# Patient Record
Sex: FEMALE | Race: WHITE | Employment: OTHER | URBAN - METROPOLITAN AREA
[De-identification: names, ages, dates, MRNs, and addresses within clinical notes are randomized per-mention and may not be internally consistent; named-entity substitution may affect disease eponyms.]

---

## 2021-10-12 ENCOUNTER — APPOINTMENT (OUTPATIENT)
Dept: GENERAL RADIOLOGY | Facility: CLINIC | Age: 76
DRG: 291 | End: 2021-10-12
Payer: MEDICARE

## 2021-10-12 ENCOUNTER — HOSPITAL ENCOUNTER (INPATIENT)
Age: 76
LOS: 23 days | Discharge: NURSING, REHAB OR LONG-TERM CARE | DRG: 291 | End: 2021-11-04
Attending: EMERGENCY MEDICINE | Admitting: INTERNAL MEDICINE
Payer: MEDICARE

## 2021-10-12 DIAGNOSIS — R09.02 HYPOXIA: Primary | ICD-10-CM

## 2021-10-12 DIAGNOSIS — D64.9 ANEMIA, UNSPECIFIED TYPE: ICD-10-CM

## 2021-10-12 DIAGNOSIS — R60.9 PERIPHERAL EDEMA: ICD-10-CM

## 2021-10-12 DIAGNOSIS — J90 PLEURAL EFFUSION ON RIGHT: ICD-10-CM

## 2021-10-12 DIAGNOSIS — N17.9 ACUTE KIDNEY INJURY (HCC): ICD-10-CM

## 2021-10-12 PROBLEM — N18.4 ACUTE RENAL FAILURE SUPERIMPOSED ON STAGE 4 CHRONIC KIDNEY DISEASE (HCC): Status: ACTIVE | Noted: 2021-10-12

## 2021-10-12 PROBLEM — D50.9 IRON DEFICIENCY ANEMIA: Status: ACTIVE | Noted: 2021-10-12

## 2021-10-12 PROBLEM — R53.1 GENERAL WEAKNESS: Status: ACTIVE | Noted: 2021-10-12

## 2021-10-12 PROBLEM — I10 ESSENTIAL HYPERTENSION: Status: ACTIVE | Noted: 2021-10-12

## 2021-10-12 LAB
ABSOLUTE EOS #: 0.27 K/UL (ref 0–0.4)
ABSOLUTE IMMATURE GRANULOCYTE: ABNORMAL K/UL (ref 0–0.3)
ABSOLUTE LYMPH #: 0.53 K/UL (ref 1–4.8)
ABSOLUTE MONO #: 0.8 K/UL (ref 0.1–0.8)
ALBUMIN SERPL-MCNC: 3.3 G/DL (ref 3.5–5.2)
ALBUMIN/GLOBULIN RATIO: 0.9 (ref 1–2.5)
ALP BLD-CCNC: 149 U/L (ref 35–104)
ALT SERPL-CCNC: 6 U/L (ref 5–33)
ANION GAP SERPL CALCULATED.3IONS-SCNC: 14 MMOL/L (ref 9–17)
AST SERPL-CCNC: 10 U/L
BASOPHILS # BLD: 0 % (ref 0–2)
BASOPHILS ABSOLUTE: 0 K/UL (ref 0–0.2)
BILIRUB SERPL-MCNC: 0.2 MG/DL (ref 0.3–1.2)
BILIRUBIN DIRECT: 0.1 MG/DL
BILIRUBIN URINE: NEGATIVE
BILIRUBIN, INDIRECT: 0.1 MG/DL (ref 0–1)
BNP INTERPRETATION: ABNORMAL
BUN BLDV-MCNC: 54 MG/DL (ref 8–23)
BUN/CREAT BLD: ABNORMAL (ref 9–20)
CALCIUM SERPL-MCNC: 8.3 MG/DL (ref 8.6–10.4)
CHLORIDE BLD-SCNC: 102 MMOL/L (ref 98–107)
CO2: 20 MMOL/L (ref 20–31)
COLOR: YELLOW
COMMENT UA: NORMAL
CREAT SERPL-MCNC: 3.1 MG/DL (ref 0.5–0.9)
D-DIMER QUANTITATIVE: 2.38 MG/L FEU
DIFFERENTIAL TYPE: ABNORMAL
EOSINOPHILS RELATIVE PERCENT: 3 % (ref 1–4)
GFR AFRICAN AMERICAN: 18 ML/MIN
GFR NON-AFRICAN AMERICAN: 15 ML/MIN
GFR SERPL CREATININE-BSD FRML MDRD: ABNORMAL ML/MIN/{1.73_M2}
GFR SERPL CREATININE-BSD FRML MDRD: ABNORMAL ML/MIN/{1.73_M2}
GLOBULIN: ABNORMAL G/DL (ref 1.5–3.8)
GLUCOSE BLD-MCNC: 94 MG/DL (ref 70–99)
GLUCOSE URINE: NEGATIVE
HCT VFR BLD CALC: 29.7 % (ref 36–46)
HEMOGLOBIN: 8.8 G/DL (ref 12–16)
IMMATURE GRANULOCYTES: ABNORMAL %
INR BLD: 1
KETONES, URINE: NEGATIVE
LEUKOCYTE ESTERASE, URINE: NEGATIVE
LYMPHOCYTES # BLD: 6 % (ref 24–44)
MCH RBC QN AUTO: 24.8 PG (ref 26–34)
MCHC RBC AUTO-ENTMCNC: 29.7 G/DL (ref 31–37)
MCV RBC AUTO: 83.3 FL (ref 80–100)
MONOCYTES # BLD: 9 % (ref 1–7)
MORPHOLOGY: ABNORMAL
MORPHOLOGY: ABNORMAL
NITRITE, URINE: NEGATIVE
NRBC AUTOMATED: ABNORMAL PER 100 WBC
OSMOLALITY URINE: 347 MOSM/KG (ref 300–1170)
PDW BLD-RTO: 20.7 % (ref 12.5–15.4)
PH UA: 5.5 (ref 5–8)
PLATELET # BLD: 426 K/UL (ref 140–450)
PLATELET ESTIMATE: ABNORMAL
PMV BLD AUTO: 7.8 FL (ref 6–12)
POTASSIUM SERPL-SCNC: 5.1 MMOL/L (ref 3.7–5.3)
PRO-BNP: 5650 PG/ML
PROTEIN UA: NEGATIVE
PROTHROMBIN TIME: 13.4 SEC (ref 11.5–14.2)
RBC # BLD: 3.57 M/UL (ref 4–5.2)
RBC # BLD: ABNORMAL 10*6/UL
SARS-COV-2, RAPID: NOT DETECTED
SEG NEUTROPHILS: 82 % (ref 36–66)
SEGMENTED NEUTROPHILS ABSOLUTE COUNT: 7.3 K/UL (ref 1.8–7.7)
SODIUM BLD-SCNC: 136 MMOL/L (ref 135–144)
SPECIFIC GRAVITY UA: 1.02 (ref 1–1.03)
SPECIMEN DESCRIPTION: NORMAL
TOTAL PROTEIN: 6.8 G/DL (ref 6.4–8.3)
TROPONIN INTERP: ABNORMAL
TROPONIN T: ABNORMAL NG/ML
TROPONIN, HIGH SENSITIVITY: 30 NG/L (ref 0–14)
TURBIDITY: CLEAR
URINE HGB: NEGATIVE
UROBILINOGEN, URINE: NORMAL
WBC # BLD: 8.9 K/UL (ref 3.5–11)
WBC # BLD: ABNORMAL 10*3/UL

## 2021-10-12 PROCEDURE — 84300 ASSAY OF URINE SODIUM: CPT

## 2021-10-12 PROCEDURE — 80048 BASIC METABOLIC PNL TOTAL CA: CPT

## 2021-10-12 PROCEDURE — 81003 URINALYSIS AUTO W/O SCOPE: CPT

## 2021-10-12 PROCEDURE — 99285 EMERGENCY DEPT VISIT HI MDM: CPT

## 2021-10-12 PROCEDURE — 99222 1ST HOSP IP/OBS MODERATE 55: CPT | Performed by: NURSE PRACTITIONER

## 2021-10-12 PROCEDURE — APPNB30 APP NON BILLABLE TIME 0-30 MINS: Performed by: NURSE PRACTITIONER

## 2021-10-12 PROCEDURE — 83880 ASSAY OF NATRIURETIC PEPTIDE: CPT

## 2021-10-12 PROCEDURE — 87040 BLOOD CULTURE FOR BACTERIA: CPT

## 2021-10-12 PROCEDURE — 85025 COMPLETE CBC W/AUTO DIFF WBC: CPT

## 2021-10-12 PROCEDURE — 84156 ASSAY OF PROTEIN URINE: CPT

## 2021-10-12 PROCEDURE — 36415 COLL VENOUS BLD VENIPUNCTURE: CPT

## 2021-10-12 PROCEDURE — 2060000000 HC ICU INTERMEDIATE R&B

## 2021-10-12 PROCEDURE — 51702 INSERT TEMP BLADDER CATH: CPT

## 2021-10-12 PROCEDURE — 93005 ELECTROCARDIOGRAM TRACING: CPT | Performed by: EMERGENCY MEDICINE

## 2021-10-12 PROCEDURE — 80076 HEPATIC FUNCTION PANEL: CPT

## 2021-10-12 PROCEDURE — 2580000003 HC RX 258: Performed by: NURSE PRACTITIONER

## 2021-10-12 PROCEDURE — 83935 ASSAY OF URINE OSMOLALITY: CPT

## 2021-10-12 PROCEDURE — 87635 SARS-COV-2 COVID-19 AMP PRB: CPT

## 2021-10-12 PROCEDURE — 71045 X-RAY EXAM CHEST 1 VIEW: CPT

## 2021-10-12 PROCEDURE — 84484 ASSAY OF TROPONIN QUANT: CPT

## 2021-10-12 PROCEDURE — 85610 PROTHROMBIN TIME: CPT

## 2021-10-12 PROCEDURE — 85379 FIBRIN DEGRADATION QUANT: CPT

## 2021-10-12 RX ORDER — SODIUM CHLORIDE 0.9 % (FLUSH) 0.9 %
5-40 SYRINGE (ML) INJECTION EVERY 12 HOURS SCHEDULED
Status: DISCONTINUED | OUTPATIENT
Start: 2021-10-12 | End: 2021-10-13 | Stop reason: SDUPTHER

## 2021-10-12 RX ORDER — ONDANSETRON 2 MG/ML
4 INJECTION INTRAMUSCULAR; INTRAVENOUS EVERY 6 HOURS PRN
Status: DISCONTINUED | OUTPATIENT
Start: 2021-10-12 | End: 2021-11-04 | Stop reason: HOSPADM

## 2021-10-12 RX ORDER — ONDANSETRON 4 MG/1
4 TABLET, ORALLY DISINTEGRATING ORAL EVERY 8 HOURS PRN
Status: DISCONTINUED | OUTPATIENT
Start: 2021-10-12 | End: 2021-11-04 | Stop reason: HOSPADM

## 2021-10-12 RX ORDER — ACETAMINOPHEN 650 MG/1
650 SUPPOSITORY RECTAL EVERY 6 HOURS PRN
Status: DISCONTINUED | OUTPATIENT
Start: 2021-10-12 | End: 2021-11-04 | Stop reason: HOSPADM

## 2021-10-12 RX ORDER — ACETAMINOPHEN 325 MG/1
650 TABLET ORAL EVERY 6 HOURS PRN
Status: DISCONTINUED | OUTPATIENT
Start: 2021-10-12 | End: 2021-11-04 | Stop reason: HOSPADM

## 2021-10-12 RX ORDER — HEPARIN SODIUM 5000 [USP'U]/ML
5000 INJECTION, SOLUTION INTRAVENOUS; SUBCUTANEOUS EVERY 8 HOURS SCHEDULED
Status: DISCONTINUED | OUTPATIENT
Start: 2021-10-13 | End: 2021-10-21

## 2021-10-12 RX ORDER — SODIUM POLYSTYRENE SULFONATE 15 G/60ML
15 SUSPENSION ORAL; RECTAL
Status: ACTIVE | OUTPATIENT
Start: 2021-10-12 | End: 2021-10-12

## 2021-10-12 RX ORDER — SODIUM CHLORIDE 0.9 % (FLUSH) 0.9 %
5-40 SYRINGE (ML) INJECTION PRN
Status: DISCONTINUED | OUTPATIENT
Start: 2021-10-12 | End: 2021-10-13 | Stop reason: SDUPTHER

## 2021-10-12 RX ORDER — SODIUM CHLORIDE 9 MG/ML
25 INJECTION, SOLUTION INTRAVENOUS PRN
Status: DISCONTINUED | OUTPATIENT
Start: 2021-10-12 | End: 2021-10-13 | Stop reason: SDUPTHER

## 2021-10-12 RX ADMIN — SODIUM CHLORIDE, PRESERVATIVE FREE 10 ML: 5 INJECTION INTRAVENOUS at 22:42

## 2021-10-12 ASSESSMENT — ENCOUNTER SYMPTOMS
COUGH: 0
ABDOMINAL PAIN: 0
COLOR CHANGE: 0
VOMITING: 0
NAUSEA: 0
SORE THROAT: 0
SINUS PRESSURE: 0
DIARRHEA: 1
SHORTNESS OF BREATH: 0

## 2021-10-12 ASSESSMENT — PAIN SCALES - GENERAL: PAINLEVEL_OUTOF10: 0

## 2021-10-12 NOTE — ED NOTES
Pt and family updated on room assignment and transport ETA. Pt given a couple of ice chips to wet mouth. Pt boosted in bed and provided with pillow.       Faustina Horton RN  10/12/21 6719

## 2021-10-12 NOTE — ED NOTES
RN called [facility] to state to check their equipment d/t their report of saturation 97% when pt saturation was 45%. Spoke with Yloy Sandoval, she states she will pass the information along to who it may concern.         Norman Mclaughlin RN  10/12/21 9162

## 2021-10-12 NOTE — ED PROVIDER NOTES
[address]      Pt Name: Stephen Mcleod  MRN: [id number]  [place] 1945  Date of evaluation: 10/12/2021      CHIEF COMPLAINT       Chief Complaint   Patient presents with    Respiratory Distress     EMS reported 97% on room air; pt arrives with saturation 45% on room air    Failure To Thrive         HISTORY OF PRESENT ILLNESS      The patient presents to the emergency department via ambulance for reported failure to thrive. The patient lives at home. Her son is with her and says that she has had decreased energy and ability to get around. She is not opposed to going to a nursing home at this point. The patient does report immunization against Covid. She has had diarrhea for about 3 days. She was brought in, in a hypoxic state. The patient denies chest pain. She denies headache. She does feel generally weak. She has peripheral edema but did not really know what that was an ongoing issue for her. REVIEW OF SYSTEMS       All systems reviewed and negative unless noted in HPI. The patient denies fever or constitutional symptoms. Denies vision change. Denies any sore throat or rhinorrhea. Denies any neck pain or stiffness. Denies chest pain. Hypoxia today. Recent diarrhea. Denies abdominal pain. Denies any dysuria. Denies urinary frequency or hematuria. Denies musculoskeletal injury or pain. Denies any weakness, numbness or focal neurologic deficit. Generalized weakness and failure to thrive. Ongoing edema without diuretics. No recent psychiatric issues. No easy bruising or bleeding. Denies any polyuria, polydypsia or history of immunocompromise. PAST MEDICAL HISTORY    has no past medical history on file. SURGICAL HISTORY      has no past surgical history on file. CURRENT MEDICATIONS       Previous Medications    No medications on file       ALLERGIES     is allergic to penicillins.     FAMILY HISTORY Impression:    Significant right pleural effusion and associated airspace disease.  Mild   left basilar airspace disease.  Pneumonia cannot be excluded. Narrative:    EXAMINATION:   ONE XRAY VIEW OF THE CHEST     10/12/2021 3:55 pm     COMPARISON:   None. HISTORY:   ORDERING SYSTEM PROVIDED HISTORY: dyspnea   TECHNOLOGIST PROVIDED HISTORY:   dyspnea   Reason for Exam: respiratory distress   Acuity: Acute   Type of Exam: Initial     FINDINGS:   Right cardiomediastinal border is obscured.  No definite cardiomegaly. Calcifications noted of the aortic arch.  Moderate to large right pleural   effusion and associated airspace disease.  Mild left basilar airspace   disease.  Trace left pleural effusion cannot be excluded.  No pneumothorax or   subdiaphragmatic free air.  No acute osseous abnormality identified. LABS:  Results for orders placed or performed during the hospital encounter of 10/12/21   COVID-19, Rapid    Specimen: Nasopharyngeal Swab   Result Value Ref Range    Specimen Description . NASOPHARYNGEAL SWAB     SARS-CoV-2, Rapid Not Detected Not Detected   CBC Auto Differential   Result Value Ref Range    WBC 8.9 3.5 - 11.0 k/uL    RBC 3.57 (L) 4.0 - 5.2 m/uL    Hemoglobin 8.8 (L) 12.0 - 16.0 g/dL    Hematocrit 29.7 (L) 36 - 46 %    MCV 83.3 80 - 100 fL    MCH 24.8 (L) 26 - 34 pg    MCHC 29.7 (L) 31 - 37 g/dL    RDW 20.7 (H) 12.5 - 15.4 %    Platelets 077 200 - 881 k/uL    MPV 7.8 6.0 - 12.0 fL    NRBC Automated NOT REPORTED per 100 WBC    Differential Type NOT REPORTED     Immature Granulocytes NOT REPORTED 0 %    Absolute Immature Granulocyte NOT REPORTED 0.00 - 0.30 k/uL    WBC Morphology NOT REPORTED     RBC Morphology NOT REPORTED     Platelet Estimate NOT REPORTED     Seg Neutrophils 82 (H) 36 - 66 %    Lymphocytes 6 (L) 24 - 44 %    Monocytes 9 (H) 1 - 7 %    Eosinophils % 3 1 - 4 %    Basophils 0 0 - 2 %    Segs Absolute 7.30 1.8 - 7.7 k/uL    Absolute Lymph # 0.53 (L) 1.0 - 4.8 k/uL    Absolute Mono # 0.80 0.1 - 0.8 k/uL    Absolute Eos # 0.27 0.0 - 0.4 k/uL    Basophils Absolute 0.00 0.0 - 0.2 k/uL    Morphology ANISOCYTOSIS PRESENT     Morphology HYPOCHROMIA PRESENT    Basic Metabolic Panel   Result Value Ref Range    Glucose 94 70 - 99 mg/dL    BUN 54 (H) 8 - 23 mg/dL    CREATININE 3.10 (H) 0.50 - 0.90 mg/dL    Bun/Cre Ratio NOT REPORTED 9 - 20    Calcium 8.3 (L) 8.6 - 10.4 mg/dL    Sodium 136 135 - 144 mmol/L    Potassium 5.1 3.7 - 5.3 mmol/L    Chloride 102 98 - 107 mmol/L    CO2 20 20 - 31 mmol/L    Anion Gap 14 9 - 17 mmol/L    GFR Non-African American 15 (L) >60 mL/min    GFR  18 (L) >60 mL/min    GFR Comment          GFR Staging NOT REPORTED    Hepatic Function Panel   Result Value Ref Range    Albumin 3.3 (L) 3.5 - 5.2 g/dL    Alkaline Phosphatase 149 (H) 35 - 104 U/L    ALT 6 5 - 33 U/L    AST 10 <32 U/L    Total Bilirubin 0.20 (L) 0.3 - 1.2 mg/dL    Bilirubin, Direct 0.10 <0.31 mg/dL    Bilirubin, Indirect 0.10 0.00 - 1.00 mg/dL    Total Protein 6.8 6.4 - 8.3 g/dL    Globulin NOT REPORTED 1.5 - 3.8 g/dL    Albumin/Globulin Ratio 0.9 (L) 1.0 - 2.5   Troponin   Result Value Ref Range    Troponin, High Sensitivity 30 (H) 0 - 14 ng/L    Troponin T NOT REPORTED <0.03 ng/mL    Troponin Interp NOT REPORTED    D-Dimer, Quantitative   Result Value Ref Range    D-Dimer, Quant 2.38 mg/L FEU   Brain Natriuretic Peptide   Result Value Ref Range    Pro-BNP 5,650 (H) <300 pg/mL    BNP Interpretation Pro-BNP Reference Range:          EMERGENCY DEPARTMENT COURSE:   Vitals:    Vitals:    10/12/21 1506 10/12/21 1527 10/12/21 1604 10/12/21 1805   BP: (!) 119/57  (!) 129/56 (!) 120/58   Pulse: 83 80 81 80   Resp: 21 18 17 16   Temp: 98 °F (36.7 °C)      TempSrc: Oral      SpO2: 96% 95% 95% 98%   Weight: 63 kg (139 lb)        -------------------------  BP: (!) 120/58, Temp: 98 °F (36.7 °C), Pulse: 80, Resp: 16      Re-evaluation Notes    The patient responded well to supplemental oxygen. She has a right pleural effusion and pedal edema with acute kidney injury, which makes diuresis more risky. I have arranged for her to go to [facility] for further evaluation. She will receive a consultation from the nephrologist.  The patient is admitted to the hospitalist service. She is transferred via ground ambulance in stable condition. CRITICAL CARE:     Critical Care: The high probability of sudden, clinically significant deterioration in the patient's condition required the highest level of my preparedness to intervene urgently. ? The services I provided to this patient were to treat and/or prevent clinically significant deterioration. Services included the following: chart data review, reviewing nursing notes and/or old charts, documentation time, consultant collaboration regarding findings and treatment options, medication orders and management, direct patient care, vital sign assessments and ordering, interpreting and reviewing diagnostic studies/lab tests. ?  Aggregate critical care time includes only time during which I was engaged in work directly related to the patient's care, as described above, whether at the bedside or elsewhere in the Emergency Department. It did not include time spent performing other reported procedures or the services of residents, students, nurses, nurse practitioners or physician assistants. ?  Critical Care: 30 minutes. Management of hypoxia, edema, and acute kidney injury. CONSULTS:    [address] contacted for transfer to [address] [place], [place] [address]ist requested. [phone]  Discussed with Lori from [facility]. FINAL IMPRESSION      1. Hypoxia    2. Pleural effusion on right    3. Peripheral edema    4. Anemia, unspecified type    5.  Acute kidney injury ([facility] [address]          DISPOSITION/PLAN   DISPOSITION        Condition on Disposition    stable    PATIENT REFERRED TO:  No follow-up provider specified.     DISCHARGE MEDICATIONS:  New Prescriptions    No medications on file       (Please note that portions of this note were completed with a voice recognition program.  Efforts were made to edit the dictations but occasionally words are mis-transcribed.)    Ama Fatima MD,, MD   Attending Emergency Physician       Brandin Frye MD  10/12/21 5396

## 2021-10-13 ENCOUNTER — APPOINTMENT (OUTPATIENT)
Dept: GENERAL RADIOLOGY | Age: 76
DRG: 291 | End: 2021-10-13
Payer: MEDICARE

## 2021-10-13 ENCOUNTER — APPOINTMENT (OUTPATIENT)
Dept: NUCLEAR MEDICINE | Age: 76
DRG: 291 | End: 2021-10-13
Payer: MEDICARE

## 2021-10-13 ENCOUNTER — APPOINTMENT (OUTPATIENT)
Dept: ULTRASOUND IMAGING | Age: 76
DRG: 291 | End: 2021-10-13
Payer: MEDICARE

## 2021-10-13 ENCOUNTER — APPOINTMENT (OUTPATIENT)
Dept: INTERVENTIONAL RADIOLOGY/VASCULAR | Age: 76
DRG: 291 | End: 2021-10-13
Payer: MEDICARE

## 2021-10-13 PROBLEM — I50.31 ACUTE DIASTOLIC HEART FAILURE (HCC): Status: ACTIVE | Noted: 2021-10-13

## 2021-10-13 PROBLEM — E43 SEVERE MALNUTRITION (HCC): Status: ACTIVE | Noted: 2021-10-13

## 2021-10-13 PROBLEM — E44.0 MODERATE PROTEIN-CALORIE MALNUTRITION (HCC): Status: ACTIVE | Noted: 2021-10-13

## 2021-10-13 PROBLEM — E55.9 VITAMIN D DEFICIENCY: Status: ACTIVE | Noted: 2021-10-13

## 2021-10-13 LAB
-: ABNORMAL
ALBUMIN SERPL-MCNC: 3.2 G/DL (ref 3.5–5.2)
ALBUMIN/GLOBULIN RATIO: ABNORMAL (ref 1–2.5)
ALP BLD-CCNC: 146 U/L (ref 35–104)
ALT SERPL-CCNC: 9 U/L (ref 5–33)
AMORPHOUS: ABNORMAL
ANION GAP SERPL CALCULATED.3IONS-SCNC: 12 MMOL/L (ref 9–17)
ANION GAP SERPL CALCULATED.3IONS-SCNC: 12 MMOL/L (ref 9–17)
AST SERPL-CCNC: 10 U/L
BACTERIA: ABNORMAL
BILIRUB SERPL-MCNC: 0.2 MG/DL (ref 0.3–1.2)
BILIRUBIN URINE: NEGATIVE
BUN BLDV-MCNC: 54 MG/DL (ref 8–23)
BUN BLDV-MCNC: 54 MG/DL (ref 8–23)
BUN/CREAT BLD: 18 (ref 9–20)
BUN/CREAT BLD: 19 (ref 9–20)
CALCIUM SERPL-MCNC: 7.8 MG/DL (ref 8.6–10.4)
CALCIUM SERPL-MCNC: 8 MG/DL (ref 8.6–10.4)
CASE NUMBER:: NORMAL
CASTS UA: ABNORMAL /LPF
CHLORIDE BLD-SCNC: 106 MMOL/L (ref 98–107)
CHLORIDE BLD-SCNC: 107 MMOL/L (ref 98–107)
CHLORIDE, UR: 60 MMOL/L
CO2: 19 MMOL/L (ref 20–31)
CO2: 21 MMOL/L (ref 20–31)
COLOR: YELLOW
COMMENT UA: ABNORMAL
CORTISOL COLLECTION INFO: ABNORMAL
CORTISOL: 21.8 UG/DL (ref 2.7–18.4)
CREAT SERPL-MCNC: 2.86 MG/DL (ref 0.5–0.9)
CREAT SERPL-MCNC: 3.01 MG/DL (ref 0.5–0.9)
CREATININE URINE: 76 MG/DL (ref 28–217)
CRYSTALS, UA: ABNORMAL /HPF
EKG ATRIAL RATE: 79 BPM
EKG P AXIS: 27 DEGREES
EKG P-R INTERVAL: 98 MS
EKG Q-T INTERVAL: 386 MS
EKG QRS DURATION: 74 MS
EKG QTC CALCULATION (BAZETT): 442 MS
EKG R AXIS: 28 DEGREES
EKG T AXIS: 146 DEGREES
EKG VENTRICULAR RATE: 79 BPM
EPITHELIAL CELLS UA: ABNORMAL /HPF (ref 0–5)
FERRITIN: 20 UG/L (ref 13–150)
FOLATE: 12.3 NG/ML
GFR AFRICAN AMERICAN: 18 ML/MIN
GFR AFRICAN AMERICAN: 19 ML/MIN
GFR NON-AFRICAN AMERICAN: 15 ML/MIN
GFR NON-AFRICAN AMERICAN: 16 ML/MIN
GFR SERPL CREATININE-BSD FRML MDRD: ABNORMAL ML/MIN/{1.73_M2}
GLUCOSE BLD-MCNC: 56 MG/DL (ref 70–99)
GLUCOSE BLD-MCNC: 60 MG/DL (ref 65–105)
GLUCOSE BLD-MCNC: 77 MG/DL (ref 65–105)
GLUCOSE BLD-MCNC: 78 MG/DL (ref 65–105)
GLUCOSE BLD-MCNC: 85 MG/DL (ref 70–99)
GLUCOSE BLD-MCNC: 88 MG/DL (ref 65–105)
GLUCOSE URINE: NEGATIVE
HCT VFR BLD CALC: 30.5 % (ref 36.3–47.1)
HEMOGLOBIN: 8.5 G/DL (ref 11.9–15.1)
IRON SATURATION: 9 % (ref 20–55)
IRON: 22 UG/DL (ref 37–145)
KETONES, URINE: NEGATIVE
LACTATE DEHYDROGENASE: 197 U/L (ref 135–214)
LEUKOCYTE ESTERASE, URINE: ABNORMAL
LV EF: 65 %
LVEF MODALITY: NORMAL
MAGNESIUM: 3.1 MG/DL (ref 1.6–2.6)
MCH RBC QN AUTO: 24.1 PG (ref 25.2–33.5)
MCHC RBC AUTO-ENTMCNC: 27.9 G/DL (ref 28.4–34.8)
MCV RBC AUTO: 86.6 FL (ref 82.6–102.9)
MUCUS: ABNORMAL
NITRITE, URINE: NEGATIVE
NRBC AUTOMATED: 0 PER 100 WBC
OTHER OBSERVATIONS UA: ABNORMAL
PDW BLD-RTO: 19.3 % (ref 11.8–14.4)
PH FLUID: 7.5
PH UA: 5.5 (ref 5–8)
PLATELET # BLD: 376 K/UL (ref 138–453)
PMV BLD AUTO: 10.2 FL (ref 8.1–13.5)
POTASSIUM SERPL-SCNC: 5 MMOL/L (ref 3.7–5.3)
POTASSIUM SERPL-SCNC: 5.7 MMOL/L (ref 3.7–5.3)
PROTEIN UA: NEGATIVE
RBC # BLD: 3.52 M/UL (ref 3.95–5.11)
RBC UA: ABNORMAL /HPF (ref 0–2)
RENAL EPITHELIAL, UA: ABNORMAL /HPF
SODIUM BLD-SCNC: 138 MMOL/L (ref 135–144)
SODIUM BLD-SCNC: 139 MMOL/L (ref 135–144)
SODIUM,UR: 40 MMOL/L
SODIUM,UR: <20 MMOL/L
SPECIFIC GRAVITY UA: 1.02 (ref 1–1.03)
SPECIMEN DESCRIPTION: NORMAL
SPECIMEN TYPE: NORMAL
SPECIMEN TYPE: NORMAL
TOTAL IRON BINDING CAPACITY: 237 UG/DL (ref 250–450)
TOTAL PROTEIN, BODY FLUID: 2.7 G/DL
TOTAL PROTEIN, URINE: 11 MG/DL
TOTAL PROTEIN, URINE: 19 MG/DL
TOTAL PROTEIN: 6 G/DL (ref 6.4–8.3)
TOTAL PROTEIN: 6.3 G/DL (ref 6.4–8.3)
TRICHOMONAS: ABNORMAL
TURBIDITY: ABNORMAL
UNSATURATED IRON BINDING CAPACITY: 215 UG/DL (ref 112–347)
URINE HGB: ABNORMAL
URINE TOTAL PROTEIN CREATININE RATIO: 0.25 (ref 0–0.2)
UROBILINOGEN, URINE: NORMAL
VITAMIN B-12: 1647 PG/ML (ref 232–1245)
VITAMIN D 25-HYDROXY: <5 NG/ML (ref 30–100)
WBC # BLD: 10 K/UL (ref 3.5–11.3)
WBC UA: ABNORMAL /HPF (ref 0–5)
YEAST: ABNORMAL

## 2021-10-13 PROCEDURE — 3430000000 HC RX DIAGNOSTIC RADIOPHARMACEUTICAL: Performed by: NURSE PRACTITIONER

## 2021-10-13 PROCEDURE — 83615 LACTATE (LD) (LDH) ENZYME: CPT

## 2021-10-13 PROCEDURE — 6360000002 HC RX W HCPCS: Performed by: INTERNAL MEDICINE

## 2021-10-13 PROCEDURE — 81001 URINALYSIS AUTO W/SCOPE: CPT

## 2021-10-13 PROCEDURE — 93306 TTE W/DOPPLER COMPLETE: CPT

## 2021-10-13 PROCEDURE — 84155 ASSAY OF PROTEIN SERUM: CPT

## 2021-10-13 PROCEDURE — 80053 COMPREHEN METABOLIC PANEL: CPT

## 2021-10-13 PROCEDURE — 78580 LUNG PERFUSION IMAGING: CPT

## 2021-10-13 PROCEDURE — 88305 TISSUE EXAM BY PATHOLOGIST: CPT

## 2021-10-13 PROCEDURE — 80048 BASIC METABOLIC PNL TOTAL CA: CPT

## 2021-10-13 PROCEDURE — 84300 ASSAY OF URINE SODIUM: CPT

## 2021-10-13 PROCEDURE — 83735 ASSAY OF MAGNESIUM: CPT

## 2021-10-13 PROCEDURE — 87206 SMEAR FLUORESCENT/ACID STAI: CPT

## 2021-10-13 PROCEDURE — 82306 VITAMIN D 25 HYDROXY: CPT

## 2021-10-13 PROCEDURE — 82570 ASSAY OF URINE CREATININE: CPT

## 2021-10-13 PROCEDURE — 82607 VITAMIN B-12: CPT

## 2021-10-13 PROCEDURE — 82436 ASSAY OF URINE CHLORIDE: CPT

## 2021-10-13 PROCEDURE — 71045 X-RAY EXAM CHEST 1 VIEW: CPT

## 2021-10-13 PROCEDURE — 83550 IRON BINDING TEST: CPT

## 2021-10-13 PROCEDURE — 82947 ASSAY GLUCOSE BLOOD QUANT: CPT

## 2021-10-13 PROCEDURE — 88112 CYTOPATH CELL ENHANCE TECH: CPT

## 2021-10-13 PROCEDURE — 6370000000 HC RX 637 (ALT 250 FOR IP): Performed by: NURSE PRACTITIONER

## 2021-10-13 PROCEDURE — 82533 TOTAL CORTISOL: CPT

## 2021-10-13 PROCEDURE — 2500000003 HC RX 250 WO HCPCS: Performed by: STUDENT IN AN ORGANIZED HEALTH CARE EDUCATION/TRAINING PROGRAM

## 2021-10-13 PROCEDURE — 87102 FUNGUS ISOLATION CULTURE: CPT

## 2021-10-13 PROCEDURE — 94761 N-INVAS EAR/PLS OXIMETRY MLT: CPT

## 2021-10-13 PROCEDURE — 32555 ASPIRATE PLEURA W/ IMAGING: CPT | Performed by: RADIOLOGY

## 2021-10-13 PROCEDURE — 87205 SMEAR GRAM STAIN: CPT

## 2021-10-13 PROCEDURE — 89051 BODY FLUID CELL COUNT: CPT

## 2021-10-13 PROCEDURE — 6360000002 HC RX W HCPCS: Performed by: STUDENT IN AN ORGANIZED HEALTH CARE EDUCATION/TRAINING PROGRAM

## 2021-10-13 PROCEDURE — 6360000002 HC RX W HCPCS: Performed by: NURSE PRACTITIONER

## 2021-10-13 PROCEDURE — 2060000000 HC ICU INTERMEDIATE R&B

## 2021-10-13 PROCEDURE — 2580000003 HC RX 258: Performed by: STUDENT IN AN ORGANIZED HEALTH CARE EDUCATION/TRAINING PROGRAM

## 2021-10-13 PROCEDURE — 87075 CULTR BACTERIA EXCEPT BLOOD: CPT

## 2021-10-13 PROCEDURE — 82746 ASSAY OF FOLIC ACID SERUM: CPT

## 2021-10-13 PROCEDURE — 84156 ASSAY OF PROTEIN URINE: CPT

## 2021-10-13 PROCEDURE — A9540 TC99M MAA: HCPCS | Performed by: NURSE PRACTITIONER

## 2021-10-13 PROCEDURE — 2709999900 IR GUIDED THORACENTESIS PLEURAL

## 2021-10-13 PROCEDURE — 76770 US EXAM ABDO BACK WALL COMP: CPT

## 2021-10-13 PROCEDURE — 99233 SBSQ HOSP IP/OBS HIGH 50: CPT | Performed by: STUDENT IN AN ORGANIZED HEALTH CARE EDUCATION/TRAINING PROGRAM

## 2021-10-13 PROCEDURE — 2700000000 HC OXYGEN THERAPY PER DAY

## 2021-10-13 PROCEDURE — 87015 SPECIMEN INFECT AGNT CONCNTJ: CPT

## 2021-10-13 PROCEDURE — 83986 ASSAY PH BODY FLUID NOS: CPT

## 2021-10-13 PROCEDURE — 84157 ASSAY OF PROTEIN OTHER: CPT

## 2021-10-13 PROCEDURE — 6370000000 HC RX 637 (ALT 250 FOR IP): Performed by: STUDENT IN AN ORGANIZED HEALTH CARE EDUCATION/TRAINING PROGRAM

## 2021-10-13 PROCEDURE — 82945 GLUCOSE OTHER FLUID: CPT

## 2021-10-13 PROCEDURE — 82728 ASSAY OF FERRITIN: CPT

## 2021-10-13 PROCEDURE — 87070 CULTURE OTHR SPECIMN AEROBIC: CPT

## 2021-10-13 PROCEDURE — 85027 COMPLETE CBC AUTOMATED: CPT

## 2021-10-13 PROCEDURE — 87116 MYCOBACTERIA CULTURE: CPT

## 2021-10-13 PROCEDURE — APPSS60 APP SPLIT SHARED TIME 46-60 MINUTES: Performed by: NURSE PRACTITIONER

## 2021-10-13 PROCEDURE — 0W993ZZ DRAINAGE OF RIGHT PLEURAL CAVITY, PERCUTANEOUS APPROACH: ICD-10-PCS | Performed by: RADIOLOGY

## 2021-10-13 PROCEDURE — 36415 COLL VENOUS BLD VENIPUNCTURE: CPT

## 2021-10-13 PROCEDURE — 83540 ASSAY OF IRON: CPT

## 2021-10-13 RX ORDER — SODIUM CHLORIDE 0.9 % (FLUSH) 0.9 %
5-40 SYRINGE (ML) INJECTION PRN
Status: DISCONTINUED | OUTPATIENT
Start: 2021-10-13 | End: 2021-11-04 | Stop reason: HOSPADM

## 2021-10-13 RX ORDER — FUROSEMIDE 10 MG/ML
40 INJECTION INTRAMUSCULAR; INTRAVENOUS ONCE
Status: COMPLETED | OUTPATIENT
Start: 2021-10-13 | End: 2021-10-13

## 2021-10-13 RX ORDER — ERGOCALCIFEROL 1.25 MG/1
50000 CAPSULE ORAL WEEKLY
Status: DISCONTINUED | OUTPATIENT
Start: 2021-10-13 | End: 2021-11-04 | Stop reason: HOSPADM

## 2021-10-13 RX ORDER — DEXTROSE MONOHYDRATE 25 G/50ML
12.5 INJECTION, SOLUTION INTRAVENOUS PRN
Status: DISCONTINUED | OUTPATIENT
Start: 2021-10-13 | End: 2021-11-04 | Stop reason: HOSPADM

## 2021-10-13 RX ORDER — DEXTROSE MONOHYDRATE 50 MG/ML
100 INJECTION, SOLUTION INTRAVENOUS PRN
Status: DISCONTINUED | OUTPATIENT
Start: 2021-10-13 | End: 2021-11-04 | Stop reason: HOSPADM

## 2021-10-13 RX ORDER — SODIUM CHLORIDE 9 MG/ML
25 INJECTION, SOLUTION INTRAVENOUS PRN
Status: DISCONTINUED | OUTPATIENT
Start: 2021-10-13 | End: 2021-11-04 | Stop reason: HOSPADM

## 2021-10-13 RX ORDER — FOLIC ACID 1 MG/1
1 TABLET ORAL DAILY
Status: DISCONTINUED | OUTPATIENT
Start: 2021-10-13 | End: 2021-10-13

## 2021-10-13 RX ORDER — LEVOFLOXACIN 5 MG/ML
750 INJECTION, SOLUTION INTRAVENOUS ONCE
Status: COMPLETED | OUTPATIENT
Start: 2021-10-13 | End: 2021-10-13

## 2021-10-13 RX ORDER — FUROSEMIDE 10 MG/ML
20 INJECTION INTRAMUSCULAR; INTRAVENOUS ONCE
Status: COMPLETED | OUTPATIENT
Start: 2021-10-13 | End: 2021-10-13

## 2021-10-13 RX ORDER — NICOTINE POLACRILEX 4 MG
15 LOZENGE BUCCAL PRN
Status: DISCONTINUED | OUTPATIENT
Start: 2021-10-13 | End: 2021-11-04 | Stop reason: HOSPADM

## 2021-10-13 RX ORDER — METOPROLOL TARTRATE 100 MG/1
100 TABLET ORAL 2 TIMES DAILY
Status: ON HOLD | COMMUNITY
End: 2021-11-03 | Stop reason: HOSPADM

## 2021-10-13 RX ORDER — MIDODRINE HYDROCHLORIDE 5 MG/1
5 TABLET ORAL ONCE
Status: COMPLETED | OUTPATIENT
Start: 2021-10-13 | End: 2021-10-13

## 2021-10-13 RX ORDER — UREA 10 %
800 LOTION (ML) TOPICAL DAILY
COMMUNITY

## 2021-10-13 RX ORDER — AMLODIPINE BESYLATE 5 MG/1
5 TABLET ORAL DAILY
Status: ON HOLD | COMMUNITY
End: 2021-11-03 | Stop reason: HOSPADM

## 2021-10-13 RX ORDER — SODIUM CHLORIDE 0.9 % (FLUSH) 0.9 %
5-40 SYRINGE (ML) INJECTION EVERY 12 HOURS SCHEDULED
Status: DISCONTINUED | OUTPATIENT
Start: 2021-10-13 | End: 2021-11-04 | Stop reason: HOSPADM

## 2021-10-13 RX ORDER — MAGNESIUM OXIDE 400 MG/1
400 TABLET ORAL DAILY
COMMUNITY

## 2021-10-13 RX ORDER — LANOLIN ALCOHOL/MO/W.PET/CERES
325 CREAM (GRAM) TOPICAL
Status: DISCONTINUED | OUTPATIENT
Start: 2021-10-14 | End: 2021-10-15

## 2021-10-13 RX ORDER — LEVOFLOXACIN 5 MG/ML
500 INJECTION, SOLUTION INTRAVENOUS
Status: DISCONTINUED | OUTPATIENT
Start: 2021-10-15 | End: 2021-10-27

## 2021-10-13 RX ORDER — ACETAMINOPHEN/DIPHENHYDRAMINE 500MG-25MG
1 TABLET ORAL NIGHTLY PRN
Status: ON HOLD | COMMUNITY
End: 2021-11-03 | Stop reason: HOSPADM

## 2021-10-13 RX ORDER — DEXTROSE MONOHYDRATE 25 G/50ML
25 INJECTION, SOLUTION INTRAVENOUS ONCE
Status: COMPLETED | OUTPATIENT
Start: 2021-10-13 | End: 2021-10-13

## 2021-10-13 RX ADMIN — SODIUM CHLORIDE, PRESERVATIVE FREE 10 ML: 5 INJECTION INTRAVENOUS at 21:19

## 2021-10-13 RX ADMIN — Medication 8.5 MILLICURIE: at 13:05

## 2021-10-13 RX ADMIN — FUROSEMIDE 20 MG: 10 INJECTION, SOLUTION INTRAMUSCULAR; INTRAVENOUS at 08:31

## 2021-10-13 RX ADMIN — LEVOFLOXACIN 750 MG: 5 INJECTION, SOLUTION INTRAVENOUS at 08:31

## 2021-10-13 RX ADMIN — FOLIC ACID 1 MG: 1 TABLET ORAL at 12:53

## 2021-10-13 RX ADMIN — INSULIN HUMAN 10 UNITS: 100 INJECTION, SOLUTION PARENTERAL at 08:23

## 2021-10-13 RX ADMIN — FUROSEMIDE 40 MG: 10 INJECTION, SOLUTION INTRAMUSCULAR; INTRAVENOUS at 18:35

## 2021-10-13 RX ADMIN — DEXTROSE MONOHYDRATE 25 G: 25 INJECTION, SOLUTION INTRAVENOUS at 08:31

## 2021-10-13 RX ADMIN — HEPARIN SODIUM 5000 UNITS: 5000 INJECTION INTRAVENOUS; SUBCUTANEOUS at 21:19

## 2021-10-13 RX ADMIN — SODIUM CHLORIDE, PRESERVATIVE FREE 10 ML: 5 INJECTION INTRAVENOUS at 08:31

## 2021-10-13 RX ADMIN — MIDODRINE HYDROCHLORIDE 5 MG: 5 TABLET ORAL at 14:22

## 2021-10-13 RX ADMIN — ERGOCALCIFEROL 50000 UNITS: 1.25 CAPSULE ORAL at 18:35

## 2021-10-13 ASSESSMENT — PAIN SCALES - GENERAL
PAINLEVEL_OUTOF10: 0

## 2021-10-13 NOTE — H&P
[facility]  Office: [address], DO, Piyush John, DO, Bob Sanders, DO, Leidy Wood Blood, DO, Fer Michaud MD, Hanna Lala MD, Lo Monaco MD, Jyoti Shetty MD, Magali Zaragoza MD, Angel Goetz MD, Manav Boogie MD, Michele Joel MD, Ace Braun, DO, Apryl Soria DO, Radha Mcleod MD,  Saranya Cisneros DO, Mauricio High MD, Reilly Heaton MD, Ajit Terrazas MD, Yoly Mccormack MD, Pavel Montelongo MD, Vito Cruz MD, Keaton Cartwright, [facility], [facility], CNP, Nay Haas, CNP, Gian Santos, CNS, Ailene Lesches, CNP, Poppy Martin, CNP, Mechelle Alva, CNP, Cathleen Garza, CNP, Love Perez, CNP, Angelita Puritt, CNP, Jarvis Pan PA-C, Celso Carrera, DNP, Gabrielle Gomez, CNP, Jose Nagel, CNP, Kaia Ram, CNP, Piotr Diaz, CNP, Marina Beltran, CNP, Sanjuanita Morgan, CNP, Dorys Randall, CNP         [address]    HISTORY AND PHYSICAL EXAMINATION            Date:   10/12/2021  Patient name:  Stephen Mcleod  Date of admission:  10/12/2021  2:53 PM  MRN:   [id number]  Account:  [de-identified]  YOB: 1945  PCP:    Ilda Garcia MD  Room:   1012/1012-01  Code Status:    Full Code    Chief Complaint:     Chief Complaint   Patient presents with    Respiratory Distress     EMS reported 97% on room air; pt arrives with saturation 45% on room air    Failure To Thrive       History Obtained From:     patient, electronic medical record    History of Present Illness:     Stephen Mcleod is a 64 y.o. Non- / non  female who presents with Respiratory Distress (EMS reported 97% on room air; pt arrives with saturation 45% on room air) and Failure To Thrive   and is admitted to the hospital for the management of Pleural effusion. 75-year-old female transferred from [facility] emergency room for failure to thrive and hypoxia. Lives at home alone.   According to records her son states that she has had decreased energy and has had difficulty getting around. He feels as if she is not safe at home. Agrees that she has been having difficulty caring for herself. She states that this has been going on for 2 weeks, however, it looks as if she was referred to neurology 2 months ago for weakness. Patient states she has not seen a neurologist. Patient is agreeable to going to a nursing home. When she presented to the emergency room in [place] she was hypoxic at 45% on room air. Does not wear O2 at home. She denies shortness of breath, chest pain, cough or recent cold symptoms. She does complain of generalized weakness. She has had diarrhea for 3 days. She also has edema to the bilateral lower extremities but states this is not new. She had a normal white count. Her hemoglobin is 8.8. The only lab available for comparison was in August of this year when she was 8.4. She does have a history of iron deficiency anemia. her BUN is 34 and creatinine is 3.10. In August of this year her creatinine was 2.34. She does have a history of chronic kidney disease stage IV. States she has seen a nephrologist \"one time\" at the [facility]. D-dimer is 2.38. BNP is also elevated at 5650. Is negative and blood cultures are pending. Chest x-ray incidentally showed right pleural effusion and airspace disease. Pneumonia cannot be excluded. Past Medical History:     History reviewed. No pertinent past medical history. Past Surgical History:     History reviewed. No pertinent surgical history. Medications Prior to Admission:     Prior to Admission medications    Not on File        Allergies:     Penicillins    Social History:     Tobacco:    reports that she quit smoking about 2 years ago. Her smoking use included cigarettes. She has never used smokeless tobacco.  Alcohol:      reports current alcohol use. Drug Use:  has no history on file for drug use.     Family History:     Family History   Problem Relation Age of Onset    Hypertension Mother     Cancer Sister     Cancer Brother        Review of Systems:     Positive and Negative as described in HPI. Review of Systems   Constitutional: Positive for activity change and fatigue. Negative for fever. HENT: Negative for congestion, sinus pressure and sore throat. Respiratory: Negative for cough and shortness of breath. Cardiovascular: Positive for leg swelling. Negative for chest pain and palpitations. Gastrointestinal: Positive for diarrhea. Negative for abdominal pain, nausea and vomiting. Genitourinary: Negative for dysuria, flank pain and frequency. Musculoskeletal: Positive for gait problem. Negative for arthralgias and myalgias. Skin: Negative for color change and rash. Neurological: Negative for dizziness, speech difficulty, weakness, numbness and headaches. Psychiatric/Behavioral: Negative for confusion and decreased concentration. Physical Exam:   BP (!) 122/59   Pulse 81   Temp 97.7 °F (36.5 °C)   Resp 20   Ht 5' 4\" (1.626 m)   Wt 139 lb (63 kg)   SpO2 95%   BMI 23.86 kg/m²   Temp (24hrs), Av.9 °F (36.6 °C), Min:97.7 °F (36.5 °C), Max:98 °F (36.7 °C)    No results for input(s): POCGLU in the last 72 hours. No intake or output data in the 24 hours ending 10/12/21 2207    Physical Exam  Constitutional:       Appearance: She is not toxic-appearing. HENT:      Right Ear: External ear normal.      Left Ear: External ear normal.      Mouth/Throat:      Mouth: Mucous membranes are dry. Eyes:      Extraocular Movements: Extraocular movements intact. Conjunctiva/sclera: Conjunctivae normal.      Pupils: Pupils are equal, round, and reactive to light. Cardiovascular:      Rate and Rhythm: Normal rate and regular rhythm. Pulmonary:      Comments: Speaks in short sentences only. Lungs are diminished throughout. Oxygen saturation 98% on 6 L per nasal cannula  Abdominal:      Palpations: Abdomen is soft. Tenderness: There is no abdominal tenderness. Musculoskeletal:      Right lower leg: Edema present. Left lower leg: Edema present. Skin:     General: Skin is warm and dry. Neurological:      General: No focal deficit present. Mental Status: She is alert and oriented to person, place, and time. Cranial Nerves: No cranial nerve deficit.    Psychiatric:         Mood and Affect: Mood normal.         Behavior: Behavior normal.          Investigations:      Laboratory Testing:  Recent Results (from the past 24 hour(s))   CBC Auto Differential    Collection Time: 10/12/21  3:31 PM   Result Value Ref Range    WBC 8.9 3.5 - 11.0 k/uL    RBC 3.57 (L) 4.0 - 5.2 m/uL    Hemoglobin 8.8 (L) 12.0 - 16.0 g/dL    Hematocrit 29.7 (L) 36 - 46 %    MCV 83.3 80 - 100 fL    MCH 24.8 (L) 26 - 34 pg    MCHC 29.7 (L) 31 - 37 g/dL    RDW 20.7 (H) 12.5 - 15.4 %    Platelets 712 777 - 630 k/uL    MPV 7.8 6.0 - 12.0 fL    NRBC Automated NOT REPORTED per 100 WBC    Differential Type NOT REPORTED     Immature Granulocytes NOT REPORTED 0 %    Absolute Immature Granulocyte NOT REPORTED 0.00 - 0.30 k/uL    WBC Morphology NOT REPORTED     RBC Morphology NOT REPORTED     Platelet Estimate NOT REPORTED     Seg Neutrophils 82 (H) 36 - 66 %    Lymphocytes 6 (L) 24 - 44 %    Monocytes 9 (H) 1 - 7 %    Eosinophils % 3 1 - 4 %    Basophils 0 0 - 2 %    Segs Absolute 7.30 1.8 - 7.7 k/uL    Absolute Lymph # 0.53 (L) 1.0 - 4.8 k/uL    Absolute Mono # 0.80 0.1 - 0.8 k/uL    Absolute Eos # 0.27 0.0 - 0.4 k/uL    Basophils Absolute 0.00 0.0 - 0.2 k/uL    Morphology ANISOCYTOSIS PRESENT     Morphology HYPOCHROMIA PRESENT    Basic Metabolic Panel    Collection Time: 10/12/21  3:31 PM   Result Value Ref Range    Glucose 94 70 - 99 mg/dL    BUN 54 (H) 8 - 23 mg/dL    CREATININE 3.10 (H) 0.50 - 0.90 mg/dL    Bun/Cre Ratio NOT REPORTED 9 - 20    Calcium 8.3 (L) 8.6 - 10.4 mg/dL    Sodium 136 135 - 144 mmol/L    Potassium 5.1 3.7 - 5.3 mmol/L Chloride 102 98 - 107 mmol/L    CO2 20 20 - 31 mmol/L    Anion Gap 14 9 - 17 mmol/L    GFR Non-African American 15 (L) >60 mL/min    GFR  18 (L) >60 mL/min    GFR Comment          GFR Staging NOT REPORTED    Hepatic Function Panel    Collection Time: 10/12/21  3:31 PM   Result Value Ref Range    Albumin 3.3 (L) 3.5 - 5.2 g/dL    Alkaline Phosphatase 149 (H) 35 - 104 U/L    ALT 6 5 - 33 U/L    AST 10 <32 U/L    Total Bilirubin 0.20 (L) 0.3 - 1.2 mg/dL    Bilirubin, Direct 0.10 <0.31 mg/dL    Bilirubin, Indirect 0.10 0.00 - 1.00 mg/dL    Total Protein 6.8 6.4 - 8.3 g/dL    Globulin NOT REPORTED 1.5 - 3.8 g/dL    Albumin/Globulin Ratio 0.9 (L) 1.0 - 2.5   Troponin    Collection Time: 10/12/21  3:31 PM   Result Value Ref Range    Troponin, High Sensitivity 30 (H) 0 - 14 ng/L    Troponin T NOT REPORTED <0.03 ng/mL    Troponin Interp NOT REPORTED    D-Dimer, Quantitative    Collection Time: 10/12/21  3:31 PM   Result Value Ref Range    D-Dimer, Quant 2.38 mg/L FEU   Brain Natriuretic Peptide    Collection Time: 10/12/21  3:31 PM   Result Value Ref Range    Pro-BNP 5,650 (H) <300 pg/mL    BNP Interpretation Pro-BNP Reference Range:    COVID-19, Rapid    Collection Time: 10/12/21  3:54 PM    Specimen: Nasopharyngeal Swab   Result Value Ref Range    Specimen Description . NASOPHARYNGEAL SWAB     SARS-CoV-2, Rapid Not Detected Not Detected   Protime-INR    Collection Time: 10/12/21  8:08 PM   Result Value Ref Range    Protime 13.4 11.5 - 14.2 sec    INR 1.0        Imaging/Diagnostics:  XR CHEST PORTABLE    Result Date: 10/12/2021  Significant right pleural effusion and associated airspace disease. Mild left basilar airspace disease. Pneumonia cannot be excluded.        Assessment :      Hospital Problems         Last Modified POA    * (Principal) Pleural effusion 10/12/2021 Yes    Acute renal failure superimposed on stage 4 chronic kidney disease ([address] 10/12/2021 Yes    Hypoxia 10/12/2021 Yes    Iron deficiency anemia 10/12/2021 Yes    Essential hypertension 10/12/2021 Yes    General weakness 10/12/2021 Yes          Plan:     Patient status inpatient in the  Progressive Unit/Step down    Inpatient admission on telemetry  Continuous pulse ox  Supplemental oxygen as needed  Consultation to interventional radiology for pleural effusion  Consultation to nephrology for acute on chronic renal disease  Await urine osmolality, urine protein and urine sodium. Retroperitoneal ultrasound for acute on chronic renal disease  Monitor labs  VQ scan for hypoxia  Echo  Will add PT/OT and social work consult for the generalized weakness  DVT prophylaxis    Consultations:   IP CONSULT TO NEPHROLOGY  IP CONSULT TO [address] [place] [address]    Patient is admitted as inpatient status because of co-morbidities listed above, severity of signs and symptoms as outlined, requirement for current medical therapies and most importantly because of direct risk to patient if care not provided in a hospital setting. Expected length of stay > 48 hours.     ANN ARIAS CNP  10/12/2021  10:07 PM    Copy sent to Dr. Reshma Sotelo MD

## 2021-10-13 NOTE — PROGRESS NOTES
Comprehensive Nutrition Assessment    Type and Reason for Visit:  Positive Nutrition Screen (Unplanned weight loss)    Nutrition Recommendations/Plan:   1. Continue ADULT DIET; Regular; Low Sodium (2 gm); Low Potassium (Less than 3000 mg/day); 1500 ml  2. Start Ensure [employer] 1x/day and [employer] 1x/day  3. Monitor p.o intakes, diet tolerance and labs    Nutrition Assessment:  Patient admission is related to peripheral edema, hypoxia, pleural effusion and MAYO. Patient was NPO this [facility] for ultrasound guided right thoracentesis. Diet advanced to low sodium, low potassion and 1200 mL fluid restriction. Patient reports she lives at home alone and is no longer able to cook but her son brings her foods. Patient reports usual body weight is 150 lbs. Patient reports a weight loss of 17 lbs in 1 month. Patient appears to have severe malnutrition. Will start Ensure Enlive 1x/day and Magic [employer] 1x/day. Will monitor p.o intakes, diet tolerance and labs. Malnutrition Assessment:  Malnutrition Status:  Severe malnutrition    Context:  Acute Illness     Findings of the 6 clinical characteristics of malnutrition:  Energy Intake:  7 - 50% or less of estimated energy requirements for 5 or more days  Weight Loss:  7 - Greater than 5% over 1 month     Body Fat Loss:  Unable to assess     Muscle Mass Loss:  Unable to assess    Fluid Accumulation:  No significant fluid accumulation Extremities   Strength:  Not Performed    Estimated Daily Nutrient Needs:  Energy (kcal):  8591-0517 kcal (28-30 kcal/kg; Weight Used for Energy Requirements:  Current     Protein (g):  60-72 gm of protein (1.0-1.2 gm/kg); Weight Used for Protein Requirements:  Current          Nutrition Related Findings:  Edema: +2 pitting BLE. S/P Right thoracentesis- 850 mL removed (10/13)      Wounds:  None       Current Nutrition Therapies:    ADULT DIET; Regular; Low Sodium (2 gm);  Low Potassium (Less than 3000 mg/day); 1500 ml    Anthropometric Measures:  · Height: 5' 4\" (162.6 cm)  · Current Body Weight: 133 lb (60.3 kg)   · Admission Body Weight: 139 lb (63 kg) (bed scale)    · Usual Body Weight: 150 lb (68 kg)     · Ideal Body Weight: 120 lbs; % Ideal Body Weight 110.8 %   · BMI: 22.8  · BMI Categories: Normal Weight (BMI 22.0 to 24.9) age over 72       Nutrition Diagnosis:   · Severe malnutrition related to inadequate protein-energy intake as evidenced by intake 0-25%, intake 26-50%, weight loss greater than or equal to 5% in 1 month      Nutrition Interventions:   Food and/or Nutrient Delivery:  Continue Current Diet, Start Oral Nutrition Supplement  Nutrition Education/Counseling:  Education not indicated   Coordination of Nutrition Care:  Continue to monitor while inpatient    Goals:  PO intakes are greater than 50% at meals       Nutrition Monitoring and Evaluation:   Food/Nutrient Intake Outcomes:  Food and Nutrient Intake, Supplement Intake  Physical Signs/Symptoms Outcomes:  Biochemical Data, Fluid Status or Edema, Skin, Weight     Discharge Planning:    Continue current diet, Continue Oral Nutrition Supplement         Foreign PATELN, RDN, LDN  Lead Clinical Dietitian  RD Office Phone [phone]

## 2021-10-13 NOTE — BRIEF OP NOTE
Brief Postoperative Note    Victor Manuel Preciado  YOB: 1945  [id number]    Pre-operative Diagnosis: SOB     Post-operative Diagnosis: Same    Procedure: US guided right thoracentesis    Anesthesia: Local    Surgeons/Assistants: Yanick    Estimated Blood Loss: less than 50     Complications: None    Specimens: Was Obtained: nonturbid straw colored fluid; 850 ml    Electronically signed by Freda Vazquez MD on 10/13/2021 at 9:41 AM

## 2021-10-13 NOTE — CARE COORDINATION
Case Management Initial Discharge Plan  Danielle Wadsworth,             Met with:patient to discuss discharge plans. Information verified: address, contacts, phone number, , insurance Yes  PCP: Jocelyn Corona MD  Date of last visit: 1 month ago    Insurance Provider: [employer]    Discharge Planning    Living Arrangements:  Alone   Support Systems:  Children, Family Members    Home has 2 stories  1 stairs to climb to get into front door, flight of stairs to climb to reach second floor  Location of bedroom/bathroom in home 2nd Floor    Patient able to perform ADL's:Independent    Current Services (outpatient & in home) None  DME equipment: None  DME provider: N/A    Pharmacy: [facility]. Potential Assistance Needed:  N/A    Patient agreeable to home care: Yes  Freedom of choice provided:  yes    Prior SNF/Rehab Placement and Facility: No  Agreeable to SNF/Rehab: Yes  [place] of choice provided: yes   Evaluation: yes    Expected Discharge date:  10/15/21  Patient expects to be discharged to: Follow Up Appointment: Best Day/ Time:     Transportation provider: David  Transportation arrangements needed for discharge: No    Readmission Risk              Risk of Unplanned Readmission:  15             Does patient have a readmission risk score greater than 14?: Yes  If yes, follow-up appointment must be made within 7 days of discharge. Goal of Care:       Discharge Plan:   Met with patient at bedside to discuss d/c plans. Patient is admitted with right pleural effusion. Thoracentesis in IR this am with 850mL removed. Echo - EF 65%  VQ Scan - large right sided pleural effusion and right basilar airspace disease, atelectasis and/or infiltrate. Currently on IV Levaquin. HFNC 60%; 50L - Watch for Home O2 at dc. Independent. Drives. Retired. Patient lives alone.   Patient has never had HC or been to a SNF/LTACH, but open to all of the above depending on what her needs will be.   No DME at home either. Consults: Nephrology    Continue to follow for plan and therapy recommendations.        Electronically signed by Nawaf Solorio RN on 10/13/21 at 4:08 PM EDT

## 2021-10-13 NOTE — PROGRESS NOTES
Physical Therapy  DATE: 10/13/2021    NAME: Waldo Kuhn  MRN: [id number]   : 1945    Patient not seen this date for Physical Therapy due to:      [x] Cancel by RN due to: hold for further testing to r/o PE    [] Hemodialysis    [] Critical Lab Value Level     [] Blood transfusion in progress    [] Acute or unstable cardiovascular status   _MAP < 55 or more than >115  _HR < 40 or > 130    [] Acute or unstable pulmonary status   -FiO2 > 60%   _RR < 5 or >40    _O2 sats < 85%    [] Strict Bedrest    [] Off Unit for surgery or procedure    [] Off Unit for testing       [] Pending imaging to R/O fracture    [] Refusal by Patient      [] Other      [] PT being discontinued at this time. Patient independent. No further needs. [] PT being discontinued at this time as the patient has been transferred to hospice care. No further needs.       Shivam Borges, PT

## 2021-10-13 NOTE — PROGRESS NOTES
Occupational Therapy  DATE: 10/13/2021    NAME: Neema Stevens  MRN: [id number]   : 1945    Patient not seen this date for Occupational Therapy due to:      [x] Cancel by RN or physician due to:10/13 cx per RN Demario Moore as pt's D-dimer is high/not medically stable; continue to follow    [] Hemodialysis    [] Critical Lab Value Level     [] Blood transfusion in progress    [] Acute or unstable cardiovascular status   _MAP < 55 or more than >115  _HR < 40 or > 130    [] Acute or unstable pulmonary status   -FiO2 > 60%   _RR < 5 or >40    _O2 sats < 85%    [] Strict Bedrest    [] Off Unit for surgery or procedure    [] Off Unit for testing       [] Pending imaging to R/O fracture    [] Refusal by Patient      [] Other      [] PT being discontinued at this time. Patient independent. No further needs. [] PT being discontinued at this time as the patient has been transferred to hospice care. No further needs.       Felipa Lanes, OT

## 2021-10-13 NOTE — PRE SEDATION
Sedation Pre-Procedure Note    Patient Name: Jas Shukla   YOB: 1945  Room/Bed: 1012/1012-01  Medical Record Number: [id number]  Date: 10/13/2021   Time: 9:40 AM       Indication:  Pancreatic cancer    Consent: I have discussed with the patient and/or the patient representative the indication, alternatives, and the possible risks and/or complications of the planned procedure and the anesthesia methods. The patient and/or patient representative appear to understand and agree to proceed. Vital Signs:   Vitals:    10/13/21 0847   BP: (!) 88/46   Pulse: 93   Resp: 16   Temp:    SpO2:        Past Medical History:   has no past medical history on file. Past Surgical History:   has no past surgical history on file. Medications:   Scheduled Meds:    levofloxacin  750 mg IntraVENous Once    sodium chloride flush  5-40 mL IntraVENous 2 times per day    [START ON 10/15/2021] levofloxacin  500 mg IntraVENous Q48H    heparin (porcine)  5,000 Units SubCUTAneous 3 times per day     Continuous Infusions:    dextrose      sodium chloride       PRN Meds: glucose, dextrose, glucagon (rDNA), dextrose, sodium chloride flush, sodium chloride, ondansetron **OR** ondansetron, acetaminophen **OR** acetaminophen, perflutren lipid microspheres  Home Meds:   Prior to Admission medications    Not on File     Coumadin Use Last 7 Days:  no  Antiplatelet drug therapy use last 7 days: no  Other anticoagulant use last 7 days: no  Additional Medication Information:  See med rec      Pre-Sedation Documentation and Exam:   I have reviewed the patient's history and review of systems.     Mallampati Airway Assessment:  Mallampati Class II - (soft palate, fauces & uvula are visible)    Prior History of Anesthesia Complications:   none    ASA Classification:  Class 2 - A normal healthy patient with mild systemic disease    Sedation/ Anesthesia Plan:   intravenous sedation    Medications Planned:   midazolam (Versed) intravenously and fentanyl intravenously    Patient is an appropriate candidate for plan of sedation: yes    Electronically signed by Pablo Luna MD on 10/13/2021 at 9:40 AM

## 2021-10-13 NOTE — PROGRESS NOTES
[facility]  Office: [address], DO, Mickey Leiva, DO, Adali Romano, DO, Cori Blakegrace Blood, DO, Shana Orta MD, Ronak Lowry MD, Taylor Ford MD, Claudean Millard, MD, Kristie Bradford MD, Lauren Nunez MD, Marysol Pool MD, Randy Coles MD, Arjun Vergara, DO, Ema Murrieta, DO, Dianne Ross MD,  Marcial Pickering DO, Jefe Aguilar MD, Marie Ruiz MD, Daniella Kim MD, Tasha Brooks MD, Diego Alba MD, Geno Alonso MD, Aida Buckner, [facility], [facility], CNP, Alia Colin, CNP, Ruthie Gomez, CNS, Fifi Mena, CNP, Candelaria Oreilly, CNP, Billy Huynh, CNP, Manish Chavez, CNP, Rosa Rios, CNP, Alejandro Knight, CNP, Vi Oconnor PA-C, Humaira Lord, [facility], Kady Magallanes, CNP, Dale Quijano, CNP, Anupama Gonzalez, CNP, Chris Chen, CNP, Jazmyn Beth, CNP, Radha Costa, [facility], Tim Saint, [place]    Progress Note    10/13/2021    10:04 AM    Name:   Victor Manuel Preciado  MRN:     [id number]     Acct:      [de-identified]   Room:   [address] [place], [place] [address] Day:  1  Admit Date:  10/12/2021  2:53 PM    PCP:   Sammy Garces MD  Code Status:  Full Code    Subjective:     C/C:   Chief Complaint   Patient presents with    Respiratory Distress     EMS reported 97% on room air; pt arrives with saturation 45% on room air    Failure To Thrive     Interval History Status: improved. S/p right thoracentesis this am per IR, 850ml removed. She was initially on 6L nasal cannula and has been weaned to 4L, mildly hypotensive but is asymptomatic. She denies any complaints but seems uninterested in conversation. Afebrile   Brief History:   Victor Manuel Preciado is a 64 y.o. female  with a hx of CKD 4 and iron deficiency anemia who presented to [facility] with Respiratory Distress and hypoxia (O2 sats 45%) and failure to thrive and is admitted to the hospital for the management of right Pleural effusion and acute renal failure.  Patient (1.626 m)   Wt 133 lb 3 oz (60.4 kg)   SpO2 95%   BMI 22.86 kg/m²   Temp (24hrs), Av.9 °F (36.6 °C), Min:97.5 °F (36.4 °C), Max:98.2 °F (36.8 °C)    No results for input(s): POCGLU in the last 72 hours. I/O (24Hr): Intake/Output Summary (Last 24 hours) at 10/13/2021 1010  Last data filed at 10/13/2021 0856  Gross per 24 hour   Intake 140 ml   Output 1750 ml   Net -1610 ml       Labs:  Hematology:  Recent Labs     10/12/21  1531 10/12/21  2008 10/13/21  0530   WBC 8.9  --  10.0   RBC 3.57*  --  3.52*   HGB 8.8*  --  8.5*   HCT 29.7*  --  30.5*   MCV 83.3  --  86.6   MCH 24.8*  --  24.1*   MCHC 29.7*  --  27.9*   RDW 20.7*  --  19.3*     --  376   MPV 7.8  --  10.2   INR  --  1.0  --    DDIMER 2.38  --   --      Chemistry:  Recent Labs     10/12/21  1531 10/13/21  0530    139   K 5.1 5.7*    106   CO2 20 21   GLUCOSE 94 85   BUN 54* 54*   CREATININE 3.10* 3.01*   MG  --  3.1*   ANIONGAP 14 12   LABGLOM 15* 15*   GFRAA 18* 18*   CALCIUM 8.3* 8.0*   PROBNP 5,650*  --    TROPHS 30*  --      Recent Labs     10/12/21  1531 10/13/21  0530   PROT 6.8 6.3*   LABALBU 3.3* 3.2*   AST 10 10   ALT 6 9   ALKPHOS 149* 146*   BILITOT 0.20* 0.20*   BILIDIR 0.10  --      ABG:No results found for: POCPH, PHART, PH, POCPCO2, UOB9DWH, PCO2, POCPO2, PO2ART, PO2, POCHCO3, KFX3YHT, HCO3, NBEA, PBEA, BEART, BE, THGBART, THB, GPC5FSY, MBSU9KHP, J7GZWZIJ, O2SAT, FIO2  Lab Results   Component Value Date/Time    SPECIAL LEFT [facility] 10/12/2021 03:40 PM     Lab Results   Component Value Date/Time    CULTURE NO GROWTH 9 HOURS 10/12/2021 03:40 PM       Radiology:  XR CHEST PORTABLE    Result Date: 10/12/2021  Significant right pleural effusion and associated airspace disease. Mild left basilar airspace disease. Pneumonia cannot be excluded. US RETROPERITONEAL COMPLETE    Result Date: 10/13/2021  Bilateral simple renal cyst. Lomeli catheter within the urinary bladder.        Physical Examination: General appearance:  alert, cooperative and no distress  Mental Status:  oriented to person, place and time and flat affect, appears uninterested in conversation   Lungs: breath sounds diminished bilaterally, normal effort  Heart:  Mildly tachycardic, regular rhythm, no murmur  Abdomen:  soft, nontender, nondistended, normal bowel sounds, no masses, hepatomegaly, splenomegaly  Extremities:  +2 BLE edema, no redness, tenderness in the calves  Skin:  no gross lesions, rashes, induration    Assessment:        Hospital Problems         Last Modified POA    * (Principal) Pleural effusion 10/12/2021 Yes    Acute renal failure superimposed on stage 4 chronic kidney disease ([facility] [address] 10/12/2021 Yes    Hypoxia 10/12/2021 Yes    Iron deficiency anemia 10/12/2021 Yes    Essential hypertension 10/12/2021 Yes    General weakness 10/12/2021 Yes          Plan:        MAYO on CKD 4: likely due to untreated/undiagnosed CHF, patient does not follow with a PCP regularly. Avoid nephrotoxic agents, appreciate nephrology's recommendations. Renal ultrasound unremarkable for acute findings  Right pleural effusion due to Acute CHF, likely has been a chronic issue for sometime, patient is inconsistent with medical history, medical adherence and follow-up. Patient initially reported she has chronic edema however she has reported to other providers that this is a new issue. S/P thoracentesis this am, no previous echo available. Await cardiac echo, fluid restriction, IV diuresis as ordered per nephrology  Hyperkalemia: due to MAYO, patient given IV insulin and dextrose in addition to IV lasix.  Monitor labs, recheck BMP this afternoon   Continue telemetry monitoring   Elevated D-Dimer: likely due to MAYO and CHF, await VQ scan   Resume cardiac/renal diet   IV levaquin started as Pneumonia could no be excluded on xray  Acute on chronic Iron deficiency anemia, check iron studies, start folvite  PT/OT evaluation   Plan discussed with patient and staff     ANN COBB NP  10/13/2021  10:10 AM

## 2021-10-13 NOTE — PROGRESS NOTES
Patient transferred from [facility]. VS obtained and head to toe assessment completed. Pt placed on 6 L NC. O2 sat is 95%. Admission database initiated and orders released. Pt is unable to remember what medications she takes at home and states her son will bring in a list tomorrow. Pt does not complain of any pain. Call light given to pt and verbalized understanding of use.

## 2021-10-13 NOTE — CONSULTS
Renal Consult Note    Patient :  Yolie Florentino; 64 y.o. MRN# [id number]  Location:  1012/1012-01  Attending:  Dahiana Dempsey MD  Admit Date:  10/12/2021   Hospital Day: 1    Reason for Consult:     Asked by Dr Dahiana Dempsey MD to see for MAYO/Elevated Creatinine. History Obtained From:     patient, electronic medical record, patient's nurse    History of Present Illness:     Yolie Florentino; 64 y.o. female with past medical history of CKD 4 likely due to nephrosclerosis, follows up with Dr. Margo Lanes, with baseline creatinine of currently not available, hypertension, anemia, vitamin D deficiency, hypomagnesemia presented to the hospital with symptoms of feeling weak, low energy and shortness of breath. Chest x-ray was done on 10/12/2021 which showed significant right pleural effusion and associated airspace disease. Mild left basilar airspace disease. Pneumonia cannot be excluded. Patient also mentions some difficulty urinating and Lomeli catheter was placed in. Renal ultrasound was done on 10/13/2021 right and left kidney both 9.1 cm. Impression: Bilateral simple renal cyst.  Lomeli catheter within the urinary bladder. Much detailed history is not available, patient is not able to give much detail and history nature is also limited. Patient presented [facility] ED with above-mentioned symptoms and was found to be hypoxic at 45% on room air oxygen saturation did improve with therapy. She also complained of some diarrhea and new onset lower extremity edema. Patient does not report being on diuretics at home. Exact medications are currently not available. Her creatinine admission was 3.10 mg/DL, other lab work showed sodium 136, potassium 5.1, bicarb 20, chloride 102, BUN 34, calcium 8.3. Potassium did increase to 5.7 today morning, creatinine did improve to 3.01. Patient did receive Lasix 20 mg IV today morning along with insulin dextrose for acute hyperkalemia management.   She also underwent right sided thoracentesis and about 850 cc of nonturbid straw-colored fluid was removed. Nephrology is consulted due to acute kidney injury. Past History/Allergies? Social History:   Past medical history of hypertension, anemia, vitamin D deficiency, hyperlipidemia, CKD 4. History reviewed. No pertinent surgical history. Allergies   Allergen Reactions    Penicillins Swelling       Social History     Socioeconomic History    Marital status:      Spouse name: Not on file    Number of children: Not on file    Years of education: Not on file    Highest education level: Not on file   Occupational History    Not on file   Tobacco Use    Smoking status: Former Smoker     Types: Cigarettes     Quit date:      Years since quittin.7    Smokeless tobacco: Never Used   Substance and Sexual Activity    Alcohol use: Yes     Comment: occasional glass of wine    Drug use: Not on file    Sexual activity: Not on file   Other Topics Concern    Not on file   Social History Narrative    Not on file     Social Determinants of Health     Financial Resource Strain:     Difficulty of Paying Living Expenses:    Food Insecurity:     Worried About [address] in the Last Year:     [address] in the Last Year:    Transportation Needs:     Lack of Transportation (Medical):      Lack of Transportation (Non-Medical):    Physical Activity:     Days of Exercise per Week:     Minutes of Exercise per Session:    Stress:     Feeling of Stress :    Social Connections:     Frequency of Communication with Friends and Family:     Frequency of Social Gatherings with Friends and Family:     Attends [employer] Services:     Active Member of Clubs or Organizations:     Attends Club or Organization Meetings:     Marital Status:    Intimate Partner Violence:     Fear of Current or Ex-Partner:     Emotionally Abused:     Physically Abused:     Sexually Abused:        Family History:        Family History Problem Relation Age of Onset    Hypertension Mother     Cancer Sister     Cancer Brother        Outpatient Medications:     No medications prior to admission. Current Medications:     Scheduled Meds:    sodium chloride flush  5-40 mL IntraVENous 2 times per day    [START ON 10/15/2021] levofloxacin  500 mg IntraVENous Q48H    heparin (porcine)  5,000 Units SubCUTAneous 3 times per day     Continuous Infusions:    dextrose      sodium chloride       PRN Meds:  glucose, dextrose, glucagon (rDNA), dextrose, sodium chloride flush, sodium chloride, ondansetron **OR** ondansetron, acetaminophen **OR** acetaminophen, perflutren lipid microspheres    Review of Systems:     Constitutional: No fever, no chills, no lethargy, positive weakness, positive low energy. HEENT:  No headache, otalgia, itchy eyes, nasal discharge or sore throat. Cardiac:  No chest pain, dyspnea, orthopnea or PND. Chest:   No cough, phlegm or wheezing. Abdomen:  No abdominal pain, nausea or vomiting, positive diarrhea. Neuro:  No focal weakness, abnormal movements or seizure like activity. Skin:   No rashes, no itching. :   No hematuria, no pyuria, no dysuria, no flank pain, positive difficulty urinating. Extremities:  Positive lower extremity swelling, no joint pains. ROS was otherwise negative except as mentioned in the [address] Input/Output:       I/O last 3 completed shifts: In: 140 [P.O.:140]  Out: 900 [Urine:900]    Vital Signs:   Temperature:  Temp: 97.5 °F (36.4 °C)  TMax:   Temp (24hrs), Av.9 °F (36.6 °C), Min:97.5 °F (36.4 °C), Max:98.2 °F (36.8 °C)    Respirations:  Resp: 16  Pulse:   Pulse: 93  BP:    BP: (!) 88/46  BP Range: Systolic (97NLX), GQF:125 , Min:88 , MANNY:847       Diastolic (64YAP), RSP:32, Min:46, Max:59      Physical Examination:     General:  AAO x 3, speaking in full sentences, no accessory muscle use. HEENT: Atraumatic, normocephalic, no throat congestion, moist mucosa.   Eyes:   Pupils equal, round and reactive to light, EOMI. Neck:   Supple  Chest:   Bilateral vesicular breath sounds, slight crackles, no wheezes. Cardiac:  S1 S2 RR, no murmurs, gallops or rubs. Abdomen: Soft, non-tender, no masses or organomegaly, BS audible. :   No suprapubic or flank tenderness. Neuro:  AAO x 3, No FND. SKIN:  No rashes, good skin turgor. Extremities:  Positive trace to 1+ edema. Labs:       Recent Labs     10/12/21  1531 10/13/21  0530   WBC 8.9 10.0   RBC 3.57* 3.52*   HGB 8.8* 8.5*   HCT 29.7* 30.5*   MCV 83.3 86.6   MCH 24.8* 24.1*   MCHC 29.7* 27.9*   RDW 20.7* 19.3*    376   MPV 7.8 10.2      BMP:   Recent Labs     10/12/21  1531 10/13/21  0530    139   K 5.1 5.7*    106   CO2 20 21   BUN 54* 54*   CREATININE 3.10* 3.01*   GLUCOSE 94 85   CALCIUM 8.3* 8.0*      Magnesium:    Recent Labs     10/13/21  0530   MG 3.1*     Albumin:    Recent Labs     10/12/21  1531 10/13/21  0530   LABALBU 3.3* 3.2*     SPEP:  Lab Results   Component Value Date    PROT 6.0 10/13/2021     Urinalysis/Chemistries:      Lab Results   Component Value Date    NITRU NEGATIVE 10/12/2021    COLORU Yellow 10/12/2021    PHUR 5.5 10/12/2021    SPECGRAV 1.025 10/12/2021    LEUKOCYTESUR NEGATIVE 10/12/2021    UROBILINOGEN Normal 10/12/2021    BILIRUBINUR NEGATIVE 10/12/2021    GLUCOSEU NEGATIVE 10/12/2021    KETUA NEGATIVE 10/12/2021     Urine Sodium:     Lab Results   Component Value Date    LAZARO <20 10/12/2021     Urine Osmolarity:   Lab Results   Component Value Date    OSMOU 347 10/12/2021     Radiology:     Reviewed. Assessment:     1. Acute Kidney Injury likely due to underlying CHF and creatinine complicated by urinary retention. Exact baseline is currently not available. 2.  Acute on chronic congestive heart failure, 2D echo pending. 3.  Hyperkalemia-likely due to underlying renal dysfunction and dietary reasons. 4.  New onset lower extremity edema.   5.  Right pleural effusion status post right sided thoracentesis (10/13/2021) and about 850 cc of nonturbid straw-colored fluid was removed. 6.  History of hypertension. 7.  Vitamin D deficiency. 8.  Anemia. 9.  Hypoalbuminemia. 10.  Urinary retention. 11.   CKD 4 likely due to nephrosclerosis, follows up with Dr. Katey Devine, with baseline creatinine of currently not available; last creatinine on 8/10/2021 was 2.34 mg/DL. Patient did have a recent serological work-up in care everywhere which was essentially negative (8/2021)  12. History of hypomagnesemia. 13.  Diarrhea. Plan:   1. Agree with acute hyperkalemia management with insulin dextrose. 2.  Will give Lasix 40 mg IV today evening. 3.  Please obtain records from Dr. Zhao June office to assess patient's underlying renal dysfunction and obtain baseline. 4.  Keep Lomeli in for now, renal ultrasound was checked after Lomeli placement. Patient mentions urinary retention symptoms, could not quantify it. 5.  Will check vitamin D, and magnesium levels. 6.  Will check Urinalysis, along with urine sodium, potassium, chloride, Urine protein and creatinine to quantify the proteinuria if any at all. 7.  Right-sided pleural effusion management as per primary. 8.  Keep fluid restriction to 15 cc/day. 9.  BMP today afternoon and in a.m. 10.  Keep low potassium diet. 11.  Will check iron studies as well. 12.  Will follow. Nutrition   Please ensure that patient is on a renal diet/TF. Avoid nephrotoxic drugs/contrast exposure. Thank you for the consultation. Please do not hesitate to contact us for any further questions/concerns. We will continue to follow along with you. Abdulkadir Wagner MD  [facility]     This note is created with the assistance of a speech-recognition program. While intending to generate a document that actually reflects the content of the visit, no guarantees can be provided that every mistake has been identified and corrected by editing.

## 2021-10-13 NOTE — PROGRESS NOTES
Transitions of Care Pharmacy Service   Medication Review    The patient's list of current home medications has been reviewed. Source(s) of information: patient, surescripts, prescription bottles    Based on information provided by the above source(s), I have updated the patient's home med list as described below. I changed or updated the following medications on the patient's home medication list:  Discontinued None      Added Amlodipine 10mg - 1QD  Magnesium Oxide 400mg - 1QD  Lopressor 258BT - 1BID  Folic Acid 704UJN - 1QD  Tylenol PM - 1QHS PRN sleep     Adjusted   None      Other Notes None            Please feel free to call me with any questions about this encounter. Thank you. This note will be reviewed and co-signed by the Transitions of [facility] Pharmacist. The pharmacist will review inpatient orders and contact the physician about any discrepancies. Duarte Hassan, pharmacy technician  Transitions [facility] Pharmacy Service  Phone:  [phone]  Fax: [phone]      Electronically signed by Duarte Hassan on 10/13/2021 at 12:03 PM     Prior to Admission medications    Medication Sig Start Date End Date Taking?  Authorizing Provider   amLODIPine (NORVASC) 5 MG tablet Take 5 mg by mouth daily       magnesium oxide (MAG-OX) 400 MG tablet Take 400 mg by mouth daily       metoprolol (LOPRESSOR) 100 MG tablet Take 100 mg by mouth 2 times daily       folic acid (FOLVITE) 393 MCG tablet Take 800 mcg by mouth daily       diphenhydrAMINE-APAP, sleep, (TYLENOL PM EXTRA STRENGTH)  MG tablet Take 1 tablet by mouth nightly as needed for Sleep

## 2021-10-14 ENCOUNTER — APPOINTMENT (OUTPATIENT)
Dept: CT IMAGING | Age: 76
DRG: 291 | End: 2021-10-14
Payer: MEDICARE

## 2021-10-14 ENCOUNTER — APPOINTMENT (OUTPATIENT)
Dept: GENERAL RADIOLOGY | Age: 76
DRG: 291 | End: 2021-10-14
Payer: MEDICARE

## 2021-10-14 PROBLEM — N17.1 ACUTE RENAL FAILURE WITH ACUTE RENAL CORTICAL NECROSIS SUPERIMPOSED ON STAGE 4 CHRONIC KIDNEY DISEASE (HCC): Status: ACTIVE | Noted: 2021-10-12

## 2021-10-14 PROBLEM — N18.9 ACUTE KIDNEY INJURY SUPERIMPOSED ON CHRONIC KIDNEY DISEASE (HCC): Status: ACTIVE | Noted: 2021-10-12

## 2021-10-14 LAB
ALBUMIN SERPL-MCNC: 2.8 G/DL (ref 3.5–5.2)
ALBUMIN/GLOBULIN RATIO: ABNORMAL (ref 1–2.5)
ALP BLD-CCNC: 130 U/L (ref 35–104)
ALT SERPL-CCNC: <5 U/L (ref 5–33)
ANION GAP SERPL CALCULATED.3IONS-SCNC: 12 MMOL/L (ref 9–17)
APPEARANCE FLUID: NORMAL
AST SERPL-CCNC: 11 U/L
BASO FLUID: NORMAL %
BILIRUB SERPL-MCNC: 0.23 MG/DL (ref 0.3–1.2)
BUN BLDV-MCNC: 53 MG/DL (ref 8–23)
BUN/CREAT BLD: 18 (ref 9–20)
CALCIUM SERPL-MCNC: 8 MG/DL (ref 8.6–10.4)
CHLORIDE BLD-SCNC: 106 MMOL/L (ref 98–107)
CO2: 22 MMOL/L (ref 20–31)
COLOR FLUID: NORMAL
CREAT SERPL-MCNC: 2.89 MG/DL (ref 0.5–0.9)
EOSINOPHIL FLUID: NORMAL %
FLUID DIFF COMMENT: NORMAL
GFR AFRICAN AMERICAN: 19 ML/MIN
GFR NON-AFRICAN AMERICAN: 16 ML/MIN
GFR SERPL CREATININE-BSD FRML MDRD: ABNORMAL ML/MIN/{1.73_M2}
GFR SERPL CREATININE-BSD FRML MDRD: ABNORMAL ML/MIN/{1.73_M2}
GLUCOSE BLD-MCNC: 100 MG/DL (ref 65–105)
GLUCOSE BLD-MCNC: 100 MG/DL (ref 65–105)
GLUCOSE BLD-MCNC: 122 MG/DL (ref 65–105)
GLUCOSE BLD-MCNC: 80 MG/DL (ref 65–105)
GLUCOSE BLD-MCNC: 82 MG/DL (ref 65–105)
GLUCOSE BLD-MCNC: 87 MG/DL (ref 70–99)
HCT VFR BLD CALC: 28 % (ref 36.3–47.1)
HEMOGLOBIN: 8 G/DL (ref 11.9–15.1)
LYMPHOCYTES, BODY FLUID: 8 %
MAGNESIUM: 2.9 MG/DL (ref 1.6–2.6)
MCH RBC QN AUTO: 24 PG (ref 25.2–33.5)
MCHC RBC AUTO-ENTMCNC: 28.6 G/DL (ref 28.4–34.8)
MCV RBC AUTO: 83.8 FL (ref 82.6–102.9)
MONOCYTE, FLUID: NORMAL %
NEUTROPHIL, FLUID: 9 %
NRBC AUTOMATED: 0 PER 100 WBC
OTHER CELLS FLUID: NORMAL %
PDW BLD-RTO: 19.1 % (ref 11.8–14.4)
PHOSPHORUS: 3.3 MG/DL (ref 2.6–4.5)
PLATELET # BLD: 349 K/UL (ref 138–453)
PMV BLD AUTO: 10.7 FL (ref 8.1–13.5)
POTASSIUM SERPL-SCNC: 4.8 MMOL/L (ref 3.7–5.3)
RBC # BLD: 3.34 M/UL (ref 3.95–5.11)
RBC FLUID: <3000 /MM3
SODIUM BLD-SCNC: 140 MMOL/L (ref 135–144)
SPECIMEN TYPE: NORMAL
SURGICAL PATHOLOGY REPORT: NORMAL
TOTAL PROTEIN: 5.6 G/DL (ref 6.4–8.3)
WBC # BLD: 8 K/UL (ref 3.5–11.3)
WBC FLUID: 76 /MM3

## 2021-10-14 PROCEDURE — 94640 AIRWAY INHALATION TREATMENT: CPT

## 2021-10-14 PROCEDURE — 80053 COMPREHEN METABOLIC PANEL: CPT

## 2021-10-14 PROCEDURE — 83735 ASSAY OF MAGNESIUM: CPT

## 2021-10-14 PROCEDURE — 6360000002 HC RX W HCPCS: Performed by: NURSE PRACTITIONER

## 2021-10-14 PROCEDURE — 84100 ASSAY OF PHOSPHORUS: CPT

## 2021-10-14 PROCEDURE — 85027 COMPLETE CBC AUTOMATED: CPT

## 2021-10-14 PROCEDURE — 71250 CT THORAX DX C-: CPT

## 2021-10-14 PROCEDURE — 2580000003 HC RX 258: Performed by: STUDENT IN AN ORGANIZED HEALTH CARE EDUCATION/TRAINING PROGRAM

## 2021-10-14 PROCEDURE — 97530 THERAPEUTIC ACTIVITIES: CPT

## 2021-10-14 PROCEDURE — 99232 SBSQ HOSP IP/OBS MODERATE 35: CPT | Performed by: STUDENT IN AN ORGANIZED HEALTH CARE EDUCATION/TRAINING PROGRAM

## 2021-10-14 PROCEDURE — 71045 X-RAY EXAM CHEST 1 VIEW: CPT

## 2021-10-14 PROCEDURE — 6360000002 HC RX W HCPCS: Performed by: STUDENT IN AN ORGANIZED HEALTH CARE EDUCATION/TRAINING PROGRAM

## 2021-10-14 PROCEDURE — 97116 GAIT TRAINING THERAPY: CPT

## 2021-10-14 PROCEDURE — 82947 ASSAY GLUCOSE BLOOD QUANT: CPT

## 2021-10-14 PROCEDURE — 6370000000 HC RX 637 (ALT 250 FOR IP): Performed by: STUDENT IN AN ORGANIZED HEALTH CARE EDUCATION/TRAINING PROGRAM

## 2021-10-14 PROCEDURE — 36415 COLL VENOUS BLD VENIPUNCTURE: CPT

## 2021-10-14 PROCEDURE — 97163 PT EVAL HIGH COMPLEX 45 MIN: CPT

## 2021-10-14 PROCEDURE — 2700000000 HC OXYGEN THERAPY PER DAY

## 2021-10-14 PROCEDURE — APPSS45 APP SPLIT SHARED TIME 31-45 MINUTES: Performed by: NURSE PRACTITIONER

## 2021-10-14 PROCEDURE — 97535 SELF CARE MNGMENT TRAINING: CPT

## 2021-10-14 PROCEDURE — 94761 N-INVAS EAR/PLS OXIMETRY MLT: CPT

## 2021-10-14 PROCEDURE — 2060000000 HC ICU INTERMEDIATE R&B

## 2021-10-14 PROCEDURE — 97166 OT EVAL MOD COMPLEX 45 MIN: CPT

## 2021-10-14 RX ORDER — IPRATROPIUM BROMIDE AND ALBUTEROL SULFATE 2.5; .5 MG/3ML; MG/3ML
1 SOLUTION RESPIRATORY (INHALATION)
Status: DISCONTINUED | OUTPATIENT
Start: 2021-10-14 | End: 2021-10-14

## 2021-10-14 RX ORDER — IPRATROPIUM BROMIDE AND ALBUTEROL SULFATE 2.5; .5 MG/3ML; MG/3ML
1 SOLUTION RESPIRATORY (INHALATION) 3 TIMES DAILY
Status: DISCONTINUED | OUTPATIENT
Start: 2021-10-14 | End: 2021-10-18

## 2021-10-14 RX ORDER — TORSEMIDE 20 MG/1
40 TABLET ORAL DAILY
Status: DISCONTINUED | OUTPATIENT
Start: 2021-10-15 | End: 2021-10-21

## 2021-10-14 RX ORDER — FUROSEMIDE 10 MG/ML
20 INJECTION INTRAMUSCULAR; INTRAVENOUS ONCE
Status: COMPLETED | OUTPATIENT
Start: 2021-10-14 | End: 2021-10-14

## 2021-10-14 RX ORDER — FUROSEMIDE 10 MG/ML
40 INJECTION INTRAMUSCULAR; INTRAVENOUS ONCE
Status: COMPLETED | OUTPATIENT
Start: 2021-10-14 | End: 2021-10-14

## 2021-10-14 RX ORDER — CALCITRIOL 0.25 UG/1
0.25 CAPSULE, LIQUID FILLED ORAL
Status: DISCONTINUED | OUTPATIENT
Start: 2021-10-15 | End: 2021-11-04 | Stop reason: HOSPADM

## 2021-10-14 RX ORDER — BUDESONIDE AND FORMOTEROL FUMARATE DIHYDRATE 160; 4.5 UG/1; UG/1
2 AEROSOL RESPIRATORY (INHALATION) 2 TIMES DAILY
Status: DISCONTINUED | OUTPATIENT
Start: 2021-10-14 | End: 2021-10-19

## 2021-10-14 RX ADMIN — SODIUM CHLORIDE, PRESERVATIVE FREE 10 ML: 5 INJECTION INTRAVENOUS at 20:46

## 2021-10-14 RX ADMIN — FUROSEMIDE 20 MG: 10 INJECTION, SOLUTION INTRAMUSCULAR; INTRAVENOUS at 09:04

## 2021-10-14 RX ADMIN — HEPARIN SODIUM 5000 UNITS: 5000 INJECTION INTRAVENOUS; SUBCUTANEOUS at 20:43

## 2021-10-14 RX ADMIN — IPRATROPIUM BROMIDE AND ALBUTEROL SULFATE 1 AMPULE: .5; 2.5 SOLUTION RESPIRATORY (INHALATION) at 14:17

## 2021-10-14 RX ADMIN — IPRATROPIUM BROMIDE AND ALBUTEROL SULFATE 1 AMPULE: .5; 2.5 SOLUTION RESPIRATORY (INHALATION) at 09:27

## 2021-10-14 RX ADMIN — FUROSEMIDE 40 MG: 10 INJECTION, SOLUTION INTRAMUSCULAR; INTRAVENOUS at 11:27

## 2021-10-14 RX ADMIN — HEPARIN SODIUM 5000 UNITS: 5000 INJECTION INTRAVENOUS; SUBCUTANEOUS at 05:41

## 2021-10-14 RX ADMIN — HEPARIN SODIUM 5000 UNITS: 5000 INJECTION INTRAVENOUS; SUBCUTANEOUS at 14:45

## 2021-10-14 RX ADMIN — BUDESONIDE AND FORMOTEROL FUMARATE DIHYDRATE 2 PUFF: 160; 4.5 AEROSOL RESPIRATORY (INHALATION) at 20:20

## 2021-10-14 RX ADMIN — SODIUM CHLORIDE, PRESERVATIVE FREE 10 ML: 5 INJECTION INTRAVENOUS at 11:28

## 2021-10-14 RX ADMIN — FERROUS SULFATE TAB EC 325 MG (65 MG FE EQUIVALENT) 325 MG: 325 (65 FE) TABLET DELAYED RESPONSE at 09:04

## 2021-10-14 RX ADMIN — IPRATROPIUM BROMIDE AND ALBUTEROL SULFATE 1 AMPULE: .5; 2.5 SOLUTION RESPIRATORY (INHALATION) at 20:20

## 2021-10-14 ASSESSMENT — PAIN SCALES - GENERAL
PAINLEVEL_OUTOF10: 0

## 2021-10-14 NOTE — PLAN OF CARE
Problem: Falls - Risk of:  Goal: Will remain free from falls  Description: Will remain free from falls  10/14/2021 1936 by Eduardo Garnett RN  Outcome: Ongoing  Note: Pt fall risk, fall band present, falling star, safety alarm activated and in use as needed. Hourly rounding performed. Pt encouraged to use call light. See [facility] [place] fall risk assessment. 10/14/2021 1640 by Iveth Rogers RN  Outcome: Ongoing  Goal: Absence of physical injury  Description: Absence of physical injury  Outcome: Ongoing  Note: Non-skid socks in place, up with assistance, bed in lowest position, bed exit & alarm as needed, provide toileting every 2 hours an d as needed. Problem: Nutrition  Goal: Optimal nutrition therapy  Outcome: Ongoing  Note: Review diet daily and as needed with pt. Provide supplement nutrition as ordered by physician & educate pt. Review nutritional guidelines with pt. Problem: IP BALANCE  Goal: LTG - Patient will maintain balance to allow for safe/functional mobility  Outcome: Ongoing  Note: Work with physical therapy. Assist with range of motion & toileting as needed.

## 2021-10-14 NOTE — PROGRESS NOTES
Occupational Therapy   Occupational Therapy Initial Assessment  Date: 10/14/2021   Patient Name: Olive Buck  MRN: [id number]     : 1945    RN  Sher Butler reports patient is medically stable for therapy treatment this date. Chart reviewed prior to treatment and patient is agreeable for therapy. All lines intact and patient positioned comfortably at end of treatment. All patient needs addressed prior to ending therapy session. Date of Service: 10/14/2021    Discharge Recommendations:  Patient would benefit from continued therapy after discharge   Pt currently functioning below baseline. Would suggest additional therapy at time of discharge to maximize long term outcomes and prevent re-admission. Please refer to AM-PAC score for current level of function. OT Equipment Recommendations  Equipment Needed: Yes  Mobility Devices: ADL Assistive Devices  ADL Assistive Devices: Emergency Alert System;Long-handled Shoe Horn;Long-handled Sponge;Reacher;Sock-Aid Hard;Grab Bars - shower; Shower Chair with back    Assessment   Performance deficits / Impairments: Decreased functional mobility ; Decreased safe awareness;Decreased cognition;Decreased ADL status; Decreased strength;Decreased endurance;Decreased high-level IADLs;Decreased fine motor control;Decreased posture;Decreased balance  Assessment: Skilled OT services are recommended to increase I and safety during funcitonal tasks to return home at prior level of function as able.   Prognosis: Good;Fair  Decision Making: Medium Complexity  OT Education: OT Role;Transfer Training;Energy Conservation;Plan of Care;ADL Adaptive Strategies  Patient Education: pursed lip breathing tech, safety in function, fall prevention/call light use, benefits of being oob, recommendations for continued therapy  REQUIRES OT FOLLOW UP: Yes  Activity Tolerance  Activity Tolerance: Patient Tolerated treatment well;Patient limited by fatigue  Activity Tolerance: poor +, pt is currently limited by resp status  Safety Devices  Safety Devices in place: Yes  Type of devices: Nurse notified;Gait belt;Call light within reach; Chair alarm in place; Left in chair;Patient at risk for falls; All fall risk precautions in place (Pt declined to have feet elevated on stool)           Patient Diagnosis(es): The primary encounter diagnosis was Hypoxia. Diagnoses of Pleural effusion on right, Peripheral edema, Anemia, unspecified type, and Acute kidney injury [facility]) were also pertinent to this visit. has no past medical history on file. has no past surgical history on file. PER H&P: The patient presents to the emergency department via ambulance for reported failure to thrive. The patient lives at home. Her son is with her and says that she has had decreased energy and ability to get around. She is not opposed to going to a nursing home at this point. The patient does report immunization against Covid. She has had diarrhea for about 3 days. She was brought in, in a hypoxic state. The patient denies chest pain. She denies headache. She does feel generally weak. She has peripheral edema but did not really know what that was an ongoing issue for her. Restrictions  Restrictions/Precautions  Restrictions/Precautions: General Precautions, Fall Risk  Position Activity Restriction  Other position/activity restrictions:  Up wt assist, telemetry, Lomeli cath, 5L O2 per nc (RN Miranda Douglas okay'd increase up too 6L)    Subjective   General  Chart Reviewed: Yes  Patient assessed for rehabilitation services?: Yes  Family / Caregiver Present: No  Subjective  Subjective: Pt resting in bed, agreeable to OT Eval  Patient Currently in Pain: Denies    Social/Functional History  Social/Functional History  Lives With: Alone  Type of Home: House  Home Layout: Two level, Bed/Bath upstairs, 1/2 bath on main level (stopped going upstairs a week ago)  Home Access: Ramped entrance  Bathroom Shower/Tub: Walk-in shower  Bathroom Toilet: Standard  Home Equipment:  (None)  ADL Assistance: Independent  Homemaking Assistance: Independent  Homemaking Responsibilities: Yes  Ambulation Assistance: Independent  Transfer Assistance: Independent  Active : Yes  Occupation: Retired  Type of occupation: Teacher  Leisure & Hobbies: working around the yard, pool  Additional Comments: Pt reports \"short fall\" about a week ago from couch fell forward unable to fully stand. Objective   Vision: Impaired (Pt denies any recent visual changes)  Vision Exceptions: Wears glasses at all times  Hearing: Within functional limits    Orientation  Overall Orientation Status: Within Functional Limits  Observation/Palpation  Posture: Fair (with RW)  Observation: 6L O2 per nc, Lomeli cath  Edema: none       Balance  Sitting Balance: Contact guard assistance  Standing Balance: Moderate assistance (x2 staff assist for safety and balance)  Standing Balance  Time: standing karey ~ 1 min  Activity: functional mobility  Functional Mobility  Functional - Mobility Device: Rolling Walker  Activity:  (bed to bedside chair)  Assist Level: Moderate assistance (x2 staff assist)  Functional Mobility Comments: Pt required MOD verbal cues/tactile assist for upright posture, RW safety, awareness/assist with lines, scanning environment, pacing self, pursed lip breathing and initiation all to increase safety and reduce risk of falls. Toilet Transfers  Toilet Transfer: Unable to assess  Toilet Transfers Comments: Pt with no needs at this time       ADL  Feeding: Setup  Grooming: Setup;Stand by assistance  UE Bathing: Setup;Minimal assistance  LE Bathing: Setup; Moderate assistance  UE Dressing: Setup;Minimal assistance (with hosp gown)  LE Dressing: Setup; Moderate assistance  Toileting: Dependent/Total (pt has Lomeli cath)  Additional Comments: Pt is currently limited by respritory status/decreased endurance.        Tone RUE  RUE Tone: Normotonic  Tone LUE  LUE Tone: Normotonic  Coordination  Movements Are Fluid And Coordinated: No  Coordination and Movement description: Fine motor impairments; Left UE;Right UE;Decreased accuracy; Decreased speed        Bed mobility  Supine to Sit: Moderate assistance;2 Person assistance  Sit to Supine: Unable to assess (Pt agreeable to sitting up in chair)  Scooting: Moderate assistance;2 Person assistance  Comment: Pt required MOD verbal cues for proper bed mobility tech, pursed lip breathing, pacing self, and awareness/assist with lines all to increase safety and ease. Transfers  Sit to stand: Moderate assistance;2 Person assistance (with RW)  Stand to sit: Moderate assistance;2 Person assistance  Transfer Comments: Pt requried Mod verbal cues/tactile assist for upright posture, controlled stand to sit, squaring self/AD and reaching back to surface prior to sitting, pacing self, pursed lip breathing and awarness/assist with lines all to increase safety and reduce risk of falls. Cognition  Overall Cognitive Status: Exceptions  Arousal/Alertness: Appropriate responses to stimuli  Following Commands: Follows multistep commands with repitition; Follows multistep commands with increased time  Attention Span: Appears intact  Memory: Appears intact  Safety Judgement: Decreased awareness of need for safety;Decreased awareness of need for assistance  Problem Solving: Decreased awareness of errors;Assistance required to correct errors made;Assistance required to identify errors made;Assistance required to implement solutions;Assistance required to generate solutions  Insights: Decreased awareness of deficits  Initiation: Requires cues for some  Sequencing: Requires cues for some        Sensation  Overall Sensation Status: WFL        LUE AROM (degrees)  LUE AROM : WFL  RUE AROM (degrees)  RUE AROM : WFL  LUE Strength  Gross LUE Strength: WFL  LUE Strength Comment: shld 3+/5, Rest 4-/5  RUE Strength  Gross RUE Strength: WFL  RUE Strength Comment: shld 3+/5, Rest 4-/5             Plan   Plan  Times per week: 4-5x/wk 1x/day as karey  Current Treatment Recommendations: Strengthening, Endurance Training, Balance Training, Functional Mobility Training, Safety Education & Training, Home Management Training, Self-Care / ADL, Equipment Evaluation, Education, & procurement, Patient/Caregiver Education & Training                        AM-PAC Score        AM-PAC Inpatient Daily Activity Raw Score: 15 (10/14/21 1252)  AM-PAC Inpatient ADL T-Scale Score : 34.69 (10/14/21 1252)  ADL Inpatient CMS 0-100% Score: 56.46 (10/14/21 1252)  ADL Inpatient CMS G-Code Modifier : CK (10/14/21 1252)        Goals  Short term goals  Time Frame for Short term goals: By discharge, pt to demo  Short term goal 1: ADL transfers and functional mobility to CGA with use of AD as needed. Short term goal 2: UB ADLs to SBA and LB ADLs to Min A with use of AD/AE as needed and proper pacing tech. Short term goal 3: toileting to Min A with use of AD/grab bars/BSC as needed. Short term goal 4: increased BUE strength by 1/2 grade to assist with functional tasks/I with simple B UE HEP with use of handouts as needed. Short term goal 5: bed mobility to SBA with use of bedrails as needed. Long term goals  Long term goal 1: Pt to be I with fall prevention education, EC/WS tech, pursed lip breathing tech and recommendations AE/discharge with use of handouts as needed. Patient Goals   Patient goals : To feel better! Therapy Time   Individual Concurrent Group Co-treatment   Time In 1130         Time Out 5306         Minutes 46          tx time: 30 min    Co-treatment with PT warranted secondary to decreased safety and independence requiring 2 skilled therapy professionals to address individual discipline's goals.         Ravinder Edwards, OT

## 2021-10-14 NOTE — CARE COORDINATION
Social work; Providing list of snf locations in this area that are in network with [employer]. Will ask for a decision of first and second choice later today. Will fax referrals and start hens. Will need milton and Rx along with discharge order for skilled nursing facility. Jonah gray    Social work: pt sleepy when list delivered so called and left message for son. Advise him where the list is in the room. Asked that he advise on his tops choices in am.  Will need milton, Rx and discharge order stated: skilled nursing facility.  Jonah gray

## 2021-10-14 NOTE — PROGRESS NOTES
[facility]  Office: [address], DO, Tho China, DO, Darinel Shepard, DO, Will Turner Blood, DO, Chadd Doe MD, Kobe Arcos MD, Shilpi Gonzales MD, Polina Bose MD, Rajeev Porter MD, Jose Ramon Mayers MD, Ludwin Nielson MD, Radha Harding MD, Henna Simmons, DO, Loraine Gill, DO, Castro Orosco MD,  Huma Roman, DO, Ana Harrington MD, Enedina Bunn MD, Anila Carrera MD, Raj Chang MD, Sylvia Barker MD, Raffaele Delaney MD, Bryant Schuster, [facility], [facility], CNP, Nolan Herrera, CNP, Summer Stewart, CNS, Naomi Godinez, CNP, Mandy Molina, CNP, Eduardo Walker, CNP, Radha Weiner, CNP, Randa Bray, CNP, Sandra Stovall, CNP, Mara Monsalve PA-C, Charmel Osgood, [facility], Nga Barr, CNP, Ladan Bobby, CNP, Rosie Kim, CNP, Ruchi Roman, CNP, Tiney Schilder, CNP, Marlyne Osgood, CNP, [facility]    Progress Note    10/14/2021      1:23 PM     Name:   Jereym Modi  MRN:     [id number]     [place]:      [de-identified]   Room:   [address] [place] [address] Day:  2  Admit Date:  10/12/2021  2:53 PM    PCP:   Eze Medina MD  Code Status:  Full Code    Subjective:     C/C:   Chief Complaint   Patient presents with    Respiratory Distress     EMS reported 97% on room air; pt arrives with saturation 45% on room air    Failure To Thrive     Interval History Status:  patient required hi-flow yesterday and overnight but has been weaned back off and currently on 5L via nasal cannula, tolerating well, she denies any new complaints, VSS   Brief History:   Jeremy Modi is a 64 y.o. female  with a hx of CKD 4 and iron deficiency anemia who presented to [facility] with Respiratory Distress and hypoxia (O2 sats 45%) and failure to thrive and is admitted to the hospital for the management of right Pleural effusion and acute renal failure.  Patient lives at home alone and has recently been having difficulty caring for herself and generalized weakness for the last few weeks . She does have chronic BLE edema but denies hx of CHF. Initial CXR showed large right pleural effusion, stat elevated BNP and D-dimer as well as BUN/creatinine. Last took known creatinine was 2.34 in august, she reports she seen a nephrologist once. She does not wear home oxygen      Review of Systems:     Constitutional:  negative for chills, fevers, sweats. Positive for fatigue and generalized weakness   Respiratory:  negative for cough, dyspnea on exertion, shortness of breath, wheezing  Cardiovascular:  negative for chest pain, chest pressure/discomfort, lpositive for lower extremity edema, palpitations  Gastrointestinal:  negative for abdominal pain, constipation, diarrhea, nausea, vomiting  Neurological:  negative for dizziness, headache    Medications: Allergies: Allergies   Allergen Reactions    Penicillins Swelling       Current Meds:   Scheduled Meds:    ipratropium-albuterol  1 ampule Inhalation TID    budesonide-formoterol  2 puff Inhalation BID    [START ON 10/15/2021] torsemide  40 mg Oral Daily    [START ON 10/15/2021] calcitRIOL  0.25 mcg Oral Once per day on Mon Wed Fri    sodium chloride flush  5-40 mL IntraVENous 2 times per day    [START ON 10/15/2021] levofloxacin  500 mg IntraVENous Q48H    vitamin D  50,000 Units Oral Weekly    ferrous sulfate  325 mg Oral Daily with breakfast    heparin (porcine)  5,000 Units SubCUTAneous 3 times per day     Continuous Infusions:    dextrose      sodium chloride       PRN Meds: glucose, dextrose, glucagon (rDNA), dextrose, sodium chloride flush, sodium chloride, ondansetron **OR** ondansetron, acetaminophen **OR** acetaminophen, perflutren lipid microspheres    Data:     Past Medical History:   has no past medical history on file. Social History:   reports that she quit smoking about 2 years ago. Her smoking use included cigarettes.  She has never used smokeless tobacco. She reports current alcohol use. Family History:   Family History   Problem Relation Age of Onset    Hypertension Mother     Cancer Sister     Cancer Brother        Vitals:  BP (!) 114/51   Pulse 90   Temp 97.4 °F (36.3 °C) (Oral)   Resp 20   Ht 5' 4\" (1.626 m)   Wt 130 lb 1.6 oz (59 kg)   SpO2 93%   BMI 22.33 kg/m²   Temp (24hrs), Av.9 °F (36.6 °C), Min:97.4 °F (36.3 °C), Max:98.4 °F (36.9 °C)    Recent Labs     10/13/21  2000 10/14/21  0053 10/14/21  0824 10/14/21  1246   POCGLU 88 80 82 100       I/O (24Hr): Intake/Output Summary (Last 24 hours) at 10/14/2021 1342  Last data filed at 10/14/2021 0342  Gross per 24 hour   Intake 708 ml   Output 1390 ml   Net -682 ml       Labs:  Hematology:  Recent Labs     10/12/21  1531 10/12/21  2008 10/13/21  0530 10/14/21  0529   WBC 8.9  --  10.0 8.0   RBC 3.57*  --  3.52* 3.34*   HGB 8.8*  --  8.5* 8.0*   HCT 29.7*  --  30.5* 28.0*   MCV 83.3  --  86.6 83.8   MCH 24.8*  --  24.1* 24.0*   MCHC 29.7*  --  27.9* 28.6   RDW 20.7*  --  19.3* 19.1*     --  376 349   MPV 7.8  --  10.2 10.7   INR  --  1.0  --   --    DDIMER 2.38  --   --   --      Chemistry:  Recent Labs     10/12/21  1531 10/12/21  1531 10/13/21  0530 10/13/21  1226 10/14/21  0529 10/14/21  1241      < > 139 138 140  --    K 5.1   < > 5.7* 5.0 4.8  --       < > 106 107 106  --    CO2 20   < > 21 19* 22  --    GLUCOSE 94   < > 85 56* 87  --    BUN 54*   < > 54* 54* 53*  --    CREATININE 3.10*   < > 3.01* 2.86* 2.89*  --    MG  --   --  3.1*  --  2.9*  --    ANIONGAP 14   < > 12 12 12  --    LABGLOM 15*   < > 15* 16* 16*  --    GFRAA 18*   < > 18* 19* 19*  --    CALCIUM 8.3*   < > 8.0* 7.8* 8.0*  --    PHOS  --   --   --   --   --  3.3   PROBNP 5,650*  --   --   --   --   --    TROPHS 30*  --   --   --   --   --     < > = values in this interval not displayed.      Recent Labs     10/12/21  1531 10/12/21  1531 10/13/21  0530 10/13/21  0953 10/13/21  1252 10/13/21  1357 10/13/21  1642 10/13/21  2000 10/14/21  0053 10/14/21  0529 10/14/21  0824 10/14/21  1246   PROT 6.8   < > 6.3* 6.0*  --   --   --   --   --  5.6*  --   --    LABALBU 3.3*  --  3.2*  --   --   --   --   --   --  2.8*  --   --    AST 10  --  10  --   --   --   --   --   --  11  --   --    ALT 6  --  9  --   --   --   --   --   --  <5*  --   --    LDH  --   --   --  197  --   --   --   --   --   --   --   --    ALKPHOS 149*  --  146*  --   --   --   --   --   --  130*  --   --    BILITOT 0.20*  --  0.20*  --   --   --   --   --   --  0.23*  --   --    BILIDIR 0.10  --   --   --   --   --   --   --   --   --   --   --    POCGLU  --   --   --   --    < > 78 77 88 80  --  82 100    < > = values in this interval not displayed. ABG:No results found for: POCPH, PHART, PH, POCPCO2, MBP1ZWW, PCO2, POCPO2, PO2ART, PO2, POCHCO3, GXO2DTN, HCO3, NBEA, PBEA, BEART, BE, THGBART, THB, XLB9ONH, DZXI7PHJ, W5MWAQRJ, O2SAT, FIO2  Lab Results   Component Value Date/Time    SPECIAL NOT REPORTED 10/13/2021 09:00 AM    SPECIAL NOT REPORTED 10/13/2021 09:00 AM     Lab Results   Component Value Date/Time    CULTURE NO GROWTH 21 HOURS 10/13/2021 09:00 AM    CULTURE PENDING 10/13/2021 09:00 AM       Radiology:  XR CHEST PORTABLE    Result Date: 10/12/2021  Significant right pleural effusion and associated airspace disease. Mild left basilar airspace disease. Pneumonia cannot be excluded. US RETROPERITONEAL COMPLETE    Result Date: 10/13/2021  Bilateral simple renal cyst. Lomeli catheter within the urinary bladder. ECHO 10/13/21  Left ventricle is normal in size  Global left ventricular systolic function is normal  Estimated ejection fraction is 65 % . Moderate tricuspid regurgitation. Moderate pulmonary hypertension. Anterior echo free space suggestive of adipose tissue is seen. Pleural effusion noted.  Normal aortic root dimension    Physical Examination:        General appearance:  alert, cooperative and no distress  Mental Status:  oriented to person, place and time and flat affect, appears uninterested in conversation   Lungs: breath sounds diminished bilaterally, normal effort  Heart:  Mildly tachycardic, regular rhythm, no murmur  Abdomen:  soft, nontender, nondistended, normal bowel sounds, no masses, hepatomegaly, splenomegaly  Extremities:  +1 BLE edema, no redness, tenderness in the calves  Skin:  no gross lesions, rashes, induration    Assessment:        Hospital Problems         Last Modified POA    * (Principal) Pleural effusion on right 10/13/2021 Yes    Acute kidney injury ([address] 10/13/2021 Yes    Acute respiratory failure with hypoxia ([address] 10/13/2021 Yes    Iron deficiency anemia 10/12/2021 Yes    Essential hypertension 10/12/2021 Yes    General weakness 10/12/2021 Yes    Vitamin D deficiency 10/13/2021 Yes    Acute diastolic heart failure ([address] 10/13/2021 Yes    Hypoxia 10/13/2021 Yes    Anemia 10/13/2021 Yes    Peripheral edema 10/13/2021 Yes    Moderate protein-calorie malnutrition ([address] 10/13/2021 Yes    Severe malnutrition ([address] 10/13/2021 Yes          Plan:        MAYO on CKD 4 improving: likely due to untreated/undiagnosed CHF, patient does not follow with a PCP regularly. Avoid nephrotoxic agents, appreciate nephrology's recommendations. Renal ultrasound unremarkable for acute findings. Creatinine improving, continue to trend kidney function   Right pleural effusion due to Acute diastolic CHF, likely has been a chronic issue for sometime, patient is inconsistent with medical history, medical adherence and follow-up. Patient initially reported she has chronic edema however she has reported to other providers that this is a new issue. S/P thoracentesis this am, no previous echo available. cardiac echo shows normal systolic function, EF 09% and moderate pulmonary hypertension.  Continue fluid restriction, IV diuresis as ordered per nephrology  Continue telemetry monitoring   Elevated D-Dimer due to MAYO: VQ scan indicates low probability of PE  Continue cardiac/renal diet   IV levaquin started as Pneumonia could no be excluded on xray, repeat CXR shows atelectasis vs trapped lung  Pulmonology consult reviewed, appreciate recommendations.  CT chest without contrast  Acute on chronic Iron deficiency anemia, continue start folvite  Continue PT/OT   Plan discussed with patient and staff     ANN Castro - ESTEFANIA  10/14/2021  1:42 PM

## 2021-10-14 NOTE — CONSULTS
diarrhea for 3 days prior to arrival.  She apparently was also hypoxic upon arrival but denied significant shortness of breath, cough or chest pain. She is currently resting in bed, does not appear to be in any distress but is on high flow oxygen 50 L / 60% FiO2. She is a former smoker, quit in 2019, smoked on and off for about 50 years. She is not on oxygen at home. She does not take any inhalers at home. Yesterday she had thoracentesis on the right with 850 mL fluid removed. PMHx   Past Medical History  History reviewed. No pertinent past medical history. Past Surgical History    History reviewed. No pertinent surgical history.     Meds    Current Medications    ipratropium-albuterol  1 ampule Inhalation TID    sodium chloride flush  5-40 mL IntraVENous 2 times per day    [START ON 10/15/2021] levofloxacin  500 mg IntraVENous Q48H    vitamin D  50,000 Units Oral Weekly    ferrous sulfate  325 mg Oral Daily with breakfast    heparin (porcine)  5,000 Units SubCUTAneous 3 times per day     glucose, dextrose, glucagon (rDNA), dextrose, sodium chloride flush, sodium chloride, ondansetron **OR** ondansetron, acetaminophen **OR** acetaminophen, perflutren lipid microspheres  IV Drips/Infusions   dextrose      sodium chloride       Home Medications  Medications Prior to Admission: amLODIPine (NORVASC) 5 MG tablet, Take 5 mg by mouth daily  magnesium oxide (MAG-OX) 400 MG tablet, Take 400 mg by mouth daily  metoprolol (LOPRESSOR) 100 MG tablet, Take 100 mg by mouth 2 times daily  folic acid (FOLVITE) 632 MCG tablet, Take 800 mcg by mouth daily  diphenhydrAMINE-APAP, sleep, (TYLENOL PM EXTRA STRENGTH)  MG tablet, Take 1 tablet by mouth nightly as needed for Sleep    Allergies    Penicillins  Social History     Social History     Tobacco Use    Smoking status: Former Smoker     Types: Cigarettes     Quit date: 2019     Years since quittin.7    Smokeless tobacco: Never Used   Substance Use Topics  Alcohol use: Yes     Comment: occasional glass of wine     Family History          Problem Relation Age of Onset    Hypertension Mother     Cancer Sister     Cancer Brother      ROS - 6 systems   General Denies any fever or chills  HEENT Denies any diplopia, tinnitus or vertigo  Resp Denies any shortness of breath, cough or wheezing  Cardiac Denies any chest pain, palpitations, claudication or edema  GI Denies any melena, hematochezia, hematemesis or pyrosis   Denies any frequency, urgency, hesitancy or incontinence  Heme Denies bruising or bleeding easily  Endocrine Denies any history of diabetes or thyroid disease  Neuro Denies any focal motor or sensory deficits  Psychiatric Denies anxiety, depression, suicidal ideation  Skin Denies rashes, itching, open sores    Vitals     height is 5' 4\" (1.626 m) and weight is 130 lb 1.6 oz (59 kg). Her axillary temperature is 97.8 °F (36.6 °C). Her blood pressure is 101/53 (abnormal) and her pulse is 88. Her respiration is 18 and oxygen saturation is 92%. Body mass index is 22.33 kg/m². I/O        Intake/Output Summary (Last 24 hours) at 10/14/2021 0929  Last data filed at 10/14/2021 0342  Gross per 24 hour   Intake 708 ml   Output 1390 ml   Net -682 ml     I/O last 3 completed shifts: In: 708 [P.O.:708]  Out: 2240 [ZDWID:6247; Other:850]   Patient Vitals for the past 96 hrs (Last 3 readings):   Weight   10/14/21 0623 130 lb 1.6 oz (59 kg)   10/12/21 1945 133 lb 3 oz (60.4 kg)   10/12/21 1506 139 lb (63 kg)     Exam   General Appearance   Awake, alert, oriented, in no acute distress  HEENT - Head is normocephalic, atraumatic. Pupil reactive to light  Neck - Supple,  trachea midline and straight  Lungs -moderate air exchange, decreased right lower lobe  Cardiovascular - Heart sounds are normal.  Regular rhythm normal rate without murmur, gallop or rub. Abdomen - Soft, nontender, nondistended, no masses or organomegaly  Neurologic - CN II-XII are grossly intact. There are no focal motor or sensory deficits  Skin - No bruising or bleeding  Extremities - No cyanosis, clubbing. Mild edema    Labs  - Old records and notes have been reviewed in [facility]   CBC     Lab Results   Component Value Date    WBC 8.0 10/14/2021    RBC 3.34 10/14/2021    HGB 8.0 10/14/2021    HCT 28.0 10/14/2021     10/14/2021    MCV 83.8 10/14/2021    MCH 24.0 10/14/2021    MCHC 28.6 10/14/2021    RDW 19.1 10/14/2021    LYMPHOPCT 6 10/12/2021    MONOPCT 9 10/12/2021    BASOPCT 0 10/12/2021    MONOSABS 0.80 10/12/2021    LYMPHSABS 0.53 10/12/2021    EOSABS 0.27 10/12/2021    BASOSABS 0.00 10/12/2021    DIFFTYPE NOT REPORTED 10/12/2021     BMP   Lab Results   Component Value Date     10/14/2021    K 4.8 10/14/2021     10/14/2021    CO2 22 10/14/2021    BUN 53 10/14/2021    CREATININE 2.89 10/14/2021    GLUCOSE 87 10/14/2021    CALCIUM 8.0 10/14/2021    MG 2.9 10/14/2021     LFTS  Lab Results   Component Value Date    ALKPHOS 130 10/14/2021    ALT <5 10/14/2021    AST 11 10/14/2021    PROT 5.6 10/14/2021    BILITOT 0.23 10/14/2021    BILIDIR 0.10 10/12/2021    IBILI 0.10 10/12/2021    LABALBU 2.8 10/14/2021     INR   Lab Results   Component Value Date    INR 1.0 10/12/2021    PROTIME 13.4 10/12/2021       Radiology    CXR       (See actual reports for details)    \"Thank you for asking us to see this patient\"    Case discussed with nurse and patient. Questions and concerns addressed.   Electronically signed by     Bettina Goldberg MD on 10/14/2021 at 5:36 PM  Pulmonary Critical Care and Sleep Medicine,  [facility]  Cell: [phone]  Office: [phone]

## 2021-10-14 NOTE — RT PROTOCOL NOTE
RT Inhaler-Nebulizer Bronchodilator Protocol Note    There is a bronchodilator order in the chart from a provider indicating to follow the RT Bronchodilator Protocol and there is an Initiate RT Inhaler-Nebulizer Bronchodilator Protocol order as well (see protocol at bottom of note). CXR Findings:  XR CHEST PORTABLE    Result Date: 10/13/2021  Interval decrease in size of right-sided pleural effusion now moderate in size. Small left pleural effusion. Opacity of bilateral lower lung fields may reflect underlying atelectasis. Apparent thin line overlying the right lung apex may be artifactual related to overlying skin fold. The lung markings are seen lateral to the aforementioned line. XR CHEST PORTABLE    Result Date: 10/12/2021  Significant right pleural effusion and associated airspace disease. Mild left basilar airspace disease. Pneumonia cannot be excluded. The findings from the last RT Protocol Assessment were as follows:   History Pulmonary Disease: Smoker 15 pack years or more  Respiratory Pattern: Mild dyspnea at rest, irregular pattern, or RR 21-25 bpm  Breath Sounds: Slightly diminished and/or crackles  Cough: Weak, non-productive  Indication for Bronchodilator Therapy:    Bronchodilator Assessment Score: 10    Aerosolized bronchodilator medication orders have been revised according to the RT Inhaler-Nebulizer Bronchodilator Protocol below. Respiratory Therapist to perform RT Therapy Protocol Assessment initially then follow the protocol. Repeat RT Therapy Protocol Assessment PRN for score 0-3 or on second treatment, BID, and PRN for scores above 3. No Indications - adjust the frequency to every 6 hours PRN wheezing or bronchospasm, if no treatments needed after 48 hours then discontinue using Per Protocol order mode. If indication present, adjust the RT bronchodilator orders based on the Bronchodilator Assessment Score as indicated below.   Use Inhaler orders unless patient has one or more of the following: on home nebulizer, not able to hold breath for 10 seconds, is not alert and oriented, cannot activate and use MDI correctly, or respiratory rate 25 breaths per minute or more, then use the equivalent nebulizer order(s) with same Frequency and PRN reasons based on the score. If a patient is on this medication at home then do not decrease Frequency below that used at home. 0-3 - enter or revise RT bronchodilator order(s) to equivalent RT Bronchodilator order with Frequency of every 4 hours PRN for wheezing or increased work of breathing using Per Protocol order mode. 4-6 - enter or revise RT Bronchodilator order(s) to two equivalent RT bronchodilator orders with one order with BID Frequency and one order with Frequency of every 4 hours PRN wheezing or increased work of breathing using Per Protocol order mode. 7-10 - enter or revise RT Bronchodilator order(s) to two equivalent RT bronchodilator orders with one order with TID Frequency and one order with Frequency of every 4 hours PRN wheezing or increased work of breathing using Per Protocol order mode. 11-13 - enter or revise RT Bronchodilator order(s) to one equivalent RT bronchodilator order with QID Frequency and an Albuterol order with Frequency of every 4 hours PRN wheezing or increased work of breathing using Per Protocol order mode. Greater than 13 - enter or revise RT Bronchodilator order(s) to one equivalent RT bronchodilator order with every 4 hours Frequency and an Albuterol order with Frequency of every 2 hours PRN wheezing or increased work of breathing using Per Protocol order mode. RT to enter RT Home Evaluation for COPD & MDI Assessment order using Per Protocol order mode.     Electronically signed by Roc Dennis RCP on 10/14/2021 at 8:11 AM

## 2021-10-14 NOTE — PLAN OF CARE
Assessment/Plan:     1. Bronchospasm  Differential diagnosis includes GERD, postnasal drip, viral URI, pneumonia,, or asthma.  Lungs are clear today therefore little concern for pneumonia.  Given that symptoms have been fairly stable for the last 3 months and for a viral URI.  No heartburn symptoms, although this does not completely rule out GERD.  I suspect bronchospasm with reactive airway.  I do think there is some component of some postnasal drip given her runny nose and sinus drainage.  Chest x-ray was deferred today.  Recommend starting a daily antihistamine such as Claritin or Zyrtec.  Prescribed fluticasone HFA, 1 puff twice daily.  Discussed proper use and possible side effects of this medication.  Instructed patient to rinse her mouth out after use.  Recommend using continuously for the next 3 to 4 weeks.  Follow-up in clinic with any fevers, shortness of breath, wheezing, worsening cough, or if symptoms do not resolve.  - fluticasone propionate (FLOVENT HFA) 110 mcg/actuation inhaler; Inhale 1 puff 2 (two) times a day.  Dispense: 1 Inhaler; Refill: 0        Subjective:     Wendy Handy is a 62 y.o. female who presents with complaints of a cough x3 months.  Cough has remained unchanged since onset.  Describes a nonproductive cough during the day.  Symptoms are worse with deep breathing or exertion.  She denies any wheezing or shortness of breath.  She does have some tightness in the chest.  Cough is worse at night when she lays flat.  When she falls asleep she does find.  She does have some production from the cough early in the morning.  Associated symptoms include a mild runny nose and some ear fullness.  No fevers or chills.  No history of asthma or smoking.  She is not historically had any seasonal or environmental allergies.  No heartburn or acid reflux symptoms, although her appetite has been a little bit low.  Patient follows with cardiology for aortic stenosis, CAD, and aortic aneurysm.  She will  Problem: Falls - Risk of:  Goal: Will remain free from falls  Description: Will remain free from falls  Outcome: Ongoing   Pt is a high fall risk per falls risk assessment. Remains free from falls and injuries, call light at reach and utilized appropriately. Bed in lowest position with wheels locked and alarm armed. Personal items that are used frequently are within reach.  Up to chair with assist, no attempts to get out of bed unassisted noted or reported, falling star program and hourly rounding implemented, will continue to monitor and educate as needed be seeing them next week.  Over-the-counter treatments include Mucinex, NyQuil, DayQuil, and Robitussin.  These will usually help her with sleep, but not necessarily with a cough.      The following portions of the patient's history were reviewed and updated as appropriate: allergies, current medications.    Review of Systems  A comprehensive review of systems was performed and was otherwise negative    Objective:     /70   Pulse 87   Temp 98.7  F (37.1  C)   SpO2 99%     General Appearance: Alert, cooperative, no distress, appears stated age  Eyes: PERRL, conjunctiva/corneas clear, EOM's intact  Ears: Normal TM's and external ear canals, both ears  Nose: Nares normal, septum midline, mucosa normal, clear drainage  Throat: Lips, mucosa, and tongue normal; teeth and gums normal  Neck: Supple, symmetrical, trachea midline, no adenopathy  Lungs: Clear to auscultation bilaterally, respirations unlabored. No rhonchi or wheezing.  Deep breathing elicits a dry cough.  Heart: Regular rate and rhythm, S1 and S2 normal, no murmur, rub, or gallop   Abdomen: Soft, non-tender, bowel sounds active all four quadrants,  no masses, no organomegaly      Shahana Parker, YOCASTA

## 2021-10-14 NOTE — PLAN OF CARE
Problem: Falls - Risk of:  Goal: Will remain free from falls  Description: Will remain free from falls  10/13/2021 2201 by Mary Jo Mcdowell RN  Outcome: Met This Shift  Note: Pt remains free from falls this shift.   10/13/2021 1610 by Argentina Landa RN  Outcome: Ongoing

## 2021-10-14 NOTE — PROGRESS NOTES
Renal Progress Note    Patient :  Giovanna Bai; 64 y.o. MRN# [id number]  Location:  1012/1012-01  Attending:  Michelle Jiemnez MD  Admit Date:  10/12/2021   Hospital Day: 2      Subjective:     Oral intake good. Urine output good. CT scan of the chest done shows presence of bilateral pleural effusions more on the right side loculated effusion in the left side. Also shows changes of emphysema. Shortness of breath persist.  Creatinine is now in the 2.8-2.9 range. Previous work-up reviewed, no evidence of immune complex or paraprotein disease. PTH was 732 indicating significant element of chronic kidney disease. UPC 0.2, albumin level 2.8  Admits to poor appetite lethargy and fatigue. Also admits to sleep disturbance. Denies any involuntary muscle movements.   Pulmonary evaluation in progress for dyspnea COPD and pleural effusions    Outpatient Medications:     Medications Prior to Admission: amLODIPine (NORVASC) 5 MG tablet, Take 5 mg by mouth daily  magnesium oxide (MAG-OX) 400 MG tablet, Take 400 mg by mouth daily  metoprolol (LOPRESSOR) 100 MG tablet, Take 100 mg by mouth 2 times daily  folic acid (FOLVITE) 751 MCG tablet, Take 800 mcg by mouth daily  diphenhydrAMINE-APAP, sleep, (TYLENOL PM EXTRA STRENGTH)  MG tablet, Take 1 tablet by mouth nightly as needed for Sleep    Current Medications:     Scheduled Meds:    ipratropium-albuterol  1 ampule Inhalation TID    budesonide-formoterol  2 puff Inhalation BID    [START ON 10/15/2021] torsemide  40 mg Oral Daily    sodium chloride flush  5-40 mL IntraVENous 2 times per day    [START ON 10/15/2021] levofloxacin  500 mg IntraVENous Q48H    vitamin D  50,000 Units Oral Weekly    ferrous sulfate  325 mg Oral Daily with breakfast    heparin (porcine)  5,000 Units SubCUTAneous 3 times per day     Continuous Infusions:    dextrose      sodium chloride       PRN Meds:  glucose, dextrose, glucagon (rDNA), dextrose, sodium chloride flush, sodium chloride, ondansetron **OR** ondansetron, acetaminophen **OR** acetaminophen, perflutren lipid microspheres    Input/Output:       I/O last 3 completed shifts: In: 708 [P.O.:708]  Out: 2240 [HTILP:7758; Other:850]. Patient Vitals for the past 96 hrs (Last 3 readings):   Weight   10/14/21 0623 130 lb 1.6 oz (59 kg)   10/12/21 1945 133 lb 3 oz (60.4 kg)   10/12/21 1506 139 lb (63 kg)       Vital Signs:   Temperature:  Temp: 97.4 °F (36.3 °C)  TMax:   Temp (24hrs), Av.9 °F (36.6 °C), Min:97.4 °F (36.3 °C), Max:98.4 °F (36.9 °C)    Respirations:  Resp: 20  Pulse:   Pulse: 90  BP:    BP: (!) 114/51  BP Range: Systolic (15USD), WUX:27 , Min:89 , ZIR:464       Diastolic (57SYL), FVS:09, Min:47, Max:53      Physical Examination:     General:  Awake, follows commands, speaking in full sentences, no accessory muscle use. Appears somewhat anxious  HEENT: Atraumatic, normocephalic, no throat congestion, moist mucosa. Eyes:   Pupils equal, round and reactive to light, EOMI. Neck:   No JVD, no thyromegaly, no lymphadenopathy. Chest:   Bilateral vesicular breath sounds, no rales or wheezes. Cardiac:  S1 S2 RR, no murmurs, gallops or rubs, JVP not raised. Abdomen: Soft, non-tender, no masses or organomegaly, BS audible. :   No suprapubic or flank tenderness. Neuro:  AAO x 3, No FND. SKIN:  No rashes, good skin turgor.   Extremities:  1+ edema, with wrinkling in lower extremities, sluggish pulses no calf tenderness  Labs:       Recent Labs     10/12/21  1531 10/13/21  0530 10/14/21  0529   WBC 8.9 10.0 8.0   RBC 3.57* 3.52* 3.34*   HGB 8.8* 8.5* 8.0*   HCT 29.7* 30.5* 28.0*   MCV 83.3 86.6 83.8   MCH 24.8* 24.1* 24.0*   MCHC 29.7* 27.9* 28.6   RDW 20.7* 19.3* 19.1*    376 349   MPV 7.8 10.2 10.7      BMP:   Recent Labs     10/13/21  0530 10/13/21  1226 10/14/21  0529    138 140   K 5.7* 5.0 4.8    107 106   CO2 21 19* 22   BUN 54* 54* 53*   CREATININE 3.01* 2.86* 2.89*   GLUCOSE 85 56* 87   CALCIUM 8.0* 7. 8* 8.0*      Phosphorus:   No results for input(s): PHOS in the last 72 hours. Magnesium:    Recent Labs     10/13/21  0530 10/14/21  0529   MG 3.1* 2.9*     Albumin:    Recent Labs     10/12/21  1531 10/13/21  0530 10/14/21  0529   LABALBU 3.3* 3.2* 2.8*     BNP:    No results found for: BNP  EDD:    No results found for: EDD  SPEP:  Lab Results   Component Value Date    PROT 5.6 10/14/2021     UPEP:   No results found for: LABPE  C3:   No results found for: C3  C4:   No results found for: C4  MPO ANCA:   No results found for: MPO  PR3 ANCA:   No results found for: PR3  Anti-GBM:   No results found for: GBMABIGG  Hep BsAg:       No results found for: HEPBSAG  Hep C AB:        No results found for: HEPCAB    Urinalysis/Chemistries:      Lab Results   Component Value Date    NITRU NEGATIVE 10/13/2021    COLORU Yellow 10/13/2021    PHUR 5.5 10/13/2021    WBCUA 10 TO 20 10/13/2021    RBCUA 2 TO 5 10/13/2021    MUCUS 1+ 10/13/2021    TRICHOMONAS NOT REPORTED 10/13/2021    YEAST NOT REPORTED 10/13/2021    BACTERIA NOT REPORTED 10/13/2021    SPECGRAV 1.020 10/13/2021    LEUKOCYTESUR SMALL 10/13/2021    UROBILINOGEN Normal 10/13/2021    BILIRUBINUR NEGATIVE 10/13/2021    GLUCOSEU NEGATIVE 10/13/2021    KETUA NEGATIVE 10/13/2021    AMORPHOUS 1+ 10/13/2021     Urine Sodium:     Lab Results   Component Value Date    LAZARO 40 10/13/2021     Urine Potassium:  No results found for: KUR  Urine Chloride:    Lab Results   Component Value Date    CLUR 60 10/13/2021     Urine Osmolarity:   Lab Results   Component Value Date    OSMOU 347 10/12/2021     Urine Protein:   No components found for: TOTALPROTEIN, URINE   Urine Creatinine:     Lab Results   Component Value Date    LABCREA 76.0 10/13/2021     Urine Eosinophils:  No components found for: UEOS    Radiology:     CXR:     Assessment:     1.   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

## 2021-10-14 NOTE — PROGRESS NOTES
Physical Therapy    Facility/Department: [facility]  Initial Assessment    NAME: Massiel Shafer  : 1945  MRN: [id number]    Date of Service: 10/14/2021    Discharge Recommendations:  Patient would benefit from continued therapy after discharge     Pt currently functioning below baseline. Would suggest additional therapy at time of discharge to maximize long term outcomes and prevent re-admission. Please refer to AM-PAC score for current level of function. HPI (Per chart): Massiel Shafer is 64 y.o.,  female presented to the emergency room 2 days ago via ambulance for failure to thrive. Patient son arrived with her and stated she has had decreased energy and ability to get around. She diarrhea for 3 days prior to arrival.  She apparently was also hypoxic upon arrival but denied significant shortness of breath, cough or chest pain. She is currently resting in bed, does not appear to be in any distress but is on high flow oxygen 50 L / 60% FiO2. She is a former smoker, quit in 2019, smoked on and off for about 50 years. She is not on oxygen at home. She does not take any inhalers at home. Yesterday she had thoracentesis on the right with 850 mL fluid removed. Assessment   Body structures, Functions, Activity limitations: Decreased balance;Decreased functional mobility ; Decreased safe awareness;Decreased ADL status; Decreased strength;Decreased endurance;Decreased posture  Assessment: Patient demo decreased gait, transfers. balance, and strength, and decreased respiratory status. Sp02 96% on 5 liters 02, Sp02 down to 88% with bed to chair transfers, back up to 90% within 2 minutes. Patient hovering at 90-92% on 5 liters, discussed with RN if patient was ok to stay up in chair, RN ok'd turning up 02 to 6 liters. RN reported patient ok to stay up in chair and RN could put her back on high flow if needed.  Patient will benefit from skilled PT to improve gait, transfers, balance, strength, and History  Lives With: Alone  Type of Home: House  Home Layout: Two level, Bed/Bath upstairs, 1/2 bath on main level (stopped going upstairs a week ago)  Home Access: Ramped entrance  Bathroom Shower/Tub: Walk-in shower  Bathroom Toilet: Standard  Home Equipment:  (None)  ADL Assistance: Independent  Homemaking Assistance: Independent  Homemaking Responsibilities: Yes  Ambulation Assistance: Independent  Transfer Assistance: Independent  Active : Yes  Occupation: Retired  Type of occupation: Teacher  Leisure & Hobbies: working around the yard, pool  Additional Comments: Pt reports \"short fall\" about a week ago from couch fell forward unable to fully stand. Cognition   Cognition  Overall Cognitive Status: Exceptions  Arousal/Alertness: Appropriate responses to stimuli  Following Commands: Follows multistep commands with repitition; Follows multistep commands with increased time  Attention Span: Appears intact  Memory: Appears intact  Safety Judgement: Decreased awareness of need for safety;Decreased awareness of need for assistance  Problem Solving: Decreased awareness of errors;Assistance required to correct errors made;Assistance required to identify errors made;Assistance required to implement solutions;Assistance required to generate solutions  Insights: Decreased awareness of deficits  Initiation: Requires cues for some  Sequencing: Requires cues for some    Objective     Observation/Palpation  Posture: Fair (RW)  Observation: 5L O2 per nc, Lomeli cath    AROM RLE (degrees)  RLE AROM: WFL  AROM LLE (degrees)  LLE AROM : WFL  Strength RLE  Comment: 4-/5  Strength LLE  Comment: hip an dknee 3+/5, ankle 4-/5  Tone RLE  RLE Tone: Normotonic  Tone LLE  LLE Tone: Normotonic  Motor Control  Gross Motor?: WFL     Bed mobility  Supine to Sit: Moderate assistance;2 Person assistance  Scooting:  Moderate assistance;2 Person assistance  Comment: Verbal cues for hand placement and safety with RW, and cues for deep breathing. Patient requires assist for line management. Transfers  Sit to Stand: Moderate Assistance;2 Person Assistance  Stand to sit: Moderate Assistance;2 Person Assistance  Bed to Chair: Moderate assistance;2 Person Assistance  Stand Pivot Transfers: Moderate Assistance;2 Person Assistance  Comment: RW, assist to manuever RW, verbal cues for safety  Ambulation  Ambulation?: Yes  Ambulation 1  Surface: level tile  Device: Rolling Walker  Other Apparatus: O2 (5 liters)  Assistance: Moderate assistance;2 Person assistance  Quality of Gait: wide NATY, flexed posture,  Gait Deviations: Slow Shavon; Increased NATY; Decreased step height;Decreased step length;Shuffles  Distance: 2 feet bed to chair     Balance  Sitting - Static: Good;-  Sitting - Dynamic: Fair;+  Standing - Static: Fair;- (RW)  Standing - Dynamic: Poor;+ (RW)        Plan   Plan  Times per week: 1-2x/d, 5-6 d/wk  Current Treatment Recommendations: Strengthening, Balance Training, Functional Mobility Training, Transfer Training, Gait Training, Endurance Training, Patient/Caregiver Education & Training, Home Exercise Program, Safety Education & Training  Safety Devices  Type of devices: All fall risk precautions in place, Gait belt, Nurse notified, Call light within reach, Left in chair, Chair alarm in place      OutComes Score    AM-PAC Score  AM-PAC Inpatient Mobility Raw Score : 10 (10/14/21 1335)  AM-PAC Inpatient T-Scale Score : 32.29 (10/14/21 1335)  Mobility Inpatient CMS 0-100% Score: 76.75 (10/14/21 1335)  Mobility Inpatient CMS G-Code Modifier : CL (10/14/21 1335)          Goals  Short term goals  Time Frame for Short term goals: 12 visits  Short term goal 1: Patient will be min assist for bed mobility. Short term goal 2: Patient will be min assist for transfers. Short term goal 3: Patient will amb 50 feet with RW and mod assist.  Short term goal 4: Patient will tolerate 30 minutes of ther-ex and ther-act.   Short term goal 5: Patient will be

## 2021-10-15 ENCOUNTER — APPOINTMENT (OUTPATIENT)
Dept: GENERAL RADIOLOGY | Age: 76
DRG: 291 | End: 2021-10-15
Payer: MEDICARE

## 2021-10-15 LAB
ALBUMIN SERPL-MCNC: 3.1 G/DL (ref 3.5–5.2)
ALBUMIN/GLOBULIN RATIO: ABNORMAL (ref 1–2.5)
ALP BLD-CCNC: 139 U/L (ref 35–104)
ALT SERPL-CCNC: 9 U/L (ref 5–33)
ANION GAP SERPL CALCULATED.3IONS-SCNC: 15 MMOL/L (ref 9–17)
AST SERPL-CCNC: 19 U/L
BILIRUB SERPL-MCNC: 0.23 MG/DL (ref 0.3–1.2)
BUN BLDV-MCNC: 53 MG/DL (ref 8–23)
BUN/CREAT BLD: 20 (ref 9–20)
CALCIUM SERPL-MCNC: 8.2 MG/DL (ref 8.6–10.4)
CHLORIDE BLD-SCNC: 101 MMOL/L (ref 98–107)
CO2: 25 MMOL/L (ref 20–31)
CREAT SERPL-MCNC: 2.71 MG/DL (ref 0.5–0.9)
GFR AFRICAN AMERICAN: 21 ML/MIN
GFR NON-AFRICAN AMERICAN: 17 ML/MIN
GFR SERPL CREATININE-BSD FRML MDRD: ABNORMAL ML/MIN/{1.73_M2}
GFR SERPL CREATININE-BSD FRML MDRD: ABNORMAL ML/MIN/{1.73_M2}
GLUCOSE BLD-MCNC: 104 MG/DL (ref 70–99)
GLUCOSE BLD-MCNC: 105 MG/DL (ref 65–105)
GLUCOSE BLD-MCNC: 105 MG/DL (ref 65–105)
GLUCOSE BLD-MCNC: 129 MG/DL (ref 65–105)
GLUCOSE BLD-MCNC: 90 MG/DL (ref 65–105)
GLUCOSE, FLUID: 106 MG/DL
HCT VFR BLD CALC: 28 % (ref 36.3–47.1)
HEMOGLOBIN: 8.1 G/DL (ref 11.9–15.1)
INR BLD: 1
LACTATE DEHYDROGENASE, FLUID: 94 U/L
MAGNESIUM: 2.8 MG/DL (ref 1.6–2.6)
MCH RBC QN AUTO: 24 PG (ref 25.2–33.5)
MCHC RBC AUTO-ENTMCNC: 28.9 G/DL (ref 28.4–34.8)
MCV RBC AUTO: 83.1 FL (ref 82.6–102.9)
NRBC AUTOMATED: 0.2 PER 100 WBC
PDW BLD-RTO: 19.2 % (ref 11.8–14.4)
PLATELET # BLD: 343 K/UL (ref 138–453)
PMV BLD AUTO: 10.5 FL (ref 8.1–13.5)
POTASSIUM SERPL-SCNC: 3.9 MMOL/L (ref 3.7–5.3)
PROTHROMBIN TIME: 13.5 SEC (ref 11.5–14.2)
RBC # BLD: 3.37 M/UL (ref 3.95–5.11)
SODIUM BLD-SCNC: 141 MMOL/L (ref 135–144)
SPECIMEN TYPE: NORMAL
SPECIMEN TYPE: NORMAL
TOTAL PROTEIN: 5.4 G/DL (ref 6.4–8.3)
WBC # BLD: 9.8 K/UL (ref 3.5–11.3)

## 2021-10-15 PROCEDURE — 2060000000 HC ICU INTERMEDIATE R&B

## 2021-10-15 PROCEDURE — 82947 ASSAY GLUCOSE BLOOD QUANT: CPT

## 2021-10-15 PROCEDURE — 83735 ASSAY OF MAGNESIUM: CPT

## 2021-10-15 PROCEDURE — 6370000000 HC RX 637 (ALT 250 FOR IP): Performed by: INTERNAL MEDICINE

## 2021-10-15 PROCEDURE — 6360000002 HC RX W HCPCS: Performed by: NURSE PRACTITIONER

## 2021-10-15 PROCEDURE — 99232 SBSQ HOSP IP/OBS MODERATE 35: CPT | Performed by: STUDENT IN AN ORGANIZED HEALTH CARE EDUCATION/TRAINING PROGRAM

## 2021-10-15 PROCEDURE — 94640 AIRWAY INHALATION TREATMENT: CPT

## 2021-10-15 PROCEDURE — 36415 COLL VENOUS BLD VENIPUNCTURE: CPT

## 2021-10-15 PROCEDURE — 80053 COMPREHEN METABOLIC PANEL: CPT

## 2021-10-15 PROCEDURE — 6360000002 HC RX W HCPCS: Performed by: INTERNAL MEDICINE

## 2021-10-15 PROCEDURE — 6370000000 HC RX 637 (ALT 250 FOR IP): Performed by: STUDENT IN AN ORGANIZED HEALTH CARE EDUCATION/TRAINING PROGRAM

## 2021-10-15 PROCEDURE — 2580000003 HC RX 258: Performed by: INTERNAL MEDICINE

## 2021-10-15 PROCEDURE — 2700000000 HC OXYGEN THERAPY PER DAY

## 2021-10-15 PROCEDURE — 6360000002 HC RX W HCPCS: Performed by: STUDENT IN AN ORGANIZED HEALTH CARE EDUCATION/TRAINING PROGRAM

## 2021-10-15 PROCEDURE — 85610 PROTHROMBIN TIME: CPT

## 2021-10-15 PROCEDURE — 85027 COMPLETE CBC AUTOMATED: CPT

## 2021-10-15 PROCEDURE — 2580000003 HC RX 258: Performed by: STUDENT IN AN ORGANIZED HEALTH CARE EDUCATION/TRAINING PROGRAM

## 2021-10-15 PROCEDURE — APPSS60 APP SPLIT SHARED TIME 46-60 MINUTES: Performed by: NURSE PRACTITIONER

## 2021-10-15 PROCEDURE — 71045 X-RAY EXAM CHEST 1 VIEW: CPT

## 2021-10-15 PROCEDURE — 94761 N-INVAS EAR/PLS OXIMETRY MLT: CPT

## 2021-10-15 RX ADMIN — TORSEMIDE 40 MG: 20 TABLET ORAL at 08:39

## 2021-10-15 RX ADMIN — CALCITRIOL 0.25 MCG: 0.25 CAPSULE ORAL at 08:39

## 2021-10-15 RX ADMIN — SODIUM CHLORIDE, PRESERVATIVE FREE 10 ML: 5 INJECTION INTRAVENOUS at 08:40

## 2021-10-15 RX ADMIN — BUDESONIDE AND FORMOTEROL FUMARATE DIHYDRATE 2 PUFF: 160; 4.5 AEROSOL RESPIRATORY (INHALATION) at 08:28

## 2021-10-15 RX ADMIN — LEVOFLOXACIN 500 MG: 5 INJECTION, SOLUTION INTRAVENOUS at 08:39

## 2021-10-15 RX ADMIN — FERROUS SULFATE TAB EC 325 MG (65 MG FE EQUIVALENT) 325 MG: 325 (65 FE) TABLET DELAYED RESPONSE at 08:39

## 2021-10-15 RX ADMIN — IRON SUCROSE 200 MG: 20 INJECTION, SOLUTION INTRAVENOUS at 10:27

## 2021-10-15 RX ADMIN — SODIUM CHLORIDE, PRESERVATIVE FREE 10 ML: 5 INJECTION INTRAVENOUS at 20:18

## 2021-10-15 RX ADMIN — HEPARIN SODIUM 5000 UNITS: 5000 INJECTION INTRAVENOUS; SUBCUTANEOUS at 05:48

## 2021-10-15 RX ADMIN — HEPARIN SODIUM 5000 UNITS: 5000 INJECTION INTRAVENOUS; SUBCUTANEOUS at 20:17

## 2021-10-15 RX ADMIN — IPRATROPIUM BROMIDE AND ALBUTEROL SULFATE 1 AMPULE: .5; 2.5 SOLUTION RESPIRATORY (INHALATION) at 14:20

## 2021-10-15 RX ADMIN — EPOETIN ALFA-EPBX 8000 UNITS: 4000 INJECTION, SOLUTION INTRAVENOUS; SUBCUTANEOUS at 10:39

## 2021-10-15 RX ADMIN — HEPARIN SODIUM 5000 UNITS: 5000 INJECTION INTRAVENOUS; SUBCUTANEOUS at 13:35

## 2021-10-15 RX ADMIN — IPRATROPIUM BROMIDE AND ALBUTEROL SULFATE 1 AMPULE: .5; 2.5 SOLUTION RESPIRATORY (INHALATION) at 08:26

## 2021-10-15 RX ADMIN — IPRATROPIUM BROMIDE AND ALBUTEROL SULFATE 1 AMPULE: .5; 2.5 SOLUTION RESPIRATORY (INHALATION) at 19:48

## 2021-10-15 RX ADMIN — BUDESONIDE AND FORMOTEROL FUMARATE DIHYDRATE 2 PUFF: 160; 4.5 AEROSOL RESPIRATORY (INHALATION) at 19:48

## 2021-10-15 ASSESSMENT — PAIN SCALES - GENERAL
PAINLEVEL_OUTOF10: 0
PAINLEVEL_OUTOF10: 0

## 2021-10-15 NOTE — PROGRESS NOTES
Nephrology Progress Note      SUBJECTIVE       Pt was seen and examined. Stable hemodynamics . As I talked to her this morning, she still seems a little dyspneic. CT scan of the chest did show evidence of bilateral pleural effusions right greater than left with some loculation. Some basal atelectasis reported as well. Evidence of pulmonary hypertension and some emphysema noted as well. Her serum creatinine is stable at 2.7. Her albumin level is 3.1, review of iron level shows that her ferritin level is less than 50, her hemoglobin is only 8.1 likely from chronic disease. Pulmonary consultants following for possible thoracentesis for symptomatic relief. I feel that patient is going to require renal replacement therapy therapy sooner than later. OBJECTIVE      CURRENT TEMPERATURE:  Temp: 97.7 °F (36.5 °C)  MAXIMUM TEMPERATURE OVER 24HRS:  Temp (24hrs), Av.5 °F (36.4 °C), Min:97.2 °F (36.2 °C), Max:97.7 °F (36.5 °C)    CURRENT RESPIRATORY RATE:  Resp: 12  CURRENT PULSE:  Pulse: 88  CURRENT BLOOD PRESSURE:  BP: (!) 109/50  24HR BLOOD PRESSURE RANGE:  Systolic (14JSM), RAY:205 , Min:98 , BLANQUITA:824   ; Diastolic (39CCS), OIK:89, Min:50, Max:66    24HR INTAKE/OUTPUT:      Intake/Output Summary (Last 24 hours) at 10/15/2021 0839  Last data filed at 10/15/2021 0503  Gross per 24 hour   Intake --   Output 1500 ml   Net -1500 ml     WEIGHT :  Patient Vitals for the past 96 hrs (Last 3 readings):   Weight   10/15/21 0502 131 lb 8 oz (59.6 kg)   10/14/21 0623 130 lb 1.6 oz (59 kg)   10/12/21 1945 133 lb 3 oz (60.4 kg)     PHYSICAL EXAM      GENERAL APPEARANCE:Awake, alert, in no acute distress  SKIN: warm and dry, no rash or erythema  EYES: conjunctivae normal and sclera anicteric  ENT: no thrush no pharyngeal congestion   NECK:   No JVD. No carotid bruits and no carotid lymphadenopathy .   PULMONARY: Diminished breath sounds at both lung bases right greater than left  CADRDIOVASCULAR: S1 and S2 normal NO S3 and NO S4 . No rubs , no murmur. ABDOMEN: soft nontender, bowel sounds present, no organomegaly, no ascites.      EXTREMITIES: + edema     CURRENT MEDICATIONS      ipratropium-albuterol (DUONEB) nebulizer solution 1 ampule, TID  budesonide-formoterol (SYMBICORT) 160-4.5 MCG/ACT inhaler 2 puff, BID  torsemide (DEMADEX) tablet 40 mg, Daily  calcitRIOL (ROCALTROL) capsule 0.25 mcg, Once per day on Mon Wed Fri  glucose (GLUTOSE) 40 % oral gel 15 g, PRN  dextrose 50 % IV solution, PRN  glucagon (rDNA) injection 1 mg, PRN  dextrose 5 % solution, PRN  sodium chloride flush 0.9 % injection 5-40 mL, 2 times per day  sodium chloride flush 0.9 % injection 5-40 mL, PRN  0.9 % sodium chloride infusion, PRN  levoFLOXacin (LEVAQUIN) 500 MG/100ML infusion 500 mg, Q48H  vitamin D (ERGOCALCIFEROL) capsule 50,000 Units, Weekly  ferrous sulfate (FE TABS 325) EC tablet 325 mg, Daily with breakfast  ondansetron (ZOFRAN-ODT) disintegrating tablet 4 mg, Q8H PRN   Or  ondansetron (ZOFRAN) injection 4 mg, Q6H PRN  acetaminophen (TYLENOL) tablet 650 mg, Q6H PRN   Or  acetaminophen (TYLENOL) suppository 650 mg, Q6H PRN  perflutren lipid microspheres (DEFINITY) injection 1.65 mg, ONCE PRN  heparin (porcine) injection 5,000 Units, 3 times per day          LABS      CBC:   Recent Labs     10/13/21  0530 10/14/21  0529 10/15/21  0553   WBC 10.0 8.0 9.8   RBC 3.52* 3.34* 3.37*   HGB 8.5* 8.0* 8.1*   HCT 30.5* 28.0* 28.0*   MCV 86.6 83.8 83.1   MCH 24.1* 24.0* 24.0*   MCHC 27.9* 28.6 28.9   RDW 19.3* 19.1* 19.2*    349 343   MPV 10.2 10.7 10.5      BMP:   Recent Labs     10/13/21  1226 10/14/21  0529 10/15/21  0553    140 141   K 5.0 4.8 3.9    106 101   CO2 19* 22 25   BUN 54* 53* 53*   CREATININE 2.86* 2.89* 2.71*   GLUCOSE 56* 87 104*   CALCIUM 7.8* 8.0* 8.2*      BNP:No results found for: BNP  PHOSPHORUS:    Recent Labs     10/14/21  1241   PHOS 3.3     MAGNESIUM:   Recent Labs     10/13/21  0530 10/14/21  0529 10/15/21  0553 MG 3.1* 2.9* 2.8*     ALBUMIN:   Recent Labs     10/13/21  0530 10/14/21  0529 10/15/21  0553   LABALBU 3.2* 2.8* 3.1*     IRON:    Lab Results   Component Value Date    IRON 22 10/13/2021     IRON SATURATION:    Lab Results   Component Value Date    LABIRON 9 10/13/2021     TIBC:    Lab Results   Component Value Date    TIBC 237 10/13/2021     FERRITIN:    Lab Results   Component Value Date    FERRITIN 20 10/13/2021       SPEP:   Lab Results   Component Value Date    PROT 5.4 10/15/2021     URINE SODIUM:    Lab Results   Component Value Date    LAZARO 40 10/13/2021      URINE CREATININE:    Lab Results   Component Value Date    LABCREA 76.0 10/13/2021     URINALYSIS:  U/A:   Lab Results   Component Value Date    NITRU NEGATIVE 10/13/2021    COLORU Yellow 10/13/2021    PHUR 5.5 10/13/2021    WBCUA 10 TO 20 10/13/2021    RBCUA 2 TO 5 10/13/2021    MUCUS 1+ 10/13/2021    TRICHOMONAS NOT REPORTED 10/13/2021    YEAST NOT REPORTED 10/13/2021    BACTERIA NOT REPORTED 10/13/2021    SPECGRAV 1.020 10/13/2021    LEUKOCYTESUR SMALL 10/13/2021    UROBILINOGEN Normal 10/13/2021    BILIRUBINUR NEGATIVE 10/13/2021    GLUCOSEU NEGATIVE 10/13/2021    KETUA NEGATIVE 10/13/2021    AMORPHOUS 1+ 10/13/2021           ASSESSMENT      1. 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  Baseline creatinine 2.8-3.0  2. Fatigue, loss of appetite could be early symptoms of uremia although GFR still about 16 mL/min  3. Bilateral pleural effusions work-up in progress  4. COPD  5. Pulmonary hypertension  6. Moderate protein calorie malnutrition  7. Secondary hyperparathyroidism       PLAN      1. Suggest thoracentesis for symptomatic relief   2. Start her on IV Venofer, start Aranesp. 3. Follow up labs ordered. 4. Following along       Please do not hesitate to call with questions.     This note is created with the assistance of a speech-recognition program. While intending to generate a document that actually reflects the content of the visit, no guarantees can be provided that every mistake has been identified and corrected by editing    Electronically signed by Alfa Thornton MD on 10/15/2021 at 8:39 AM

## 2021-10-15 NOTE — PROGRESS NOTES
Occupational Therapy  DATE: 10/15/2021    NAME: Mayelin Ochoa  MRN: [id number]   : 1945    Patient not seen this date for Occupational Therapy due to:      [] Cancel by RN or physician due to:    [] Hemodialysis    [] Critical Lab Value Level     [] Blood transfusion in progress    [] Acute or unstable cardiovascular status   _MAP < 55 or more than >115  _HR < 40 or > 130    [] Acute or unstable pulmonary status   -FiO2 > 60%   _RR < 5 or >40    _O2 sats < 85%    [] Strict Bedrest    [] Off Unit for surgery or procedure    [] Off Unit for testing       [] Pending imaging to R/O fracture    [x] Refusal by Patient      [] Other      [] OT being discontinued at this time. Patient independent. No further needs. [] OT being discontinued at this time as the patient has been transferred to hospice care. No further needs. Pt. Refused treatment after multiple attempts stating she was too fatigued for treatment.       Danita President, SHA

## 2021-10-15 NOTE — PROGRESS NOTES
· BMI Categories: Normal Weight (BMI 22.0 to 24.9) age over 72       Nutrition Diagnosis:   · Severe malnutrition related to inadequate protein-energy intake as evidenced by intake 0-25%, intake 26-50%, weight loss greater than or equal to 5% in 1 month    Nutrition Interventions:   Food and/or Nutrient Delivery:  Continue Current Diet, Continue Oral Nutrition Supplement  Nutrition Education/Counseling:  Education not indicated   Coordination of Nutrition Care:  Continue to monitor while inpatient    Goals:  PO intakes are greater than 50% at meals       Nutrition Monitoring and Evaluation:   Food/Nutrient Intake Outcomes:  Food and Nutrient Intake, Supplement Intake  Physical Signs/Symptoms Outcomes:  Biochemical Data, Fluid Status or Edema, Skin, Weight     Discharge Planning:    Continue current diet, Continue Oral Nutrition Supplement       Some areas of assessment may be incomplete due to COVID-19 precautions    Mae Harris RD, LD  Office phone [phone]

## 2021-10-15 NOTE — CARE COORDINATION
Discharge planning    Patient chart reviewed. Appreciate prior CM assessment. Per notes patient lives at home alone. Independent,drive and no DME in the home. Therapy has seen patient and recommending snf. SS did give list to patient and notified son. Patient admitted with pleural effusions. Currently on 6 liters NC. Thoracentesis completed and 850 removed. Nephrology and pulmonary are following. Possible HD per nephrology, unsure if now or in future. Creatine is 2.71    PULM 10/14   Assessment   · Acute hypoxic respiratory failure requiring high flow oxygen  · Moderate to large right pleural effusion, s/p thoracentesis with 850 mL removed  · Small left pleural effusion, loculated  · Atelectasis/?  Trapped lung on the right  · Mild pulmonary edema  · Moderate pulmonary hypertension, RVSP 60 mmHg  Recommendations   · Continue oxygen via high flow nasal cannula, keep SPO2 90% or greater, wean as able  · Continue Levaquin IV  · X-ray chest in am  · Nephrology following, diuresis with IV Lasix 40 mg x 1 dose today  · Pleural fluid negative for malignancy  · PFTs as an outpatient    NEPHRO 10/15  Assessment:     1. 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  Baseline creatinine 2.8-3.0  2. Fatigue, loss of appetite could be early symptoms of uremia although GFR still about 16 mL/min    Plan:   1. Discontinue IV Lasix  2. Start Demadex 40 mg oral daily  3. Fluid restriction 1.5 L a day  4.   Discussed with patient that at some point renal placement therapy may become necessary

## 2021-10-15 NOTE — CARE COORDINATION
Social work: received message son called back and selected 1. Jero Garcia 2. Blaire Hernandez. Will fax both and see who may be in network. Both are listed on the computer site as in network,  Will need milton, Rx and discharge order for snf. Hens started. Will need new covid test prior to discharge within 48 hours of discharge.   Johan gray

## 2021-10-15 NOTE — PROGRESS NOTES
Pulmonary Critical Care Progress Note  Hola Ramires MD     Patient seen for the follow up of acute hypoxic respiratory failure, moderate pulmonary hypertension, former smoker/COPD Pleural effusion     Subjective:  No significant overnight events noted. She is resting comfortably in bed, no distress. She feels her shortness of breath is better today. She has been weaned down to oxygen at 4 L nasal cannula. She denies significant cough or any chest pain. Examination:  Vitals: BP 96/61   Pulse 92   Temp 97.5 °F (36.4 °C) (Oral)   Resp 18   Ht 5' 4\" (1.626 m)   Wt 131 lb 8 oz (59.6 kg)   SpO2 90%   BMI 22.57 kg/m²   General appearance: alert and cooperative with exam resting in bed, no distress  Neck: No JVD  Lungs: Moderate air exchange, diminished bilateral bases  Heart: regular rate and rhythm, S1, S2 normal, no gallop  Abdomen: Soft, non tender, + BS  Extremities: no cyanosis or clubbing.  No significant edema    LABs:  CBC:   Recent Labs     10/14/21  0529 10/15/21  0553   WBC 8.0 9.8   HGB 8.0* 8.1*   HCT 28.0* 28.0*    343     BMP:   Recent Labs     10/14/21  0529 10/15/21  0553    141   K 4.8 3.9   CO2 22 25   BUN 53* 53*   CREATININE 2.89* 2.71*   LABGLOM 16* 17*   GLUCOSE 87 104*     PT/INR:   Recent Labs     10/12/21  2008   PROTIME 13.4   INR 1.0     LIVER PROFILE:  Recent Labs     10/14/21  0529 10/15/21  0553   AST 11 19   ALT <5* 9   LABALBU 2.8* 3.1*     Radiology:  10/15/21      Impression:  · Acute hypoxic respiratory failure requiring high flow oxygen  · Moderate to large right pleural effusion, s/p thoracentesis with 850 mL removed  · Small left pleural effusion, loculated  · Atelectasis/?  Trapped lung on the right  · Mild pulmonary edema  · Moderate pulmonary hypertension, RVSP 60 mmHg  · Former smoker, quit 2019  · Acute kidney injury    Recommendations:  · Continue oxygen via high flow nasal cannula, keep SPO2 90% or greater, wean as able  · Incentive spirometry every hour while awake  · Symbicort  · DuoNeb 3 times daily  · Continue Levaquin IV  · CT scan of the chest reviewed. We will plan on decubitus x-rays on Sunday and pending results she may need drainage of fluid.   · Labs: CBC and BMP in am  · Nephrology following, started on 40 mg Demadex daily  · Pleural fluid negative for malignancy  · DVT prophylaxis with subcu heparin  · PUD prophylaxis  · PFTs as an outpatient  · Will follow with you    Electronically signed by     Marta Hyatt MD on 10/15/2021 at 3:48 PM  Pulmonary Critical Care and Sleep Medicine,  [facility]  Cell: [phone]  Office: [phone]

## 2021-10-15 NOTE — PLAN OF CARE
Nutrition Problem #1: Severe malnutrition  Intervention: Food and/or Nutrient Delivery: Continue Current Diet, Continue Oral Nutrition Supplement  Nutritional Goals: PO intakes are greater than 50% at meals

## 2021-10-15 NOTE — PROGRESS NOTES
does have chronic BLE edema but denies hx of CHF. Initial CXR showed large right pleural effusion, stat elevated BNP and D-dimer as well as BUN/creatinine. Last took known creatinine was 2.34 in august, she reports she seen a nephrologist once. She does not wear home oxygen      10/13: US guided right thoracentesis 850ml of nonturbid straw colored fluid removed   Review of Systems:     Constitutional:  negative for chills, fevers, sweats. Positive for fatigue and generalized weakness   Respiratory:  negative for cough, dyspnea on exertion, shortness of breath, wheezing  Cardiovascular:  negative for chest pain, chest pressure/discomfort, lpositive for lower extremity edema, palpitations  Gastrointestinal:  negative for abdominal pain, constipation, diarrhea, nausea, vomiting  Neurological:  negative for dizziness, headache    Medications: Allergies: Allergies   Allergen Reactions    Penicillins Swelling       Current Meds:   Scheduled Meds:    iron sucrose  200 mg IntraVENous Daily    epoetin mike-epbx  8,000 Units SubCUTAneous Once per day on Mon Wed Fri    ipratropium-albuterol  1 ampule Inhalation TID    budesonide-formoterol  2 puff Inhalation BID    torsemide  40 mg Oral Daily    calcitRIOL  0.25 mcg Oral Once per day on Mon Wed Fri    sodium chloride flush  5-40 mL IntraVENous 2 times per day    levofloxacin  500 mg IntraVENous Q48H    vitamin D  50,000 Units Oral Weekly    heparin (porcine)  5,000 Units SubCUTAneous 3 times per day     Continuous Infusions:    dextrose      sodium chloride       PRN Meds: glucose, dextrose, glucagon (rDNA), dextrose, sodium chloride flush, sodium chloride, ondansetron **OR** ondansetron, acetaminophen **OR** acetaminophen, perflutren lipid microspheres    Data:     Past Medical History:   has no past medical history on file. Social History:   reports that she quit smoking about 2 years ago. Her smoking use included cigarettes.  She has never used smokeless tobacco. She reports current alcohol use. Family History:   Family History   Problem Relation Age of Onset    Hypertension Mother     Cancer Sister     Cancer Brother        Vitals:  BP (!) 94/48   Pulse 89   Temp 97.5 °F (36.4 °C) (Oral)   Resp 14   Ht 5' 4\" (1.626 m)   Wt 131 lb 8 oz (59.6 kg)   SpO2 92%   BMI 22.57 kg/m²   Temp (24hrs), Av.5 °F (36.4 °C), Min:97.2 °F (36.2 °C), Max:97.7 °F (36.5 °C)    Recent Labs     10/14/21  1656 10/14/21  2018 10/15/21  0832 10/15/21  1148   POCGLU 100 122* 90 105       I/O (24Hr): Intake/Output Summary (Last 24 hours) at 10/15/2021 1241  Last data filed at 10/15/2021 1014  Gross per 24 hour   Intake --   Output 1700 ml   Net -1700 ml       Labs:  Hematology:  Recent Labs     10/12/21  1531 10/12/21  1531 10/12/21  2008 10/13/21  0530 10/14/21  0529 10/15/21  0553   WBC 8.9   < >  --  10.0 8.0 9.8   RBC 3.57*   < >  --  3.52* 3.34* 3.37*   HGB 8.8*   < >  --  8.5* 8.0* 8.1*   HCT 29.7*   < >  --  30.5* 28.0* 28.0*   MCV 83.3   < >  --  86.6 83.8 83.1   MCH 24.8*   < >  --  24.1* 24.0* 24.0*   MCHC 29.7*   < >  --  27.9* 28.6 28.9   RDW 20.7*   < >  --  19.3* 19.1* 19.2*      < >  --  376 349 343   MPV 7.8   < >  --  10.2 10.7 10.5   INR  --   --  1.0  --   --   --    DDIMER 2.38  --   --   --   --   --     < > = values in this interval not displayed.      Chemistry:  Recent Labs     10/12/21  1531 10/12/21  1531 10/13/21  0530 10/13/21  0530 10/13/21  1226 10/14/21  0529 10/14/21  1241 10/15/21  0553      < > 139   < > 138 140  --  141   K 5.1   < > 5.7*   < > 5.0 4.8  --  3.9      < > 106   < > 107 106  --  101   CO2 20   < > 21   < > 19* 22  --  25   GLUCOSE 94   < > 85   < > 56* 87  --  104*   BUN 54*   < > 54*   < > 54* 53*  --  53*   CREATININE 3.10*   < > 3.01*   < > 2.86* 2.89*  --  2.71*   MG  --   --  3.1*  --   --  2.9*  --  2.8*   ANIONGAP 14   < > 12   < > 12 12  --  15   LABGLOM 15*   < > 15*   < > 16* 16*  --  17* GFRAA 18*   < > 18*   < > 19* 19*  --  21*   CALCIUM 8.3*   < > 8.0*   < > 7.8* 8.0*  --  8.2*   PHOS  --   --   --   --   --   --  3.3  --    PROBNP 5,650*  --   --   --   --   --   --   --    TROPHS 30*  --   --   --   --   --   --   --     < > = values in this interval not displayed. Recent Labs     10/12/21  1531 10/12/21  1531 10/13/21  0530 10/13/21  0530 10/13/21  0953 10/13/21  1252 10/14/21  0053 10/14/21  0529 10/14/21  0824 10/14/21  1246 10/14/21  1656 10/14/21  2018 10/15/21  0553 10/15/21  0832 10/15/21  1148   PROT 6.8   < > 6.3*   < > 6.0*  --   --  5.6*  --   --   --   --  5.4*  --   --    LABALBU 3.3*   < > 3.2*  --   --   --   --  2.8*  --   --   --   --  3.1*  --   --    AST 10   < > 10  --   --   --   --  11  --   --   --   --  19  --   --    ALT 6   < > 9  --   --   --   --  <5*  --   --   --   --  9  --   --    LDH  --   --   --   --  197  --   --   --   --   --   --   --   --   --   --    ALKPHOS 149*   < > 146*  --   --   --   --  130*  --   --   --   --  139*  --   --    BILITOT 0.20*   < > 0.20*  --   --   --   --  0.23*  --   --   --   --  0.23*  --   --    BILIDIR 0.10  --   --   --   --   --   --   --   --   --   --   --   --   --   --    POCGLU  --   --   --   --   --    < >   < >  --  82 100 100 122*  --  90 105    < > = values in this interval not displayed. ABG:No results found for: POCPH, PHART, PH, POCPCO2, UBO3BJR, PCO2, POCPO2, PO2ART, PO2, POCHCO3, VHI6OJE, HCO3, NBEA, PBEA, BEART, BE, THGBART, THB, SHG6VGU, WOKS6SOL, L8VQVSFD, O2SAT, FIO2  Lab Results   Component Value Date/Time    SPECIAL NOT REPORTED 10/13/2021 09:00 AM    SPECIAL NOT REPORTED 10/13/2021 09:00 AM     Lab Results   Component Value Date/Time    CULTURE NO GROWTH 2 DAYS 10/13/2021 09:00 AM    CULTURE PENDING 10/13/2021 09:00 AM       Radiology:  XR CHEST PORTABLE    Result Date: 10/12/2021  Significant right pleural effusion and associated airspace disease. Mild left basilar airspace disease. Pneumonia cannot be excluded. US RETROPERITONEAL COMPLETE    Result Date: 10/13/2021  Bilateral simple renal cyst. Lomeli catheter within the urinary bladder. ECHO 10/13/21  Left ventricle is normal in size  Global left ventricular systolic function is normal  Estimated ejection fraction is 65 % . Moderate tricuspid regurgitation. Moderate pulmonary hypertension. Anterior echo free space suggestive of adipose tissue is seen. Pleural effusion noted. Normal aortic root dimension    Physical Examination:        General appearance:  alert, cooperative and no distress  Mental Status:  oriented to person, place and time and flat affect, appears uninterested in conversation   Lungs: breath sounds diminished bilaterally, normal effort  Heart:  Mildly tachycardic, regular rhythm, no murmur  Abdomen:  soft, nontender, nondistended, normal bowel sounds, no masses, hepatomegaly, splenomegaly  Extremities:  +1 BLE edema, no redness, tenderness in the calves  Skin:  no gross lesions, rashes, induration    Assessment:        Hospital Problems         Last Modified POA    * (Principal) Pleural effusion 10/14/2021 Yes    Acute renal failure with acute renal cortical necrosis superimposed on stage 4 chronic kidney disease ([address] 10/14/2021 Yes    Acute respiratory failure with hypoxia ([address] 10/13/2021 Yes    Iron deficiency anemia 10/12/2021 Yes    Essential hypertension 10/12/2021 Yes    General weakness 10/12/2021 Yes    Vitamin D deficiency 10/13/2021 Yes    Acute diastolic heart failure ([address] 10/13/2021 Yes    Hypoxia 10/13/2021 Yes    Anemia 10/13/2021 Yes    Peripheral edema 10/13/2021 Yes    Moderate protein-calorie malnutrition ([address] 10/13/2021 Yes    Severe malnutrition ([address] 10/13/2021 Yes          Plan:        MAYO on CKD 4 improving: likely due to untreated/undiagnosed CHF, patient does not follow with a PCP regularly. Avoid nephrotoxic agents, appreciate nephrology's recommendations.  Renal ultrasound unremarkable for acute findings. Creatinine improving, continue to trend kidney function   Right pleural effusion due to Acute diastolic CHF, likely has been a chronic issue for sometime, patient is inconsistent with medical history, medical adherence and follow-up. Patient initially reported she has chronic edema however she has reported to other providers that this is a new issue. S/P thoracentesis 10/13, no previous echo available. cardiac echo shows normal systolic function, EF 66% and moderate pulmonary hypertension. Continue fluid restriction, IV diuresis as ordered per nephrology. Consult IR for repeat thoracentesis   Continue telemetry monitoring   Elevated D-Dimer due to MAYO: VQ scan indicates low probability of PE  Continue cardiac/renal diet   IV levaquin started as Pneumonia could no be excluded on xray, repeat CXR shows atelectasis vs trapped lung  Pulmonology consult reviewed, appreciate recommendations. CT chest revealed moderate size, mildly loculated right pleural effusion and small mildly loculated left pleural effusion, moderate to severe centrilobular emphysema.   Repeat chest x-ray does not show much improvement in effusions today, continue aerosols as ordered   Acute on chronic Iron deficiency anemia, IV iron daily for 3 days, retacrit initiated per nephrology   Wean supplemental oxygen as tolerated  Continue PT/OT   Plan discussed with patient and staff     ANN Taylor NP  10/15/2021  12:41 PM

## 2021-10-15 NOTE — DISCHARGE INSTR - COC
intra-treatment (patient normally on 2L NC)  Information sent to [facility] as well     Patient's personal belongings (please select all that are sent with patient):  Dentures upper and lower    RN SIGNATURE:  Electronically signed by Sherryle Duffel, RN on 11/4/21 at 3:20 PM EDT    CASE MANAGEMENT/SOCIAL WORK SECTION    Inpatient Status Date: 10/12/21    Readmission Risk Assessment Score:  Readmission Risk              Risk of Unplanned Readmission:  19           Discharging to Facility/ Agency     Facility: [facility]  Address: [address], [facility], [place] [address]  Phone:  [phone]  · Fax:  [phone]    / signature: Electronically signed by Daril Lanes, RN on 11/4/21 at 7:03 AM EDT    PHYSICIAN SECTION    Prognosis: Fair    Condition at Discharge: Stable    Rehab Potential (if transferring to Rehab): Fair    Recommended Labs or Other Treatments After Discharge:   Optimize cardio-pulmonary function  Pulmonary eval & f/u as scheduled Dr Katey Davidson  Chest tube removed  Thoracentesis as needed  Oxygen supplementation  Respiratory Therapy and Bronchodilators prn   Nephrology eval & f/u as scheduled Dr June Kelly  Dialysis as scheduled  Check bun and creatinine  Avoid nephrotoxins  Correct electrolyte abnormalities  Anemia w/u on outpatient basis is suggested    Blood Pressure - Monitor and control  Midodrine as needed  Antibiotics per Infectious Disease service   Observing off antibiotics at this time  Follow-up with PCP in one week, Ranjan Hahn MD    Physician Certification: I certify the above information and transfer of Gilles Coello  is necessary for the continuing treatment of the diagnosis listed and that she requires Located within [facility] for less 30 days.      Update Admission H&P:   Principal Problem:    Pleural effusion on right  Active Problems:    ESRD    Acute respiratory failure with hypoxia (HCC)    Iron deficiency anemia    Essential hypertension    General weakness    Vitamin D deficiency    Acute diastolic heart failure (HCC)    Hypoxia    Anemia    Peripheral edema    Moderate protein-calorie malnutrition (HCC)    Severe malnutrition (HCC)    Acute kidney injury ([facility]    Hypokalemia    Other emphysema (HCC)    Pulmonary hypertension (HCC)    COPD (chronic obstructive pulmonary disease) (HCC)    Thrombocytopenia (HCC)  Resolved Problems:    * No resolved hospital problems.  *     PHYSICIAN SIGNATURE:  Electronically signed by Arnav Brown. BLOOD, DO on 10/19/21 at 10:41 AM EDT  Electronically signed by Yusef Seth DO on 11/3/2021 at 12:59 PM

## 2021-10-15 NOTE — CARE COORDINATION
Social work: [facility] is not in network for [employer]. Rossy Maldonado is full and does not wish to take this insurance at this time. Called son for more snf choices. Will need milton, Rx and discharge order for snf. Also will need new covid test prior to snf most likely within 48 hours of admission.   Aminta gray

## 2021-10-15 NOTE — PROGRESS NOTES
Physical Therapy  DATE: 10/15/2021    NAME: Tay Cervantes  MRN: [id number]   : 1945    Patient not seen this date for Physical Therapy due to:      [] Cancel by RN or physician due to:    [] Hemodialysis    [] Critical Lab Value Level     [] Blood transfusion in progress    [] Acute or unstable cardiovascular status   _MAP < 55 or more than >115  _HR < 40 or > 130    [] Acute or unstable pulmonary status   -FiO2 > 60%   _RR < 5 or >40    _O2 sats < 85%    [] Strict Bedrest    [] Off Unit for surgery or procedure    [] Off Unit for testing       [] Pending imaging to R/O fracture    [x] Refusal by Patient (Patient declined PT treatment x 2. Reporting feeling too tired and declined PT treatment)     [] Other      [] PT being discontinued at this time. Patient independent. No further needs. [] PT being discontinued at this time as the patient has been transferred to hospice care. No further needs.       Fariba Dickerson, PTA

## 2021-10-15 NOTE — PLAN OF CARE
Problem: Falls - Risk of:  Goal: Will remain free from falls  Description: Will remain free from falls  Outcome: Ongoing   Pt is a high fall risk per falls risk assessment. Remains free from falls and injuries, call light at reach and utilized appropriately. Bed in lowest position with wheels locked and alarm armed. Personal items that are used frequently are within reach.  Up to chair with assist, no attempts to get out of bed unassisted noted or reported, falling star program and hourly rounding implemented, will continue to monitor and educate as needed     Problem: OXYGENATION/RESPIRATORY FUNCTION  Goal: Patient will achieve/maintain normal respiratory rate/effort  Description: Respiratory rate and effort will be within normal limits for the patient  Outcome: Ongoing   Oxygen demand assessed and gradually decreased, pt educated on pacing activities, recognizing limitations and when to rest

## 2021-10-15 NOTE — CARE COORDINATION
Social work: spoke to son who wants to discuss other options with pt tomorrow when they visit. He will advise  or leave a message on social work office voicemail on choices. Pt does need a precert so Monday the referral can be sent and precert started. Will need milton and Rx along with discharge order that states SNF.   ALSO will likely need a covid test.  Lo gray

## 2021-10-16 ENCOUNTER — APPOINTMENT (OUTPATIENT)
Dept: GENERAL RADIOLOGY | Age: 76
DRG: 291 | End: 2021-10-16
Payer: MEDICARE

## 2021-10-16 LAB
ALBUMIN SERPL-MCNC: 2.7 G/DL (ref 3.5–5.2)
ALBUMIN/GLOBULIN RATIO: ABNORMAL (ref 1–2.5)
ALP BLD-CCNC: 130 U/L (ref 35–104)
ALT SERPL-CCNC: 6 U/L (ref 5–33)
ANION GAP SERPL CALCULATED.3IONS-SCNC: 13 MMOL/L (ref 9–17)
ANION GAP SERPL CALCULATED.3IONS-SCNC: 15 MMOL/L (ref 9–17)
AST SERPL-CCNC: 14 U/L
BILIRUB SERPL-MCNC: 0.24 MG/DL (ref 0.3–1.2)
BUN BLDV-MCNC: 50 MG/DL (ref 8–23)
BUN BLDV-MCNC: 51 MG/DL (ref 8–23)
BUN/CREAT BLD: 19 (ref 9–20)
BUN/CREAT BLD: 20 (ref 9–20)
CALCIUM SERPL-MCNC: 8 MG/DL (ref 8.6–10.4)
CALCIUM SERPL-MCNC: 8.3 MG/DL (ref 8.6–10.4)
CHLORIDE BLD-SCNC: 100 MMOL/L (ref 98–107)
CHLORIDE BLD-SCNC: 99 MMOL/L (ref 98–107)
CO2: 26 MMOL/L (ref 20–31)
CO2: 30 MMOL/L (ref 20–31)
CREAT SERPL-MCNC: 2.47 MG/DL (ref 0.5–0.9)
CREAT SERPL-MCNC: 2.68 MG/DL (ref 0.5–0.9)
GFR AFRICAN AMERICAN: 21 ML/MIN
GFR AFRICAN AMERICAN: 23 ML/MIN
GFR NON-AFRICAN AMERICAN: 17 ML/MIN
GFR NON-AFRICAN AMERICAN: 19 ML/MIN
GFR SERPL CREATININE-BSD FRML MDRD: ABNORMAL ML/MIN/{1.73_M2}
GLUCOSE BLD-MCNC: 101 MG/DL (ref 65–105)
GLUCOSE BLD-MCNC: 116 MG/DL (ref 70–99)
GLUCOSE BLD-MCNC: 118 MG/DL (ref 65–105)
GLUCOSE BLD-MCNC: 119 MG/DL (ref 65–105)
GLUCOSE BLD-MCNC: 93 MG/DL (ref 65–105)
GLUCOSE BLD-MCNC: 95 MG/DL (ref 70–99)
HCT VFR BLD CALC: 26.8 % (ref 36.3–47.1)
HEMOGLOBIN: 7.9 G/DL (ref 11.9–15.1)
MAGNESIUM: 2.3 MG/DL (ref 1.6–2.6)
MAGNESIUM: 2.5 MG/DL (ref 1.6–2.6)
MCH RBC QN AUTO: 24.5 PG (ref 25.2–33.5)
MCHC RBC AUTO-ENTMCNC: 29.5 G/DL (ref 28.4–34.8)
MCV RBC AUTO: 83.2 FL (ref 82.6–102.9)
NRBC AUTOMATED: 0.2 PER 100 WBC
PDW BLD-RTO: 19.1 % (ref 11.8–14.4)
PLATELET # BLD: 301 K/UL (ref 138–453)
PMV BLD AUTO: 10.6 FL (ref 8.1–13.5)
POTASSIUM SERPL-SCNC: 3.1 MMOL/L (ref 3.7–5.3)
POTASSIUM SERPL-SCNC: 3.2 MMOL/L (ref 3.7–5.3)
RBC # BLD: 3.22 M/UL (ref 3.95–5.11)
SODIUM BLD-SCNC: 141 MMOL/L (ref 135–144)
SODIUM BLD-SCNC: 142 MMOL/L (ref 135–144)
TOTAL PROTEIN: 4.8 G/DL (ref 6.4–8.3)
WBC # BLD: 8.4 K/UL (ref 3.5–11.3)

## 2021-10-16 PROCEDURE — 2580000003 HC RX 258: Performed by: STUDENT IN AN ORGANIZED HEALTH CARE EDUCATION/TRAINING PROGRAM

## 2021-10-16 PROCEDURE — 71045 X-RAY EXAM CHEST 1 VIEW: CPT

## 2021-10-16 PROCEDURE — 94640 AIRWAY INHALATION TREATMENT: CPT

## 2021-10-16 PROCEDURE — 6370000000 HC RX 637 (ALT 250 FOR IP): Performed by: INTERNAL MEDICINE

## 2021-10-16 PROCEDURE — 6360000002 HC RX W HCPCS: Performed by: INTERNAL MEDICINE

## 2021-10-16 PROCEDURE — 2060000000 HC ICU INTERMEDIATE R&B

## 2021-10-16 PROCEDURE — 97110 THERAPEUTIC EXERCISES: CPT

## 2021-10-16 PROCEDURE — 2580000003 HC RX 258: Performed by: INTERNAL MEDICINE

## 2021-10-16 PROCEDURE — 6360000002 HC RX W HCPCS: Performed by: NURSE PRACTITIONER

## 2021-10-16 PROCEDURE — 80053 COMPREHEN METABOLIC PANEL: CPT

## 2021-10-16 PROCEDURE — 85027 COMPLETE CBC AUTOMATED: CPT

## 2021-10-16 PROCEDURE — 83735 ASSAY OF MAGNESIUM: CPT

## 2021-10-16 PROCEDURE — 80048 BASIC METABOLIC PNL TOTAL CA: CPT

## 2021-10-16 PROCEDURE — 2700000000 HC OXYGEN THERAPY PER DAY

## 2021-10-16 PROCEDURE — 94761 N-INVAS EAR/PLS OXIMETRY MLT: CPT

## 2021-10-16 PROCEDURE — 82947 ASSAY GLUCOSE BLOOD QUANT: CPT

## 2021-10-16 PROCEDURE — 99232 SBSQ HOSP IP/OBS MODERATE 35: CPT | Performed by: NURSE PRACTITIONER

## 2021-10-16 PROCEDURE — 97116 GAIT TRAINING THERAPY: CPT

## 2021-10-16 PROCEDURE — 6370000000 HC RX 637 (ALT 250 FOR IP): Performed by: STUDENT IN AN ORGANIZED HEALTH CARE EDUCATION/TRAINING PROGRAM

## 2021-10-16 PROCEDURE — 36415 COLL VENOUS BLD VENIPUNCTURE: CPT

## 2021-10-16 RX ORDER — POTASSIUM CHLORIDE 20 MEQ/1
30 TABLET, EXTENDED RELEASE ORAL ONCE
Status: COMPLETED | OUTPATIENT
Start: 2021-10-16 | End: 2021-10-16

## 2021-10-16 RX ADMIN — IRON SUCROSE 200 MG: 20 INJECTION, SOLUTION INTRAVENOUS at 09:11

## 2021-10-16 RX ADMIN — IPRATROPIUM BROMIDE AND ALBUTEROL SULFATE 1 AMPULE: .5; 2.5 SOLUTION RESPIRATORY (INHALATION) at 20:55

## 2021-10-16 RX ADMIN — IPRATROPIUM BROMIDE AND ALBUTEROL SULFATE 1 AMPULE: .5; 2.5 SOLUTION RESPIRATORY (INHALATION) at 13:55

## 2021-10-16 RX ADMIN — SODIUM CHLORIDE, PRESERVATIVE FREE 10 ML: 5 INJECTION INTRAVENOUS at 20:32

## 2021-10-16 RX ADMIN — HEPARIN SODIUM 5000 UNITS: 5000 INJECTION INTRAVENOUS; SUBCUTANEOUS at 15:03

## 2021-10-16 RX ADMIN — IPRATROPIUM BROMIDE AND ALBUTEROL SULFATE 1 AMPULE: .5; 2.5 SOLUTION RESPIRATORY (INHALATION) at 09:39

## 2021-10-16 RX ADMIN — TORSEMIDE 40 MG: 20 TABLET ORAL at 09:11

## 2021-10-16 RX ADMIN — BUDESONIDE AND FORMOTEROL FUMARATE DIHYDRATE 2 PUFF: 160; 4.5 AEROSOL RESPIRATORY (INHALATION) at 20:56

## 2021-10-16 RX ADMIN — SODIUM CHLORIDE, PRESERVATIVE FREE 10 ML: 5 INJECTION INTRAVENOUS at 10:05

## 2021-10-16 RX ADMIN — POTASSIUM CHLORIDE 30 MEQ: 20 TABLET, EXTENDED RELEASE ORAL at 17:54

## 2021-10-16 RX ADMIN — HEPARIN SODIUM 5000 UNITS: 5000 INJECTION INTRAVENOUS; SUBCUTANEOUS at 20:27

## 2021-10-16 RX ADMIN — BUDESONIDE AND FORMOTEROL FUMARATE DIHYDRATE 2 PUFF: 160; 4.5 AEROSOL RESPIRATORY (INHALATION) at 09:39

## 2021-10-16 RX ADMIN — HEPARIN SODIUM 5000 UNITS: 5000 INJECTION INTRAVENOUS; SUBCUTANEOUS at 05:28

## 2021-10-16 ASSESSMENT — PAIN SCALES - GENERAL
PAINLEVEL_OUTOF10: 0

## 2021-10-16 NOTE — PROGRESS NOTES
Occupational Therapy  DATE: 10/16/2021    NAME: Judie Bird  MRN: [id number]   : 1945    11:35- Patient supine in bed using bed pan and reported she wants to do therapy, but needs more time. Will cont. , to check back later.     RAD Mcmahon/NASREEN

## 2021-10-16 NOTE — PROGRESS NOTES
Physical Therapy  Facility/Department: [facility] PROGRESSIVE CARE  Daily Treatment Note  NAME: Tiffany Castañeda  : 1945  MRN: [id number]    Date of Service: 10/16/2021    Discharge Recommendations:  Patient would benefit from continued therapy after discharge   Assessment   Body structures, Functions, Activity limitations: Decreased balance;Decreased functional mobility ; Decreased safe awareness;Decreased ADL status; Decreased strength;Decreased endurance;Decreased posture  Assessment: Patient demo deficits in  gait, transfers. balance, and strength, and decreased respiratory status. Note poor activity tolerance this date as  sat levels dropped  into the mid  80s with mobility. Patient was instructed in deep breathing however recovery only back to 88%. Once positioned in bed sats increased to 90% and nurse was notified. Pt will benefit from skilled PT to improve gait, transfers, balance, strength, and functional activity tolerance. Prognosis: Good  Decision Making: Medium Complexity  REQUIRES PT FOLLOW UP: Yes  Activity Tolerance  Activity Tolerance: Patient limited by endurance; Patient limited by fatigue     Patient Diagnosis(es): The primary encounter diagnosis was Hypoxia. Diagnoses of Pleural effusion on right, Peripheral edema, Anemia, unspecified type, and Acute kidney injury [facility]) were also pertinent to this visit. has no past medical history on file. has no past surgical history on file. Restrictions  Restrictions/Precautions  Restrictions/Precautions: General Precautions, Fall Risk  Position Activity Restriction  Other position/activity restrictions: Up wtih assist, telemetry, Lomeli cath, 5L O2 per nc (PRADIP Mejias okay'd increase up too 6L)  Subjective   General  Chart Reviewed: Yes  Family / Caregiver Present: No  Subjective  Subjective: With encourgaement pt agreed to PT session  General Comment  Comments: Nancy Mejias RN reports patient ok for PT.      Orientation  Orientation  Overall Orientation Status: Within Normal Limits  Objective   Bed mobility  Rolling: Moderate assistance  Supine to sit: Moderate assistance  Scooting: Moderate assistance  Transfers  Sit to Stand: Moderate Assistance  Stand to sit: Moderate Assistance  Comment: Verbal and tactile cues for correct use of UEs when performing sit to stand transfers with walker  Ambulation  Ambulation?: Yes  Ambulation 1  Surface: level tile  Device: Rolling Walker  Other Apparatus: O2  Assistance: Maximum assistance  Quality of Gait: unsteady gait, assist to manage walker, flexed posture, LE weakness  Gait Deviations: Slow Shavon;Decreased step length;Decreased step height  Distance: 4 lateral steps at EOB  Comments: SpO2 dropped into the mid 80 with mobility and gait. Pt had difficulty recovering therefore pt was transferred back to bed. Once in bed SpO2 increased to 90%  Stairs/Curb  Stairs?: No     Balance  Posture: Fair  Sitting - Static: Good;-  Sitting - Dynamic: Fair  Standing - Static: Fair;-  Standing - Dynamic: Poor;+  Exercises  Comments: EOB sitting approx 4 min with deep breathing exercises. SpO2 dropped into min 80s. Little recovery with deep breathing however wave form inconsistent. Pt was transferred back to bed and tray was set up for lunch. Nursing notified of O2 sat levels. AM-PAC Score     AM-PAC Inpatient Mobility without Stair Climbing Raw Score : 10 (10/16/21 1331)  AM-PAC Inpatient without Stair Climbing T-Scale Score : 34.07 (10/16/21 1331)  Mobility Inpatient CMS 0-100% Score: 71.66 (10/16/21 1331)  Mobility Inpatient without Stair CMS G-Code Modifier : CL (10/16/21 1331)       Goals  Short term goals  Time Frame for Short term goals: 12 visits  Short term goal 1: Patient will be min assist for bed mobility. Short term goal 2: Patient will be min assist for transfers.   Short term goal 3: Patient will amb 50 feet with RW and mod assist.  Short term goal 4: Patient will tolerate 30 minutes of ther-ex and ther-act. Short term goal 5: Patient will be indep with HEP.   Patient Goals   Patient goals : Improve breathing    Plan    Plan  Times per week: 1-2x/d, 5-6 d/wk  Current Treatment Recommendations: Strengthening, Balance Training, Functional Mobility Training, Transfer Training, Gait Training, Endurance Training, Patient/Caregiver Education & Training, Home Exercise Program, Safety Education & Training  Safety Devices  Type of devices: Nurse notified, Left in bed, Gait belt, Call light within reach, Bed alarm in place, All fall risk precautions in place     Therapy Time   Individual Concurrent Group Co-treatment   Time In  1222         Time Out  1248         Minutes  [place], [place]

## 2021-10-16 NOTE — PROGRESS NOTES
Pulmonary Critical Care Progress Note  Ann Meadows MD     Patient seen for the follow up of acute hypoxic respiratory failure, moderate pulmonary hypertension, former smoker/COPD Pleural effusion on right     Subjective:  No significant overnight events noted. She is resting comfortably in bed, no distress. She appears kind of sleepy this morning however is cooperative with exam.  RN is at bedside. Shortness of breath is about the same. She remains on oxygen at 4 L nasal cannula. She has occasional dry cough, no chest pain. She has not been using her incentive spirometer much. Examination:  Vitals: BP (!) 103/46   Pulse 94   Temp 97.3 °F (36.3 °C) (Oral)   Resp 20   Ht 5' 4\" (1.626 m)   Wt 129 lb 9.6 oz (58.8 kg)   SpO2 92%   BMI 22.25 kg/m²   General appearance: alert and cooperative with exam resting in bed, no distress  Neck: No JVD  Lungs: Moderate air exchange, diminished bilateral bases  Heart: regular rate and rhythm, S1, S2 normal, no gallop  Abdomen: Soft, non tender, + BS  Extremities: no cyanosis or clubbing.  No significant edema    LABs:  CBC:   Recent Labs     10/15/21  0553 10/16/21  0538   WBC 9.8 8.4   HGB 8.1* 7.9*   HCT 28.0* 26.8*    301     BMP:   Recent Labs     10/15/21  0553 10/16/21  0538    141   K 3.9 3.2*   CO2 25 26   BUN 53* 51*   CREATININE 2.71* 2.68*   LABGLOM 17* 17*   GLUCOSE 104* 95     PT/INR:   Recent Labs     10/15/21  1308   PROTIME 13.5   INR 1.0     LIVER PROFILE:  Recent Labs     10/15/21  0553 10/16/21  0538   AST 19 14   ALT 9 6   LABALBU 3.1* 2.7*     Radiology:  10/15/21      Impression:  · Acute hypoxic respiratory failure requiring high flow oxygen  · Moderate to large right pleural effusion, s/p thoracentesis with 850 mL removed  · Small left pleural effusion, loculated  · Atelectasis/?  Trapped lung on the right  · Mild pulmonary edema  · Moderate pulmonary hypertension, RVSP 60 mmHg  · Former smoker, quit 2019  · Acute kidney injury    Recommendations:  · Continue oxygen via high flow nasal cannula, keep SPO2 90% or greater, wean as able  · Incentive spirometry every hour while awake  · Symbicort  · DuoNeb 3 times daily  · Continue Levaquin IV  · CT scan of the chest reviewed. We will obtain decubitus x-rays to evaluate amount of free-flowing fluid and need for repeat drainage.   · Labs: CBC and BMP in am  · Nephrology following, 40 mg Demadex daily  · IV iron x3 doses  · Pleural fluid negative for malignancy  · DVT prophylaxis with subcu heparin  · PFTs as an outpatient  · Will follow with you    Electronically signed by     Yandel Gotti MD on 10/16/2021 at 2:29 PM  Pulmonary Critical Care and Sleep Medicine,  [facility]  Cell: [phone]  Office: [phone]

## 2021-10-16 NOTE — PROGRESS NOTES
[facility]  Office: [address], DO, Shikha Moran, DO, Amy Barry, DO, Arcenio Hong Blood, DO, David Johnson MD, Blank Radford MD, Cinthya Chawla MD, Kevin Martinez MD, Aftab Macario MD, Maninder Caceres MD, Bonny Ling MD, Arjun Pineda MD, Dee Antony, DO, Pb Cisneros DO, Mary العلي MD,  Sanchez Lawson DO, Melissa Ramirez MD, Nayely Godinez MD, Suri Ellsworth MD, Josefa Arenas MD, Michelle Cruz MD, Ivette Humphries MD, Atilio Ortega, [facility], [facility] ([place], [place]), CNP, Debbie Nichols, CNP, Ladan Johnson, CNS, Gabo Alves, CNP, Adela Johnson, CNP, Sheldon Guadarrama, CNP, Rakesh Cotto, [facility], Evette Hendricks, [facility], Sonya Pedersen, CNP, Rowan Oppenheim, PA-C, Pratima Murphy, [facility], Mel Rivero, CNP, Tho Chavez, CNP, Soo Slaughter, CNP, Sowmya Kumar, CNP, Delmy Jones, CNP, Sharon Bear, CNP, Sabiha Felix, [address] [place] [address]    Progress Note    10/16/2021    7:15 AM    Name:   Mar Hanley  MRN:     [id number]     Erick Abramswell:      [de-identified]   Room:   [address] [place] [address] Day:  4  Admit Date:  10/12/2021  2:53 PM    PCP:   Cam Greene MD  Code Status:  Full Code    Subjective:     C/C:   Chief Complaint   Patient presents with    Respiratory Distress     EMS reported 97% on room air; pt arrives with saturation 45% on room air    Failure To Thrive     Interval History Status: not changed- stable. Patient seen and evaluated in room resting in bed in no acute distress, tolerating 4 L low flow nasal cannula. Lomeli catheter present. No patient symptomology at this time, awaiting placement, likely Monday    Brief History:     Tawanda Wadsworth is a 64 y. o. female with a hx of CKD 4 and iron deficiency anemia who presented to [place] [facility] with Respiratory Distress and hypoxia (O2 sats 45%) and failure to thrive and is admitted to the hospital for the management of right Pleural effusion and acute renal failure.  Patient lives at home alone and has recently been having difficulty caring for herself and generalized weakness for the last few weeks . She does have chronic BLE edema but denies hx of CHF. Initial CXR showed large right pleural effusion, stat elevated BNP and D-dimer as well as BUN/creatinine. Last took known creatinine was 2.34 in august, she reports she seen a nephrologist once. She does not wear home oxygen      10/13: US guided right thoracentesis 850ml of nonturbid straw colored fluid removed -transudative    Review of Systems:     Constitutional:  negative for chills, fevers, sweats, + fatigue  Respiratory:  negative for cough, dyspnea on exertion, shortness of breath, wheezing  Cardiovascular:  negative for chest pain, chest pressure/discomfort, lower extremity edema, palpitations  Gastrointestinal:  negative for abdominal pain, constipation, diarrhea, nausea, vomiting  Neurological:  negative for dizziness, headache    Medications: Allergies: Allergies   Allergen Reactions    Penicillins Swelling       Current Meds:   Scheduled Meds:    iron sucrose  200 mg IntraVENous Daily    epoetin mike-epbx  8,000 Units SubCUTAneous Once per day on Mon Wed Fri    ipratropium-albuterol  1 ampule Inhalation TID    budesonide-formoterol  2 puff Inhalation BID    torsemide  40 mg Oral Daily    calcitRIOL  0.25 mcg Oral Once per day on Mon Wed Fri    sodium chloride flush  5-40 mL IntraVENous 2 times per day    levofloxacin  500 mg IntraVENous Q48H    vitamin D  50,000 Units Oral Weekly    heparin (porcine)  5,000 Units SubCUTAneous 3 times per day     Continuous Infusions:    dextrose      sodium chloride       PRN Meds: glucose, dextrose, glucagon (rDNA), dextrose, sodium chloride flush, sodium chloride, ondansetron **OR** ondansetron, acetaminophen **OR** acetaminophen, perflutren lipid microspheres    Data:     Past Medical History:   has no past medical history on file.     Social History:   reports that she quit smoking about 2 years ago. Her smoking use included cigarettes. She has never used smokeless tobacco. She reports current alcohol use. Family History:   Family History   Problem Relation Age of Onset    Hypertension Mother     Cancer Sister     Cancer Brother        Vitals:  BP (!) 103/50   Pulse 92   Temp 98.2 °F (36.8 °C) (Oral)   Resp 12   Ht 5' 4\" (1.626 m)   Wt 129 lb 9.6 oz (58.8 kg)   SpO2 92%   BMI 22.25 kg/m²   Temp (24hrs), Av.8 °F (36.6 °C), Min:97.5 °F (36.4 °C), Max:98.2 °F (36.8 °C)    Recent Labs     10/15/21  0832 10/15/21  1148 10/15/21  1700 10/15/21  2027   POCGLU 90 105 129* 105       I/O (24Hr):     Intake/Output Summary (Last 24 hours) at 10/16/2021 0715  Last data filed at 10/16/2021 0456  Gross per 24 hour   Intake --   Output 1300 ml   Net -1300 ml       Labs:  Hematology:  Recent Labs     10/14/21  0529 10/15/21  0553 10/15/21  1308 10/16/21  0538   WBC 8.0 9.8  --  8.4   RBC 3.34* 3.37*  --  3.22*   HGB 8.0* 8.1*  --  7.9*   HCT 28.0* 28.0*  --  26.8*   MCV 83.8 83.1  --  83.2   MCH 24.0* 24.0*  --  24.5*   MCHC 28.6 28.9  --  29.5   RDW 19.1* 19.2*  --  19.1*    343  --  301   MPV 10.7 10.5  --  10.6   INR  --   --  1.0  --      Chemistry:  Recent Labs     10/14/21  0529 10/14/21  1241 10/15/21  0553 10/16/21  0538     --  141 141   K 4.8  --  3.9 3.2*     --  101 100   CO2 22  --  25 26   GLUCOSE 87  --  104* 95   BUN 53*  --  53* 51*   CREATININE 2.89*  --  2.71* 2.68*   MG 2.9*  --  2.8* 2.5   ANIONGAP   --  15 15   LABGLOM 16*  --  17* 17*   GFRAA 19*  --  * 21*   CALCIUM 8.0*  --  8.2* 8.0*   PHOS  --  3.3  --   --      Recent Labs     10/13/21  0953 10/13/21  1252 10/14/21  0529 10/14/21  0824 10/14/21  1656 10/14/21  2018 10/15/21  0553 10/15/21  0832 10/15/21  1148 10/15/21  1700 10/15/21  2027 10/16/21  0538   PROT 6.0*   < > 5.6*  --   --   --  5.4*  --   --   --   --  4.8*   LABALBU  --   --  2.8*  --   --   --  3.1*  --   --   --   -- 2.7*   AST  --   --  11  --   --   --  19  --   --   --   --  14   ALT  --   --  <5*  --   --   --  9  --   --   --   --  6     --   --   --   --   --   --   --   --   --   --   --    ALKPHOS  --   --  130*  --   --   --  139*  --   --   --   --  130*   BILITOT  --   --  0.23*  --   --   --  0.23*  --   --   --   --  0.24*   POCGLU  --    < >  --    < > 100 122*  --  90 105 129* 105  --     < > = values in this interval not displayed. ABG:No results found for: POCPH, PHART, PH, POCPCO2, IZI2KPY, PCO2, POCPO2, PO2ART, PO2, POCHCO3, QKT6FMH, HCO3, NBEA, PBEA, BEART, BE, THGBART, THB, GJI2MXW, MVFU4LKJ, N6PZIXIT, O2SAT, FIO2  Lab Results   Component Value Date/Time    SPECIAL NOT REPORTED 10/13/2021 09:00 AM    SPECIAL NOT REPORTED 10/13/2021 09:00 AM     Lab Results   Component Value Date/Time    CULTURE NO GROWTH 2 DAYS 10/13/2021 09:00 AM    CULTURE PENDING 10/13/2021 09:00 AM       Radiology:  CT CHEST WO CONTRAST    Result Date: 10/15/2021  1. Moderate sized, mildly loculated right pleural effusion and small mildly loculated left pleural effusion. 2. Consolidation at the inferior lungs, likely atelectasis. 3. Moderate to severe centrilobular emphysema. 4. Mildly prominent main pulmonary artery, which can be seen with pulmonary hypertension. 5. Small hiatal hernia. 6. Small nodules noted in the thyroid gland. If indicated, this could be further evaluated with ultrasound. NM LUNG SCAN PERFUSION ONLY    Result Date: 10/13/2021  Findings consistent with low probability V/Q scan for pulmonary embolus. XR CHEST PORTABLE    Result Date: 10/15/2021  Bilateral effusions with adjacent infiltrates concerning for pneumonia. XR CHEST PORTABLE    Result Date: 10/14/2021  Interval increase in the recurrent at least moderate right pleural effusion.  However, there is rightward mediastinal shift despite the presence of right pleural effusion, indicating right lung atelectasis/collapse suggestive of trapped

## 2021-10-16 NOTE — PLAN OF CARE
Problem: OXYGENATION/RESPIRATORY FUNCTION  Goal: Patient will achieve/maintain normal respiratory rate/effort  Description: Respiratory rate and effort will be within normal limits for the patient  10/15/2021 2213 by Lizzy Allan RN  Outcome: Ongoing     Problem: Skin Integrity:  Goal: Absence of new skin breakdown  Description: Absence of new skin breakdown  Outcome: Ongoing     Problem: Skin Integrity:  Goal: Will show no infection signs and symptoms  Description: Will show no infection signs and symptoms  Outcome: Ongoing     Problem: IP BALANCE  Goal: LTG - Patient will maintain balance to allow for safe/functional mobility  Outcome: Ongoing     Problem: Nutrition  Goal: Optimal nutrition therapy  Outcome: Ongoing     Problem: Falls - Risk of:  Goal: Absence of physical injury  Description: Absence of physical injury  Outcome: Ongoing     Problem: Falls - Risk of:  Goal: Will remain free from falls  Description: Will remain free from falls  10/15/2021 2213 by Lizzy Allan RN  Outcome: Ongoing

## 2021-10-16 NOTE — PROGRESS NOTES
Nephrology Progress Note      SUBJECTIVE    Patient was seen and examined. Patient was in bed. She states that there is slight improvement in her appetite. She continues to be short of breath and even dyspneic at rest.  Her BUN and creatinine remain essentially unchanged. However her creatinine is not a true indicator of her renal function due to poor muscle mass. OBJECTIVE      CURRENT TEMPERATURE:  Temp: 97.3 °F (36.3 °C)  MAXIMUM TEMPERATURE OVER 24HRS:  Temp (24hrs), Av.8 °F (36.6 °C), Min:97.3 °F (36.3 °C), Max:98.2 °F (36.8 °C)    CURRENT RESPIRATORY RATE:  Resp: 18  CURRENT PULSE:  Pulse: 94  CURRENT BLOOD PRESSURE:  BP: (!) 103/46  24HR BLOOD PRESSURE RANGE:  Systolic (28DUJ), GUM:42 , Min:91 , HLB:727   ; Diastolic (89GSZ), JNC:91, Min:46, Max:54    24HR INTAKE/OUTPUT:      Intake/Output Summary (Last 24 hours) at 10/16/2021 1129  Last data filed at 10/16/2021 0456  Gross per 24 hour   Intake --   Output 1100 ml   Net -1100 ml     WEIGHT :  Patient Vitals for the past 96 hrs (Last 3 readings):   Weight   10/16/21 0648 129 lb 9.6 oz (58.8 kg)   10/15/21 0502 131 lb 8 oz (59.6 kg)   10/14/21 0623 130 lb 1.6 oz (59 kg)     PHYSICAL EXAM      GENERAL APPEARANCE: awake and alertx3  SKIN: To touch and no erythema  EYES: conjunctivae normal and sclera anicteric  ENT: No thrush or pharyngeal congestion  NECK:   No JVD or carotid bruits  PULMONARY: Decreased air entry at the bases  CADRDIOVASCULAR: S1 and S2 normal NO S3 and NO S4 . No rubs , no murmur. ABDOMEN: soft nontender, bowel sounds present, no organomegaly, no ascites.        EXTREMITIES: + edema     CURRENT MEDICATIONS      iron sucrose (VENOFER) 200 mg in sodium chloride 0.9 % 100 mL IVPB, Daily  epoetin mike-epbx (RETACRIT) injection 8,000 Units, Once per day on   ipratropium-albuterol (DUONEB) nebulizer solution 1 ampule, TID  budesonide-formoterol (SYMBICORT) 160-4.5 MCG/ACT inhaler 2 puff, BID  torsemide (DEMADEX) tablet 40 mg, Daily  calcitRIOL (ROCALTROL) capsule 0.25 mcg, Once per day on Mon Wed Fri  glucose (GLUTOSE) 40 % oral gel 15 g, PRN  dextrose 50 % IV solution, PRN  glucagon (rDNA) injection 1 mg, PRN  dextrose 5 % solution, PRN  sodium chloride flush 0.9 % injection 5-40 mL, 2 times per day  sodium chloride flush 0.9 % injection 5-40 mL, PRN  0.9 % sodium chloride infusion, PRN  levoFLOXacin (LEVAQUIN) 500 MG/100ML infusion 500 mg, Q48H  vitamin D (ERGOCALCIFEROL) capsule 50,000 Units, Weekly  ondansetron (ZOFRAN-ODT) disintegrating tablet 4 mg, Q8H PRN   Or  ondansetron (ZOFRAN) injection 4 mg, Q6H PRN  acetaminophen (TYLENOL) tablet 650 mg, Q6H PRN   Or  acetaminophen (TYLENOL) suppository 650 mg, Q6H PRN  perflutren lipid microspheres (DEFINITY) injection 1.65 mg, ONCE PRN  heparin (porcine) injection 5,000 Units, 3 times per day          LABS      CBC:   Recent Labs     10/14/21  0529 10/15/21  0553 10/16/21  0538   WBC 8.0 9.8 8.4   RBC 3.34* 3.37* 3.22*   HGB 8.0* 8.1* 7.9*   HCT 28.0* 28.0* 26.8*   MCV 83.8 83.1 83.2   MCH 24.0* 24.0* 24.5*   MCHC 28.6 28.9 29.5   RDW 19.1* 19.2* 19.1*    343 301   MPV 10.7 10.5 10.6      BMP:   Recent Labs     10/14/21  0529 10/15/21  0553 10/16/21  0538    141 141   K 4.8 3.9 3.2*    101 100   CO2 22 25 26   BUN 53* 53* 51*   CREATININE 2.89* 2.71* 2.68*   GLUCOSE 87 104* 95   CALCIUM 8.0* 8.2* 8.0*    PHOSPHORUS:    Recent Labs     10/14/21  1241   PHOS 3.3     MAGNESIUM:   Recent Labs     10/14/21  0529 10/15/21  0553 10/16/21  0538   MG 2.9* 2.8* 2.5     ALBUMIN:   Recent Labs     10/14/21  0529 10/15/21  0553 10/16/21  0538   LABALBU 2.8* 3.1* 2.7*     IRON:    Lab Results   Component Value Date    IRON 22 10/13/2021     IRON SATURATION:    Lab Results   Component Value Date    LABIRON 9 10/13/2021     TIBC:    Lab Results   Component Value Date    TIBC 237 10/13/2021     FERRITIN:    Lab Results   Component Value Date    FERRITIN 20 10/13/2021       SPEP: Lab Results   Component Value Date    PROT 4.8 10/16/2021     URINE SODIUM:    Lab Results   Component Value Date    LAZARO 40 10/13/2021      URINE CREATININE:    Lab Results   Component Value Date    LABCREA 76.0 10/13/2021     URINALYSIS:  U/A:   Lab Results   Component Value Date    NITRU NEGATIVE 10/13/2021    COLORU Yellow 10/13/2021    PHUR 5.5 10/13/2021    WBCUA 10 TO 20 10/13/2021    RBCUA 2 TO 5 10/13/2021    MUCUS 1+ 10/13/2021    TRICHOMONAS NOT REPORTED 10/13/2021    YEAST NOT REPORTED 10/13/2021    BACTERIA NOT REPORTED 10/13/2021    SPECGRAV 1.020 10/13/2021    LEUKOCYTESUR SMALL 10/13/2021    UROBILINOGEN Normal 10/13/2021    BILIRUBINUR NEGATIVE 10/13/2021    GLUCOSEU NEGATIVE 10/13/2021    KETUA NEGATIVE 10/13/2021    AMORPHOUS 1+ 10/13/2021           ASSESSMENT      1. Stage IV chronic kidney disease based on serum creatinine however because of poor muscle mass arterial function is overestimated. Probably some of her symptoms of decreased appetite are due to uremia. Patient has very poor appetite which is going on for a while and also hypoalbuminemic  2. Fatigue, loss of appetite could be early symptoms of uremia although GFR still about 16 mL/min  3. Bilateral pleural effusions work-up in progress and pulmonary on board  4. COPD  5. Pulmonary hypertension  6. Moderate protein calorie malnutrition  7. Secondary hyperparathyroidism   8. Anemia of chronic kidney disease  9. Hypokalemia  PLAN      1. Consider thoracocentesis  2. We will check 24-hour urine  3. We will check magnesium and potassium this evening and replace as needed  Please do not hesitate to call with questions.     This note is created with the assistance of a speech-recognition program. While intending to generate a document that actually reflects the content of the visit, no guarantees can be provided that every mistake has been identified and corrected by editing    Electronically signed by Loren Bauer MD on

## 2021-10-17 PROBLEM — E87.6 HYPOKALEMIA: Status: ACTIVE | Noted: 2021-10-17

## 2021-10-17 PROBLEM — J43.8 OTHER EMPHYSEMA (HCC): Status: ACTIVE | Noted: 2021-10-17

## 2021-10-17 LAB
ALBUMIN SERPL-MCNC: 2.7 G/DL (ref 3.5–5.2)
ALBUMIN/GLOBULIN RATIO: ABNORMAL (ref 1–2.5)
ALP BLD-CCNC: 129 U/L (ref 35–104)
ALT SERPL-CCNC: 8 U/L (ref 5–33)
ANION GAP SERPL CALCULATED.3IONS-SCNC: 12 MMOL/L (ref 9–17)
AST SERPL-CCNC: 13 U/L
BILIRUB SERPL-MCNC: 0.21 MG/DL (ref 0.3–1.2)
BUN BLDV-MCNC: 49 MG/DL (ref 8–23)
BUN/CREAT BLD: 19 (ref 9–20)
CALCIUM SERPL-MCNC: 8 MG/DL (ref 8.6–10.4)
CHLORIDE BLD-SCNC: 99 MMOL/L (ref 98–107)
CO2: 29 MMOL/L (ref 20–31)
CREAT SERPL-MCNC: 2.57 MG/DL (ref 0.5–0.9)
GFR AFRICAN AMERICAN: 22 ML/MIN
GFR NON-AFRICAN AMERICAN: 18 ML/MIN
GFR SERPL CREATININE-BSD FRML MDRD: ABNORMAL ML/MIN/{1.73_M2}
GFR SERPL CREATININE-BSD FRML MDRD: ABNORMAL ML/MIN/{1.73_M2}
GLUCOSE BLD-MCNC: 107 MG/DL (ref 65–105)
GLUCOSE BLD-MCNC: 110 MG/DL (ref 65–105)
GLUCOSE BLD-MCNC: 84 MG/DL (ref 65–105)
GLUCOSE BLD-MCNC: 99 MG/DL (ref 65–105)
GLUCOSE BLD-MCNC: 99 MG/DL (ref 70–99)
HCT VFR BLD CALC: 26.5 % (ref 36.3–47.1)
HEMOGLOBIN: 7.5 G/DL (ref 11.9–15.1)
MAGNESIUM: 2.3 MG/DL (ref 1.6–2.6)
MCH RBC QN AUTO: 23.9 PG (ref 25.2–33.5)
MCHC RBC AUTO-ENTMCNC: 28.3 G/DL (ref 28.4–34.8)
MCV RBC AUTO: 84.4 FL (ref 82.6–102.9)
NRBC AUTOMATED: 0 PER 100 WBC
PDW BLD-RTO: 19.4 % (ref 11.8–14.4)
PLATELET # BLD: 294 K/UL (ref 138–453)
PMV BLD AUTO: 11.1 FL (ref 8.1–13.5)
POTASSIUM SERPL-SCNC: 3.1 MMOL/L (ref 3.7–5.3)
RBC # BLD: 3.14 M/UL (ref 3.95–5.11)
SODIUM BLD-SCNC: 140 MMOL/L (ref 135–144)
TOTAL PROTEIN: 4.7 G/DL (ref 6.4–8.3)
WBC # BLD: 9.7 K/UL (ref 3.5–11.3)

## 2021-10-17 PROCEDURE — 94761 N-INVAS EAR/PLS OXIMETRY MLT: CPT

## 2021-10-17 PROCEDURE — 99232 SBSQ HOSP IP/OBS MODERATE 35: CPT | Performed by: STUDENT IN AN ORGANIZED HEALTH CARE EDUCATION/TRAINING PROGRAM

## 2021-10-17 PROCEDURE — 82947 ASSAY GLUCOSE BLOOD QUANT: CPT

## 2021-10-17 PROCEDURE — 6370000000 HC RX 637 (ALT 250 FOR IP): Performed by: INTERNAL MEDICINE

## 2021-10-17 PROCEDURE — 2580000003 HC RX 258: Performed by: INTERNAL MEDICINE

## 2021-10-17 PROCEDURE — 2700000000 HC OXYGEN THERAPY PER DAY

## 2021-10-17 PROCEDURE — 93005 ELECTROCARDIOGRAM TRACING: CPT | Performed by: NURSE PRACTITIONER

## 2021-10-17 PROCEDURE — 97535 SELF CARE MNGMENT TRAINING: CPT

## 2021-10-17 PROCEDURE — 6360000002 HC RX W HCPCS: Performed by: NURSE PRACTITIONER

## 2021-10-17 PROCEDURE — 2060000000 HC ICU INTERMEDIATE R&B

## 2021-10-17 PROCEDURE — 83735 ASSAY OF MAGNESIUM: CPT

## 2021-10-17 PROCEDURE — 6370000000 HC RX 637 (ALT 250 FOR IP): Performed by: STUDENT IN AN ORGANIZED HEALTH CARE EDUCATION/TRAINING PROGRAM

## 2021-10-17 PROCEDURE — 2580000003 HC RX 258: Performed by: STUDENT IN AN ORGANIZED HEALTH CARE EDUCATION/TRAINING PROGRAM

## 2021-10-17 PROCEDURE — 6360000002 HC RX W HCPCS: Performed by: STUDENT IN AN ORGANIZED HEALTH CARE EDUCATION/TRAINING PROGRAM

## 2021-10-17 PROCEDURE — 97110 THERAPEUTIC EXERCISES: CPT

## 2021-10-17 PROCEDURE — 6360000002 HC RX W HCPCS: Performed by: INTERNAL MEDICINE

## 2021-10-17 PROCEDURE — 85027 COMPLETE CBC AUTOMATED: CPT

## 2021-10-17 PROCEDURE — 80053 COMPREHEN METABOLIC PANEL: CPT

## 2021-10-17 PROCEDURE — 36415 COLL VENOUS BLD VENIPUNCTURE: CPT

## 2021-10-17 PROCEDURE — 94640 AIRWAY INHALATION TREATMENT: CPT

## 2021-10-17 PROCEDURE — 82575 CREATININE CLEARANCE TEST: CPT

## 2021-10-17 RX ORDER — POTASSIUM CHLORIDE 20 MEQ/1
30 TABLET, EXTENDED RELEASE ORAL ONCE
Status: COMPLETED | OUTPATIENT
Start: 2021-10-17 | End: 2021-10-17

## 2021-10-17 RX ORDER — POTASSIUM CHLORIDE 20 MEQ/1
40 TABLET, EXTENDED RELEASE ORAL DAILY
Status: DISCONTINUED | OUTPATIENT
Start: 2021-10-17 | End: 2021-10-26

## 2021-10-17 RX ORDER — SPIRONOLACTONE 25 MG/1
12.5 TABLET ORAL ONCE
Status: COMPLETED | OUTPATIENT
Start: 2021-10-17 | End: 2021-10-17

## 2021-10-17 RX ORDER — SPIRONOLACTONE 25 MG/1
25 TABLET ORAL ONCE
Status: DISCONTINUED | OUTPATIENT
Start: 2021-10-17 | End: 2021-10-17

## 2021-10-17 RX ADMIN — IPRATROPIUM BROMIDE AND ALBUTEROL SULFATE 1 AMPULE: .5; 2.5 SOLUTION RESPIRATORY (INHALATION) at 14:29

## 2021-10-17 RX ADMIN — POTASSIUM CHLORIDE 30 MEQ: 20 TABLET, EXTENDED RELEASE ORAL at 09:31

## 2021-10-17 RX ADMIN — SODIUM CHLORIDE, PRESERVATIVE FREE 10 ML: 5 INJECTION INTRAVENOUS at 09:32

## 2021-10-17 RX ADMIN — BUDESONIDE AND FORMOTEROL FUMARATE DIHYDRATE 2 PUFF: 160; 4.5 AEROSOL RESPIRATORY (INHALATION) at 09:57

## 2021-10-17 RX ADMIN — TORSEMIDE 40 MG: 20 TABLET ORAL at 09:31

## 2021-10-17 RX ADMIN — POTASSIUM CHLORIDE 40 MEQ: 20 TABLET, EXTENDED RELEASE ORAL at 13:04

## 2021-10-17 RX ADMIN — HEPARIN SODIUM 5000 UNITS: 5000 INJECTION INTRAVENOUS; SUBCUTANEOUS at 05:33

## 2021-10-17 RX ADMIN — IRON SUCROSE 200 MG: 20 INJECTION, SOLUTION INTRAVENOUS at 10:51

## 2021-10-17 RX ADMIN — IPRATROPIUM BROMIDE AND ALBUTEROL SULFATE 1 AMPULE: .5; 2.5 SOLUTION RESPIRATORY (INHALATION) at 20:32

## 2021-10-17 RX ADMIN — IPRATROPIUM BROMIDE AND ALBUTEROL SULFATE 1 AMPULE: .5; 2.5 SOLUTION RESPIRATORY (INHALATION) at 09:56

## 2021-10-17 RX ADMIN — HEPARIN SODIUM 5000 UNITS: 5000 INJECTION INTRAVENOUS; SUBCUTANEOUS at 13:04

## 2021-10-17 RX ADMIN — HEPARIN SODIUM 5000 UNITS: 5000 INJECTION INTRAVENOUS; SUBCUTANEOUS at 21:58

## 2021-10-17 RX ADMIN — SODIUM CHLORIDE, PRESERVATIVE FREE 10 ML: 5 INJECTION INTRAVENOUS at 21:13

## 2021-10-17 RX ADMIN — LEVOFLOXACIN 500 MG: 5 INJECTION, SOLUTION INTRAVENOUS at 09:31

## 2021-10-17 RX ADMIN — SPIRONOLACTONE 12.5 MG: 25 TABLET ORAL at 13:04

## 2021-10-17 RX ADMIN — BUDESONIDE AND FORMOTEROL FUMARATE DIHYDRATE 2 PUFF: 160; 4.5 AEROSOL RESPIRATORY (INHALATION) at 20:31

## 2021-10-17 ASSESSMENT — PAIN SCALES - GENERAL
PAINLEVEL_OUTOF10: 0

## 2021-10-17 NOTE — PLAN OF CARE
Problem: Falls - Risk of:  Goal: Will remain free from falls  Description: Will remain free from falls  Outcome: Ongoing  Goal: Absence of physical injury  Description: Absence of physical injury  Outcome: Ongoing     Problem: Nutrition  Goal: Optimal nutrition therapy  Outcome: Ongoing     Problem: IP BALANCE  Goal: LTG - Patient will maintain balance to allow for safe/functional mobility  Outcome: Ongoing     Problem: Skin Integrity:  Goal: Will show no infection signs and symptoms  Description: Will show no infection signs and symptoms  Outcome: Ongoing  Goal: Absence of new skin breakdown  Description: Absence of new skin breakdown  Outcome: Ongoing     Problem: OXYGENATION/RESPIRATORY FUNCTION  Goal: Patient will achieve/maintain normal respiratory rate/effort  Description: Respiratory rate and effort will be within normal limits for the patient  Outcome: Ongoing     Problem: Breathing Pattern - Ineffective:  Goal: Ability to achieve and maintain a regular respiratory rate will improve  Description: Ability to achieve and maintain a regular respiratory rate will improve  Outcome: Ongoing

## 2021-10-17 NOTE — PROGRESS NOTES
[facility]  Office: [address], DO, Lamberto Connors, DO, Hanna Knight, DO, Mahsa Earl Blood, DO, Neeru Callahan MD, Vee Blackwell MD, Lelo Oates MD, Anatsasiya Flores MD, Martha Cueto MD, Shelly Russell MD, Alen Jack MD, Spenser Mills MD, Ritika Foster, DO, Joycelyn Mackay, DO, Davina Monge MD,  Anne Marie Correa DO, Betty Agarwal MD, Ike Mienr MD, Fede Light MD, Rob Palacio MD, Cinthya Orozco MD, Valeria Wong MD, Tonja Alvarez, [facility], [facility], CNP, Romy Rodriguez, CNP, Dejon Ghotra, CNS, Janneth Roman, CNP, Roderick Tai, CNP, Beth Ordonez, CNP, Gurjit Nettles, CNP, Cornell Nguyen, CNP, Socorro Jang, CNP, Jase Moran PA-C, Jamilah Golden, [facility], Amanda Magana, CNP, Lelia Santa, CNP, She Bernard, CNP, Bebeto Cuellar, CNP, Lawson Rivera, CNP, Brittanie Nascimento, [facility], Ashley Buck, [facility]    Progress Note    10/17/2021    1:51 PM    Name:   Monica Mills  MRN:     [id number]     Gabby Petty:      [de-identified]   Room:   [address] [place] [address] Day:  5  Admit Date:  10/12/2021  2:53 PM    PCP:   Ingris Del Toro MD  Code Status:  Full Code    Subjective:     C/C:   Chief Complaint   Patient presents with    Respiratory Distress     EMS reported 97% on room air; pt arrives with saturation 45% on room air    Failure To Thrive     Interval History Status: improved. Patient feels better, continues on 4 to 5 L nasal cannula oxygen  No chest pain  Has minimal cough  No acute events overnight  Good urine output  Appetite improving  Brief History:   Danielle Wadsworth is a 64 y. o. female with a hx of CKD 4 and iron deficiency anemia who presented to [place] [facility] with Respiratory Distress and hypoxia (O2 sats 45%) and failure to thrive and is admitted to the hospital for the management of right Pleural effusion and acute renal failure.  Patient lives at home alone and has recently been having difficulty caring for herself and generalized weakness for the last few weeks . She does have chronic BLE edema but denies hx of CHF. Initial CXR showed large right pleural effusion, stat elevated BNP and D-dimer as well as BUN/creatinine.  Last took known creatinine was 2.34 in august, she reports she seen a nephrologist once. She does not wear home oxygen      10/13: US guided right thoracentesis 850ml of nonturbid straw colored fluid removed -transudative    Review of Systems:     Constitutional:  negative for chills, fevers, sweats  Respiratory: Positive for cough and shortness of breath, improving cardiovascular:  negative for chest pain, chest pressure/discomfort, lower extremity edema, palpitations  Gastrointestinal:  negative for abdominal pain, constipation, diarrhea, nausea, vomiting  Neurological:  negative for dizziness, headache    Medications: Allergies: Allergies   Allergen Reactions    Penicillins Swelling       Current Meds:   Scheduled Meds:    potassium chloride  40 mEq Oral Daily    epoetin mike-epbx  8,000 Units SubCUTAneous Once per day on Mon Wed Fri    ipratropium-albuterol  1 ampule Inhalation TID    budesonide-formoterol  2 puff Inhalation BID    torsemide  40 mg Oral Daily    calcitRIOL  0.25 mcg Oral Once per day on Mon Wed Fri    sodium chloride flush  5-40 mL IntraVENous 2 times per day    levofloxacin  500 mg IntraVENous Q48H    vitamin D  50,000 Units Oral Weekly    heparin (porcine)  5,000 Units SubCUTAneous 3 times per day     Continuous Infusions:    dextrose      sodium chloride       PRN Meds: glucose, dextrose, glucagon (rDNA), dextrose, sodium chloride flush, sodium chloride, ondansetron **OR** ondansetron, acetaminophen **OR** acetaminophen, perflutren lipid microspheres    Data:     Past Medical History:   has no past medical history on file. Social History:   reports that she quit smoking about 2 years ago. Her smoking use included cigarettes.  She has never used smokeless tobacco. She reports current alcohol use. Family History:   Family History   Problem Relation Age of Onset    Hypertension Mother     Cancer Sister     Cancer Brother        Vitals:  BP (!) 108/47   Pulse 92   Temp 97.5 °F (36.4 °C)   Resp 18   Ht 5' 4\" (1.626 m)   Wt 129 lb 3 oz (58.6 kg)   SpO2 92%   BMI 22.17 kg/m²   Temp (24hrs), Av.8 °F (36.6 °C), Min:97.5 °F (36.4 °C), Max:98.6 °F (37 °C)    Recent Labs     10/16/21  1649 10/16/21  2018 10/17/21  0714 10/17/21  1111   POCGLU 119* 118* 99 84       I/O (24Hr):     Intake/Output Summary (Last 24 hours) at 10/17/2021 1351  Last data filed at 10/17/2021 0019  Gross per 24 hour   Intake 150 ml   Output 850 ml   Net -700 ml       Labs:  Hematology:  Recent Labs     10/15/21  0553 10/15/21  1308 10/16/21  0538 10/17/21  0453   WBC 9.8  --  8.4 9.7   RBC 3.37*  --  3.22* 3.14*   HGB 8.1*  --  7.9* 7.5*   HCT 28.0*  --  26.8* 26.5*   MCV 83.1  --  83.2 84.4   MCH 24.0*  --  24.5* 23.9*   MCHC 28.9  --  29.5 28.3*   RDW 19.2*  --  19.1* 19.4*     --  301 294   MPV 10.5  --  10.6 11.1   INR  --  1.0  --   --      Chemistry:  Recent Labs     10/16/21  0538 10/16/21  1611 10/17/21  0453    142 140   K 3.2* 3.1* 3.1*    99 99   CO2 26 30 29   GLUCOSE 95 116* 99   BUN 51* 50* 49*   CREATININE 2.68* 2.47* 2.57*   MG 2.5 2.3 2.3   ANIONGAP 15 13 12   LABGLOM 17* 19* 18*   GFRAA 21* 23* 22*   CALCIUM 8.0* 8.3* 8.0*     Recent Labs     10/15/21  0553 10/15/21  0832 10/15/21  2027 10/16/21  0538 10/16/21  0716 10/16/21  1148 10/16/21  1649 10/16/21  2018 10/17/21  0453 10/17/21  0714 10/17/21  1111   PROT 5.4*  --   --  4.8*  --   --   --   --  4.7*  --   --    LABALBU 3.1*  --   --  2.7*  --   --   --   --  2.7*  --   --    AST 19  --   --  14  --   --   --   --  13  --   --    ALT 9  --   --  6  --   --   --   --  8  --   --    ALKPHOS 139*  --   --  130*  --   --   --   --  129*  --   --    BILITOT 0.23*  --   --  0.24*  --   --   --   -- 0.21*  --   --    POCGLU  --    < >   < >  --  93 101 119* 118*  --  99 84    < > = values in this interval not displayed. ABG:No results found for: POCPH, PHART, PH, POCPCO2, DTN7MFM, PCO2, POCPO2, PO2ART, PO2, POCHCO3, ORF8MBN, HCO3, NBEA, PBEA, BEART, BE, THGBART, THB, NPT5LGH, CFOQ6QSE, A3VJFAMC, O2SAT, FIO2  Lab Results   Component Value Date/Time    SPECIAL NOT REPORTED 10/13/2021 09:00 AM    SPECIAL NOT REPORTED 10/13/2021 09:00 AM     Lab Results   Component Value Date/Time    CULTURE NO GROWTH 4 DAYS 10/13/2021 09:00 AM    CULTURE PENDING 10/13/2021 09:00 AM       Radiology:  CT CHEST WO CONTRAST    Result Date: 10/15/2021  1. Moderate sized, mildly loculated right pleural effusion and small mildly loculated left pleural effusion. 2. Consolidation at the inferior lungs, likely atelectasis. 3. Moderate to severe centrilobular emphysema. 4. Mildly prominent main pulmonary artery, which can be seen with pulmonary hypertension. 5. Small hiatal hernia. 6. Small nodules noted in the thyroid gland. If indicated, this could be further evaluated with ultrasound. NM LUNG SCAN PERFUSION ONLY    Result Date: 10/13/2021  Findings consistent with low probability V/Q scan for pulmonary embolus. XR CHEST DECUBITUS RIGHT    Result Date: 10/16/2021  Small to moderate right and small left layering pleural effusions. XR CHEST DECUBITUS LEFT    Result Date: 10/16/2021  Small to moderate right and small left layering pleural effusions. XR CHEST PORTABLE    Result Date: 10/15/2021  Bilateral effusions with adjacent infiltrates concerning for pneumonia. XR CHEST PORTABLE    Result Date: 10/14/2021  Interval increase in the recurrent at least moderate right pleural effusion. However, there is rightward mediastinal shift despite the presence of right pleural effusion, indicating right lung atelectasis/collapse suggestive of trapped lung syndrome.  Small left pleural effusion with probable left basilar atelectasis. XR CHEST PORTABLE    Result Date: 10/13/2021  Interval decrease in size of right-sided pleural effusion now moderate in size. Small left pleural effusion. Opacity of bilateral lower lung fields may reflect underlying atelectasis. Apparent thin line overlying the right lung apex may be artifactual related to overlying skin fold. The lung markings are seen lateral to the aforementioned line. XR CHEST PORTABLE    Result Date: 10/12/2021  Significant right pleural effusion and associated airspace disease. Mild left basilar airspace disease. Pneumonia cannot be excluded. US RETROPERITONEAL COMPLETE    Result Date: 10/13/2021  Bilateral simple renal cyst. Lomeli catheter within the urinary bladder. IR GUIDED THORACENTESIS PLEURAL    Result Date: 10/13/2021  Successful ultrasound guided diagnostic and therapeutic right thoracentesis. Physical Examination:        General appearance:  alert, cooperative and mild distress  Mental Status:  At baseline, normal affect  Lungs: Decreased breath sounds, crepitations positive, on 5 L nasal cannula oxygen  Heart:  regular rate and rhythm, no murmur  Abdomen:  soft, nontender, nondistended, normal bowel sounds, no masses, hepatomegaly, splenomegaly  Extremities:  no edema, redness, tenderness in the calves  Skin:  no gross lesions, rashes, induration    Assessment:        Hospital Problems         Last Modified POA    * (Principal) Pleural effusion on right 10/15/2021 Yes    Acute renal failure with acute renal cortical necrosis superimposed on stage 4 chronic kidney disease ([address] 10/14/2021 Yes    Acute respiratory failure with hypoxia ([address] 10/13/2021 Yes    Iron deficiency anemia 10/12/2021 Yes    Essential hypertension 10/12/2021 Yes    General weakness 10/12/2021 Yes    Vitamin D deficiency 10/13/2021 Yes    Acute diastolic heart failure ([address] 10/13/2021 Yes    Hypoxia 10/13/2021 Yes    Anemia 10/13/2021 Yes    Peripheral edema 10/13/2021 Yes Moderate protein-calorie malnutrition ([address] 10/13/2021 Yes    Severe malnutrition ([address] 10/13/2021 Yes    Acute kidney injury ([address] 10/15/2021 Yes    Hypokalemia 10/17/2021 Yes    Other emphysema ([address] 10/17/2021 Yes          Plan:        Acute on chronic Diastolic CHF:   - Echo from 10/12 reviewed showing an EF of 65% with moderate pulmonary hypertension.    - Continue Demadex, low sodium, fluid restriction     MAYO on CKD stage 4 with hyperparathyroid:   - With moderate risk for HD in the future. Continue diuretics, 24 hr creatinine clearance pending.  - Continue to trend as outpatient.    - Nephrology following     R pleural effusion:   - Status post thoracentesis with 850 mL fluid removed 10/13  -Chest x-ray showing more than 1 cm free-flowing fluid, pulmonology recommending IR to place right pigtail catheter for fluid drainage.  - Transudative of based on studies.     Acute hypoxic respiratory failure:   - Continue low-flow O2 as needed  -  [place]iflow if warranted. - Home inhalers     Hypokalemia: Replete as ordered, start daily potassium supplementation     Essential hypertension: Currently stable     KEELY:   - Hemoglobin low but stable. - Continue JOSE.      Moderate protein calorie malnutrition:   - Supplements with meals     Discharge plan to SNF, awaiting choice for SNF per family.     Gilberto Stokes MD  10/17/2021  1:51 PM

## 2021-10-17 NOTE — PROGRESS NOTES
Pt son came up and gave us a preference list for SNFs. 1. [facility]  2. [facility]  3. [facility]  4. [facility]    Will inform SW.

## 2021-10-17 NOTE — CARE COORDINATION
Social Work-Contacted patient's son, Saurabh Perez. He states that his brother will be at the hospital later today to make a decision about which SNF. Will need to follow up with son regarding SNF preference.  Carolina Dangelo

## 2021-10-17 NOTE — PROGRESS NOTES
90% or greater, wean as able  · Incentive spirometry every hour while awake  · Symbicort  · DuoNeb 3 times daily  · Continue Levaquin IV  · Decubitus films reviewed. She does have greater than 1 cm of free-flowing fluid. We will ask IR to place right pigtail catheter for fluid drainage and to see if lung will reexpand.   · Labs: CBC and BMP in am  · Nephrology following, 40 mg Demadex daily  · IV iron x3 doses  · Pleural fluid negative for malignancy  · DVT prophylaxis with subcu heparin  · PFTs as an outpatient  · Will follow with you    Electronically signed by     Armida Melendez MD on 10/17/2021 at 1:20 PM  Pulmonary Critical Care and Sleep Medicine,  [facility]  Cell: [phone]  Office: [phone]

## 2021-10-17 NOTE — PROGRESS NOTES
Nephrology Progress Note      SUBJECTIVE    Patient was seen and examined. 24-hour urine collection is in progress. Patient states that her shortness of breath has improved  Pulmonary is on board, patient has right loculated pleural effusion probably needs to be tapped  Intake and output charting shows negative fluid balance. Patient is so far 5.7 L net negative. Slight improvement in serum creatinine noted  He needs to have low potassium may be related to diuresis and poor oral intake    OBJECTIVE      CURRENT TEMPERATURE:  Temp: 97.5 °F (36.4 °C)  MAXIMUM TEMPERATURE OVER 24HRS:  Temp (24hrs), Av.8 °F (36.6 °C), Min:97.5 °F (36.4 °C), Max:98.6 °F (37 °C)    CURRENT RESPIRATORY RATE:  Resp: 18  CURRENT PULSE:  Pulse: 92  CURRENT BLOOD PRESSURE:  BP: (!) 108/47  24HR BLOOD PRESSURE RANGE:  Systolic (66JKU), IX , Min:87 , QKP:043   ; Diastolic (09XGD), BXB:75, Min:47, Max:65    24HR INTAKE/OUTPUT:      Intake/Output Summary (Last 24 hours) at 10/17/2021 1243  Last data filed at 10/17/2021 0019  Gross per 24 hour   Intake 150 ml   Output 850 ml   Net -700 ml     WEIGHT :  Patient Vitals for the past 96 hrs (Last 3 readings):   Weight   10/17/21 0543 129 lb 3 oz (58.6 kg)   10/16/21 0648 129 lb 9.6 oz (58.8 kg)   10/15/21 0502 131 lb 8 oz (59.6 kg)     PHYSICAL EXAM      GENERAL APPEARANCE: Awake and alert x3  SKIN: To touch and no erythema  EYES: conjunctivae normal and sclera anicteric  ENT: No thrush or pharyngeal congestion  NECK:   No JVD or carotid bruits  PULMONARY: Crease air entry on the right base   CADRDIOVASCULAR: S1 and S2 normal NO S3 and NO S4 . No rubs , no murmur. ABDOMEN: soft nontender, bowel sounds present, no organomegaly, no ascites.        EXTREMITIES: Trace edema    CURRENT MEDICATIONS      epoetin mike-epbx (RETACRIT) injection 8,000 Units, Once per day on   ipratropium-albuterol (DUONEB) nebulizer solution 1 ampule, TID  budesonide-formoterol (SYMBICORT) 160-4.5 MCG/ACT inhaler 2 puff, BID  torsemide (DEMADEX) tablet 40 mg, Daily  calcitRIOL (ROCALTROL) capsule 0.25 mcg, Once per day on Mon Wed Fri  glucose (GLUTOSE) 40 % oral gel 15 g, PRN  dextrose 50 % IV solution, PRN  glucagon (rDNA) injection 1 mg, PRN  dextrose 5 % solution, PRN  sodium chloride flush 0.9 % injection 5-40 mL, 2 times per day  sodium chloride flush 0.9 % injection 5-40 mL, PRN  0.9 % sodium chloride infusion, PRN  levoFLOXacin (LEVAQUIN) 500 MG/100ML infusion 500 mg, Q48H  vitamin D (ERGOCALCIFEROL) capsule 50,000 Units, Weekly  ondansetron (ZOFRAN-ODT) disintegrating tablet 4 mg, Q8H PRN   Or  ondansetron (ZOFRAN) injection 4 mg, Q6H PRN  acetaminophen (TYLENOL) tablet 650 mg, Q6H PRN   Or  acetaminophen (TYLENOL) suppository 650 mg, Q6H PRN  perflutren lipid microspheres (DEFINITY) injection 1.65 mg, ONCE PRN  heparin (porcine) injection 5,000 Units, 3 times per day          LABS      CBC:   Recent Labs     10/15/21  0553 10/16/21  0538 10/17/21  0453   WBC 9.8 8.4 9.7   RBC 3.37* 3.22* 3.14*   HGB 8.1* 7.9* 7.5*   HCT 28.0* 26.8* 26.5*   MCV 83.1 83.2 84.4   MCH 24.0* 24.5* 23.9*   MCHC 28.9 29.5 28.3*   RDW 19.2* 19.1* 19.4*    301 294   MPV 10.5 10.6 11.1      BMP:   Recent Labs     10/16/21  0538 10/16/21  1611 10/17/21  0453    142 140   K 3.2* 3.1* 3.1*    99 99   CO2 26 30 29   BUN 51* 50* 49*   CREATININE 2.68* 2.47* 2.57*   GLUCOSE 95 116* 99   CALCIUM 8.0* 8.3* 8.0*    PHOSPHORUS:    No results for input(s): PHOS in the last 72 hours.   MAGNESIUM:   Recent Labs     10/16/21  0538 10/16/21  1611 10/17/21  0453   MG 2.5 2.3 2.3     ALBUMIN:   Recent Labs     10/15/21  0553 10/16/21  0538 10/17/21  0453   LABALBU 3.1* 2.7* 2.7*     IRON:    Lab Results   Component Value Date    IRON 22 10/13/2021     IRON SATURATION:    Lab Results   Component Value Date    LABIRON 9 10/13/2021     TIBC:    Lab Results   Component Value Date    TIBC 237 10/13/2021     FERRITIN:    Lab Results   Component Value Date    FERRITIN 20 10/13/2021       SPEP:   Lab Results   Component Value Date    PROT 4.7 10/17/2021     URINE SODIUM:    Lab Results   Component Value Date    LAZARO 40 10/13/2021      URINE CREATININE:    Lab Results   Component Value Date    LABCREA 76.0 10/13/2021     URINALYSIS:  U/A:   Lab Results   Component Value Date    NITRU NEGATIVE 10/13/2021    COLORU Yellow 10/13/2021    PHUR 5.5 10/13/2021    WBCUA 10 TO 20 10/13/2021    RBCUA 2 TO 5 10/13/2021    MUCUS 1+ 10/13/2021    TRICHOMONAS NOT REPORTED 10/13/2021    YEAST NOT REPORTED 10/13/2021    BACTERIA NOT REPORTED 10/13/2021    SPECGRAV 1.020 10/13/2021    LEUKOCYTESUR SMALL 10/13/2021    UROBILINOGEN Normal 10/13/2021    BILIRUBINUR NEGATIVE 10/13/2021    GLUCOSEU NEGATIVE 10/13/2021    KETUA NEGATIVE 10/13/2021    AMORPHOUS 1+ 10/13/2021           ASSESSMENT      1. Stage IV chronic kidney disease but GFR is overestimated due to poor muscle mass and caloric intake. Will check 24-hour urine for creatinine clearance and decide for dialysis if needed  2. Fatigue, loss of appetite and weight loss may be related to uremia  3. Bilateral pleural effusion with loculated right-sided effusion contributing and feeling of shortness of breath  4. COPD  5. Pulmonary hypertension  6. Moderate protein calorie malnutrition  7. Secondary hyperparathyroidism   8. Anemia of chronic kidney disease  9. Hypokalemia: Persists  PLAN      1. Consider thoracocentesis  2. We will follow the results of 24-hour creatinine clearance  3.   We will start Aldactone 12.5 mg once a day  Check potassium twice a day and replace as needed  We will also place her on 40 of potassium p.o. daily   this note is created with the assistance of a speech-recognition program. While intending to generate a document that actually reflects the content of the visit, no guarantees can be provided that every mistake has been identified and corrected by editing    Electronically signed by Randolph Toure MD on 10/17/2021 at 12:43 PM

## 2021-10-17 NOTE — PROGRESS NOTES
Occupational Therapy  Facility/Department: [facility] PROGRESSIVE CARE  Daily Treatment Note  NAME: Sharon Oswald  : 1945  MRN: [id number]    Date of Service: 10/17/2021    Discharge Recommendations:  Patient would benefit from continued therapy after discharge       Assessment   Performance deficits / Impairments: Decreased functional mobility ; Decreased safe awareness;Decreased cognition;Decreased ADL status; Decreased strength;Decreased endurance;Decreased high-level IADLs;Decreased fine motor control;Decreased posture;Decreased balance  Assessment: Pt. completed grooming task while positioned upright in bed. Pt. required SBA with setup and much time to complete d/t increase fatigue levels. Pt. completed AROM with B UE to promote functional strength and mobilty for ADLS. Pt. limited by resp. status and decreased strength and endurance. Skilled OT warranted to promote I/safety to return pt to prior living arrangement with assist as needed. Prognosis: Fair  OT Education: OT Role;Plan of Care;Home Exercise Program;Energy Conservation; Family Education;IADL Safety  Patient Education: Pt. educated on importance of mobility and proper breathing tech to increase resp. status. Pt and family very attentive and receptive to information. REQUIRES OT FOLLOW UP: Yes  Activity Tolerance  Activity Tolerance: poor, pt is limited by resp. status and fatigues easily  Safety Devices  Safety Devices in place: Yes  Type of devices: All fall risk precautions in place; Bed alarm in place;Call light within reach;Nurse notified; Left in bed         Patient Diagnosis(es): The primary encounter diagnosis was Hypoxia. Diagnoses of Pleural effusion on right, Peripheral edema, Anemia, unspecified type, and Acute kidney injury [facility]) were also pertinent to this visit. has no past medical history on file. has no past surgical history on file.     Restrictions  Restrictions/Precautions  Restrictions/Precautions: General Precautions, Fall Risk  Position Activity Restriction  Other position/activity restrictions: Up with assist, telemetry, Lomeli cath, 5L O2 NC  Subjective   General  Chart Reviewed: Yes  Patient assessed for rehabilitation services?: Yes  Response to previous treatment: Patient with no complaints from previous session  Family / Caregiver Present: Yes (son and daughter)  Subjective  Subjective: Pt. resting in bed and agreeable to treatment. Orientation  Orientation  Overall Orientation Status: Within Functional Limits  Objective    ADL  Grooming: Setup;Stand by assistance  Additional Comments: Pt. fatigues easily and requires frequent rest breaks. Balance  Sitting Balance: Supervision (Supervised position upright in bed.)     Cognition  Overall Cognitive Status: WFL  Arousal/Alertness: Appropriate responses to stimuli  Following Commands: Follows multistep commands with repitition; Follows multistep commands with increased time  Attention Span: Appears intact  Memory: Appears intact  Safety Judgement: Decreased awareness of need for safety;Decreased awareness of need for assistance  Problem Solving: Decreased awareness of errors;Assistance required to correct errors made;Assistance required to identify errors made;Assistance required to implement solutions;Assistance required to generate solutions  Insights: Decreased awareness of deficits  Initiation: Requires cues for some  Sequencing: Requires cues for some  Cognition Comment: Pt. followed all cues throughout treatment.      Perception  Overall Perceptual Status: WFL   Type of ROM/Therapeutic Exercise  Type of ROM/Therapeutic Exercise: AROM  Exercises  Shoulder Flexion: x10  Shoulder Extension: x10  Horizontal ABduction: x10  Horizontal ADduction: x10  Finger Flexion: x10  Finger Extension: x10      Plan   Plan  Times per week: 4-5x/wk 1x/day as karey  Times per day: Daily  Current Treatment Recommendations: Strengthening, Endurance Training, Balance Training, Functional Mobility Training, Safety Education & Training, Home Management Training, Self-Care / ADL, Equipment Evaluation, Education, & procurement, Patient/Caregiver Education & Training  Plan Comment: Cont. with stated POC. AM-PAC Score        AM-PAC Inpatient Daily Activity Raw Score: 14 (10/17/21 1209)  AM-PAC Inpatient ADL T-Scale Score : 33.39 (10/17/21 1209)  ADL Inpatient CMS 0-100% Score: 59.67 (10/17/21 1209)  ADL Inpatient CMS G-Code Modifier : CK (10/17/21 1209)    Goals  Short term goals  Time Frame for Short term goals: By discharge, pt to demo  Short term goal 1: ADL transfers and functional mobility to CGA with use of AD as needed. Short term goal 2: UB ADLs to SBA and LB ADLs to Min A with use of AD/AE as needed and proper pacing tech. Short term goal 3: toileting to Min A with use of AD/grab bars/BSC as needed. Short term goal 4: increased BUE strength by 1/2 grade to assist with functional tasks/I with simple B UE HEP with use of handouts as needed. Short term goal 5: bed mobility to SBA with use of bedrails as needed. Long term goals  Long term goal 1: Pt to be I with fall prevention education, EC/WS tech, pursed lip breathing tech and recommendations AE/discharge with use of handouts as needed. Patient Goals   Patient goals : To feel better! Therapy Time   Individual Concurrent Group Co-treatment   Time In 1140         Time Out 24 [id number]         Minutes 25              RN reports patient is medically stable for therapy treatment this date. Chart reviewed prior to treatment and patient is agreeable for therapy. All lines intact and patient positioned comfortably at end of treatment. All patient needs addressed prior to ending therapy session.       SHA Miner

## 2021-10-18 ENCOUNTER — APPOINTMENT (OUTPATIENT)
Dept: INTERVENTIONAL RADIOLOGY/VASCULAR | Age: 76
DRG: 291 | End: 2021-10-18
Payer: MEDICARE

## 2021-10-18 ENCOUNTER — APPOINTMENT (OUTPATIENT)
Dept: GENERAL RADIOLOGY | Age: 76
DRG: 291 | End: 2021-10-18
Payer: MEDICARE

## 2021-10-18 LAB
CREAT SERPL-MCNC: NORMAL MG/DL (ref 0.5–0.9)
CREATININE CLEARANCE: NORMAL ML/MIN/BSA (ref 71–151)
CREATININE URINE: NORMAL MG/DL (ref 28–217)
CULTURE: NORMAL
CULTURE: NORMAL
EKG ATRIAL RATE: 102 BPM
EKG P AXIS: 71 DEGREES
EKG P-R INTERVAL: 136 MS
EKG Q-T INTERVAL: 350 MS
EKG QRS DURATION: 80 MS
EKG QTC CALCULATION (BAZETT): 456 MS
EKG R AXIS: 37 DEGREES
EKG T AXIS: -98 DEGREES
EKG VENTRICULAR RATE: 102 BPM
GLUCOSE BLD-MCNC: 102 MG/DL (ref 65–105)
GLUCOSE BLD-MCNC: 114 MG/DL (ref 65–105)
GLUCOSE BLD-MCNC: 84 MG/DL (ref 65–105)
HCT VFR BLD CALC: 26.2 % (ref 36.3–47.1)
HEMOGLOBIN: 7.3 G/DL (ref 11.9–15.1)
LACTIC ACID, SEPSIS WHOLE BLOOD: NORMAL MMOL/L (ref 0.5–1.9)
LACTIC ACID, SEPSIS: 0.8 MMOL/L (ref 0.5–1.9)
LENGTH OF COLLECTION: 24 H
Lab: NORMAL
Lab: NORMAL
MCH RBC QN AUTO: 23.9 PG (ref 25.2–33.5)
MCHC RBC AUTO-ENTMCNC: 27.9 G/DL (ref 28.4–34.8)
MCV RBC AUTO: 85.9 FL (ref 82.6–102.9)
NRBC AUTOMATED: 0.2 PER 100 WBC
PATIENT HEIGHT: 163 CM
PATIENT WEIGHT: 58.79 KG
PDW BLD-RTO: 19.8 % (ref 11.8–14.4)
PLATELET # BLD: 257 K/UL (ref 138–453)
PMV BLD AUTO: 10.5 FL (ref 8.1–13.5)
POTASSIUM SERPL-SCNC: 4 MMOL/L (ref 3.7–5.3)
POTASSIUM SERPL-SCNC: 4.6 MMOL/L (ref 3.7–5.3)
RBC # BLD: 3.05 M/UL (ref 3.95–5.11)
SPECIMEN DESCRIPTION: NORMAL
SPECIMEN DESCRIPTION: NORMAL
VOLUME: NORMAL ML
WBC # BLD: 11.4 K/UL (ref 3.5–11.3)

## 2021-10-18 PROCEDURE — 6360000002 HC RX W HCPCS: Performed by: NURSE PRACTITIONER

## 2021-10-18 PROCEDURE — 94761 N-INVAS EAR/PLS OXIMETRY MLT: CPT

## 2021-10-18 PROCEDURE — 6360000002 HC RX W HCPCS: Performed by: INTERNAL MEDICINE

## 2021-10-18 PROCEDURE — 71045 X-RAY EXAM CHEST 1 VIEW: CPT

## 2021-10-18 PROCEDURE — 6370000000 HC RX 637 (ALT 250 FOR IP): Performed by: INTERNAL MEDICINE

## 2021-10-18 PROCEDURE — 99232 SBSQ HOSP IP/OBS MODERATE 35: CPT | Performed by: NURSE PRACTITIONER

## 2021-10-18 PROCEDURE — 93010 ELECTROCARDIOGRAM REPORT: CPT | Performed by: INTERNAL MEDICINE

## 2021-10-18 PROCEDURE — 2060000000 HC ICU INTERMEDIATE R&B

## 2021-10-18 PROCEDURE — 83605 ASSAY OF LACTIC ACID: CPT

## 2021-10-18 PROCEDURE — 85027 COMPLETE CBC AUTOMATED: CPT

## 2021-10-18 PROCEDURE — 6370000000 HC RX 637 (ALT 250 FOR IP): Performed by: STUDENT IN AN ORGANIZED HEALTH CARE EDUCATION/TRAINING PROGRAM

## 2021-10-18 PROCEDURE — 36415 COLL VENOUS BLD VENIPUNCTURE: CPT

## 2021-10-18 PROCEDURE — 2700000000 HC OXYGEN THERAPY PER DAY

## 2021-10-18 PROCEDURE — 2580000003 HC RX 258: Performed by: STUDENT IN AN ORGANIZED HEALTH CARE EDUCATION/TRAINING PROGRAM

## 2021-10-18 PROCEDURE — 94640 AIRWAY INHALATION TREATMENT: CPT

## 2021-10-18 PROCEDURE — 6370000000 HC RX 637 (ALT 250 FOR IP): Performed by: NURSE PRACTITIONER

## 2021-10-18 PROCEDURE — 0W9930Z DRAINAGE OF RIGHT PLEURAL CAVITY WITH DRAINAGE DEVICE, PERCUTANEOUS APPROACH: ICD-10-PCS | Performed by: INTERNAL MEDICINE

## 2021-10-18 PROCEDURE — 87040 BLOOD CULTURE FOR BACTERIA: CPT

## 2021-10-18 PROCEDURE — 82947 ASSAY GLUCOSE BLOOD QUANT: CPT

## 2021-10-18 PROCEDURE — C1729 CATH, DRAINAGE: HCPCS

## 2021-10-18 PROCEDURE — 32557 INSERT CATH PLEURA W/ IMAGE: CPT | Performed by: RADIOLOGY

## 2021-10-18 PROCEDURE — 84132 ASSAY OF SERUM POTASSIUM: CPT

## 2021-10-18 RX ORDER — IPRATROPIUM BROMIDE AND ALBUTEROL SULFATE 2.5; .5 MG/3ML; MG/3ML
1 SOLUTION RESPIRATORY (INHALATION) EVERY 4 HOURS
Status: DISCONTINUED | OUTPATIENT
Start: 2021-10-18 | End: 2021-10-19

## 2021-10-18 RX ADMIN — SODIUM CHLORIDE, PRESERVATIVE FREE 10 ML: 5 INJECTION INTRAVENOUS at 10:01

## 2021-10-18 RX ADMIN — SODIUM CHLORIDE, PRESERVATIVE FREE 10 ML: 5 INJECTION INTRAVENOUS at 21:26

## 2021-10-18 RX ADMIN — IPRATROPIUM BROMIDE AND ALBUTEROL SULFATE 1 AMPULE: .5; 2.5 SOLUTION RESPIRATORY (INHALATION) at 19:19

## 2021-10-18 RX ADMIN — IPRATROPIUM BROMIDE AND ALBUTEROL SULFATE 1 AMPULE: .5; 2.5 SOLUTION RESPIRATORY (INHALATION) at 23:09

## 2021-10-18 RX ADMIN — CALCITRIOL 0.25 MCG: 0.25 CAPSULE ORAL at 10:00

## 2021-10-18 RX ADMIN — TORSEMIDE 40 MG: 20 TABLET ORAL at 10:00

## 2021-10-18 RX ADMIN — HEPARIN SODIUM 5000 UNITS: 5000 INJECTION INTRAVENOUS; SUBCUTANEOUS at 05:35

## 2021-10-18 RX ADMIN — ACETAMINOPHEN 650 MG: 325 TABLET ORAL at 14:10

## 2021-10-18 RX ADMIN — BUDESONIDE AND FORMOTEROL FUMARATE DIHYDRATE 2 PUFF: 160; 4.5 AEROSOL RESPIRATORY (INHALATION) at 08:58

## 2021-10-18 RX ADMIN — HEPARIN SODIUM 5000 UNITS: 5000 INJECTION INTRAVENOUS; SUBCUTANEOUS at 14:10

## 2021-10-18 RX ADMIN — IPRATROPIUM BROMIDE AND ALBUTEROL SULFATE 1 AMPULE: .5; 2.5 SOLUTION RESPIRATORY (INHALATION) at 08:56

## 2021-10-18 RX ADMIN — IPRATROPIUM BROMIDE AND ALBUTEROL SULFATE 1 AMPULE: .5; 2.5 SOLUTION RESPIRATORY (INHALATION) at 13:54

## 2021-10-18 RX ADMIN — EPOETIN ALFA-EPBX 8000 UNITS: 4000 INJECTION, SOLUTION INTRAVENOUS; SUBCUTANEOUS at 10:00

## 2021-10-18 RX ADMIN — POTASSIUM CHLORIDE 40 MEQ: 20 TABLET, EXTENDED RELEASE ORAL at 10:00

## 2021-10-18 RX ADMIN — HEPARIN SODIUM 5000 UNITS: 5000 INJECTION INTRAVENOUS; SUBCUTANEOUS at 21:26

## 2021-10-18 ASSESSMENT — PAIN SCALES - WONG BAKER: WONGBAKER_NUMERICALRESPONSE: 0

## 2021-10-18 ASSESSMENT — PAIN DESCRIPTION - LOCATION: LOCATION: CHEST

## 2021-10-18 ASSESSMENT — PAIN - FUNCTIONAL ASSESSMENT: PAIN_FUNCTIONAL_ASSESSMENT: PREVENTS OR INTERFERES SOME ACTIVE ACTIVITIES AND ADLS

## 2021-10-18 ASSESSMENT — PAIN SCALES - GENERAL
PAINLEVEL_OUTOF10: 1
PAINLEVEL_OUTOF10: 0

## 2021-10-18 ASSESSMENT — PAIN DESCRIPTION - ORIENTATION: ORIENTATION: RIGHT

## 2021-10-18 ASSESSMENT — PAIN DESCRIPTION - FREQUENCY: FREQUENCY: INTERMITTENT

## 2021-10-18 ASSESSMENT — PAIN DESCRIPTION - DESCRIPTORS: DESCRIPTORS: ACHING;DISCOMFORT

## 2021-10-18 ASSESSMENT — PAIN DESCRIPTION - PROGRESSION: CLINICAL_PROGRESSION: NOT CHANGED

## 2021-10-18 ASSESSMENT — PAIN DESCRIPTION - PAIN TYPE: TYPE: ACUTE PAIN

## 2021-10-18 ASSESSMENT — PAIN DESCRIPTION - ONSET: ONSET: GRADUAL

## 2021-10-18 NOTE — PLAN OF CARE
Problem: Falls - Risk of:  Goal: Will remain free from falls  Description: Will remain free from falls  Outcome: Ongoing  Note: Siderails up x 2  Hourly rounding  Call light in reach  Instructed to call for assist before attempting out of bed. Remains free from falls and accidental injury at this time   Floor free from obstacles  Bed is locked and in lowest position  Adequate lighting provided  Bed alarm on, Red [place]alling star and Stay with Me signs posted  Goal: Absence of physical injury  Description: Absence of physical injury  Outcome: Ongoing     Problem: Nutrition  Goal: Optimal nutrition therapy  Outcome: Ongoing     Problem: IP BALANCE  Goal: LTG - Patient will maintain balance to allow for safe/functional mobility  Outcome: Ongoing     Problem: Skin Integrity:  Goal: Will show no infection signs and symptoms  Description: Will show no infection signs and symptoms  Outcome: Ongoing  Note: Checked for incontinence every 2 hours and prn. Pericare as needed. Assisted to reposition off back frequently. On waffle mattress. Heels off bed with pillows.   Goal: Absence of new skin breakdown  Description: Absence of new skin breakdown  Outcome: Ongoing     Problem: OXYGENATION/RESPIRATORY FUNCTION  Goal: Patient will achieve/maintain normal respiratory rate/effort  Description: Respiratory rate and effort will be within normal limits for the patient  Outcome: Ongoing     Problem: Breathing Pattern - Ineffective:  Goal: Ability to achieve and maintain a regular respiratory rate will improve  Description: Ability to achieve and maintain a regular respiratory rate will improve  Outcome: Ongoing

## 2021-10-18 NOTE — BRIEF OP NOTE
Brief Postoperative Note    Iza Anderson  YOB: 1945  [id number]    Pre-operative Diagnosis: Bilateral loculated pleural effusions, larger on the right. Post-operative Diagnosis: Same    Procedure: Rt  Chest tube placement    Medications Given: none    Anesthesia: Local    Surgeons/Assistants: Dewey Delgado MD    Estimated Blood Loss: minimal    Complications: none    Specimens: were not obtained    Findings: 10 F rt chest tube inserted under US and fluoro guidance. Sutured to skin and attached to [facility]. Suggest -20 for 4-5 hours then as per Dr Farhana Xiao.       Electronically signed by Dewey Delgado MD on 10/18/2021 at 1:33 PM

## 2021-10-18 NOTE — RT PROTOCOL NOTE
RT Inhaler-Nebulizer Bronchodilator Protocol Note    There is a bronchodilator order in the chart from a provider indicating to follow the RT Bronchodilator Protocol and there is an Initiate RT Inhaler-Nebulizer Bronchodilator Protocol order as well (see protocol at bottom of note). CXR Findings:  No results found. The findings from the last RT Protocol Assessment were as follows:   History Pulmonary Disease: Chronic pulmonary disease (COPD and just recently quit smoking)  Respiratory Pattern: (S) Severe use of accessory muscle or RR>30 bpm (Pt u/a to keep O2 levels up without HFNC and RR is 31 with very small breaths)  Breath Sounds: Intermittent or unilateral wheezes  Cough: Weak, non-productive  Indication for Bronchodilator Therapy: Decreased or absent breath sounds, On home bronchodilators  Bronchodilator Assessment Score: 17    Aerosolized bronchodilator medication orders have been revised according to the RT Inhaler-Nebulizer Bronchodilator Protocol below. Respiratory Therapist to perform RT Therapy Protocol Assessment initially then follow the protocol. Repeat RT Therapy Protocol Assessment PRN for score 0-3 or on second treatment, BID, and PRN for scores above 3. No Indications - adjust the frequency to every 6 hours PRN wheezing or bronchospasm, if no treatments needed after 48 hours then discontinue using Per Protocol order mode. If indication present, adjust the RT bronchodilator orders based on the Bronchodilator Assessment Score as indicated below. Use Inhaler orders unless patient has one or more of the following: on home nebulizer, not able to hold breath for 10 seconds, is not alert and oriented, cannot activate and use MDI correctly, or respiratory rate 25 breaths per minute or more, then use the equivalent nebulizer order(s) with same Frequency and PRN reasons based on the score.   If a patient is on this medication at home then do not decrease Frequency below that used at

## 2021-10-18 NOTE — PROGRESS NOTES
Occupational Therapy  DATE: 10/18/2021    NAME: Zuleima David  MRN: [id number]   : 1945    Patient not seen this date for Occupational Therapy due to:      [x] Cancel by RN or physician due to: off unit for chest tube placement  [] Hemodialysis    [] Critical Lab Value Level     [] Blood transfusion in progress    [] Acute or unstable cardiovascular status   _MAP < 55 or more than >115  _HR < 40 or > 130    [] Acute or unstable pulmonary status   -FiO2 > 60%   _RR < 5 or >40    _O2 sats < 85%    [] Strict Bedrest    [] Off Unit for surgery or procedure    [] Off Unit for testing       [] Pending imaging to R/O fracture    [] Refusal by Patient      [] Other      [] OT being discontinued at this time. Patient independent. No further needs. [] OT being discontinued at this time as the patient has been transferred to hospice care. No further needs. OT will consult with nursing prior to next visit.     SHA Zuñiga

## 2021-10-18 NOTE — PROGRESS NOTES
Rajan Gamez from [facility] called to inform writer that the 24 hr-urine collected for creatinine clearance could not be completed. The urine was thrown away before it was measured. Therefore, the result cannot be resulted properly. Writer was informed to redo the 24 hour urine collection. Lab at [address] [place] [address] was notified for a new bottle. Urine collection starts now. Will notify Dr. Aracelis Leal. Pre-Procedure Text: The treatment areas were cleaned and prepped in the usual fashion.

## 2021-10-18 NOTE — PROGRESS NOTES
Nephrology Progress Note      SUBJECTIVE    Patient was seen and examined. No creatinine today. She is status post chest tube placement earlier today on the right by IR. She has bilateral loculated pleural effusions. Right pleural effusion was larger. Pulmonary is following. Slight improvement in serum creatinine noted previously, and potasium level is normal.       OBJECTIVE      CURRENT TEMPERATURE:  Temp: 97.3 °F (36.3 °C)  MAXIMUM TEMPERATURE OVER 24HRS:  Temp (24hrs), Av.7 °F (36.5 °C), Min:97.3 °F (36.3 °C), Max:98.4 °F (36.9 °C)    CURRENT RESPIRATORY RATE:  Resp: 14  CURRENT PULSE:  Pulse: 98  CURRENT BLOOD PRESSURE:  BP: (!) 103/59  24HR BLOOD PRESSURE RANGE:  Systolic (00WHH), WZO:91 , Min:81 , PEB:421   ; Diastolic (59BOR), AFD:15, Min:48, Max:81    24HR INTAKE/OUTPUT:      Intake/Output Summary (Last 24 hours) at 10/18/2021 1615  Last data filed at 10/18/2021 1427  Gross per 24 hour   Intake --   Output 1250 ml   Net -1250 ml     WEIGHT :  Patient Vitals for the past 96 hrs (Last 3 readings):   Weight   10/18/21 0552 138 lb 6 oz (62.8 kg)   10/17/21 0543 129 lb 3 oz (58.6 kg)   10/16/21 0648 129 lb 9.6 oz (58.8 kg)     PHYSICAL EXAM      GENERAL APPEARANCE: Awake and alert x3, right sided chest tube in place  SKIN: warm to touch and no erythema  EYES: conjunctivae pale and sclera anicteric  ENT: No thrush, moist mucus membranes  NECK:   No JVD appreciated  PULMONARY: bilateral air entry with decreased breath sounds at the bases  CADRDIOVASCULAR: Regular rate and rhythm with positive S1 and S2 and no appreciable rub   ABDOMEN: soft nontender, bowel sounds present, no ascites.        EXTREMITIES: Trace edema    CURRENT MEDICATIONS      potassium chloride (KLOR-CON M) extended release tablet 40 mEq, Daily  epoetin mike-epbx (RETACRIT) injection 8,000 Units, Once per day on   ipratropium-albuterol (DUONEB) nebulizer solution 1 ampule, TID  budesonide-formoterol (SYMBICORT) 160-4.5 MCG/ACT inhaler 2 puff, BID  torsemide (DEMADEX) tablet 40 mg, Daily  calcitRIOL (ROCALTROL) capsule 0.25 mcg, Once per day on Mon Wed Fri  glucose (GLUTOSE) 40 % oral gel 15 g, PRN  dextrose 50 % IV solution, PRN  glucagon (rDNA) injection 1 mg, PRN  dextrose 5 % solution, PRN  sodium chloride flush 0.9 % injection 5-40 mL, 2 times per day  sodium chloride flush 0.9 % injection 5-40 mL, PRN  0.9 % sodium chloride infusion, PRN  levoFLOXacin (LEVAQUIN) 500 MG/100ML infusion 500 mg, Q48H  vitamin D (ERGOCALCIFEROL) capsule 50,000 Units, Weekly  ondansetron (ZOFRAN-ODT) disintegrating tablet 4 mg, Q8H PRN   Or  ondansetron (ZOFRAN) injection 4 mg, Q6H PRN  acetaminophen (TYLENOL) tablet 650 mg, Q6H PRN   Or  acetaminophen (TYLENOL) suppository 650 mg, Q6H PRN  perflutren lipid microspheres (DEFINITY) injection 1.65 mg, ONCE PRN  heparin (porcine) injection 5,000 Units, 3 times per day          LABS      CBC:   Recent Labs     10/16/21  0538 10/17/21  0453 10/18/21  0427   WBC 8.4 9.7 11.4*   RBC 3.22* 3.14* 3.05*   HGB 7.9* 7.5* 7.3*   HCT 26.8* 26.5* 26.2*   MCV 83.2 84.4 85.9   MCH 24.5* 23.9* 23.9*   MCHC 29.5 28.3* 27.9*   RDW 19.1* 19.4* 19.8*    294 257   MPV 10.6 11.1 10.5      BMP:   Recent Labs     10/16/21  0538 10/16/21  0538 10/16/21  1611 10/16/21  1611 10/17/21  0453 10/17/21  1800 10/18/21  0117 10/18/21  1246     --  142  --  140  --   --   --    K 3.2*   < > 3.1*   < > 3.1*  --  4.0 4.6     --  99  --  99  --   --   --    CO2 26  --  30  --  29  --   --   --    BUN 51*  --  50*  --  49*  --   --   --    CREATININE 2.68*   < > 2.47*  --  2.57* DISREGARD RESULTS. CLERICAL ERROR.  --   --    GLUCOSE 95  --  116*  --  99  --   --   --    CALCIUM 8.0*  --  8.3*  --  8.0*  --   --   --     < > = values in this interval not displayed. PHOSPHORUS:    No results for input(s): PHOS in the last 72 hours.   MAGNESIUM:   Recent Labs     10/16/21  0538 10/16/21  1611 10/17/21  0453   MG 2.5 2.3 2.3 ALBUMIN:   Recent Labs     10/16/21  0538 10/17/21  0453   LABALBU 2.7* 2.7*     IRON:    Lab Results   Component Value Date    IRON 22 10/13/2021     IRON SATURATION:    Lab Results   Component Value Date    LABIRON 9 10/13/2021     TIBC:    Lab Results   Component Value Date    TIBC 237 10/13/2021     FERRITIN:    Lab Results   Component Value Date    FERRITIN 20 10/13/2021       SPEP:   Lab Results   Component Value Date    PROT 4.7 10/17/2021     URINE SODIUM:    Lab Results   Component Value Date    LAZARO 40 10/13/2021      URINE CREATININE:    Lab Results   Component Value Date    LABCREA DISREGARD RESULTS. CLERICAL ERROR. 10/17/2021    LABCREA 76.0 10/13/2021     URINALYSIS:  U/A:   Lab Results   Component Value Date    NITRU NEGATIVE 10/13/2021    COLORU Yellow 10/13/2021    PHUR 5.5 10/13/2021    WBCUA 10 TO 20 10/13/2021    RBCUA 2 TO 5 10/13/2021    MUCUS 1+ 10/13/2021    TRICHOMONAS NOT REPORTED 10/13/2021    YEAST NOT REPORTED 10/13/2021    BACTERIA NOT REPORTED 10/13/2021    SPECGRAV 1.020 10/13/2021    LEUKOCYTESUR SMALL 10/13/2021    UROBILINOGEN Normal 10/13/2021    BILIRUBINUR NEGATIVE 10/13/2021    GLUCOSEU NEGATIVE 10/13/2021    KETUA NEGATIVE 10/13/2021    AMORPHOUS 1+ 10/13/2021           ASSESSMENT      1. Stage IV chronic kidney disease but GFR is overestimated due to poor muscle mass and caloric intake. Will check 24-hour urine for creatinine clearance and decide for dialysis if needed   2. Fatigue, loss of appetite and weight loss may be related to uremia  3. Bilateral pleural effusion with loculated right-sided effusion contributing and feeling of shortness of breath, status post right-sided chest tube on 10/18  4. COPD  5. Pulmonary hypertension  6. Moderate protein calorie malnutrition  7. Secondary hyperparathyroidism   8. Anemia of chronic kidney disease  9. Hypokalemia: Improved  PLAN      1. No changes today  2. Follow labs as ordered  3.   Monitor hemodynamics This note is created with the assistance of a speech-recognition program. While intending to generate a document that actually reflects the content of the visit, no guarantees can be provided that every mistake has been identified and corrected by editing    Electronically signed by Nick Garcia MD on 10/18/2021 at 4:15 PM

## 2021-10-18 NOTE — PROGRESS NOTES
[facility]  Office: [address], DO, [place], DO, Nicolas [facility], DO, Leland Sims Blood, DO, Sammi Pepe MD, Sherrie Sheldon MD, Gerardo Richardson MD, Jaja Mejia MD, Leandro Prasad MD, Papa Yañez MD, Hudson Champion MD, Kamini Souza MD, Harjeet Hammond, DO, Evita Lucas, DO, Aleyda Molina MD,  Geoffrey Hernández, DO, Roque Keene MD, Murphy Badillo MD, Diamond Dickerson MD, Esvin Bass MD, Neel Dunn MD, Anne Fleming MD, Panfilo Torres, [facility], [facility], [facility], Vazquez Joshua, CNP, Kyler Lynch, [facility], Clarisse Jon, CNP, Arline Kam, CNP, Aletha Acevedo, CNP, Baron Lesch, CNP, Kai Navarrete, [facility], Debby Samson, CNP, Nasrin Avila PA-C, Denver Parks, [facility], Michael Torres, CNP, Elio Baig, CNP, Windy Doan, CNP, Vlad Wheeler, CNP, Anne Beth, CNP, Iliana Cuevas, CNP, [facility]    Progress Note    10/18/2021    10:01 AM    Name:   Tristen Reyes  MRN:     [id number]     [place]:      [de-identified]   Room:   [address] [place] [address] Day:  6  Admit Date:  10/12/2021  2:53 PM    PCP:   Terrence Yuen MD  Code Status:  Full Code    Subjective:     C/C:   Chief Complaint   Patient presents with    Respiratory Distress     EMS reported 97% on room air; pt arrives with saturation 45% on room air    Failure To Thrive     Interval History Status: improved. Patient feels about the same as yesterday, currently on 5 L via nasal cannula, no new complaints or events overnight. Plans for pigtail catheter per IR today     Brief History:   Danielle Wadsworth is a 64 y. o. female with a hx of CKD 4 and iron deficiency anemia who presented to [place] with Respiratory Distress and hypoxia (O2 sats 45%) and failure to thrive and is admitted to the hospital for the management of right Pleural effusion and acute renal failure.  Patient lives at home alone and has recently been having difficulty caring for herself and generalized weakness for the last few weeks . She does have chronic BLE edema but denies hx of CHF. Initial CXR showed large right pleural effusion, stat elevated BNP and D-dimer as well as BUN/creatinine.  Last took known creatinine was 2.34 in august, she reports she seen a nephrologist once. She does not wear home oxygen      10/13: US guided right thoracentesis 850ml of nonturbid straw colored fluid removed -transudative    Review of Systems:     Constitutional:  negative for chills, fevers, sweats  Respiratory: Positive for cough and shortness of breath, improving cardiovascular:  negative for chest pain, chest pressure/discomfort, lower extremity edema, palpitations  Gastrointestinal:  negative for abdominal pain, constipation, diarrhea, nausea, vomiting  Neurological:  negative for dizziness, headache    Medications: Allergies: Allergies   Allergen Reactions    Penicillins Swelling       Current Meds:   Scheduled Meds:    potassium chloride  40 mEq Oral Daily    epoetin mike-epbx  8,000 Units SubCUTAneous Once per day on Mon Wed Fri    ipratropium-albuterol  1 ampule Inhalation TID    budesonide-formoterol  2 puff Inhalation BID    torsemide  40 mg Oral Daily    calcitRIOL  0.25 mcg Oral Once per day on Mon Wed Fri    sodium chloride flush  5-40 mL IntraVENous 2 times per day    levofloxacin  500 mg IntraVENous Q48H    vitamin D  50,000 Units Oral Weekly    heparin (porcine)  5,000 Units SubCUTAneous 3 times per day     Continuous Infusions:    dextrose      sodium chloride       PRN Meds: glucose, dextrose, glucagon (rDNA), dextrose, sodium chloride flush, sodium chloride, ondansetron **OR** ondansetron, acetaminophen **OR** acetaminophen, perflutren lipid microspheres    Data:     Past Medical History:   has no past medical history on file. Social History:   reports that she quit smoking about 2 years ago. Her smoking use included cigarettes.  She has never used smokeless tobacco. She reports current alcohol use. Family History:   Family History   Problem Relation Age of Onset    Hypertension Mother     Cancer Sister     Cancer Brother        Vitals:  BP (!) 83/53   Pulse 91   Temp 97.9 °F (36.6 °C) (Oral)   Resp 18   Ht 5' 4\" (1.626 m)   Wt 138 lb 6 oz (62.8 kg)   SpO2 94%   BMI 23.75 kg/m²   Temp (24hrs), Av.7 °F (36.5 °C), Min:97.5 °F (36.4 °C), Max:98.4 °F (36.9 °C)    Recent Labs     10/17/21  1111 10/17/21  1613 10/17/21  1942 10/18/21  0735   POCGLU 84 110* 107* 84       I/O (24Hr): Intake/Output Summary (Last 24 hours) at 10/18/2021 1012  Last data filed at 10/18/2021 0059  Gross per 24 hour   Intake --   Output 900 ml   Net -900 ml       Labs:  Hematology:  Recent Labs     10/15/21  1308 10/16/21  0538 10/17/21  0453 10/18/21  0427   WBC  --  8.4 9.7 11.4*   RBC  --  3.22* 3.14* 3.05*   HGB  --  7.9* 7.5* 7.3*   HCT  --  26.8* 26.5* 26.2*   MCV  --  83.2 84.4 85.9   MCH  --  24.5* 23.9* 23.9*   MCHC  --  29.5 28.3* 27.9*   RDW  --  19.1* 19.4* 19.8*   PLT  --  301 294 257   MPV  --  10.6 11.1 10.5   INR 1.0  --   --   --      Chemistry:  Recent Labs     10/16/21  0538 10/16/21  0538 10/16/21  1611 10/17/21  0453 10/17/21  1800 10/18/21  0117     --  142 140  --   --    K 3.2*   < > 3.1* 3.1*  --  4.0     --  99 99  --   --    CO2 26  --  30 29  --   --    GLUCOSE 95  --  116* 99  --   --    BUN 51*  --  50* 49*  --   --    CREATININE 2.68*   < > 2.47* 2.57* DISREGARD RESULTS. CLERICAL ERROR. --    MG 2.5  --  2.3 2.3  --   --    ANIONGAP 15  --  13 12  --   --    LABGLOM 17*  --  19* 18*  --   --    GFRAA 21*  --  23* 22*  --   --    CALCIUM 8.0*  --  8.3* 8.0*  --   --     < > = values in this interval not displayed.      Recent Labs     10/16/21  0538 10/16/21  0716 10/16/21  2018 10/17/21  0453 10/17/21  0714 10/17/21  1111 10/17/21  1613 10/17/21  1942 10/18/21  0735   PROT 4.8*  --   --  4.7*  --   --   --   --   --    LABALBU 2.7*  --   -- 2.7*  --   --   --   --   --    AST 14  --   --  13  --   --   --   --   --    ALT 6  --   --  8  --   --   --   --   --    ALKPHOS 130*  --   --  129*  --   --   --   --   --    BILITOT 0.24*  --   --  0.21*  --   --   --   --   --    POCGLU  --    < > 118*  --  99 84 110* 107* 84    < > = values in this interval not displayed. ABG:No results found for: POCPH, PHART, PH, POCPCO2, QDF6GOT, PCO2, POCPO2, PO2ART, PO2, POCHCO3, UTZ7ESU, HCO3, NBEA, PBEA, BEART, BE, THGBART, THB, FEZ1ECY, QERF9MXN, V8OJNIXW, O2SAT, FIO2  Lab Results   Component Value Date/Time    SPECIAL NOT REPORTED 10/13/2021 09:00 AM    SPECIAL NOT REPORTED 10/13/2021 09:00 AM     Lab Results   Component Value Date/Time    CULTURE NO GROWTH 5 DAYS 10/13/2021 09:00 AM    CULTURE PENDING 10/13/2021 09:00 AM       Radiology:  CT CHEST WO CONTRAST    Result Date: 10/15/2021  1. Moderate sized, mildly loculated right pleural effusion and small mildly loculated left pleural effusion. 2. Consolidation at the inferior lungs, likely atelectasis. 3. Moderate to severe centrilobular emphysema. 4. Mildly prominent main pulmonary artery, which can be seen with pulmonary hypertension. 5. Small hiatal hernia. 6. Small nodules noted in the thyroid gland. If indicated, this could be further evaluated with ultrasound. NM LUNG SCAN PERFUSION ONLY    Result Date: 10/13/2021  Findings consistent with low probability V/Q scan for pulmonary embolus. XR CHEST DECUBITUS RIGHT    Result Date: 10/16/2021  Small to moderate right and small left layering pleural effusions. XR CHEST DECUBITUS LEFT    Result Date: 10/16/2021  Small to moderate right and small left layering pleural effusions. XR CHEST PORTABLE    Result Date: 10/15/2021  Bilateral effusions with adjacent infiltrates concerning for pneumonia. XR CHEST PORTABLE    Result Date: 10/14/2021  Interval increase in the recurrent at least moderate right pleural effusion.  However, there is rightward mediastinal shift despite the presence of right pleural effusion, indicating right lung atelectasis/collapse suggestive of trapped lung syndrome. Small left pleural effusion with probable left basilar atelectasis. XR CHEST PORTABLE    Result Date: 10/13/2021  Interval decrease in size of right-sided pleural effusion now moderate in size. Small left pleural effusion. Opacity of bilateral lower lung fields may reflect underlying atelectasis. Apparent thin line overlying the right lung apex may be artifactual related to overlying skin fold. The lung markings are seen lateral to the aforementioned line. XR CHEST PORTABLE    Result Date: 10/12/2021  Significant right pleural effusion and associated airspace disease. Mild left basilar airspace disease. Pneumonia cannot be excluded. US RETROPERITONEAL COMPLETE    Result Date: 10/13/2021  Bilateral simple renal cyst. Lomeli catheter within the urinary bladder. IR GUIDED THORACENTESIS PLEURAL    Result Date: 10/13/2021  Successful ultrasound guided diagnostic and therapeutic right thoracentesis. Physical Examination:        General appearance:  alert, cooperative and mild distress  Mental Status:  At baseline, normal affect  Lungs: Decreased breath sounds, crepitations positive, on 5 L nasal cannula oxygen  Heart:  regular rate and rhythm, no murmur  Abdomen:  soft, nontender, nondistended, normal bowel sounds, no masses, hepatomegaly, splenomegaly  Extremities:  no edema, redness, tenderness in the calves  Skin:  no gross lesions, rashes, induration    Assessment:        Hospital Problems         Last Modified POA    * (Principal) Pleural effusion on right 10/15/2021 Yes    Acute renal failure with acute renal cortical necrosis superimposed on stage 4 chronic kidney disease ([address] 10/14/2021 Yes    Acute respiratory failure with hypoxia ([address] 10/13/2021 Yes    Iron deficiency anemia 10/12/2021 Yes    Essential hypertension 10/12/2021 Yes    General weakness 10/12/2021 Yes    Vitamin D deficiency 10/13/2021 Yes    Acute diastolic heart failure ([address] 10/13/2021 Yes    Hypoxia 10/13/2021 Yes    Anemia 10/13/2021 Yes    Peripheral edema 10/13/2021 Yes    Moderate protein-calorie malnutrition ([address] 10/13/2021 Yes    Severe malnutrition ([address] 10/13/2021 Yes    Acute kidney injury ([address] 10/15/2021 Yes    Hypokalemia 10/17/2021 Yes    Other emphysema ([address] 10/17/2021 Yes          Plan:        Acute on chronic Diastolic CHF:   - Echo from 10/12 reviewed showing an EF of 65% with moderate pulmonary hypertension.    - Continue Demadex, low sodium, fluid restriction     MAYO on CKD stage 4 with hyperparathyroid:   - With moderate risk for HD in the future. Continue diuretics, 24 hr creatinine clearance pending.  - Continue to trend as outpatient.    - Nephrology following     R pleural effusion:   - Status post thoracentesis with 850 mL fluid removed 10/13  -Chest x-ray showing more than 1 cm free-flowing fluid, pulmonology recommending IR to place right pigtail catheter for fluid drainage, plans to be done today        Acute hypoxic respiratory failure:   - Continue supplemental O2 as needed  -  [place] if warranted. - continue Home inhalers     Hypokalemia:  currently stable. Replete as ordered, continue daily potassium supplementation     Essential hypertension: Currently stable     KEELY:   - Hemoglobin low but stable.     - Continue JOSE.      Moderate protein calorie malnutrition:   - Supplements with meals     9. case discussed with Gloria Ramirez NP with pulmonology, patient and staff      Discharge plan to SNF when stable     ANN COBB - NP  10/18/2021  10:12 AM

## 2021-10-18 NOTE — CARE COORDINATION
Social work: Sara is out of network with Geronimo Pardo, albert c/b from [address] [place] [address] of [facility] and Sara are also out of network; await response from Natalya.

## 2021-10-18 NOTE — PROGRESS NOTES
Pulmonary Critical Care Progress Note  Elen Rivas MD     Patient seen for the follow up of acute hypoxic respiratory failure, moderate pulmonary hypertension, former smoker/COPD Pleural effusion on right     Subjective:  No significant overnight events noted. She sitting up in the bed, no distress. She remains on oxygen at 5 L nasal cannula. Shortness of breath is not much change. She has occasional loose cough cough, no sputum production. She denies chest pain. Examination:  Vitals: BP (!) 83/53   Pulse 91   Temp 97.9 °F (36.6 °C) (Oral)   Resp 18   Ht 5' 4\" (1.626 m)   Wt 138 lb 6 oz (62.8 kg)   SpO2 94%   BMI 23.75 kg/m²   General appearance: alert and cooperative with exam resting in bed, no distress  Neck: No JVD  Lungs: Moderate air exchange, diminished bilateral bases, positive crackles  Heart: regular rate and rhythm, S1, S2 normal, no gallop  Abdomen: Soft, non tender, + BS  Extremities: no cyanosis or clubbing. No significant edema    LABs:  CBC:   Recent Labs     10/17/21  0453 10/18/21  0427   WBC 9.7 11.4*   HGB 7.5* 7.3*   HCT 26.5* 26.2*    257     BMP:   Recent Labs     10/16/21  1611 10/16/21  1611 10/17/21  0453 10/17/21  1800 10/18/21  0117     --  140  --   --    K 3.1*   < > 3.1*  --  4.0   CO2 30  --  29  --   --    BUN 50*  --  49*  --   --    CREATININE 2.47*   < > 2.57* DISREGARD RESULTS. CLERICAL ERROR. --    LABGLOM 19*  --  18*  --   --    GLUCOSE 116*  --  99  --   --     < > = values in this interval not displayed.      PT/INR:   Recent Labs     10/15/21  1308   PROTIME 13.5   INR 1.0     LIVER PROFILE:  Recent Labs     10/16/21  0538 10/17/21  0453   AST 14 13   ALT 6 8   LABALBU 2.7* 2.7*     Radiology:  10/16/2021 right decubitus film      10/16/2021 left decubitus film      Impression:  · Acute hypoxic respiratory failure requiring high flow oxygen  · Moderate to large right pleural effusion, s/p thoracentesis with 850 mL removed  · Small left pleural effusion, loculated  · Atelectasis/?  Trapped lung on the right  · Mild pulmonary edema  · Moderate pulmonary hypertension, RVSP 60 mmHg  · Former smoker, quit 2019  · Acute kidney injury    Recommendations:  · Continue oxygen via high flow nasal cannula, keep SPO2 90% or greater, wean as able  · Incentive spirometry every hour while awake  · Symbicort  · DuoNeb 3 times daily  · Continue Levaquin IV  · Keep pigtail catheter to suction. Monitor output. · Repeat x-ray chest in a.m.   · Labs: CBC and BMP in am  · Nephrology following, 40 mg Demadex daily  · S/P IV iron x3 doses  · Pleural fluid negative for malignancy  · DVT prophylaxis with subcu heparin  · PFTs as an outpatient  · Will follow with you    Electronically signed by     Paul Mckenna MD on 10/18/2021 at 12:45 PM  Pulmonary Critical Care and Sleep Medicine,  [facility]  Cell: [phone]  Office: [phone]

## 2021-10-18 NOTE — CARE COORDINATION
Social work: Spoke to [facility], patients needs are too much for their facility at this time, but will continue to follow. Pt's family gave more choices over the weekend of [address], [facility] and [facility]. Referral faxed to all three facilities.

## 2021-10-18 NOTE — PROGRESS NOTES
DATE: 10/18/2021    NAME: Judie Bird  MRN: [id number]   : 1945    Patient not seen this date for Physical Therapy due to:      [] Cancel by RN or physician due to:    [] Hemodialysis    [] Critical Lab Value Level     [] Blood transfusion in progress    [] Acute or unstable cardiovascular status   _MAP < 55 or more than >115  _HR < 40 or > 130    [] Acute or unstable pulmonary status   -FiO2 > 60%   _RR < 5 or >40    _O2 sats < 85%    [] Strict Bedrest    [] Off Unit for surgery or procedure    [] Off Unit for testing       [] Pending imaging to R/O fracture    [] Refusal by Patient      [x] Other chest tube placement      [] PT being discontinued at this time. Patient independent. No further needs. [] PT being discontinued at this time as the patient has been transferred to hospice care. No further needs.       AUTUMN OCAMPO, PTA

## 2021-10-19 ENCOUNTER — APPOINTMENT (OUTPATIENT)
Dept: GENERAL RADIOLOGY | Age: 76
DRG: 291 | End: 2021-10-19
Payer: MEDICARE

## 2021-10-19 LAB
ANION GAP SERPL CALCULATED.3IONS-SCNC: 11 MMOL/L (ref 9–17)
BUN BLDV-MCNC: 52 MG/DL (ref 8–23)
BUN/CREAT BLD: 21 (ref 9–20)
CALCIUM SERPL-MCNC: 8.4 MG/DL (ref 8.6–10.4)
CHLORIDE BLD-SCNC: 101 MMOL/L (ref 98–107)
CO2: 26 MMOL/L (ref 20–31)
CREAT SERPL-MCNC: 2.52 MG/DL (ref 0.5–0.9)
CREAT SERPL-MCNC: 2.52 MG/DL (ref 0.5–0.9)
CREATININE CLEARANCE: 10.6 ML/MIN/BSA (ref 71–151)
CREATININE URINE: 40.5 MG/DL (ref 28–217)
CULTURE: ABNORMAL
DIRECT EXAM: ABNORMAL
GFR AFRICAN AMERICAN: 22 ML/MIN
GFR NON-AFRICAN AMERICAN: 18 ML/MIN
GFR SERPL CREATININE-BSD FRML MDRD: ABNORMAL ML/MIN/{1.73_M2}
GFR SERPL CREATININE-BSD FRML MDRD: ABNORMAL ML/MIN/{1.73_M2}
GLUCOSE BLD-MCNC: 106 MG/DL (ref 65–105)
GLUCOSE BLD-MCNC: 126 MG/DL (ref 65–105)
GLUCOSE BLD-MCNC: 206 MG/DL (ref 65–105)
GLUCOSE BLD-MCNC: 86 MG/DL (ref 65–105)
GLUCOSE BLD-MCNC: 87 MG/DL (ref 65–105)
GLUCOSE BLD-MCNC: 94 MG/DL (ref 70–99)
LENGTH OF COLLECTION: 24 H
Lab: ABNORMAL
PATIENT HEIGHT: 163 CM
PATIENT WEIGHT: 63 KG
POTASSIUM SERPL-SCNC: 4.7 MMOL/L (ref 3.7–5.3)
POTASSIUM SERPL-SCNC: 4.9 MMOL/L (ref 3.7–5.3)
POTASSIUM SERPL-SCNC: 4.9 MMOL/L (ref 3.7–5.3)
SODIUM BLD-SCNC: 138 MMOL/L (ref 135–144)
SPECIMEN DESCRIPTION: ABNORMAL
VOLUME: 921 ML

## 2021-10-19 PROCEDURE — 97530 THERAPEUTIC ACTIVITIES: CPT

## 2021-10-19 PROCEDURE — 6360000002 HC RX W HCPCS: Performed by: NURSE PRACTITIONER

## 2021-10-19 PROCEDURE — 2580000003 HC RX 258: Performed by: STUDENT IN AN ORGANIZED HEALTH CARE EDUCATION/TRAINING PROGRAM

## 2021-10-19 PROCEDURE — 2060000000 HC ICU INTERMEDIATE R&B

## 2021-10-19 PROCEDURE — 94761 N-INVAS EAR/PLS OXIMETRY MLT: CPT

## 2021-10-19 PROCEDURE — 2700000000 HC OXYGEN THERAPY PER DAY

## 2021-10-19 PROCEDURE — 94640 AIRWAY INHALATION TREATMENT: CPT

## 2021-10-19 PROCEDURE — 80048 BASIC METABOLIC PNL TOTAL CA: CPT

## 2021-10-19 PROCEDURE — 6370000000 HC RX 637 (ALT 250 FOR IP): Performed by: INTERNAL MEDICINE

## 2021-10-19 PROCEDURE — 6360000002 HC RX W HCPCS: Performed by: STUDENT IN AN ORGANIZED HEALTH CARE EDUCATION/TRAINING PROGRAM

## 2021-10-19 PROCEDURE — 6370000000 HC RX 637 (ALT 250 FOR IP): Performed by: NURSE PRACTITIONER

## 2021-10-19 PROCEDURE — 97112 NEUROMUSCULAR REEDUCATION: CPT

## 2021-10-19 PROCEDURE — 84132 ASSAY OF SERUM POTASSIUM: CPT

## 2021-10-19 PROCEDURE — 71045 X-RAY EXAM CHEST 1 VIEW: CPT

## 2021-10-19 PROCEDURE — 99233 SBSQ HOSP IP/OBS HIGH 50: CPT | Performed by: NURSE PRACTITIONER

## 2021-10-19 PROCEDURE — 6360000002 HC RX W HCPCS: Performed by: INTERNAL MEDICINE

## 2021-10-19 PROCEDURE — 36415 COLL VENOUS BLD VENIPUNCTURE: CPT

## 2021-10-19 RX ORDER — FUROSEMIDE 10 MG/ML
20 INJECTION INTRAMUSCULAR; INTRAVENOUS ONCE
Status: COMPLETED | OUTPATIENT
Start: 2021-10-19 | End: 2021-10-19

## 2021-10-19 RX ORDER — METHYLPREDNISOLONE SODIUM SUCCINATE 40 MG/ML
40 INJECTION, POWDER, LYOPHILIZED, FOR SOLUTION INTRAMUSCULAR; INTRAVENOUS EVERY 6 HOURS
Status: DISCONTINUED | OUTPATIENT
Start: 2021-10-19 | End: 2021-10-22

## 2021-10-19 RX ORDER — ARFORMOTEROL TARTRATE 15 UG/2ML
15 SOLUTION RESPIRATORY (INHALATION) 2 TIMES DAILY
Status: DISCONTINUED | OUTPATIENT
Start: 2021-10-19 | End: 2021-11-04 | Stop reason: HOSPADM

## 2021-10-19 RX ORDER — LANOLIN ALCOHOL/MO/W.PET/CERES
3 CREAM (GRAM) TOPICAL NIGHTLY PRN
Status: DISCONTINUED | OUTPATIENT
Start: 2021-10-19 | End: 2021-11-04 | Stop reason: HOSPADM

## 2021-10-19 RX ORDER — BUDESONIDE 0.5 MG/2ML
0.5 INHALANT ORAL 2 TIMES DAILY
Status: DISCONTINUED | OUTPATIENT
Start: 2021-10-19 | End: 2021-11-04 | Stop reason: HOSPADM

## 2021-10-19 RX ORDER — IPRATROPIUM BROMIDE AND ALBUTEROL SULFATE 2.5; .5 MG/3ML; MG/3ML
1 SOLUTION RESPIRATORY (INHALATION) 4 TIMES DAILY
Status: DISCONTINUED | OUTPATIENT
Start: 2021-10-20 | End: 2021-10-31

## 2021-10-19 RX ADMIN — HEPARIN SODIUM 5000 UNITS: 5000 INJECTION INTRAVENOUS; SUBCUTANEOUS at 06:20

## 2021-10-19 RX ADMIN — METHYLPREDNISOLONE SODIUM SUCCINATE 40 MG: 40 INJECTION, POWDER, FOR SOLUTION INTRAMUSCULAR; INTRAVENOUS at 18:18

## 2021-10-19 RX ADMIN — ARFORMOTEROL TARTRATE 15 MCG: 15 SOLUTION RESPIRATORY (INHALATION) at 20:33

## 2021-10-19 RX ADMIN — FUROSEMIDE 20 MG: 10 INJECTION, SOLUTION INTRAMUSCULAR; INTRAVENOUS at 12:13

## 2021-10-19 RX ADMIN — LEVOFLOXACIN 500 MG: 5 INJECTION, SOLUTION INTRAVENOUS at 08:51

## 2021-10-19 RX ADMIN — IPRATROPIUM BROMIDE AND ALBUTEROL SULFATE 1 AMPULE: .5; 2.5 SOLUTION RESPIRATORY (INHALATION) at 04:24

## 2021-10-19 RX ADMIN — HEPARIN SODIUM 5000 UNITS: 5000 INJECTION INTRAVENOUS; SUBCUTANEOUS at 22:39

## 2021-10-19 RX ADMIN — BUDESONIDE 500 MCG: 0.5 SUSPENSION RESPIRATORY (INHALATION) at 20:33

## 2021-10-19 RX ADMIN — Medication 3 MG: at 22:39

## 2021-10-19 RX ADMIN — SODIUM CHLORIDE, PRESERVATIVE FREE 10 ML: 5 INJECTION INTRAVENOUS at 09:00

## 2021-10-19 RX ADMIN — SODIUM CHLORIDE, PRESERVATIVE FREE 10 ML: 5 INJECTION INTRAVENOUS at 22:39

## 2021-10-19 RX ADMIN — HEPARIN SODIUM 5000 UNITS: 5000 INJECTION INTRAVENOUS; SUBCUTANEOUS at 14:44

## 2021-10-19 RX ADMIN — TORSEMIDE 40 MG: 20 TABLET ORAL at 10:00

## 2021-10-19 RX ADMIN — IPRATROPIUM BROMIDE AND ALBUTEROL SULFATE 1 AMPULE: .5; 2.5 SOLUTION RESPIRATORY (INHALATION) at 07:57

## 2021-10-19 RX ADMIN — METHYLPREDNISOLONE SODIUM SUCCINATE 40 MG: 40 INJECTION, POWDER, FOR SOLUTION INTRAMUSCULAR; INTRAVENOUS at 22:39

## 2021-10-19 RX ADMIN — METHYLPREDNISOLONE SODIUM SUCCINATE 40 MG: 40 INJECTION, POWDER, FOR SOLUTION INTRAMUSCULAR; INTRAVENOUS at 12:13

## 2021-10-19 RX ADMIN — IPRATROPIUM BROMIDE AND ALBUTEROL SULFATE 1 AMPULE: .5; 2.5 SOLUTION RESPIRATORY (INHALATION) at 10:54

## 2021-10-19 RX ADMIN — IPRATROPIUM BROMIDE AND ALBUTEROL SULFATE 1 AMPULE: .5; 2.5 SOLUTION RESPIRATORY (INHALATION) at 15:19

## 2021-10-19 RX ADMIN — POTASSIUM CHLORIDE 40 MEQ: 20 TABLET, EXTENDED RELEASE ORAL at 10:26

## 2021-10-19 RX ADMIN — BUDESONIDE AND FORMOTEROL FUMARATE DIHYDRATE 2 PUFF: 160; 4.5 AEROSOL RESPIRATORY (INHALATION) at 07:59

## 2021-10-19 RX ADMIN — IPRATROPIUM BROMIDE AND ALBUTEROL SULFATE 1 AMPULE: .5; 2.5 SOLUTION RESPIRATORY (INHALATION) at 23:38

## 2021-10-19 RX ADMIN — IPRATROPIUM BROMIDE AND ALBUTEROL SULFATE 1 AMPULE: .5; 2.5 SOLUTION RESPIRATORY (INHALATION) at 20:33

## 2021-10-19 ASSESSMENT — PAIN SCALES - WONG BAKER
WONGBAKER_NUMERICALRESPONSE: 0

## 2021-10-19 ASSESSMENT — PAIN SCALES - GENERAL: PAINLEVEL_OUTOF10: 0

## 2021-10-19 NOTE — PROGRESS NOTES
[facility]  Office: [address], DO, Enrrique Franco, DO, Arnav Abreu, DO, Jhon Diaz Blood, DO, Cordella Holstein, MD, Lele Kebede MD, Ankit Gillespie MD, Rosalino Boland MD, Renella Boeck, MD, Aviva Humphreys MD, Twan Truong MD, Ariana Fajardo MD, Yessenia Sargent, DO, Alen Nice, DO, Karen Koroma MD,  Mir March DO, Aleida Meadows MD, Shima Rose MD, Rodriguez Guadarrama MD, Krystal Coreas MD, Sven Garcia MD, Ab Joe MD, Kelly Perdomo, [facility], [facility], CNP, Irina Benton, CNP, Radha Prabhakar, CNS, Chaz Polo, CNP, Lynnette Cuellar, CNP, Neetu Arreola, CNP, Gabrielle Ayers, CNP, Katia Horton, [facility], Brandin Quintana, CNP, Darius Gamma, PA-C, Too Tian, [facility], Brent Petersen, CNP, Gabo Leigh, CNP, Lux Marshall, CNP, Misha Hernandez, CNP, Manjula Thomas, CNP, Omid Hart, CNP, Siva Cornell, [facility]    Progress Note    10/19/2021    10:28 AM    Name:   Gilles Coello  MRN:     [id number]     [place]:      [de-identified]   Room:   [address] [place] [address] Day:  7  Admit Date:  10/12/2021  2:53 PM    PCP:   Ranjan Hahn MD  Code Status:  Full Code    Subjective:     C/C:   Chief Complaint   Patient presents with    Respiratory Distress     EMS reported 97% on room air; pt arrives with saturation 45% on room air    Failure To Thrive     Interval History Status: worsened   Patient had chest tube placed last night and has had about 1300ml of serosanguinous fluid  Patient feels about the same as yesterday, she denies increased SOB, however her oxygen sats have dropped and she is now back on hi-flow oxygen at 45L/FIO2 of 60, afebrile      Brief History:   Danielle Wadsworth is a 64 y. o. female with a hx of CKD 4 and iron deficiency anemia who presented to [facility] with Respiratory Distress and hypoxia (O2 sats 45%) and failure to thrive and is admitted to the hospital for the management of right Pleural effusion and acetaminophen **OR** acetaminophen, perflutren lipid microspheres    Data:     Past Medical History:   has no past medical history on file. Social History:   reports that she quit smoking about 2 years ago. Her smoking use included cigarettes. She has never used smokeless tobacco. She reports current alcohol use. Family History:   Family History   Problem Relation Age of Onset    Hypertension Mother     Cancer Sister     Cancer Brother        Vitals:  BP (!) 96/47   Pulse 93   Temp 98.1 °F (36.7 °C) (Oral)   Resp 20   Ht 5' 4\" (1.626 m)   Wt 132 lb 4.8 oz (60 kg)   SpO2 92%   BMI 22.71 kg/m²   Temp (24hrs), Av.1 °F (36.7 °C), Min:97.3 °F (36.3 °C), Max:99.1 °F (37.3 °C)    Recent Labs     10/18/21  1224 10/18/21  1638 10/18/21  2049 10/19/21  0721   POCGLU 102 114* 106* 86       I/O (24Hr): Intake/Output Summary (Last 24 hours) at 10/19/2021 1040  Last data filed at 10/19/2021 0713  Gross per 24 hour   Intake --   Output 3065 ml   Net -3065 ml       Labs:  Hematology:  Recent Labs     10/17/21  0453 10/18/21  0427   WBC 9.7 11.4*   RBC 3.14* 3.05*   HGB 7.5* 7.3*   HCT 26.5* 26.2*   MCV 84.4 85.9   MCH 23.9* 23.9*   MCHC 28.3* 27.9*   RDW 19.4* 19.8*    257   MPV 11.1 10.5     Chemistry:  Recent Labs     10/16/21  1611 10/16/21  1611 10/17/21  0453 10/17/21  0453 10/17/21  1800 10/17/21  2200 10/18/21  0117 10/18/21  1246 10/19/21  0105 10/19/21  0612     --  140  --   --   --   --   --   --  138   K 3.1*   < > 3.1*  --   --   --    < > 4.6 4.9 4.7   CL 99  --  99  --   --   --   --   --   --  101   CO2 30  --  29  --   --   --   --   --   --  26   GLUCOSE 116*  --  99  --   --   --   --   --   --  94   BUN 50*  --  49*  --   --   --   --   --   --  52*   CREATININE 2.47*   < > 2.57*   < > DISREGARD RESULTS. CLERICAL ERROR.  2.52*  --   --   --  2.52*   MG 2.3  --  2.3  --   --   --   --   --   --   --    ANIONGAP 13  --  12  --   --   --   --   --   --  11   LABGLOM 19*  --  18* --   --   --   --   --   --  18*   GFRAA 23*  --  22*  --   --   --   --   --   --  22*   CALCIUM 8.3*  --  8.0*  --   --   --   --   --   --  8.4*    < > = values in this interval not displayed. Recent Labs     10/17/21  0453 10/17/21  0714 10/17/21  1942 10/18/21  0735 10/18/21  1224 10/18/21  1638 10/18/21  2049 10/19/21  0721   PROT 4.7*  --   --   --   --   --   --   --    LABALBU 2.7*  --   --   --   --   --   --   --    AST 13  --   --   --   --   --   --   --    ALT 8  --   --   --   --   --   --   --    ALKPHOS 129*  --   --   --   --   --   --   --    BILITOT 0.21*  --   --   --   --   --   --   --    POCGLU  --    < > 107* 84 102 114* 106* 86    < > = values in this interval not displayed. ABG:No results found for: POCPH, PHART, PH, POCPCO2, YNV3MBD, PCO2, POCPO2, PO2ART, PO2, POCHCO3, ZJD7ETK, HCO3, NBEA, PBEA, BEART, BE, THGBART, THB, PMN4CFH, DONS5ASV, G4QOQJBC, O2SAT, FIO2  Lab Results   Component Value Date/Time    SPECIAL RT HAND 6ML 10/18/2021 04:26 AM    SPECIAL RT AC 5ML 10/18/2021 04:26 AM     Lab Results   Component Value Date/Time    CULTURE NO GROWTH 1 DAY 10/18/2021 04:26 AM    CULTURE NO GROWTH 1 DAY 10/18/2021 04:26 AM       Radiology:  CT CHEST WO CONTRAST    Result Date: 10/15/2021  1. Moderate sized, mildly loculated right pleural effusion and small mildly loculated left pleural effusion. 2. Consolidation at the inferior lungs, likely atelectasis. 3. Moderate to severe centrilobular emphysema. 4. Mildly prominent main pulmonary artery, which can be seen with pulmonary hypertension. 5. Small hiatal hernia. 6. Small nodules noted in the thyroid gland. If indicated, this could be further evaluated with ultrasound. NM LUNG SCAN PERFUSION ONLY    Result Date: 10/13/2021  Findings consistent with low probability V/Q scan for pulmonary embolus. XR CHEST DECUBITUS RIGHT    Result Date: 10/16/2021  Small to moderate right and small left layering pleural effusions.      XR CHEST DECUBITUS LEFT    Result Date: 10/16/2021  Small to moderate right and small left layering pleural effusions. XR CHEST PORTABLE    Result Date: 10/15/2021  Bilateral effusions with adjacent infiltrates concerning for pneumonia. XR CHEST PORTABLE    Result Date: 10/14/2021  Interval increase in the recurrent at least moderate right pleural effusion. However, there is rightward mediastinal shift despite the presence of right pleural effusion, indicating right lung atelectasis/collapse suggestive of trapped lung syndrome. Small left pleural effusion with probable left basilar atelectasis. XR CHEST PORTABLE    Result Date: 10/13/2021  Interval decrease in size of right-sided pleural effusion now moderate in size. Small left pleural effusion. Opacity of bilateral lower lung fields may reflect underlying atelectasis. Apparent thin line overlying the right lung apex may be artifactual related to overlying skin fold. The lung markings are seen lateral to the aforementioned line. XR CHEST PORTABLE    Result Date: 10/12/2021  Significant right pleural effusion and associated airspace disease. Mild left basilar airspace disease. Pneumonia cannot be excluded. US RETROPERITONEAL COMPLETE    Result Date: 10/13/2021  Bilateral simple renal cyst. Lomeli catheter within the urinary bladder. IR GUIDED THORACENTESIS PLEURAL    Result Date: 10/13/2021  Successful ultrasound guided diagnostic and therapeutic right thoracentesis. Physical Examination:        General appearance:  alert, cooperative and mild distress  Mental Status:  At baseline, normal affect  Lungs: Decreased breath sounds, + crackles, on high-flow oxygen, right chest tube to suction   Heart:  regular rate and rhythm, no murmur  Abdomen:  soft, nontender, nondistended, normal bowel sounds, no masses, hepatomegaly, splenomegaly  Extremities:  trace edema, redness, tenderness in the calves  Skin:  no gross lesions, rashes, induration    Assessment:        Hospital Problems         Last Modified POA    * (Principal) Pleural effusion on right 10/15/2021 Yes    Acute renal failure with acute renal cortical necrosis superimposed on stage 4 chronic kidney disease ([address] 10/14/2021 Yes    Acute respiratory failure with hypoxia ([address] 10/13/2021 Yes    Iron deficiency anemia 10/12/2021 Yes    Essential hypertension 10/12/2021 Yes    General weakness 10/12/2021 Yes    Vitamin D deficiency 10/13/2021 Yes    Acute diastolic heart failure ([address] 10/13/2021 Yes    Hypoxia 10/13/2021 Yes    Anemia 10/13/2021 Yes    Peripheral edema 10/13/2021 Yes    Moderate protein-calorie malnutrition ([address] 10/13/2021 Yes    Severe malnutrition ([address] 10/13/2021 Yes    Acute kidney injury ([address] 10/15/2021 Yes    Hypokalemia 10/17/2021 Yes    Other emphysema ([address] 10/17/2021 Yes          Plan:        Acute on chronic Diastolic CHF:   - Echo from 10/12 reviewed showing an EF of 65% with moderate pulmonary hypertension.    - Continue Demadex, low sodium, fluid restriction  -IV lasix x 1 given as ordered per nephrology due to increased hypoxia      MAYO on CKD stage 4 with hyperparathyroid:   - With moderate risk for HD in the future. Continue oral diuretics, 24 hr creatinine clearance was 10, creatinine stable, continue to trend as outpatient. Nephrology following      R pleural effusion:   - Status post thoracentesis with 850 mL fluid removed 10/13, cytology negative for malignancy  -Chest x-ray showing more than 1 cm free-flowing fluid,Right sided chest tube placed per IR 10/18, good output      Acute hypoxic respiratory failure:   - Continue hi-flow oxygen as needed, wean as tolerated. Unclear of etiology of worsening hypoxia. Chest tube in place to suction with good output overnight. Repeat CXR stable,VQ scan 10/13-low probability of PE. Inhalers switched to aerosols and IV steroids initiated per pulmonology        5.  KEELY:   - Hemoglobin low but stable, continue to monitor, Patient received IV iron supplementation   - Continue JOSE.      6.  Moderate protein calorie malnutrition:   - Supplements with meals     7. case discussed with Gordy Avila, NP with pulmonology, patient and staff      Discharge plan to SNF when stable     ANN COBB NP  10/19/2021  10:40 AM

## 2021-10-19 NOTE — PLAN OF CARE
Nutrition Problem #1: Severe malnutrition  Intervention: Food and/or Nutrient Delivery: Continue Current Diet, Discontinue Oral Nutrition Supplement  Nutritional Goals: PO intakes are greater than 50% at meals

## 2021-10-19 NOTE — CARE COORDINATION
Discharge planning     Chart reviewed. Prior to admission patient was independent and driving. Admitted with pleural effusions. Patient is back on HF at 45 L and FIO2 of 60. Right chest tube placed yesterday. Patient had thoracentesis earlier in stay for 850 ml. Pulmonary and nephrology following for potential HD, currently labs improving. Pleural fluid negative for malignancy. Sent face sheet to [address] [place] [address] at [facility] to see if in network. SS working on a snf that can accept her insurance.

## 2021-10-19 NOTE — CARE COORDINATION
Social work: Spoke with [facility], they are bed tight, unsure if they will have a bed when patient is ready. VM left for son for more choices.

## 2021-10-19 NOTE — PROGRESS NOTES
Nephrology Progress Note      SUBJECTIVE    Patient was seen and examined. Patient is more tachypneic as compared to previous examination. Currently she is on high flow oxygen and at 25 L. Patient is quite tachypneic. Just had a chest x-ray and results are pending. A chest tube was placed and close to 2 L was drained since its placement  Blood pressure is low but stable  24-hour creatinine clearance results are available and her clearance was only 10. OBJECTIVE      CURRENT TEMPERATURE:  Temp: 98.1 °F (36.7 °C)  MAXIMUM TEMPERATURE OVER 24HRS:  Temp (24hrs), Av.1 °F (36.7 °C), Min:97.3 °F (36.3 °C), Max:99.1 °F (37.3 °C)    CURRENT RESPIRATORY RATE:  Resp: 20  CURRENT PULSE:  Pulse: 93  CURRENT BLOOD PRESSURE:  BP: (!) 96/47  24HR BLOOD PRESSURE RANGE:  Systolic (64RAI), YUS:058 , Min:91 , ZYE:237   ; Diastolic (60YTO), RAY:94, Min:47, Max:81    24HR INTAKE/OUTPUT:      Intake/Output Summary (Last 24 hours) at 10/19/2021 1006  Last data filed at 10/19/2021 3700  Gross per 24 hour   Intake --   Output 3065 ml   Net -3065 ml     WEIGHT :  Patient Vitals for the past 96 hrs (Last 3 readings):   Weight   10/19/21 0600 132 lb 4.8 oz (60 kg)   10/18/21 0552 138 lb 6 oz (62.8 kg)   10/17/21 0543 129 lb 3 oz (58.6 kg)     PHYSICAL EXAM      GENERAL APPEARANCE: Awake and alert x3   SKIN: Warm to touch  EYES: conjunctivae pale and sclera anicteric  ENT: No thrush, moist mucus membranes  NECK:   No JVD appreciated  PULMONARY: Lateral air entry, reduced at right base  CADRDIOVASCULAR: Regular rate and rhythm with positive S1 and S2 and no appreciable rub   ABDOMEN: soft nontender, bowel sounds present, no ascites.        EXTREMITIES: Trace edema    CURRENT MEDICATIONS      ipratropium-albuterol (DUONEB) nebulizer solution 1 ampule, Q4H  potassium chloride (KLOR-CON M) extended release tablet 40 mEq, Daily  epoetin mike-epbx (RETACRIT) injection 8,000 Units, Once per day on   budesonide-formoterol (SYMBICORT) 160-4.5 MCG/ACT inhaler 2 puff, BID  torsemide (DEMADEX) tablet 40 mg, Daily  calcitRIOL (ROCALTROL) capsule 0.25 mcg, Once per day on Mon Wed Fri  glucose (GLUTOSE) 40 % oral gel 15 g, PRN  dextrose 50 % IV solution, PRN  glucagon (rDNA) injection 1 mg, PRN  dextrose 5 % solution, PRN  sodium chloride flush 0.9 % injection 5-40 mL, 2 times per day  sodium chloride flush 0.9 % injection 5-40 mL, PRN  0.9 % sodium chloride infusion, PRN  levoFLOXacin (LEVAQUIN) 500 MG/100ML infusion 500 mg, Q48H  vitamin D (ERGOCALCIFEROL) capsule 50,000 Units, Weekly  ondansetron (ZOFRAN-ODT) disintegrating tablet 4 mg, Q8H PRN   Or  ondansetron (ZOFRAN) injection 4 mg, Q6H PRN  acetaminophen (TYLENOL) tablet 650 mg, Q6H PRN   Or  acetaminophen (TYLENOL) suppository 650 mg, Q6H PRN  perflutren lipid microspheres (DEFINITY) injection 1.65 mg, ONCE PRN  heparin (porcine) injection 5,000 Units, 3 times per day          LABS      CBC:   Recent Labs     10/17/21  0453 10/18/21  0427   WBC 9.7 11.4*   RBC 3.14* 3.05*   HGB 7.5* 7.3*   HCT 26.5* 26.2*   MCV 84.4 85.9   MCH 23.9* 23.9*   MCHC 28.3* 27.9*   RDW 19.4* 19.8*    257   MPV 11.1 10.5      BMP:   Recent Labs     10/16/21  1611 10/16/21  1611 10/17/21  0453 10/17/21  0453 10/17/21  1800 10/17/21  2200 10/18/21  0117 10/18/21  1246 10/19/21  0105 10/19/21  0612     --  140  --   --   --   --   --   --  138   K 3.1*   < > 3.1*  --   --   --    < > 4.6 4.9 4.7   CL 99  --  99  --   --   --   --   --   --  101   CO2 30  --  29  --   --   --   --   --   --  26   BUN 50*  --  49*  --   --   --   --   --   --  52*   CREATININE 2.47*   < > 2.57*   < > DISREGARD RESULTS. CLERICAL ERROR. 2.52*  --   --   --  2.52*   GLUCOSE 116*  --  99  --   --   --   --   --   --  94   CALCIUM 8.3*  --  8.0*  --   --   --   --   --   --  8.4*    < > = values in this interval not displayed. PHOSPHORUS:    No results for input(s): PHOS in the last 72 hours.   MAGNESIUM:   Recent Labs     10/16/21  1611 10/17/21  0453   MG 2.3 2.3     ALBUMIN:   Recent Labs     10/17/21  0453   LABALBU 2.7*     IRON:    Lab Results   Component Value Date    IRON 22 10/13/2021     IRON SATURATION:    Lab Results   Component Value Date    LABIRON 9 10/13/2021     TIBC:    Lab Results   Component Value Date    TIBC 237 10/13/2021     FERRITIN:    Lab Results   Component Value Date    FERRITIN 20 10/13/2021       SPEP:   Lab Results   Component Value Date    PROT 4.7 10/17/2021     URINE SODIUM:    Lab Results   Component Value Date    LAZARO 40 10/13/2021      URINE CREATININE:    Lab Results   Component Value Date    LABCREA 40.5 10/17/2021    LABCREA 76.0 10/13/2021     URINALYSIS:  U/A:   Lab Results   Component Value Date    NITRU NEGATIVE 10/13/2021    COLORU Yellow 10/13/2021    PHUR 5.5 10/13/2021    WBCUA 10 TO 20 10/13/2021    RBCUA 2 TO 5 10/13/2021    MUCUS 1+ 10/13/2021    TRICHOMONAS NOT REPORTED 10/13/2021    YEAST NOT REPORTED 10/13/2021    BACTERIA NOT REPORTED 10/13/2021    SPECGRAV 1.020 10/13/2021    LEUKOCYTESUR SMALL 10/13/2021    UROBILINOGEN Normal 10/13/2021    BILIRUBINUR NEGATIVE 10/13/2021    GLUCOSEU NEGATIVE 10/13/2021    KETUA NEGATIVE 10/13/2021    AMORPHOUS 1+ 10/13/2021           ASSESSMENT    1. Based on estimated GFR patient has stage IV chronic kidney disease however 24-hour creatinine clearance shows clearance of 10 although his creatinine clearance overestimate renal function. Most of symptoms can be explained on the basis of uremia. Patient is not agreeable for hemodialysis at this point  2. Increasing oxygen requirement etiology not clear and pulmonary is on board   3. Bilateral pleural effusion with loculated right-sided effusion status post chest tube placement. Chest tube is draining well  4. COPD  5. Pulmonary hypertension  6. Moderate protein calorie malnutrition  7. Secondary hyperparathyroidism   8. Anemia of chronic kidney disease  9.   Hypokalemia: Improved  PLAN      1. Continue oral diuretics  2. Review chest x-ray once available  3.   May need dialysis if family and patient agrees to it    This note is created with the assistance of a speech-recognition program. While intending to generate a document that actually reflects the content of the visit, no guarantees can be provided that every mistake has been identified and corrected by editing    Electronically signed by Sarah Devine MD on 10/19/2021 at 10:06 AM

## 2021-10-19 NOTE — PROGRESS NOTES
Physical Therapy  Facility/Department: [facility] PROGRESSIVE CARE  Daily Treatment Note  NAME: Micheal Ingram  : 1945  MRN: [id number]    Date of Service: 10/19/2021    Discharge Recommendations:  Patient would benefit from continued therapy after discharge        Assessment   Body structures, Functions, Activity limitations: Decreased balance;Decreased functional mobility ; Decreased safe awareness;Decreased ADL status; Decreased strength;Decreased endurance;Decreased posture  Assessment: Pt with deficits noted in bed mobility, transfers, balance, and endurance this session, not safe to mobilize to chair 2* hypotension, & is 2 assist for all functional mobility. Pt requires continued PT to maximize independence with functional mobility, balance, safety awareness & activity tolerance to improve aerobic capacity & overall tolerance of I ADL's. Pt currently functioning below baseline. Would suggest additional therapy at time of discharge to maximize long term outcomes and prevent re-admission. Please refer to AM-PAC score for current level of function. Prognosis: Good  Decision Making: Medium Complexity  PT Education: PT Role;Plan of Care;Disease Specific Education;Transfer Training;General Safety; Functional Mobility Training  Patient Education: Ed pt on functional mobility, safety awareness, importance of being up & OOB with improved activity tolerance to regain strength, & prevention of sedentary complications, circulation ex's,  & optimal breathing techniques  REQUIRES PT FOLLOW UP: Yes  Activity Tolerance  Activity Tolerance: Patient limited by endurance; Patient limited by fatigue  Activity Tolerance: c/o feeling dizzy after stood to walker. BP checked & was 73/44 with MAP of 54. Pt returned to supine & BP checked was 83/66 with MAP 72     Patient Diagnosis(es): The primary encounter diagnosis was Hypoxia.  Diagnoses of Pleural effusion on right, Peripheral edema, Anemia, unspecified type, and Acute kidney come in/OOB , use of UB to scoot completely out to EOB with B foot placement to establish safe sitting balance, pursed lip breathing,  pacing, encouragement, total assist with line mgt,  with increased time needed all to increase safety & reduce fall risk  Transfers  Sit to Stand: Maximum Assistance;2 Person Assistance  Stand to sit: Maximum Assistance;2 Person Assistance  Comment: Max Ed + tactile assist on correct use of upper body for safe sit/stand + cues for posture, pursed lip breathing, + total assist to manage lines  Ambulation  Ambulation?: Yes  Ambulation 1  Surface: level tile  Device: Rolling Walker  Other Apparatus: O2  Assistance: Maximum assistance;2 Person assistance  Quality of Gait: pt stood to walker but not safe to ambulate due to POOR activity tolerance       Balance  Posture: Fair  Sitting - Static: Good;-  Sitting - Dynamic: Fair  Standing - Static: Fair;-  Standing - Dynamic: Poor;+  Exercises  Comments: Educated patient on circulation ex's, pursed lip breathing to increase control of breathing. Initiated by breathing through the nose and blowing out with pursed lip to increase O2 into lungs & LE ex's to move what moves to maintain strength & joint congruency                        G-Code     OutComes Score  Balance Score: 1 (10/19/21 1154)  Gait Score: 0 (10/19/21 1154)        Tinetti Total Score: 1 (10/19/21 1154)                                      AM-PAC Score             Goals  Short term goals  Time Frame for Short term goals: 12 visits  Short term goal 1: Patient will be min assist for bed mobility. Short term goal 2: Patient will be min assist for transfers. Short term goal 3: Patient will amb 50 feet with RW and mod assist.  Short term goal 4: Patient will tolerate 30 minutes of ther-ex and ther-act. Short term goal 5: Patient will be indep with HEP.   Patient Goals   Patient goals : Improve breathing    Plan    Plan  Times per week: 1-2x/d, 5-6 d/wk  Current Treatment Recommendations: Strengthening, Balance Training, Functional Mobility Training, Transfer Training, Gait Training, Endurance Training, Patient/Caregiver Education & Training, Home Exercise Program, Safety Education & Training  Safety Devices  Type of devices: Nurse notified, Left in bed, Gait belt, Call light within reach, Bed alarm in place, All fall risk precautions in place     Therapy Time   Individual Concurrent Group Co-treatment   Time In 0570(9749)         Time Out 6640(5352)         Minutes 35+5=40              Co-treatment with OT( 7583-1131) warranted secondary to decreased safety and independence requiring 2 skilled therapy professionals to address individual discipline's goals. PT addressing pre gait trunk strengthening, weight shifting prior to transfers, transfer training and postural control in sitting.       [address], PT

## 2021-10-19 NOTE — PROGRESS NOTES
Occupational Therapy  Facility/Department: [facility] [place] PROGRESSIVE CARE  Daily Treatment Note  NAME: Yolie Florention  : 1945  MRN: [id number]    Date of Service: 10/19/2021    Discharge Recommendations:  Patient would benefit from continued therapy after discharge       Assessment   Performance deficits / Impairments: Decreased functional mobility ; Decreased safe awareness;Decreased cognition;Decreased ADL status; Decreased strength;Decreased endurance;Decreased high-level IADLs;Decreased fine motor control;Decreased posture;Decreased balance  Assessment: Pt. sat EOB to tolerated 10 min of sitting with max assist x1 with contant cues on proper posture control and use of siderails to assist holding position. Pt. did c/o increased \"dizziness\" with O2 monitored on HF at 88-90% saturation. Functional transfer attempted with use of RW to require max assist x2 for safety and proper posture control. Pt. returned to bed d/t increased fatigue. Pt. limited by resp. status and decreased strength/endurance. Skilled OT warranted to promote I/safety to return pt to prior living arrangement with assist as needed. Prognosis: Fair  OT Education: OT Role;Plan of Care;Home Exercise Program;Energy Conservation; Family Education;IADL Safety  Patient Education: Pt. educated on proper positioning when sitting EOB and while completing functional transfers to promote postural control/strength. REQUIRES OT FOLLOW UP: Yes  Activity Tolerance  Activity Tolerance: Patient limited by fatigue  Activity Tolerance: poor: pt is limited by resp. status and poor functional endurance. Safety Devices  Safety Devices in place: Yes  Type of devices: All fall risk precautions in place; Bed alarm in place;Call light within reach;Nurse notified; Left in bed         Patient Diagnosis(es): The primary encounter diagnosis was Hypoxia.  Diagnoses of Pleural effusion on right, Peripheral edema, Anemia, unspecified type, and Acute kidney injury ([facility] [address] were also pertinent to this visit. has no past medical history on file. has a past surgical history that includes IR CHEST TUBE INSERTION (10/18/2021). Restrictions  Restrictions/Precautions  Restrictions/Precautions: General Precautions, Fall Risk  Required Braces or Orthoses?: No  Position Activity Restriction  Other position/activity restrictions: Up with assist, telemetry, gallegos cath, HFO2  Subjective   General  Chart Reviewed: Yes  Patient assessed for rehabilitation services?: Yes  Additional Pertinent Hx: Pt. agreeable to treatment. Response to previous treatment: Patient with no complaints from previous session  Family / Caregiver Present: No  Subjective  Subjective: \"I would like to sit up on the EOB today\"      Orientation  Orientation  Overall Orientation Status: Within Functional Limits  Objective    ADL  Feeding: Setup        Balance  Sitting Balance: Maximum assistance (max assist with constant cues on positioning and reassurance to complete task)  Standing Balance: Maximum assistance (Max assist x2 for proper positioning of body with use of walker and foot placement.)  Standing Balance  Time: standing karey < 1 min  Activity: functional ADL transfer  Functional Mobility  Functional - Mobility Device: Rolling Walker  Assist Level: Maximum assistance (x2)  Functional Mobility Comments: Pt. attempted to stand up with use of RW at bedside to transfer in recliner. Max assist x2 required to insure safety with constant cues on proper posture control and placement of feet to bal self.   Bed mobility  Supine to Sit: Maximum assistance;2 Person assistance  Sit to Supine: Maximum assistance;2 Person assistance  Scootin Person assistance;Maximal assistance  Comment: Max assist x2 for proper positioning at EOB  Transfers  Sit to stand: 2 Person assistance;Maximum assistance (to insure safety and management of lines)  Stand to sit: Maximum assistance;2 Person assistance (to insure safety and management of needed. Short term goal 5: bed mobility to SBA with use of bedrails as needed. Long term goals  Long term goal 1: Pt to be I with fall prevention education, EC/WS tech, pursed lip breathing tech and recommendations AE/discharge with use of handouts as needed. Patient Goals   Patient goals : To feel better! Therapy Time   Co-treat Concurrent Group Co-treatment   Time In 18         Time Out 1         Minutes 28              Co-treatment with PT warranted secondary to decreased safety and independence requiring 2 skilled therapy professionals to address individual discipline's goals. OT addressing \"preparation for ADL transfer\",\"sitting balance for increased ADL performance\",\"sitting/activity tolerance\",\"functional reaching\",\"environmental safety/scanning\",\"fall prevention\",\"functional mobility for ADL transfers\",\"ability to sequence and follow directions\",\"functional UE strength\".     John Maza SHA

## 2021-10-19 NOTE — PROGRESS NOTES
lung on the right  · Mild pulmonary edema  · Moderate pulmonary hypertension, RVSP 60 mmHg  · Former smoker, quit 2019  · Acute kidney injury  · D-dimer, low probability for PE on VQ scan on 10/13/21    Recommendations:  · Continue oxygen via high flow nasal cannula, keep SPO2 90% or greater, wean as able  · Incentive spirometry every hour while awake  · Symbicort  · DuoNeb every 4 hours  · Discontinue Symbicort  · Start budesonide and Brovana via nebulizer every 12 hours  · IV Solu-Medrol 40 mg every 6 hours  · Continue Levaquin IV  · Keep pigtail catheter to suction. Monitor output. · X-ray chest in a.m.   · Labs: CBC and BMP in am  · Nephrology following, 40 mg Demadex daily  · S/P IV iron x3 doses  · Pleural fluid negative for malignancy  · DVT prophylaxis with subcu heparin  · PFTs as an outpatient  · Xochitl Dinero CNP discussed patient care/POC with Dr. Thomas Alfaro and Karyna Batista CNP   · Will follow with you    Electronically signed by     Krishan Faustin MD on 10/19/2021 at 12:40 PM  Pulmonary Critical Care and Sleep Medicine,  [facility]  Cell: [phone]  Office: [phone]

## 2021-10-19 NOTE — PROGRESS NOTES
Son was in with and wanted to add [facility] to [facility], [facility] choices.

## 2021-10-19 NOTE — CARE COORDINATION
Social work: [facility] [place] [facility] cannot accept patient with chest tube. So far the following have denied d/t acquity and/or insurance [address] [facility], [address], [address] [facility], [address] [place] [address] and [employer]. Referral sent today to [facility] for review d/t chest tube and high flow requirements.

## 2021-10-19 NOTE — PROGRESS NOTES
Nutrition Assessment     Type and Reason for Visit: Reassess    Nutrition Recommendations/Plan:   1. ADULT DIET; Regular; Low Sodium (2 gm); Low Potassium (Less than 3000 mg/day); 1500 ml fluid restriction  2. She does not like ONS- does not want to try Ensure clear or Magic cup  3. PO intake <50% may need nutrition support to help meet nutritional needs   4. Monitor po intakes, weights, and labs    Nutrition Assessment:  Patient seen for follow-up date. Patient had chest tube placed last night and has had about 1300ml of serosanguinous fluid. She seemed out of breath when writer was talking to her- she is now on high flow. . She reports slightly better appetite- states having 50% of breakfast (boiled egg, Wallis[place] toast) however, not having lunch. She does feel hungry for dinner. She likes orange sherbert and pudding. Abnormal renal labs noted- She is not in agreement for dialysis right now. She does not want any nutritional shakes at this time. Will continue current diet. Monitor po intakes, weights and labs. Patient may need nutrition support to help meet nutritional needs. Malnutrition Assessment:  Malnutrition Status: Severe malnutrition    Estimated Daily Nutrient Needs:  Energy (kcal): 7838-7642 kcal (28-30 kcal/kg; Weight Used for Energy Requirements:  Current     Protein (g): 60-72 gm of protein (1.0-1.2 gm/kg); Weight Used for Protein Requirements:  Current        Fluid (ml/day): 1500 mL fluid restriction    Nutrition Related Findings: 10/18 chest tube placed. High flow. Loss of appetite- active bowel sounds. Edema: LUE/BLE +1 non-pitting. Labs reviewed. Current Nutrition Therapies:    ADULT DIET; Regular; Low Sodium (2 gm);  Low Potassium (Less than 3000 mg/day); 1500 ml    Anthropometric Measures:  · Height: 5' 4\" (162.6 cm)  · Current Body Wt: 132 lb 4.8 oz (60 kg)   · BMI: 22.7    Nutrition Diagnosis:   · Severe malnutrition related to inadequate protein-energy intake as evidenced by intake 0-25%, intake 26-50%, weight loss greater than or equal to 5% in 1 month    Nutrition Interventions:   Food and/or Nutrient Delivery:  Continue Current Diet, Discontinue Oral Nutrition Supplement  Nutrition Education/Counseling:  Education not indicated   Coordination of Nutrition Care:  Continue to monitor while inpatient    Goals:  PO intakes are greater than 50% at meals       Nutrition Monitoring and Evaluation:   Behavioral-Environmental Outcomes:  None Identified   Food/Nutrient Intake Outcomes:  Food and Nutrient Intake  Physical Signs/Symptoms Outcomes:  Biochemical Data, Fluid Status or Edema, Skin, Weight     Discharge Planning:    Continue current diet     Shruti Mcacbe, [address] [place] [address],   Office Number: [phone]

## 2021-10-20 ENCOUNTER — APPOINTMENT (OUTPATIENT)
Dept: GENERAL RADIOLOGY | Age: 76
DRG: 291 | End: 2021-10-20
Payer: MEDICARE

## 2021-10-20 LAB
ABSOLUTE EOS #: 0 K/UL (ref 0–0.4)
ABSOLUTE IMMATURE GRANULOCYTE: 0.09 K/UL (ref 0–0.3)
ABSOLUTE LYMPH #: 0.27 K/UL (ref 1–4.8)
ABSOLUTE MONO #: 0.09 K/UL (ref 0.2–0.8)
ANION GAP SERPL CALCULATED.3IONS-SCNC: 14 MMOL/L (ref 9–17)
BASOPHILS # BLD: 0 %
BASOPHILS ABSOLUTE: 0 K/UL (ref 0–0.2)
BUN BLDV-MCNC: 55 MG/DL (ref 8–23)
BUN/CREAT BLD: 21 (ref 9–20)
CALCIUM SERPL-MCNC: 8.9 MG/DL (ref 8.6–10.4)
CHLORIDE BLD-SCNC: 97 MMOL/L (ref 98–107)
CO2: 25 MMOL/L (ref 20–31)
CREAT SERPL-MCNC: 2.6 MG/DL (ref 0.5–0.9)
DIFFERENTIAL TYPE: ABNORMAL
EOSINOPHILS RELATIVE PERCENT: 0 % (ref 1–4)
GFR AFRICAN AMERICAN: 22 ML/MIN
GFR NON-AFRICAN AMERICAN: 18 ML/MIN
GFR SERPL CREATININE-BSD FRML MDRD: ABNORMAL ML/MIN/{1.73_M2}
GFR SERPL CREATININE-BSD FRML MDRD: ABNORMAL ML/MIN/{1.73_M2}
GLUCOSE BLD-MCNC: 113 MG/DL (ref 65–105)
GLUCOSE BLD-MCNC: 131 MG/DL (ref 65–105)
GLUCOSE BLD-MCNC: 142 MG/DL (ref 65–105)
GLUCOSE BLD-MCNC: 143 MG/DL (ref 70–99)
GLUCOSE BLD-MCNC: 145 MG/DL (ref 65–105)
GLUCOSE BLD-MCNC: 167 MG/DL (ref 65–105)
HCT VFR BLD CALC: 27.1 % (ref 36.3–47.1)
HEMOGLOBIN: 7.9 G/DL (ref 11.9–15.1)
IMMATURE GRANULOCYTES: 1 %
LYMPHOCYTES # BLD: 3 % (ref 24–44)
MCH RBC QN AUTO: 24.6 PG (ref 25.2–33.5)
MCHC RBC AUTO-ENTMCNC: 29.2 G/DL (ref 28.4–34.8)
MCV RBC AUTO: 84.4 FL (ref 82.6–102.9)
MONOCYTES # BLD: 1 % (ref 1–7)
MORPHOLOGY: ABNORMAL
MORPHOLOGY: ABNORMAL
NRBC AUTOMATED: 0 PER 100 WBC
PDW BLD-RTO: 20.3 % (ref 11.8–14.4)
PLATELET # BLD: 282 K/UL (ref 138–453)
PLATELET ESTIMATE: ABNORMAL
PMV BLD AUTO: 11.7 FL (ref 8.1–13.5)
POTASSIUM SERPL-SCNC: 4.4 MMOL/L (ref 3.7–5.3)
POTASSIUM SERPL-SCNC: 4.6 MMOL/L (ref 3.7–5.3)
POTASSIUM SERPL-SCNC: 4.9 MMOL/L (ref 3.7–5.3)
RBC # BLD: 3.21 M/UL (ref 3.95–5.11)
RBC # BLD: ABNORMAL 10*6/UL
SEG NEUTROPHILS: 95 % (ref 36–66)
SEGMENTED NEUTROPHILS ABSOLUTE COUNT: 8.65 K/UL (ref 1.8–7.7)
SODIUM BLD-SCNC: 136 MMOL/L (ref 135–144)
WBC # BLD: 9.1 K/UL (ref 3.5–11.3)
WBC # BLD: ABNORMAL 10*3/UL

## 2021-10-20 PROCEDURE — 6370000000 HC RX 637 (ALT 250 FOR IP): Performed by: INTERNAL MEDICINE

## 2021-10-20 PROCEDURE — 99232 SBSQ HOSP IP/OBS MODERATE 35: CPT | Performed by: INTERNAL MEDICINE

## 2021-10-20 PROCEDURE — 82947 ASSAY GLUCOSE BLOOD QUANT: CPT

## 2021-10-20 PROCEDURE — 85025 COMPLETE CBC W/AUTO DIFF WBC: CPT

## 2021-10-20 PROCEDURE — 80048 BASIC METABOLIC PNL TOTAL CA: CPT

## 2021-10-20 PROCEDURE — 6360000002 HC RX W HCPCS: Performed by: NURSE PRACTITIONER

## 2021-10-20 PROCEDURE — 94761 N-INVAS EAR/PLS OXIMETRY MLT: CPT

## 2021-10-20 PROCEDURE — APPSS45 APP SPLIT SHARED TIME 31-45 MINUTES: Performed by: NURSE PRACTITIONER

## 2021-10-20 PROCEDURE — 71045 X-RAY EXAM CHEST 1 VIEW: CPT

## 2021-10-20 PROCEDURE — 6370000000 HC RX 637 (ALT 250 FOR IP): Performed by: STUDENT IN AN ORGANIZED HEALTH CARE EDUCATION/TRAINING PROGRAM

## 2021-10-20 PROCEDURE — 36415 COLL VENOUS BLD VENIPUNCTURE: CPT

## 2021-10-20 PROCEDURE — 84132 ASSAY OF SERUM POTASSIUM: CPT

## 2021-10-20 PROCEDURE — 2580000003 HC RX 258: Performed by: STUDENT IN AN ORGANIZED HEALTH CARE EDUCATION/TRAINING PROGRAM

## 2021-10-20 PROCEDURE — 2060000000 HC ICU INTERMEDIATE R&B

## 2021-10-20 PROCEDURE — 6360000002 HC RX W HCPCS: Performed by: INTERNAL MEDICINE

## 2021-10-20 PROCEDURE — 6370000000 HC RX 637 (ALT 250 FOR IP): Performed by: NURSE PRACTITIONER

## 2021-10-20 PROCEDURE — 94640 AIRWAY INHALATION TREATMENT: CPT

## 2021-10-20 PROCEDURE — 2700000000 HC OXYGEN THERAPY PER DAY

## 2021-10-20 RX ADMIN — METHYLPREDNISOLONE SODIUM SUCCINATE 40 MG: 40 INJECTION, POWDER, FOR SOLUTION INTRAMUSCULAR; INTRAVENOUS at 12:30

## 2021-10-20 RX ADMIN — HEPARIN SODIUM 5000 UNITS: 5000 INJECTION INTRAVENOUS; SUBCUTANEOUS at 22:07

## 2021-10-20 RX ADMIN — HEPARIN SODIUM 5000 UNITS: 5000 INJECTION INTRAVENOUS; SUBCUTANEOUS at 15:12

## 2021-10-20 RX ADMIN — BUDESONIDE 500 MCG: 0.5 SUSPENSION RESPIRATORY (INHALATION) at 07:43

## 2021-10-20 RX ADMIN — SODIUM CHLORIDE, PRESERVATIVE FREE 10 ML: 5 INJECTION INTRAVENOUS at 08:43

## 2021-10-20 RX ADMIN — ACETAMINOPHEN 650 MG: 325 TABLET ORAL at 02:50

## 2021-10-20 RX ADMIN — ERGOCALCIFEROL 50000 UNITS: 1.25 CAPSULE ORAL at 12:25

## 2021-10-20 RX ADMIN — IPRATROPIUM BROMIDE AND ALBUTEROL SULFATE 1 AMPULE: .5; 2.5 SOLUTION RESPIRATORY (INHALATION) at 16:22

## 2021-10-20 RX ADMIN — EPOETIN ALFA-EPBX 8000 UNITS: 4000 INJECTION, SOLUTION INTRAVENOUS; SUBCUTANEOUS at 12:25

## 2021-10-20 RX ADMIN — POTASSIUM CHLORIDE 40 MEQ: 20 TABLET, EXTENDED RELEASE ORAL at 08:32

## 2021-10-20 RX ADMIN — TORSEMIDE 40 MG: 20 TABLET ORAL at 08:32

## 2021-10-20 RX ADMIN — SODIUM CHLORIDE, PRESERVATIVE FREE 10 ML: 5 INJECTION INTRAVENOUS at 22:06

## 2021-10-20 RX ADMIN — IPRATROPIUM BROMIDE AND ALBUTEROL SULFATE 1 AMPULE: .5; 2.5 SOLUTION RESPIRATORY (INHALATION) at 07:43

## 2021-10-20 RX ADMIN — ACETAMINOPHEN 650 MG: 325 TABLET ORAL at 20:28

## 2021-10-20 RX ADMIN — BUDESONIDE 500 MCG: 0.5 SUSPENSION RESPIRATORY (INHALATION) at 19:29

## 2021-10-20 RX ADMIN — METHYLPREDNISOLONE SODIUM SUCCINATE 40 MG: 40 INJECTION, POWDER, FOR SOLUTION INTRAMUSCULAR; INTRAVENOUS at 05:26

## 2021-10-20 RX ADMIN — ARFORMOTEROL TARTRATE 15 MCG: 15 SOLUTION RESPIRATORY (INHALATION) at 07:43

## 2021-10-20 RX ADMIN — METHYLPREDNISOLONE SODIUM SUCCINATE 40 MG: 40 INJECTION, POWDER, FOR SOLUTION INTRAMUSCULAR; INTRAVENOUS at 17:45

## 2021-10-20 RX ADMIN — METHYLPREDNISOLONE SODIUM SUCCINATE 40 MG: 40 INJECTION, POWDER, FOR SOLUTION INTRAMUSCULAR; INTRAVENOUS at 22:07

## 2021-10-20 RX ADMIN — CALCITRIOL 0.25 MCG: 0.25 CAPSULE ORAL at 08:32

## 2021-10-20 RX ADMIN — HEPARIN SODIUM 5000 UNITS: 5000 INJECTION INTRAVENOUS; SUBCUTANEOUS at 05:26

## 2021-10-20 RX ADMIN — IPRATROPIUM BROMIDE AND ALBUTEROL SULFATE 1 AMPULE: .5; 2.5 SOLUTION RESPIRATORY (INHALATION) at 11:08

## 2021-10-20 RX ADMIN — IPRATROPIUM BROMIDE AND ALBUTEROL SULFATE 1 AMPULE: .5; 2.5 SOLUTION RESPIRATORY (INHALATION) at 19:29

## 2021-10-20 ASSESSMENT — PAIN SCALES - GENERAL
PAINLEVEL_OUTOF10: 3
PAINLEVEL_OUTOF10: 0
PAINLEVEL_OUTOF10: 6
PAINLEVEL_OUTOF10: 0

## 2021-10-20 ASSESSMENT — PAIN DESCRIPTION - LOCATION
LOCATION: BACK
LOCATION: HEAD

## 2021-10-20 ASSESSMENT — PAIN DESCRIPTION - PAIN TYPE
TYPE: ACUTE PAIN
TYPE: ACUTE PAIN

## 2021-10-20 ASSESSMENT — PAIN DESCRIPTION - DESCRIPTORS
DESCRIPTORS: PRESSURE;POUNDING
DESCRIPTORS: ACHING;SORE

## 2021-10-20 ASSESSMENT — PAIN DESCRIPTION - ORIENTATION: ORIENTATION: LOWER;MID

## 2021-10-20 ASSESSMENT — PAIN DESCRIPTION - ONSET
ONSET: GRADUAL
ONSET: GRADUAL

## 2021-10-20 ASSESSMENT — PAIN DESCRIPTION - PROGRESSION: CLINICAL_PROGRESSION: GRADUALLY WORSENING

## 2021-10-20 ASSESSMENT — PAIN DESCRIPTION - FREQUENCY: FREQUENCY: INTERMITTENT

## 2021-10-20 NOTE — PROGRESS NOTES
[facility]  Office: [address], DO, Zelda Nyhan, DO, Nallely Samuels, DO, Bisi Butcher Blood, DO, Alejandro Mkcinney MD, Giulia Pulliam MD, Jeremias Martin MD, Shyam Lizarraga MD, Alva Carrillo MD, Mikhail Esteban MD, Sandra Mabry MD, Simon Garcia MD, Benigno Quesada, DO, Brenda Jaeger, DO, Ekaterina Stewart MD,  Mine Baldwin, DO, Abdiel Gaines MD, Cruzito Boyer MD, Swati Guerrero MD, Veronica Torres MD, Natasha Kilgore MD, Kaitlin Olvera MD, Steff Willis, [facility], [facility], CNP, Catrina Nieves, CNP, Marianne Zuniga, CNS, Greg Riggs, CNP, Keyla Weaver, CNP, Robert Giron, CNP, Jennifer Jacques, CNP, Corwin Sullivan, CNP, Claudia Jean, CNP, Capo Woodward PA-C, Susi Mcmanus, [facility], Lauren Awad, CNP, Nicanor Sigala, CNP, Jaciel Singh, CNP, Darin Sin, CNP, Jyoti Rosas, CNP, Nidhi Sharma, CNP, Lilibeth Pinzon, [facility]    Progress Note    10/20/2021    9:57 AM    Name:   Tay Cervantes  MRN:     [id number]     [place]:      [de-identified]   Room:   [address] [place] [address] Day:  8  Admit Date:  10/12/2021  2:53 PM    PCP:   Flavio De Luna MD  Code Status:  Full Code    Subjective:     C/C:   Chief Complaint   Patient presents with    Respiratory Distress     EMS reported 97% on room air; pt arrives with saturation 45% on room air    Failure To Thrive     Interval History Status: worsened   chest tube placed 10/18: she has had approx 2L total of serosanguinous fluid since insertion   Patient feels about the same as yesterday, she denies increased SOB but is tachypneic at rest, RR 2,  hi-flow oxygen at 45L/FIO2 of 45, afebrile     CXR today shows   No significant interval change. Right chest tube in place with small right  pleural effusion and right basilar opacity. Unchanged moderate left pleural  effusion. Brief History:   Danielle Wadsworth is a 64 y. o. female with a hx of CKD 4 and iron deficiency anemia who presented to [place] [facility] with Respiratory Distress and hypoxia (O2 sats 45%) and failure to thrive and is admitted to the hospital for the management of right Pleural effusion and acute renal failure. Patient lives at home alone and has recently been having difficulty caring for herself and generalized weakness for the last few weeks . She does have chronic BLE edema but denies hx of CHF. Initial CXR showed large right pleural effusion, stat elevated BNP and D-dimer as well as BUN/creatinine.  Last took known creatinine was 2.34 in august, she reports she seen a nephrologist once. She does not wear home oxygen      10/13: US guided right thoracentesis 850ml of nonturbid straw colored fluid removed -transudative    Pleural fluid cultures negative for malignancy  Review of Systems:     Constitutional:  negative for chills, fevers, sweats  Respiratory: Positive for cough and shortness of breath   cardiovascular:  negative for chest pain, chest pressure/discomfort,palpitations, +lower extremity edema,   Gastrointestinal:  negative for abdominal pain, constipation, diarrhea, nausea, vomiting  Neurological:  negative for dizziness, headache    Medications: Allergies:     Allergies   Allergen Reactions    Penicillins Swelling       Current Meds:   Scheduled Meds:    budesonide  0.5 mg Nebulization BID    Arformoterol Tartrate  15 mcg Nebulization BID    methylPREDNISolone  40 mg IntraVENous Q6H    ipratropium-albuterol  1 ampule Inhalation 4x daily    potassium chloride  40 mEq Oral Daily    epoetin mike-epbx  8,000 Units SubCUTAneous Once per day on Mon Wed Fri    torsemide  40 mg Oral Daily    calcitRIOL  0.25 mcg Oral Once per day on Mon Wed Fri    sodium chloride flush  5-40 mL IntraVENous 2 times per day    levofloxacin  500 mg IntraVENous Q48H    vitamin D  50,000 Units Oral Weekly    heparin (porcine)  5,000 Units SubCUTAneous 3 times per day     Continuous Infusions:    dextrose      sodium chloride       PRN Meds: melatonin, glucose, dextrose, glucagon (rDNA), dextrose, sodium chloride flush, sodium chloride, ondansetron **OR** ondansetron, acetaminophen **OR** acetaminophen, perflutren lipid microspheres    Data:     Past Medical History:   has no past medical history on file. Social History:   reports that she quit smoking about 2 years ago. Her smoking use included cigarettes. She has never used smokeless tobacco. She reports current alcohol use. Family History:   Family History   Problem Relation Age of Onset    Hypertension Mother     Cancer Sister     Cancer Brother        Vitals:  /61   Pulse 107   Temp 98.4 °F (36.9 °C) (Oral)   Resp 18   Ht 5' 4\" (1.626 m)   Wt 130 lb 9.6 oz (59.2 kg)   SpO2 94%   BMI 22.42 kg/m²   Temp (24hrs), Av.3 °F (36.8 °C), Min:97.7 °F (36.5 °C), Max:98.8 °F (37.1 °C)    Recent Labs     10/19/21  1648 10/19/21  2047 10/20/21  0717 10/20/21  0804   POCGLU 126* 206* 167* 142*       I/O (24Hr):     Intake/Output Summary (Last 24 hours) at 10/20/2021 1016  Last data filed at 10/20/2021 0610  Gross per 24 hour   Intake --   Output 760 ml   Net -760 ml       Labs:  Hematology:  Recent Labs     10/18/21  0427 10/20/21  0552   WBC 11.4* 9.1   RBC 3.05* 3.21*   HGB 7.3* 7.9*   HCT 26.2* 27.1*   MCV 85.9 84.4   MCH 23.9* 24.6*   MCHC 27.9* 29.2   RDW 19.8* 20.3*    282   MPV 10.5 11.7     Chemistry:  Recent Labs     10/17/21  2200 10/18/21  0117 10/19/21  0612 10/19/21  0612 10/19/21  1239 10/20/21  0054 10/20/21  0552   NA  --   --  138  --   --   --  136   K  --    < > 4.7   < > 4.9 4.6 4.9   CL  --   --  101  --   --   --  97*   CO2  --   --  26  --   --   --  25   GLUCOSE  --   --  94  --   --   --  143*   BUN  --   --  52*  --   --   --  55*   CREATININE 2.52*  --  2.52*  --   --   --  2.60*   ANIONGAP  --   --  11  --   --   --  14   LABGLOM  --   --  18*  --   --   --  18*   GFRAA  --   --  22*  --   --   --  22*   CALCIUM  --   --  8.4*  --   --   --  8.9    < > = values in this interval not displayed. Recent Labs     10/19/21  0721 10/19/21  1102 10/19/21  1648 10/19/21  2047 10/20/21  0717 10/20/21  0804   POCGLU 86 87 126* 206* 167* 142*     ABG:No results found for: POCPH, PHART, PH, POCPCO2, GNY1RUX, PCO2, POCPO2, PO2ART, PO2, POCHCO3, OSM2LGN, HCO3, NBEA, PBEA, BEART, BE, THGBART, THB, ZHQ3KXX, VPOF0LPL, T6EBSICE, O2SAT, FIO2  Lab Results   Component Value Date/Time    SPECIAL RT HAND 6ML 10/18/2021 04:26 AM    SPECIAL RT AC 5ML 10/18/2021 04:26 AM     Lab Results   Component Value Date/Time    CULTURE NO GROWTH 2 DAYS 10/18/2021 04:26 AM    CULTURE NO GROWTH 2 DAYS 10/18/2021 04:26 AM       Radiology:  CT CHEST WO CONTRAST    Result Date: 10/15/2021  1. Moderate sized, mildly loculated right pleural effusion and small mildly loculated left pleural effusion. 2. Consolidation at the inferior lungs, likely atelectasis. 3. Moderate to severe centrilobular emphysema. 4. Mildly prominent main pulmonary artery, which can be seen with pulmonary hypertension. 5. Small hiatal hernia. 6. Small nodules noted in the thyroid gland. If indicated, this could be further evaluated with ultrasound. NM LUNG SCAN PERFUSION ONLY    Result Date: 10/13/2021  Findings consistent with low probability V/Q scan for pulmonary embolus. XR CHEST DECUBITUS RIGHT    Result Date: 10/16/2021  Small to moderate right and small left layering pleural effusions. XR CHEST DECUBITUS LEFT    Result Date: 10/16/2021  Small to moderate right and small left layering pleural effusions. XR CHEST PORTABLE    Result Date: 10/15/2021  Bilateral effusions with adjacent infiltrates concerning for pneumonia. XR CHEST PORTABLE    Result Date: 10/14/2021  Interval increase in the recurrent at least moderate right pleural effusion.  However, there is rightward mediastinal shift despite the presence of right pleural effusion, indicating right lung atelectasis/collapse suggestive of trapped lung syndrome. Small left pleural effusion with probable left basilar atelectasis. XR CHEST PORTABLE    Result Date: 10/13/2021  Interval decrease in size of right-sided pleural effusion now moderate in size. Small left pleural effusion. Opacity of bilateral lower lung fields may reflect underlying atelectasis. Apparent thin line overlying the right lung apex may be artifactual related to overlying skin fold. The lung markings are seen lateral to the aforementioned line. XR CHEST PORTABLE    Result Date: 10/12/2021  Significant right pleural effusion and associated airspace disease. Mild left basilar airspace disease. Pneumonia cannot be excluded. US RETROPERITONEAL COMPLETE    Result Date: 10/13/2021  Bilateral simple renal cyst. Lomeli catheter within the urinary bladder. IR GUIDED THORACENTESIS PLEURAL    Result Date: 10/13/2021  Successful ultrasound guided diagnostic and therapeutic right thoracentesis. Physical Examination:        General appearance:  alert, cooperative and mild distress  Mental Status:  At baseline, normal affect  Lungs: Decreased breath sounds, + crackles, on high-flow oxygen, right chest tube to suction   Heart:  regular rate and rhythm, no murmur  Abdomen:  soft, nontender, nondistended, normal bowel sounds, no masses, hepatomegaly, splenomegaly  Extremities:  trace edema, redness, tenderness in the calves  Skin:  no gross lesions, rashes, induration    Assessment:        Hospital Problems         Last Modified POA    * (Principal) Pleural effusion on right 10/15/2021 Yes    Acute renal failure with acute renal cortical necrosis superimposed on stage 4 chronic kidney disease ([address] 10/14/2021 Yes    Acute respiratory failure with hypoxia ([address] 10/13/2021 Yes    Iron deficiency anemia 10/12/2021 Yes    Essential hypertension 10/12/2021 Yes    General weakness 10/12/2021 Yes    Vitamin D deficiency 10/13/2021 Yes    Acute diastolic heart failure ([address] 10/13/2021 Yes Hypoxia 10/13/2021 Yes    Anemia 10/13/2021 Yes    Peripheral edema 10/13/2021 Yes    Moderate protein-calorie malnutrition ([address] 10/13/2021 Yes    Severe malnutrition ([address] 10/13/2021 Yes    Acute kidney injury ([address] 10/15/2021 Yes    Hypokalemia 10/17/2021 Yes    Other emphysema ([address] 10/17/2021 Yes          Plan:        Acute on chronic Diastolic CHF:   - Echo from 10/12 reviewed showing an EF of 65% with moderate pulmonary hypertension.    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 Continue oral diuretics, 24 hr creatinine clearance was 10, creatinine elevated but stable, continue to trend as outpatient.       R pleural effusion:   - Status post thoracentesis with 850 mL fluid removed 10/13, cytology negative for malignancy  -Right sided chest tube placed per IR 10/18, good output , continue to suction, managed per pulmonology      Acute hypoxic respiratory failure:   - Continue hi-flow oxygen as needed, wean as tolerated. Unclear of etiology of worsening hypoxia. Chest tube in place to suction, 2L output since insertion. Repeat CXR stable,VQ scan 10/13-low probability of PE. Continue aerosols and IV steroids as ordered   5. KEELY:   - Hemoglobin stable, continue to monitor, Patient received IV iron supplementation   - Continue JOSE.      6. Moderate protein calorie malnutrition:   - Supplements with meals     7. Referral for [facility] sent,  case discussed with patient and staff      Discharge plan to SNF when stable     ANN COBB NP  10/20/2021  10:16 AM

## 2021-10-20 NOTE — PROGRESS NOTES
Pulmonary Critical Care Progress Note  Sherita Carey MD     Patient seen for the follow up of acute hypoxic respiratory failure, moderate pulmonary hypertension, former smoker/COPD Pleural effusion on right     Subjective:  She is resting comfortably in bed, no distress, family at bedside. She is sleepy but awakens and answers my questions appropriately. She has only had about 70 mL out of chest tube over the last 24 hours. Initially had 1.2 L out when chest tube first placed 2 days ago. Remains on oxygen via high flow nasal cannula, 45% / 45 L. Examination:  Vitals: /64   Pulse 106   Temp 98.8 °F (37.1 °C) (Oral)   Resp 16   Ht 5' 4\" (1.626 m)   Wt 130 lb 9.6 oz (59.2 kg)   SpO2 92%   BMI 22.42 kg/m²   General appearance: alert and cooperative with exam resting in bed, no distress  Neck: No JVD  Lungs: Moderate air exchange, diminished bilateral bases, positive crackles. No air leak noted in chest tube  Heart: regular rate and rhythm, S1, S2 normal, no gallop  Abdomen: Soft, non tender, + BS  Extremities: no cyanosis or clubbing. No significant edema    LABs:  CBC:   Recent Labs     10/18/21  0427 10/20/21  0552   WBC 11.4* 9.1   HGB 7.3* 7.9*   HCT 26.2* 27.1*    282     BMP:   Recent Labs     10/19/21  0612 10/19/21  1239 10/20/21  0552 10/20/21  1242     --  136  --    K 4.7   < > 4.9 4.4   CO2 26  --  25  --    BUN 52*  --  55*  --    CREATININE 2.52*  --  2.60*  --    LABGLOM 18*  --  18*  --    GLUCOSE 94  --  143*  --     < > = values in this interval not displayed.      Radiology:  10/20/2021      Impression:  · Acute hypoxic respiratory failure requiring high flow oxygen  · Moderate to large right pleural effusion, s/p thoracentesis with 850 mL removed  · Small left pleural effusion, loculated  · Atelectasis/?  Trapped lung on the right  · Mild pulmonary edema  · Moderate pulmonary hypertension, RVSP 60 mmHg  · Former smoker, quit 2019  · Acute kidney injury  · D-dimer,

## 2021-10-20 NOTE — RT PROTOCOL NOTE
RT Inhaler-Nebulizer Bronchodilator Protocol Note    There is a bronchodilator order in the chart from a provider indicating to follow the RT Bronchodilator Protocol and there is an Initiate RT Inhaler-Nebulizer Bronchodilator Protocol order as well (see protocol at bottom of note). CXR Findings:  XR CHEST PORTABLE    Result Date: 10/19/2021  Stable position/appearance of right pleural catheter stable bibasilar opacities related to combined pleural-parenchymal processes     XR CHEST PORTABLE    Result Date: 10/18/2021  Right-sided pleural catheter in place Decreased right pleural effusion No pneumothorax. Unchanged left pleural effusion       The findings from the last RT Protocol Assessment were as follows:   History Pulmonary Disease: Chronic pulmonary disease  Respiratory Pattern: Mild dyspnea at rest, irregular pattern, or RR 21-25 bpm  Breath Sounds: Slightly diminished and/or crackles (Diminished Right, Clear/Diminished Left)  Cough: Weak, non-productive  Indication for Bronchodilator Therapy: Decreased or absent breath sounds  Bronchodilator Assessment Score: 11    Aerosolized bronchodilator medication orders have been revised according to the RT Inhaler-Nebulizer Bronchodilator Protocol below. Respiratory Therapist to perform RT Therapy Protocol Assessment initially then follow the protocol. Repeat RT Therapy Protocol Assessment PRN for score 0-3 or on second treatment, BID, and PRN for scores above 3. No Indications - adjust the frequency to every 6 hours PRN wheezing or bronchospasm, if no treatments needed after 48 hours then discontinue using Per Protocol order mode. If indication present, adjust the RT bronchodilator orders based on the Bronchodilator Assessment Score as indicated below.   Use Inhaler orders unless patient has one or more of the following: on home nebulizer, not able to hold breath for 10 seconds, is not alert and oriented, cannot activate and use MDI correctly, or respiratory rate 25 breaths per minute or more, then use the equivalent nebulizer order(s) with same Frequency and PRN reasons based on the score. If a patient is on this medication at home then do not decrease Frequency below that used at home. 0-3 - enter or revise RT bronchodilator order(s) to equivalent RT Bronchodilator order with Frequency of every 4 hours PRN for wheezing or increased work of breathing using Per Protocol order mode. 4-6 - enter or revise RT Bronchodilator order(s) to two equivalent RT bronchodilator orders with one order with BID Frequency and one order with Frequency of every 4 hours PRN wheezing or increased work of breathing using Per Protocol order mode. 7-10 - enter or revise RT Bronchodilator order(s) to two equivalent RT bronchodilator orders with one order with TID Frequency and one order with Frequency of every 4 hours PRN wheezing or increased work of breathing using Per Protocol order mode. 11-13 - enter or revise RT Bronchodilator order(s) to one equivalent RT bronchodilator order with QID Frequency and an Albuterol order with Frequency of every 4 hours PRN wheezing or increased work of breathing using Per Protocol order mode. Greater than 13 - enter or revise RT Bronchodilator order(s) to one equivalent RT bronchodilator order with every 4 hours Frequency and an Albuterol order with Frequency of every 2 hours PRN wheezing or increased work of breathing using Per Protocol order mode. RT to enter RT Home Evaluation for COPD & MDI Assessment order using Per Protocol order mode.     Electronically signed by Miranda Epstein RCP on 10/19/2021 at 11:53 PM

## 2021-10-20 NOTE — CARE COORDINATION
Discharge planning     Patient chart reviewed. Patient on 45 liters/FIO2. Chest tube in place. [facility] did receive referral for LTAC and will need to inquire as when patient is medically stable to start a precert. Note on desk this am per family that they are also interested in [facility] or [facility]. Patient has been denied multiple snf due to multiple reasons ( see SS notes)     Pulmonary and nephrology following.  Nephrology notes potential for HD     5506 pre cert started for LTAC

## 2021-10-20 NOTE — PROGRESS NOTES
Nephrology Progress Note      SUBJECTIVE    Patient was seen and examined. No significant change last 24 hours. Patient is still relatively tachypneic and up eating breakfast and denies much discomfort from right sided chest tube. compared to previous examination. Currently she is on high flow oxygen. and at 25 L. Patient is quite tachypneic. Just had a chest x-ray this morning showing a stable chest with right sided chest tube with a small right pleural effusion and right basilar opacity and an unchanged moderate left pleural effusion. Chest tube output recorded as 70 ml yesterday. Labs today show sodium 136, potassium 4.9, chloride 97 and CO2 of 25. Calcium is 8.9. Blood pressure is low, but stable. 24-hour creatinine clearance results are available and her clearance was only 10.       OBJECTIVE      CURRENT TEMPERATURE:  Temp: 98.4 °F (36.9 °C)  MAXIMUM TEMPERATURE OVER 24HRS:  Temp (24hrs), Av.3 °F (36.8 °C), Min:97.7 °F (36.5 °C), Max:98.8 °F (37.1 °C)    CURRENT RESPIRATORY RATE:  Resp: 18  CURRENT PULSE:  Pulse: 107  CURRENT BLOOD PRESSURE:  BP: 121/61  24HR BLOOD PRESSURE RANGE:  Systolic (25ORQ), SIW:361 , Min:92 , UGM:058   ; Diastolic (94YLI), ZWM:69, Min:50, Max:61    24HR INTAKE/OUTPUT:      Intake/Output Summary (Last 24 hours) at 10/20/2021 1058  Last data filed at 10/20/2021 0610  Gross per 24 hour   Intake --   Output 760 ml   Net -760 ml     WEIGHT :  Patient Vitals for the past 96 hrs (Last 3 readings):   Weight   10/20/21 0610 130 lb 9.6 oz (59.2 kg)   10/19/21 0600 132 lb 4.8 oz (60 kg)   10/18/21 0552 138 lb 6 oz (62.8 kg)     PHYSICAL EXAM      GENERAL APPEARANCE: Awake and alert x3   SKIN: Warm to touch  EYES: conjunctivae pale and sclera anicteric  ENT: No thrush, moist mucus membranes  NECK:   Midline trachea with positive accessory muscles of respiration used   PULMONARY: BiLateral air entry, reduced at bilateral bases  CADRDIOVASCULAR: Tachycardic with a regular rate and rhythm and ectopy with positive S1 and S2    ABDOMEN: soft nontender, bowel sounds present, no ascites.        EXTREMITIES: No pitting edema or cyanosis    CURRENT MEDICATIONS      budesonide (PULMICORT) nebulizer suspension 500 mcg, BID  Arformoterol Tartrate (BROVANA) nebulizer solution 15 mcg, BID  methylPREDNISolone sodium (SOLU-MEDROL) injection 40 mg, Q6H  melatonin tablet 3 mg, Nightly PRN  ipratropium-albuterol (DUONEB) nebulizer solution 1 ampule, 4x daily  potassium chloride (KLOR-CON M) extended release tablet 40 mEq, Daily  epoetin mike-epbx (RETACRIT) injection 8,000 Units, Once per day on Mon Wed Fri  torsemide [facility]) tablet 40 mg, Daily  calcitRIOL (ROCALTROL) capsule 0.25 mcg, Once per day on Mon Wed Fri  glucose (GLUTOSE) 40 % oral gel 15 g, PRN  dextrose 50 % IV solution, PRN  glucagon (rDNA) injection 1 mg, PRN  dextrose 5 % solution, PRN  sodium chloride flush 0.9 % injection 5-40 mL, 2 times per day  sodium chloride flush 0.9 % injection 5-40 mL, PRN  0.9 % sodium chloride infusion, PRN  levoFLOXacin (LEVAQUIN) 500 MG/100ML infusion 500 mg, Q48H  vitamin D (ERGOCALCIFEROL) capsule 50,000 Units, Weekly  ondansetron (ZOFRAN-ODT) disintegrating tablet 4 mg, Q8H PRN   Or  ondansetron (ZOFRAN) injection 4 mg, Q6H PRN  acetaminophen (TYLENOL) tablet 650 mg, Q6H PRN   Or  acetaminophen (TYLENOL) suppository 650 mg, Q6H PRN  perflutren lipid microspheres (DEFINITY) injection 1.65 mg, ONCE PRN  heparin (porcine) injection 5,000 Units, 3 times per day          LABS      CBC:   Recent Labs     10/18/21  0427 10/20/21  0552   WBC 11.4* 9.1   RBC 3.05* 3.21*   HGB 7.3* 7.9*   HCT 26.2* 27.1*   MCV 85.9 84.4   MCH 23.9* 24.6*   MCHC 27.9* 29.2   RDW 19.8* 20.3*    282   MPV 10.5 11.7      BMP:   Recent Labs     10/17/21  2200 10/18/21  0117 10/19/21  0612 10/19/21  0612 10/19/21  1239 10/20/21  0054 10/20/21  0552   NA  --   --  138  --   --   --  136   K  --    < > 4.7   < > 4.9 4.6 4.9   CL  -- --  101  --   --   --  97*   CO2  --   --  26  --   --   --  25   BUN  --   --  52*  --   --   --  55*   CREATININE 2.52*  --  2.52*  --   --   --  2.60*   GLUCOSE  --   --  94  --   --   --  143*   CALCIUM  --   --  8.4*  --   --   --  8.9    < > = values in this interval not displayed. PHOSPHORUS:    No results for input(s): PHOS in the last 72 hours. MAGNESIUM:   No results for input(s): MG in the last 72 hours. ALBUMIN:   No results for input(s): LABALBU in the last 72 hours. IRON:    Lab Results   Component Value Date    IRON 22 10/13/2021     IRON SATURATION:    Lab Results   Component Value Date    LABIRON 9 10/13/2021     TIBC:    Lab Results   Component Value Date    TIBC 237 10/13/2021     FERRITIN:    Lab Results   Component Value Date    FERRITIN 20 10/13/2021       SPEP:   Lab Results   Component Value Date    PROT 4.7 10/17/2021     URINE SODIUM:    Lab Results   Component Value Date    LAZARO 40 10/13/2021      URINE CREATININE:    Lab Results   Component Value Date    LABCREA 40.5 10/17/2021    LABCREA 76.0 10/13/2021     URINALYSIS:  U/A:   Lab Results   Component Value Date    NITRU NEGATIVE 10/13/2021    COLORU Yellow 10/13/2021    PHUR 5.5 10/13/2021    WBCUA 10 TO 20 10/13/2021    RBCUA 2 TO 5 10/13/2021    MUCUS 1+ 10/13/2021    TRICHOMONAS NOT REPORTED 10/13/2021    YEAST NOT REPORTED 10/13/2021    BACTERIA NOT REPORTED 10/13/2021    SPECGRAV 1.020 10/13/2021    LEUKOCYTESUR SMALL 10/13/2021    UROBILINOGEN Normal 10/13/2021    BILIRUBINUR NEGATIVE 10/13/2021    GLUCOSEU NEGATIVE 10/13/2021    KETUA NEGATIVE 10/13/2021    AMORPHOUS 1+ 10/13/2021           ASSESSMENT    1. Based on estimated GFR patient has stage IV chronic kidney disease however 24-hour creatinine clearance shows clearance of 10 . Most of symptoms can be explained on the basis of uremia. Patient is not agreeable for hemodialysis at this point  2.    Increasing oxygen requirement etiology not clear and pulmonary is on board   3. Bilateral pleural effusion with loculated right-sided effusion status post chest tube placement. Chest tube is draining well with a small residual effusion and infiltrate. Moderate left sided pleural effusion remains  4. COPD  5. Pulmonary hypertension  6. Moderate protein calorie malnutrition  7. Secondary hyperparathyroidism   8. Anemia of chronic kidney disease, hgb 7.9 today  9. Hypokalemia: Improved  PLAN      1. Continue oral diuretics with torsemide  2. Follow labs  3.   May need dialysis if family and patient agrees to it    This note is created with the assistance of a speech-recognition program. While intending to generate a document that actually reflects the content of the visit, no guarantees can be provided that every mistake has been identified and corrected by editing    Electronically signed by Nina Ortiz MD on 10/20/2021 at 10:58 AM

## 2021-10-20 NOTE — PLAN OF CARE
Problem: Falls - Risk of:  Goal: Will remain free from falls  Description: Will remain free from falls  Outcome: Ongoing  Patient is fall risk per fall scale. Falling star on door. Fall sticker on armband. Hourly rounding performed. Personal belongings and call light within reach. Bed in low position. Problem: Skin Integrity:  Goal: Absence of new skin breakdown  Description: Absence of new skin breakdown  Outcome: Ongoing  Mucus membranes moist. Patient turned and repositioned as needed. Heels elevated as needed. Dressings changed as needed and per orders. Continue to monitor skin for breakdown.

## 2021-10-20 NOTE — PROGRESS NOTES
Physical Therapy  DATE: 10/20/2021    NAME: Victor Manuel Preciado  MRN: [id number]   : 1945    Patient not seen this date for Physical Therapy due to:      [x] Cancel by RN or physician due to: Patient medically unstable told to rest by kidney doctor. High flow is being increased    [] Hemodialysis    [] Critical Lab Value Level     [] Blood transfusion in progress    [] Acute or unstable cardiovascular status   _MAP < 55 or more than >115  _HR < 40 or > 130    [] Acute or unstable pulmonary status   -FiO2 > 60%   _RR < 5 or >40    _O2 sats < 85%    [] Strict Bedrest    [] Off Unit for surgery or procedure    [] Off Unit for testing       [] Pending imaging to R/O fracture    [x] Refusal by Patient Patient states she was told to rest today     [] Other      [] PT being discontinued at this time. Patient independent. No further needs. [] PT being discontinued at this time as the patient has been transferred to hospice care. No further needs.       Waleska Arch, PTA

## 2021-10-20 NOTE — PLAN OF CARE
Problem: Falls - Risk of:  Goal: Will remain free from falls  Description: Will remain free from falls  Outcome: Ongoing     Problem: Falls - Risk of:  Goal: Absence of physical injury  Description: Absence of physical injury  Outcome: Ongoing     Problem: Nutrition  Goal: Optimal nutrition therapy  Outcome: Ongoing     Problem: Skin Integrity:  Goal: Will show no infection signs and symptoms  Description: Will show no infection signs and symptoms  Outcome: Ongoing     Problem: Skin Integrity:  Goal: Absence of new skin breakdown  Description: Absence of new skin breakdown  Outcome: Ongoing     Problem: OXYGENATION/RESPIRATORY FUNCTION  Goal: Patient will achieve/maintain normal respiratory rate/effort  Description: Respiratory rate and effort will be within normal limits for the patient  Outcome: Ongoing     Problem: Breathing Pattern - Ineffective:  Goal: Ability to achieve and maintain a regular respiratory rate will improve  Description: Ability to achieve and maintain a regular respiratory rate will improve  Outcome: Ongoing

## 2021-10-20 NOTE — PROGRESS NOTES
Occupational Therapy  DATE: 10/20/2021    NAME: Herman Chin  MRN: [id number]   : 1945    Patient not seen this date for Occupational Therapy due to:      [x] Cancel by RN or physician due to:Pt. Medically unstable: told to rest per doctor. High flow being increased per RN    [] Hemodialysis    [] Critical Lab Value Level     [] Blood transfusion in progress    [] Acute or unstable cardiovascular status   _MAP < 55 or more than >115  _HR < 40 or > 130    [] Acute or unstable pulmonary status   -FiO2 > 60%   _RR < 5 or >40    _O2 sats < 85%    [] Strict Bedrest    [] Off Unit for surgery or procedure    [] Off Unit for testing       [] Pending imaging to R/O fracture    [] Refusal by Patient      [] Other      [] OT being discontinued at this time. Patient independent. No further needs. [] OT being discontinued at this time as the patient has been transferred to hospice care. No further needs. OT will consult with nursing prior to next treatment.         SHA Fink

## 2021-10-21 ENCOUNTER — APPOINTMENT (OUTPATIENT)
Dept: GENERAL RADIOLOGY | Age: 76
DRG: 291 | End: 2021-10-21
Payer: MEDICARE

## 2021-10-21 ENCOUNTER — APPOINTMENT (OUTPATIENT)
Dept: NUCLEAR MEDICINE | Age: 76
DRG: 291 | End: 2021-10-21
Payer: MEDICARE

## 2021-10-21 LAB
ANION GAP SERPL CALCULATED.3IONS-SCNC: 14 MMOL/L (ref 9–17)
ANTI-XA UNFRAC HEPARIN: >1.1 IU/L (ref 0.3–0.7)
BUN BLDV-MCNC: 61 MG/DL (ref 8–23)
BUN/CREAT BLD: 22 (ref 9–20)
CALCIUM SERPL-MCNC: 8.7 MG/DL (ref 8.6–10.4)
CHLORIDE BLD-SCNC: 95 MMOL/L (ref 98–107)
CO2: 25 MMOL/L (ref 20–31)
CREAT SERPL-MCNC: 2.73 MG/DL (ref 0.5–0.9)
GFR AFRICAN AMERICAN: 20 ML/MIN
GFR NON-AFRICAN AMERICAN: 17 ML/MIN
GFR SERPL CREATININE-BSD FRML MDRD: ABNORMAL ML/MIN/{1.73_M2}
GFR SERPL CREATININE-BSD FRML MDRD: ABNORMAL ML/MIN/{1.73_M2}
GLUCOSE BLD-MCNC: 128 MG/DL (ref 65–105)
GLUCOSE BLD-MCNC: 132 MG/DL (ref 70–99)
GLUCOSE BLD-MCNC: 138 MG/DL (ref 65–105)
GLUCOSE BLD-MCNC: 150 MG/DL (ref 65–105)
GLUCOSE BLD-MCNC: 207 MG/DL (ref 65–105)
INR BLD: 1
PARTIAL THROMBOPLASTIN TIME: 38.6 SEC (ref 23.9–33.8)
POTASSIUM SERPL-SCNC: 4.5 MMOL/L (ref 3.7–5.3)
PROTHROMBIN TIME: 12.8 SEC (ref 11.5–14.2)
SODIUM BLD-SCNC: 134 MMOL/L (ref 135–144)

## 2021-10-21 PROCEDURE — A9540 TC99M MAA: HCPCS | Performed by: INTERNAL MEDICINE

## 2021-10-21 PROCEDURE — 93970 EXTREMITY STUDY: CPT

## 2021-10-21 PROCEDURE — 82947 ASSAY GLUCOSE BLOOD QUANT: CPT

## 2021-10-21 PROCEDURE — 6370000000 HC RX 637 (ALT 250 FOR IP): Performed by: NURSE PRACTITIONER

## 2021-10-21 PROCEDURE — 6370000000 HC RX 637 (ALT 250 FOR IP): Performed by: INTERNAL MEDICINE

## 2021-10-21 PROCEDURE — 85730 THROMBOPLASTIN TIME PARTIAL: CPT

## 2021-10-21 PROCEDURE — 85520 HEPARIN ASSAY: CPT

## 2021-10-21 PROCEDURE — 6360000002 HC RX W HCPCS: Performed by: NURSE PRACTITIONER

## 2021-10-21 PROCEDURE — 97530 THERAPEUTIC ACTIVITIES: CPT

## 2021-10-21 PROCEDURE — 2060000000 HC ICU INTERMEDIATE R&B

## 2021-10-21 PROCEDURE — 2580000003 HC RX 258: Performed by: STUDENT IN AN ORGANIZED HEALTH CARE EDUCATION/TRAINING PROGRAM

## 2021-10-21 PROCEDURE — 99233 SBSQ HOSP IP/OBS HIGH 50: CPT | Performed by: NURSE PRACTITIONER

## 2021-10-21 PROCEDURE — 6360000002 HC RX W HCPCS: Performed by: INTERNAL MEDICINE

## 2021-10-21 PROCEDURE — 3430000000 HC RX DIAGNOSTIC RADIOPHARMACEUTICAL: Performed by: INTERNAL MEDICINE

## 2021-10-21 PROCEDURE — 36415 COLL VENOUS BLD VENIPUNCTURE: CPT

## 2021-10-21 PROCEDURE — 6370000000 HC RX 637 (ALT 250 FOR IP): Performed by: STUDENT IN AN ORGANIZED HEALTH CARE EDUCATION/TRAINING PROGRAM

## 2021-10-21 PROCEDURE — 80048 BASIC METABOLIC PNL TOTAL CA: CPT

## 2021-10-21 PROCEDURE — 94640 AIRWAY INHALATION TREATMENT: CPT

## 2021-10-21 PROCEDURE — 78580 LUNG PERFUSION IMAGING: CPT

## 2021-10-21 PROCEDURE — 97535 SELF CARE MNGMENT TRAINING: CPT

## 2021-10-21 PROCEDURE — 6360000002 HC RX W HCPCS: Performed by: STUDENT IN AN ORGANIZED HEALTH CARE EDUCATION/TRAINING PROGRAM

## 2021-10-21 PROCEDURE — 2700000000 HC OXYGEN THERAPY PER DAY

## 2021-10-21 PROCEDURE — 94761 N-INVAS EAR/PLS OXIMETRY MLT: CPT

## 2021-10-21 PROCEDURE — 85610 PROTHROMBIN TIME: CPT

## 2021-10-21 PROCEDURE — 71045 X-RAY EXAM CHEST 1 VIEW: CPT

## 2021-10-21 RX ORDER — HEPARIN SODIUM 10000 [USP'U]/100ML
5-30 INJECTION, SOLUTION INTRAVENOUS CONTINUOUS
Status: DISCONTINUED | OUTPATIENT
Start: 2021-10-21 | End: 2021-10-25

## 2021-10-21 RX ORDER — HYDROCODONE BITARTRATE AND ACETAMINOPHEN 5; 325 MG/1; MG/1
2 TABLET ORAL EVERY 4 HOURS PRN
Status: DISCONTINUED | OUTPATIENT
Start: 2021-10-21 | End: 2021-11-04 | Stop reason: HOSPADM

## 2021-10-21 RX ORDER — HEPARIN SODIUM 1000 [USP'U]/ML
80 INJECTION, SOLUTION INTRAVENOUS; SUBCUTANEOUS PRN
Status: DISCONTINUED | OUTPATIENT
Start: 2021-10-21 | End: 2021-10-25

## 2021-10-21 RX ORDER — HYDROCODONE BITARTRATE AND ACETAMINOPHEN 5; 325 MG/1; MG/1
1 TABLET ORAL EVERY 4 HOURS PRN
Status: DISCONTINUED | OUTPATIENT
Start: 2021-10-21 | End: 2021-11-04 | Stop reason: HOSPADM

## 2021-10-21 RX ORDER — HEPARIN SODIUM 1000 [USP'U]/ML
40 INJECTION, SOLUTION INTRAVENOUS; SUBCUTANEOUS PRN
Status: DISCONTINUED | OUTPATIENT
Start: 2021-10-21 | End: 2021-10-25

## 2021-10-21 RX ORDER — HEPARIN SODIUM 1000 [USP'U]/ML
80 INJECTION, SOLUTION INTRAVENOUS; SUBCUTANEOUS ONCE
Status: DISCONTINUED | OUTPATIENT
Start: 2021-10-21 | End: 2021-10-21 | Stop reason: SDUPTHER

## 2021-10-21 RX ORDER — FUROSEMIDE 10 MG/ML
40 INJECTION INTRAMUSCULAR; INTRAVENOUS 2 TIMES DAILY
Status: DISCONTINUED | OUTPATIENT
Start: 2021-10-21 | End: 2021-10-26

## 2021-10-21 RX ADMIN — TORSEMIDE 40 MG: 20 TABLET ORAL at 08:59

## 2021-10-21 RX ADMIN — METHYLPREDNISOLONE SODIUM SUCCINATE 40 MG: 40 INJECTION, POWDER, FOR SOLUTION INTRAMUSCULAR; INTRAVENOUS at 13:30

## 2021-10-21 RX ADMIN — IPRATROPIUM BROMIDE AND ALBUTEROL SULFATE 1 AMPULE: .5; 2.5 SOLUTION RESPIRATORY (INHALATION) at 11:22

## 2021-10-21 RX ADMIN — HEPARIN SODIUM 18 UNITS/KG/HR: 10000 INJECTION, SOLUTION INTRAVENOUS at 15:34

## 2021-10-21 RX ADMIN — IPRATROPIUM BROMIDE AND ALBUTEROL SULFATE 1 AMPULE: .5; 2.5 SOLUTION RESPIRATORY (INHALATION) at 14:36

## 2021-10-21 RX ADMIN — IPRATROPIUM BROMIDE AND ALBUTEROL SULFATE 1 AMPULE: .5; 2.5 SOLUTION RESPIRATORY (INHALATION) at 19:51

## 2021-10-21 RX ADMIN — HYDROCODONE BITARTRATE AND ACETAMINOPHEN 1 TABLET: 5; 325 TABLET ORAL at 08:59

## 2021-10-21 RX ADMIN — HEPARIN SODIUM 5000 UNITS: 5000 INJECTION INTRAVENOUS; SUBCUTANEOUS at 05:39

## 2021-10-21 RX ADMIN — SODIUM CHLORIDE, PRESERVATIVE FREE 10 ML: 5 INJECTION INTRAVENOUS at 11:54

## 2021-10-21 RX ADMIN — METHYLPREDNISOLONE SODIUM SUCCINATE 40 MG: 40 INJECTION, POWDER, FOR SOLUTION INTRAMUSCULAR; INTRAVENOUS at 05:39

## 2021-10-21 RX ADMIN — IPRATROPIUM BROMIDE AND ALBUTEROL SULFATE 1 AMPULE: .5; 2.5 SOLUTION RESPIRATORY (INHALATION) at 07:27

## 2021-10-21 RX ADMIN — METHYLPREDNISOLONE SODIUM SUCCINATE 40 MG: 40 INJECTION, POWDER, FOR SOLUTION INTRAMUSCULAR; INTRAVENOUS at 17:13

## 2021-10-21 RX ADMIN — LEVOFLOXACIN 500 MG: 5 INJECTION, SOLUTION INTRAVENOUS at 11:53

## 2021-10-21 RX ADMIN — POTASSIUM CHLORIDE 40 MEQ: 20 TABLET, EXTENDED RELEASE ORAL at 08:59

## 2021-10-21 RX ADMIN — ACETAMINOPHEN 650 MG: 325 TABLET ORAL at 04:45

## 2021-10-21 RX ADMIN — Medication 3 MG: at 02:59

## 2021-10-21 RX ADMIN — HEPARIN SODIUM 5000 UNITS: 5000 INJECTION INTRAVENOUS; SUBCUTANEOUS at 13:29

## 2021-10-21 RX ADMIN — Medication 9.8 MILLICURIE: at 10:40

## 2021-10-21 RX ADMIN — SODIUM CHLORIDE, PRESERVATIVE FREE 10 ML: 5 INJECTION INTRAVENOUS at 22:58

## 2021-10-21 RX ADMIN — METHYLPREDNISOLONE SODIUM SUCCINATE 40 MG: 40 INJECTION, POWDER, FOR SOLUTION INTRAMUSCULAR; INTRAVENOUS at 22:59

## 2021-10-21 RX ADMIN — ARFORMOTEROL TARTRATE 15 MCG: 15 SOLUTION RESPIRATORY (INHALATION) at 19:52

## 2021-10-21 RX ADMIN — BUDESONIDE 500 MCG: 0.5 SUSPENSION RESPIRATORY (INHALATION) at 19:52

## 2021-10-21 RX ADMIN — BUDESONIDE 500 MCG: 0.5 SUSPENSION RESPIRATORY (INHALATION) at 07:27

## 2021-10-21 RX ADMIN — ARFORMOTEROL TARTRATE 15 MCG: 15 SOLUTION RESPIRATORY (INHALATION) at 07:27

## 2021-10-21 RX ADMIN — FUROSEMIDE 40 MG: 10 INJECTION, SOLUTION INTRAMUSCULAR; INTRAVENOUS at 17:13

## 2021-10-21 ASSESSMENT — PAIN DESCRIPTION - LOCATION
LOCATION: LEG
LOCATION: BACK;LEG

## 2021-10-21 ASSESSMENT — PAIN DESCRIPTION - PROGRESSION
CLINICAL_PROGRESSION: GRADUALLY WORSENING
CLINICAL_PROGRESSION: GRADUALLY WORSENING

## 2021-10-21 ASSESSMENT — PAIN DESCRIPTION - DESCRIPTORS
DESCRIPTORS: SORE;CRAMPING
DESCRIPTORS: SORE;CRAMPING;DISCOMFORT

## 2021-10-21 ASSESSMENT — PAIN DESCRIPTION - ONSET
ONSET: SUDDEN
ONSET: ON-GOING

## 2021-10-21 ASSESSMENT — PAIN DESCRIPTION - PAIN TYPE
TYPE: ACUTE PAIN
TYPE: ACUTE PAIN

## 2021-10-21 ASSESSMENT — PAIN SCALES - GENERAL
PAINLEVEL_OUTOF10: 10
PAINLEVEL_OUTOF10: 4
PAINLEVEL_OUTOF10: 6

## 2021-10-21 ASSESSMENT — PAIN DESCRIPTION - ORIENTATION
ORIENTATION: LEFT;UPPER
ORIENTATION: LEFT;UPPER

## 2021-10-21 ASSESSMENT — PAIN DESCRIPTION - FREQUENCY
FREQUENCY: INTERMITTENT
FREQUENCY: CONTINUOUS

## 2021-10-21 NOTE — PROGRESS NOTES
Physical Therapy  Facility/Department: [facility] PROGRESSIVE CARE  Daily Treatment Note  NAME: Pineda Vuong  : 1945  MRN: [id number]    Date of Service: 10/21/2021    Discharge Recommendations:  Patient would benefit from continued therapy after discharge        Assessment   Body structures, Functions, Activity limitations: Decreased balance;Decreased functional mobility ; Decreased safe awareness;Decreased ADL status; Decreased strength;Decreased endurance;Decreased posture  Assessment: Pt with deficits noted in bed mobility, transfers, balance, and endurance this session, & is 2 assist for all functional mobility. Pt requires continued PT to maximize independence with functional mobility, balance, safety awareness & activity tolerance to improve aerobic capacity & overall tolerance of I ADL's. Pt currently functioning below baseline. Would suggest additional therapy at time of discharge to maximize long term outcomes and prevent re-admission. Please refer to AM-PAC score for current level of function. Activity Tolerance  Activity Tolerance: Patient limited by endurance; Patient limited by fatigue     Patient Diagnosis(es): The primary encounter diagnosis was Hypoxia. Diagnoses of Pleural effusion on right, Peripheral edema, Anemia, unspecified type, and Acute kidney injury [facility]) were also pertinent to this visit. has no past medical history on file. has a past surgical history that includes IR CHEST TUBE INSERTION (10/18/2021). Restrictions  Restrictions/Precautions  Restrictions/Precautions: General Precautions, Fall Risk, Up as Tolerated  Required Braces or Orthoses?: No  Position Activity Restriction  Other position/activity restrictions:  Up with assist, telemetry, gallegos cath, HFNC O2, R chest tube, 1500 mL fluid restriction, heels off bed at all times  Subjective   General  Chart Reviewed: Yes  Subjective  Subjective: Pt agreed to PT session, requires encouragement  General Comment  Comments: RN reports patient ok for PT. Pain Screening  Patient Currently in Pain: Yes  Pain Assessment  Pain Assessment: 0-10  Vital Signs  Patient Currently in Pain: Yes  Pre Treatment Pain Screening  Intervention List: Patient able to continue with treatment    Orientation     Cognition      Objective   Bed mobility  Scooting: Maximal assistance;2 Person assistance  Transfers  Sit to Stand: Maximum Assistance;2 Person Assistance  Stand to sit: Maximum Assistance;2 Person Assistance  Stand Pivot Transfers: Moderate Assistance;2 Person Assistance  Ambulation  Ambulation?: Yes  Ambulation 1  Surface: level tile  Device: Rolling Walker  Assistance: Maximum assistance;2 Person assistance  Quality of Gait: pt required MAX A to steer RW  Gait Deviations: Slow Shavon;Decreased step length;Decreased step height  Distance: 4-5 steps   Comments: strong posterior lean      Balance  Posture: Fair  Sitting - Static: Good;-  Sitting - Dynamic: Fair  Standing - Static: Fair;-  Standing - Dynamic: Poor;+            Comment: assisted pt to bedside chair              G-Code     OutComes Score                                                     AM-PAC Score  AM-PAC Inpatient Mobility Raw Score : 10 (10/21/21 1547)  AM-PAC Inpatient T-Scale Score : 32.29 (10/21/21 1547)  Mobility Inpatient CMS 0-100% Score: 76.75 (10/21/21 1547)  Mobility Inpatient CMS G-Code Modifier : CL (10/21/21 1547)          Goals  Short term goals  Time Frame for Short term goals: 12 visits  Short term goal 1: Patient will be min assist for bed mobility. Short term goal 2: Patient will be min assist for transfers. Short term goal 3: Patient will amb 50 feet with RW and mod assist.  Short term goal 4: Patient will tolerate 30 minutes of ther-ex and ther-act. Short term goal 5: Patient will be indep with HEP.   Patient Goals   Patient goals : Improve breathing    Plan    Plan  Times per week: 1-2x/d, 5-6 d/wk  Current Treatment Recommendations: Strengthening, Balance

## 2021-10-21 NOTE — PLAN OF CARE
Problem: Falls - Risk of:  Goal: Will remain free from falls  Description: Will remain free from falls  10/20/2021 2254 by Ginny Meyer RN  Outcome: Ongoing     Problem: Falls - Risk of:  Goal: Absence of physical injury  Description: Absence of physical injury  10/20/2021 2254 by Ginny Meyer RN  Outcome: Ongoing     Problem: Nutrition  Goal: Optimal nutrition therapy  10/20/2021 2254 by Ginny Meyer RN  Outcome: Ongoing     Problem: IP BALANCE  Goal: LTG - Patient will maintain balance to allow for safe/functional mobility  10/20/2021 2254 by Ginny Meyer RN  Outcome: Ongoing     Problem: Skin Integrity:  Goal: Will show no infection signs and symptoms  Description: Will show no infection signs and symptoms  10/20/2021 2254 by Ginny Meyer RN  Outcome: Ongoing     Problem: Skin Integrity:  Goal: Absence of new skin breakdown  Description: Absence of new skin breakdown  10/20/2021 2254 by Ginny Meyer RN  Outcome: Ongoing     Problem: OXYGENATION/RESPIRATORY FUNCTION  Goal: Patient will achieve/maintain normal respiratory rate/effort  Description: Respiratory rate and effort will be within normal limits for the patient  10/20/2021 2254 by Ginny Meyer RN  Outcome: Ongoing     Problem: Breathing Pattern - Ineffective:  Goal: Ability to achieve and maintain a regular respiratory rate will improve  Description: Ability to achieve and maintain a regular respiratory rate will improve  10/20/2021 2254 by Ginny Meyer RN  Outcome: Ongoing     Problem: Pain:  Goal: Pain level will decrease  Description: Pain level will decrease  10/20/2021 2254 by Ginny Meyer RN  Outcome: Ongoing     Problem: Pain:  Goal: Control of acute pain  Description: Control of acute pain  10/20/2021 2254 by Ginny Meyer RN  Outcome: Ongoing     Problem: Pain:  Goal: Control of chronic pain  Description: Control of chronic pain  10/20/2021 2254 by Ginny Meyer RN  Outcome: Ongoing

## 2021-10-21 NOTE — PROGRESS NOTES
Occupational Therapy  Facility/Department: [facility] PROGRESSIVE CARE  Daily Treatment Note  NAME: Gilles Coello  : 1945  MRN: [id number]    Date of Service: 10/21/2021    Discharge Recommendations:  Patient would benefit from continued therapy after discharge   Pt currently functioning below baseline. Would suggest additional therapy at time of discharge to maximize long term outcomes and prevent re-admission. Please refer to AM-PAC score for current level of function. Assessment   Performance deficits / Impairments: Decreased functional mobility ; Decreased safe awareness;Decreased cognition;Decreased ADL status; Decreased strength;Decreased endurance;Decreased high-level IADLs;Decreased fine motor control;Decreased posture;Decreased balance  Assessment: Pt is very limited by pain, weakness and resp status and currently req's 2 staff assist for all functional tasks/transfers. Skilled OT indicated to increase safety and IND with self care and increase overall strength/endurance for a safe return to OF. Prognosis: Fair  REQUIRES OT FOLLOW UP: Yes  Activity Tolerance  Activity Tolerance: Patient limited by fatigue;Treatment limited secondary to medical complications (resp status); Patient limited by pain  Activity Tolerance: poor  Safety Devices  Safety Devices in place: Yes  Type of devices: All fall risk precautions in place; Left in chair;Nurse notified;Call light within reach; Chair alarm in place;Gait belt;Patient at risk for falls         Patient Diagnosis(es): The primary encounter diagnosis was Hypoxia. Diagnoses of Pleural effusion on right, Peripheral edema, Anemia, unspecified type, and Acute kidney injury [facility]) were also pertinent to this visit. has no past medical history on file. has a past surgical history that includes IR CHEST TUBE INSERTION (10/18/2021).     Restrictions  Restrictions/Precautions  Restrictions/Precautions: General Precautions, Fall Risk, Up as Tolerated  Required Braces or Orthoses?: No  Position Activity Restriction  Other position/activity restrictions: Up with assist, telemetry, gallegos cath, HFNC O2, R chest tube, 1500 mL fluid restriction, heels off bed at all times  Subjective   General  Chart Reviewed: Yes  Patient assessed for rehabilitation services?: Yes  Additional Pertinent Hx: Pt. agreeable to treatment. Response to previous treatment: Patient with no complaints from previous session  Family / Caregiver Present: No  Subjective  Subjective: Pt in bed upon arrival. Pt agreeable to get up to chair. General Comment  Comments: RN present and ok'd pt for therapy. Orientation  Orientation  Overall Orientation Status: Within Functional Limits  Objective    ADL  Feeding: Setup (pt req'd complete set up of all food items and drinks.)  Grooming: Setup (to apply adhesive to botton dentures)  Additional Comments: Pt is limited by resp status, weakness and vision impairments        Balance  Sitting Balance: Contact guard assistance  Standing Balance: Maximum assistance (x2)  Bed mobility  Supine to Sit: Maximum assistance;2 Person assistance (with MAX verbal and hand over hand cues for sequencing movements, use of bed rail and overall safety. Total assist with line mgmt.)  Transfers  Stand Step Transfers: Maximum assistance;2 Person assistance  Sit to stand: 2 Person assistance;Maximum assistance  Stand to sit: Maximum assistance;2 Person assistance  Transfer Comments: Pt requried Max verbal cues/tactile assist for upright posture, controlled stand to sit, squaring self/AD and reaching back to surface prior to sitting, pacing self, pursed lip breathing and awarness/assist with lines all to increase safety and reduce risk of falls. Max A to manage/maneuver walker. Cognition  Overall Cognitive Status: Exceptions  Arousal/Alertness: Appropriate responses to stimuli  Following Commands: Follows one step commands with increased time; Follows one step commands with repetition  Attention Span: Appears intact; Attends with cues to redirect  Memory: Decreased short term memory  Safety Judgement: Decreased awareness of need for safety;Decreased awareness of need for assistance  Problem Solving: Decreased awareness of errors;Assistance required to correct errors made;Assistance required to identify errors made;Assistance required to implement solutions;Assistance required to generate solutions  Insights: Decreased awareness of deficits  Initiation: Requires cues for some  Sequencing: Requires cues for some  Cognition Comment: Pt had some difficulty following commands. Perception  Overall Perceptual Status: Impaired  Initiation: Cues to initiate tasks                                   Plan   Plan  Times per week: 4-5x/wk 1x/day as karey  Times per day: Daily  Current Treatment Recommendations: Strengthening, Endurance Training, Balance Training, Functional Mobility Training, Safety Education & Training, Home Management Training, Self-Care / ADL, Equipment Evaluation, Education, & procurement, Patient/Caregiver Education & Training  Plan Comment: Cont. with stated POC. AM-PAC Score        AM-[facility] Inpatient Daily Activity Raw Score: 12 (10/21/21 1004)  AM-PAC Inpatient ADL T-Scale Score : 30.6 (10/21/21 1004)  ADL Inpatient CMS 0-100% Score: 66.57 (10/21/21 1004)  ADL Inpatient CMS G-Code Modifier : CL (10/21/21 1004)    Goals  Short term goals  Time Frame for Short term goals: By discharge, pt to demo  Short term goal 1: ADL transfers and functional mobility to CGA with use of AD as needed. Short term goal 2: UB ADLs to SBA and LB ADLs to Min A with use of AD/AE as needed and proper pacing tech. Short term goal 3: toileting to Min A with use of AD/grab bars/BSC as needed. Short term goal 4: increased BUE strength by 1/2 grade to assist with functional tasks/I with simple B UE HEP with use of handouts as needed.   Short term goal 5: bed mobility to SBA with use of bedrails as needed. Long term goals  Long term goal 1: Pt to be I with fall prevention education, EC/WS tech, pursed lip breathing tech and recommendations AE/discharge with use of handouts as needed. Patient Goals   Patient goals : To feel better! Therapy Time   Individual Concurrent Group Co-treatment   Time In       2585   Time Out       0913   Minutes       12        Co-treatment with PT warranted secondary to decreased safety and independence requiring 2 skilled therapy professionals to address individual discipline's goals. OT addressing \"preparation for ADL transfer\",\"sitting balance for increased ADL performance\",\"sitting/activity tolerance\",\"functional reaching\",\"environmental safety/scanning\",\"fall prevention\",\"functional mobility for ADL transfers\",\"ability to sequence and follow directions\",\"functional UE strength\".       SHA Meléndez PROCEDURES:  Excision, mass, mesenteric, laparoscopic 20-Jan-2021 10:03:15 laparascopic, robot-assisted, partial excision of mesenteric mass Berhane Fox

## 2021-10-21 NOTE — RT PROTOCOL NOTE
RT Inhaler-Nebulizer Bronchodilator Protocol Note    There is a bronchodilator order in the chart from a provider indicating to follow the RT Bronchodilator Protocol and there is an Initiate RT Inhaler-Nebulizer Bronchodilator Protocol order as well (see protocol at bottom of note). CXR Findings:  XR CHEST PORTABLE    Result Date: 10/21/2021  Stable right chest tube without pneumothorax Stable bibasilar pleuroparenchymal changes     XR CHEST PORTABLE    Result Date: 10/20/2021  No significant interval change. Right chest tube in place with small right pleural effusion and right basilar opacity. Unchanged moderate left pleural effusion. XR CHEST PORTABLE    Result Date: 10/19/2021  Stable position/appearance of right pleural catheter stable bibasilar opacities related to combined pleural-parenchymal processes       The findings from the last RT Protocol Assessment were as follows:   History Pulmonary Disease: Chronic pulmonary disease  Respiratory Pattern: Mild dyspnea at rest, irregular pattern, or RR 21-25 bpm  Breath Sounds: Slightly diminished and/or crackles  Cough: Weak, non-productive  Indication for Bronchodilator Therapy: Decreased or absent breath sounds  Bronchodilator Assessment Score: 11    Aerosolized bronchodilator medication orders have been revised according to the RT Inhaler-Nebulizer Bronchodilator Protocol below. Respiratory Therapist to perform RT Therapy Protocol Assessment initially then follow the protocol. Repeat RT Therapy Protocol Assessment PRN for score 0-3 or on second treatment, BID, and PRN for scores above 3. No Indications - adjust the frequency to every 6 hours PRN wheezing or bronchospasm, if no treatments needed after 48 hours then discontinue using Per Protocol order mode. If indication present, adjust the RT bronchodilator orders based on the Bronchodilator Assessment Score as indicated below.   Use Inhaler orders unless patient has one or more of the following: on home nebulizer, not able to hold breath for 10 seconds, is not alert and oriented, cannot activate and use MDI correctly, or respiratory rate 25 breaths per minute or more, then use the equivalent nebulizer order(s) with same Frequency and PRN reasons based on the score. If a patient is on this medication at home then do not decrease Frequency below that used at home. 0-3 - enter or revise RT bronchodilator order(s) to equivalent RT Bronchodilator order with Frequency of every 4 hours PRN for wheezing or increased work of breathing using Per Protocol order mode. 4-6 - enter or revise RT Bronchodilator order(s) to two equivalent RT bronchodilator orders with one order with BID Frequency and one order with Frequency of every 4 hours PRN wheezing or increased work of breathing using Per Protocol order mode. 7-10 - enter or revise RT Bronchodilator order(s) to two equivalent RT bronchodilator orders with one order with TID Frequency and one order with Frequency of every 4 hours PRN wheezing or increased work of breathing using Per Protocol order mode. 11-13 - enter or revise RT Bronchodilator order(s) to one equivalent RT bronchodilator order with QID Frequency and an Albuterol order with Frequency of every 4 hours PRN wheezing or increased work of breathing using Per Protocol order mode. Greater than 13 - enter or revise RT Bronchodilator order(s) to one equivalent RT bronchodilator order with every 4 hours Frequency and an Albuterol order with Frequency of every 2 hours PRN wheezing or increased work of breathing using Per Protocol order mode. RT to enter RT Home Evaluation for COPD & MDI Assessment order using Per Protocol order mode.     Electronically signed by Kavya Alves RCP on 10/21/2021 at 8:51 AM

## 2021-10-21 NOTE — PROGRESS NOTES
Provider Kenia Ashby informed of 10/10 pain in back and left thigh. Norco ordered per orders. Pt refused pain meds at this time.

## 2021-10-21 NOTE — PROGRESS NOTES
Nephrology Progress Note      SUBJECTIVE    Patient continues to be short of breath and requiring 40 L of oxygen. After chest tube placement there was expansion of lung met reason for high FiO2 requirement is not clear at this point. Patient will be going for repeat VQ scan. We will discussed the option of dialysis. Considering her overall health I am not sure if she will be able to tolerate dialysis or not. We will discuss with her family tomorrow. 24-hour creatinine clearance results are available and her clearance was only 10. OBJECTIVE      CURRENT TEMPERATURE:  Temp: 98.4 °F (36.9 °C)  MAXIMUM TEMPERATURE OVER 24HRS:  Temp (24hrs), Av.5 °F (36.9 °C), Min:98.1 °F (36.7 °C), Max:99.1 °F (37.3 °C)    CURRENT RESPIRATORY RATE:  Resp: 22  CURRENT PULSE:  Pulse: 102  CURRENT BLOOD PRESSURE:  BP: (!) 103/58  24HR BLOOD PRESSURE RANGE:  Systolic (59ULE), WWU:199 , Min:101 , KXT:011   ; Diastolic (25JLJ), TFH:18, Min:58, Max:64    24HR INTAKE/OUTPUT:      Intake/Output Summary (Last 24 hours) at 10/21/2021 1012  Last data filed at 10/21/2021 4508  Gross per 24 hour   Intake --   Output 890 ml   Net -890 ml     WEIGHT :  Patient Vitals for the past 96 hrs (Last 3 readings):   Weight   10/21/21 0630 131 lb 2 oz (59.5 kg)   10/20/21 0610 130 lb 9.6 oz (59.2 kg)   10/19/21 0600 132 lb 4.8 oz (60 kg)     PHYSICAL EXAM      GENERAL APPEARANCE: Awake and alert x3   SKIN: Warm to touch  EYES: Conjunctiva is pink   ENT: No thrush   NECK:   Midline trachea  PULMONARY: Bilateral air entry and decreased at the bases  CADRDIOVASCULAR: S1 and S2 audible no S3  ABDOMEN: soft nontender, bowel sounds present, no ascites.        EXTREMITIES: No edema  CURRENT MEDICATIONS      HYDROcodone-acetaminophen (NORCO) 5-325 MG per tablet 1 tablet, Q4H PRN   Or  HYDROcodone-acetaminophen (NORCO) 5-325 MG per tablet 2 tablet, Q4H PRN  budesonide (PULMICORT) nebulizer suspension 500 mcg, BID  Arformoterol Tartrate (BROVANA) nebulizer solution 15 mcg, BID  methylPREDNISolone sodium (SOLU-MEDROL) injection 40 mg, Q6H  melatonin tablet 3 mg, Nightly PRN  ipratropium-albuterol (DUONEB) nebulizer solution 1 ampule, 4x daily  potassium chloride (KLOR-CON M) extended release tablet 40 mEq, Daily  epoetin mike-epbx (RETACRIT) injection 8,000 Units, Once per day on Mon Wed Fri  torsemide [facility]) tablet 40 mg, Daily  calcitRIOL (ROCALTROL) capsule 0.25 mcg, Once per day on Mon Wed Fri  glucose (GLUTOSE) 40 % oral gel 15 g, PRN  dextrose 50 % IV solution, PRN  glucagon (rDNA) injection 1 mg, PRN  dextrose 5 % solution, PRN  sodium chloride flush 0.9 % injection 5-40 mL, 2 times per day  sodium chloride flush 0.9 % injection 5-40 mL, PRN  0.9 % sodium chloride infusion, PRN  levoFLOXacin (LEVAQUIN) 500 MG/100ML infusion 500 mg, Q48H  vitamin D (ERGOCALCIFEROL) capsule 50,000 Units, Weekly  ondansetron (ZOFRAN-ODT) disintegrating tablet 4 mg, Q8H PRN   Or  ondansetron (ZOFRAN) injection 4 mg, Q6H PRN  acetaminophen (TYLENOL) tablet 650 mg, Q6H PRN   Or  acetaminophen (TYLENOL) suppository 650 mg, Q6H PRN  perflutren lipid microspheres (DEFINITY) injection 1.65 mg, ONCE PRN  heparin (porcine) injection 5,000 Units, 3 times per day          LABS      CBC:   Recent Labs     10/20/21  0552   WBC 9.1   RBC 3.21*   HGB 7.9*   HCT 27.1*   MCV 84.4   MCH 24.6*   MCHC 29.2   RDW 20.3*      MPV 11.7      BMP:   Recent Labs     10/19/21  0612 10/19/21  1239 10/20/21  0552 10/20/21  1242 10/21/21  0519     --  136  --  134*   K 4.7   < > 4.9 4.4 4.5     --  97*  --  95*   CO2 26  --  25  --  25   BUN 52*  --  55*  --  61*   CREATININE 2.52*  --  2.60*  --  2.73*   GLUCOSE 94  --  143*  --  132*   CALCIUM 8.4*  --  8.9  --  8.7    < > = values in this interval not displayed.     PHOSPHORUS:    IRON:    Lab Results   Component Value Date    IRON 22 10/13/2021     IRON SATURATION:    Lab Results   Component Value Date    LABIRON 9 10/13/2021     TIBC: Lab Results   Component Value Date    TIBC 237 10/13/2021     FERRITIN:    Lab Results   Component Value Date    FERRITIN 20 10/13/2021       SPEP:   Lab Results   Component Value Date    PROT 4.7 10/17/2021     URINE SODIUM:    Lab Results   Component Value Date    LAZARO 40 10/13/2021      URINE CREATININE:    Lab Results   Component Value Date    LABCREA 40.5 10/17/2021    LABCREA 76.0 10/13/2021     URINALYSIS:  U/A:   Lab Results   Component Value Date    NITRU NEGATIVE 10/13/2021    COLORU Yellow 10/13/2021    PHUR 5.5 10/13/2021    WBCUA 10 TO 20 10/13/2021    RBCUA 2 TO 5 10/13/2021    MUCUS 1+ 10/13/2021    TRICHOMONAS NOT REPORTED 10/13/2021    YEAST NOT REPORTED 10/13/2021    BACTERIA NOT REPORTED 10/13/2021    SPECGRAV 1.020 10/13/2021    LEUKOCYTESUR SMALL 10/13/2021    UROBILINOGEN Normal 10/13/2021    BILIRUBINUR NEGATIVE 10/13/2021    GLUCOSEU NEGATIVE 10/13/2021    KETUA NEGATIVE 10/13/2021    AMORPHOUS 1+ 10/13/2021           ASSESSMENT    1. End-stage renal disease with creatinine clearance of 10 mL/min with poor appetite and weight loss. Patient is not a good candidate for dialysis because of overall health issue. However we will talk to the family if they are agreeable for dialysis as a treatment modality. 2.   Increasing oxygen requirement etiology not clear patient is going for repeat VQ scan  3. Bilateral pleural effusion with loculated right-sided effusion status post chest tube placement. 4.  COPD  5. Pulmonary hypertension  6. Moderate to severe protein calorie malnutrition  7. Secondary hyperparathyroidism   8. Anemia of chronic kidney disease, hgb 7.9 today  9. Hypokalemia: Improved  PLAN      1. Will DC oral torsemide  2. We will change Lasix to IV  3. I requested nurse to call family so they can meet me tomorrow morning so we can discuss the option of kidney dialysis. Two.  This note is created with the assistance of a speech-recognition program. While intending to

## 2021-10-21 NOTE — PROGRESS NOTES
Agnieszka Rodriguez [facility]  Office: [address], DO, Orlando Sher, DO, Britt Pulliam, DO, Char Morris Blood, DO, Kimmy Mccord MD, Jennifer Kemp MD, Rebecca Montesinos MD, Maribeth Collins MD, Dwight Jain MD, Davie Gleason MD, Francie Dodd MD, Jessica Ellis MD, Otto Adams, DO, Gisselle Gtz, DO, Brissa Daly MD,  Miranda Clemente, DO, Neida Alejandre MD, Minda Evans MD, Fidelia Rivera MD, Cristina Joseph MD, Nidia Estrella MD, Jagdish Dumas MD, Saloni Cisneros [facility], [facility], CNP, Elmer Bernabe, CNP, Caesar Rodriguez, CNS, Garfield Nicholas, CNP, Ozzie Nix, CNP, Sonido Diaz, CNP, Kendrick Hairston, CNP, Radha Huang, CNP, Rowan Dior, CNP, ANGEL Juarez-C, Sarah Griffith, [facility], Moni Stevens, CNP, Danette Riddle, CNP, Juanis Landers, CNP, Paul Ordonez, CNP, Opal Long, CNP, Fede Castillo, CNP, Delfino Vigil, [facility]    Progress Note    10/21/2021    9:57 AM    Name:   Caity Peterson  MRN:     [id number]     [place]:      [de-identified]   Room:   [address] [place] [address] Day:  9  Admit Date:  10/12/2021  2:53 PM    PCP:   Debra Hull MD  Code Status:  Full Code    Subjective:     C/C:   Chief Complaint   Patient presents with    Respiratory Distress     EMS reported 97% on room air; pt arrives with saturation 45% on room air    Failure To Thrive     Interval History Status:   chest tube placed 10/18: she has had approx 2L total of serosanguinous fluid since insertion   Patient feels like her breathing is better today, she is currently on 5L via nasal cannula. She was still on hi-flow this morning and repeat VQ scan was ordered  . VQ results   Exam is technically high probability for pulmonary embolism. While emboli are possible, the diminished activity within the upper lung zones could also be secondary to emphysema; a  ventilation study would be needed for confirmation.  This could be obtained at a later time as clinically warranted. Improving perfusion at the lateral right lung base. Ventilation study was not initial done due hospital COVID protocols. Patient can have Ventilation study completed but will need to wait 3 days for isolate to clear from her system   Brief History:   Danielle Wadsworth is a 64 y. o. female with a hx of CKD 4 and iron deficiency anemia who presented to [place] [facility] with Respiratory Distress and hypoxia (O2 sats 45%) and failure to thrive and is admitted to the hospital for the management of right Pleural effusion and acute renal failure. Patient lives at home alone and has recently been having difficulty caring for herself and generalized weakness for the last few weeks . She does have chronic BLE edema but denies hx of CHF. Initial CXR showed large right pleural effusion, stat elevated BNP and D-dimer as well as BUN/creatinine.  Last took known creatinine was 2.34 in august, she reports she seen a nephrologist once. She does not wear home oxygen      10/13: US guided right thoracentesis 850ml of nonturbid straw colored fluid removed -transudative    Pleural fluid cultures negative for malignancy  Review of Systems:     Constitutional:  negative for chills, fevers, sweats  Respiratory: Positive for cough and shortness of breath   cardiovascular:  negative for chest pain, chest pressure/discomfort,palpitations, +lower extremity edema,   Gastrointestinal:  negative for abdominal pain, constipation, diarrhea, nausea, vomiting  Neurological:  negative for dizziness, headache    Medications: Allergies:     Allergies   Allergen Reactions    Penicillins Swelling       Current Meds:   Scheduled Meds:    furosemide  40 mg IntraVENous BID    budesonide  0.5 mg Nebulization BID    Arformoterol Tartrate  15 mcg Nebulization BID    methylPREDNISolone  40 mg IntraVENous Q6H    ipratropium-albuterol  1 ampule Inhalation 4x daily    potassium chloride  40 mEq Oral Daily    epoetin mike-epbx  8,000 Units SubCUTAneous Once per day on     calcitRIOL  0.25 mcg Oral Once per day on     sodium chloride flush  5-40 mL IntraVENous 2 times per day    levofloxacin  500 mg IntraVENous Q48H    vitamin D  50,000 Units Oral Weekly    heparin (porcine)  5,000 Units SubCUTAneous 3 times per day     Continuous Infusions:    dextrose      sodium chloride       PRN Meds: HYDROcodone 5 mg - acetaminophen **OR** HYDROcodone 5 mg - acetaminophen, melatonin, glucose, dextrose, glucagon (rDNA), dextrose, sodium chloride flush, sodium chloride, ondansetron **OR** ondansetron, acetaminophen **OR** acetaminophen, perflutren lipid microspheres    Data:     Past Medical History:   has no past medical history on file. Social History:   reports that she quit smoking about 2 years ago. Her smoking use included cigarettes. She has never used smokeless tobacco. She reports current alcohol use. Family History:   Family History   Problem Relation Age of Onset    Hypertension Mother     Cancer Sister     Cancer Brother        Vitals:  /63   Pulse 97   Temp 97.5 °F (36.4 °C) (Oral)   Resp 20   Ht 5' 4\" (1.626 m)   Wt 131 lb 2 oz (59.5 kg)   SpO2 99%   BMI 22.51 kg/m²   Temp (24hrs), Av.2 °F (36.8 °C), Min:97.5 °F (36.4 °C), Max:99.1 °F (37.3 °C)    Recent Labs     10/20/21  1214 10/20/21  1707 10/20/21  2036 10/21/21  0814   POCGLU 131* 113* 145* 128*       I/O (24Hr):     Intake/Output Summary (Last 24 hours) at 10/21/2021 1241  Last data filed at 10/21/2021 3906  Gross per 24 hour   Intake --   Output 890 ml   Net -890 ml       Labs:  Hematology:  Recent Labs     10/20/21  0552   WBC 9.1   RBC 3.21*   HGB 7.9*   HCT 27.1*   MCV 84.4   MCH 24.6*   MCHC 29.2   RDW 20.3*      MPV 11.7     Chemistry:  Recent Labs     10/19/21  0612 10/19/21  1239 10/20/21  0552 10/20/21  1242 10/21/21  0519     --  136  --  134*   K 4.7   < > 4.9 4.4 4.5     --  97*  --  95*   CO2 26  --  25  -- 25   GLUCOSE 94  --  143*  --  132*   BUN 52*  --  55*  --  61*   CREATININE 2.52*  --  2.60*  --  2.73*   ANIONGAP 11  --  14  --  14   LABGLOM 18*  --  18*  --  17*   GFRAA 22*  --  22*  --  20*   CALCIUM 8.4*  --  8.9  --  8.7    < > = values in this interval not displayed. Recent Labs     10/20/21  0717 10/20/21  0804 10/20/21  1214 10/20/21  1707 10/20/21  2036 10/21/21  0814   POCGLU 167* 142* 131* 113* 145* 128*     ABG:No results found for: POCPH, PHART, PH, POCPCO2, VQV6MUD, PCO2, POCPO2, PO2ART, PO2, POCHCO3, ZLZ2SNW, HCO3, NBEA, PBEA, BEART, BE, THGBART, THB, NQQ5CSD, JKUG3NMF, N5YRBEWR, O2SAT, FIO2  Lab Results   Component Value Date/Time    SPECIAL RT HAND 6ML 10/18/2021 04:26 AM    SPECIAL RT AC 5ML 10/18/2021 04:26 AM     Lab Results   Component Value Date/Time    CULTURE NO GROWTH 3 DAYS 10/18/2021 04:26 AM    CULTURE NO GROWTH 3 DAYS 10/18/2021 04:26 AM       Radiology:  CT CHEST WO CONTRAST    Result Date: 10/15/2021  1. Moderate sized, mildly loculated right pleural effusion and small mildly loculated left pleural effusion. 2. Consolidation at the inferior lungs, likely atelectasis. 3. Moderate to severe centrilobular emphysema. 4. Mildly prominent main pulmonary artery, which can be seen with pulmonary hypertension. 5. Small hiatal hernia. 6. Small nodules noted in the thyroid gland. If indicated, this could be further evaluated with ultrasound. NM LUNG SCAN PERFUSION ONLY    Result Date: 10/13/2021  Findings consistent with low probability V/Q scan for pulmonary embolus. XR CHEST DECUBITUS RIGHT    Result Date: 10/16/2021  Small to moderate right and small left layering pleural effusions. XR CHEST DECUBITUS LEFT    Result Date: 10/16/2021  Small to moderate right and small left layering pleural effusions. XR CHEST PORTABLE    Result Date: 10/15/2021  Bilateral effusions with adjacent infiltrates concerning for pneumonia.      XR CHEST PORTABLE    Result Date: 10/14/2021  Interval increase in the recurrent at least moderate right pleural effusion. However, there is rightward mediastinal shift despite the presence of right pleural effusion, indicating right lung atelectasis/collapse suggestive of trapped lung syndrome. Small left pleural effusion with probable left basilar atelectasis. XR CHEST PORTABLE    Result Date: 10/13/2021  Interval decrease in size of right-sided pleural effusion now moderate in size. Small left pleural effusion. Opacity of bilateral lower lung fields may reflect underlying atelectasis. Apparent thin line overlying the right lung apex may be artifactual related to overlying skin fold. The lung markings are seen lateral to the aforementioned line. XR CHEST PORTABLE    Result Date: 10/12/2021  Significant right pleural effusion and associated airspace disease. Mild left basilar airspace disease. Pneumonia cannot be excluded. US RETROPERITONEAL COMPLETE    Result Date: 10/13/2021  Bilateral simple renal cyst. Lomeli catheter within the urinary bladder. IR GUIDED THORACENTESIS PLEURAL    Result Date: 10/13/2021  Successful ultrasound guided diagnostic and therapeutic right thoracentesis. Physical Examination:        General appearance:  alert, cooperative and mild distress  Mental Status:  At baseline, normal affect  Lungs: Decreased breath sounds, + crackles, on high-flow oxygen, right chest tube to suction   Heart:  regular rate and rhythm, no murmur  Abdomen:  soft, nontender, nondistended, normal bowel sounds, no masses, hepatomegaly, splenomegaly  Extremities:  trace edema, redness, tenderness in the calves  Skin:  no gross lesions, rashes, induration    Assessment:        Hospital Problems         Last Modified POA    * (Principal) Pleural effusion on right 10/15/2021 Yes    Acute renal failure with acute renal cortical necrosis superimposed on stage 4 chronic kidney disease ([address] 10/14/2021 Yes    Acute respiratory Kay Oliver NP  10/21/2021  12:41 PM

## 2021-10-21 NOTE — PROGRESS NOTES
Pulmonary Critical Care Progress Note  Manny Huynh MD     Patient seen for the follow up of acute hypoxic respiratory failure, moderate pulmonary hypertension, former smoker/COPD Pleural effusion on right     Subjective:  No significant overnight events noted. She is sitting up in the bedside chair, no distress. She remains is off HFNC, on 5L/NC. She had about 150 mL out of chest tube over the last 24 hours. She feels shortness of breath is better today. She does not have any significant cough or chest pain. Does complain of pain in her left leg. Examination:  Vitals: BP (!) 103/58   Pulse 102   Temp 98.4 °F (36.9 °C) (Axillary)   Resp 22   Ht 5' 4\" (1.626 m)   Wt 131 lb 2 oz (59.5 kg)   SpO2 96%   BMI 22.51 kg/m²   General appearance: alert and cooperative with exam, up in the chair  Neck: No JVD  Lungs: Moderate air exchange, diminished bilateral bases, positive crackles. No air leak noted in chest tube  Heart: regular rate and rhythm, S1, S2 normal, no gallop  Abdomen: Soft, non tender, + BS  Extremities: no cyanosis or clubbing. No significant edema    LABs:  CBC:   Recent Labs     10/20/21  0552   WBC 9.1   HGB 7.9*   HCT 27.1*        BMP:   Recent Labs     10/20/21  0552 10/20/21  0552 10/20/21  1242 10/21/21  0519     --   --  134*   K 4.9   < > 4.4 4.5   CO2 25  --   --  25   BUN 55*  --   --  61*   CREATININE 2.60*  --   --  2.73*   LABGLOM 18*  --   --  17*   GLUCOSE 143*  --   --  132*    < > = values in this interval not displayed.      Radiology:  10/21/2021      Impression:  Acute hypoxic respiratory failure requiring high flow oxygen  Moderate to large right pleural effusion, s/p thoracentesis with 850 mL removed  Small left pleural effusion, loculated  Atelectasis/?  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 Spoke with Dr. Amie Bradshaw at [place] [facility], will proceed with ventilation portion of VQ scan, which can not be done for 3 days due to lack of medication needed. Will start heparin drip until that can be done. Also check bilateral lower extremity Dopplers  Incentive spirometry every hour while awake  Symbicort  DuoNeb every 4 hours  Budesonide and Brovana via nebulizer every 12 hours  IV Solu-Medrol 40 mg every 6 hours  Continue Levaquin IV  Keep pigtail catheter to suction. Monitor output. X-ray chest in a.m. Labs: CBC and BMP in am  Nephrology following, 40 mg Demadex daily. Possible dialysis if patient and family agrees.   S/P IV iron x3 doses  Pleural fluid negative for malignancy  DVT prophylaxis with subcu heparin  PFTs as an outpatient  Will follow with you    Electronically signed by     Allyson Jamil MD on 10/21/2021 at 2:05 PM  Pulmonary Critical Care and Sleep Medicine,  [facility]  Cell: [phone]  Office: [phone]

## 2021-10-22 ENCOUNTER — APPOINTMENT (OUTPATIENT)
Dept: GENERAL RADIOLOGY | Age: 76
DRG: 291 | End: 2021-10-22
Payer: MEDICARE

## 2021-10-22 LAB
ANION GAP SERPL CALCULATED.3IONS-SCNC: 18 MMOL/L (ref 9–17)
ANTI-XA UNFRAC HEPARIN: 0.63 IU/L (ref 0.3–0.7)
ANTI-XA UNFRAC HEPARIN: 0.98 IU/L (ref 0.3–0.7)
ANTI-XA UNFRAC HEPARIN: >1.1 IU/L (ref 0.3–0.7)
BUN BLDV-MCNC: 70 MG/DL (ref 8–23)
BUN/CREAT BLD: 24 (ref 9–20)
CALCIUM SERPL-MCNC: 8.4 MG/DL (ref 8.6–10.4)
CHLORIDE BLD-SCNC: 92 MMOL/L (ref 98–107)
CO2: 24 MMOL/L (ref 20–31)
CREAT SERPL-MCNC: 2.95 MG/DL (ref 0.5–0.9)
GFR AFRICAN AMERICAN: 19 ML/MIN
GFR NON-AFRICAN AMERICAN: 15 ML/MIN
GFR SERPL CREATININE-BSD FRML MDRD: ABNORMAL ML/MIN/{1.73_M2}
GFR SERPL CREATININE-BSD FRML MDRD: ABNORMAL ML/MIN/{1.73_M2}
GLUCOSE BLD-MCNC: 126 MG/DL (ref 70–99)
GLUCOSE BLD-MCNC: 133 MG/DL (ref 65–105)
GLUCOSE BLD-MCNC: 146 MG/DL (ref 65–105)
GLUCOSE BLD-MCNC: 179 MG/DL (ref 65–105)
GLUCOSE BLD-MCNC: 180 MG/DL (ref 65–105)
POTASSIUM SERPL-SCNC: 3.8 MMOL/L (ref 3.7–5.3)
SODIUM BLD-SCNC: 134 MMOL/L (ref 135–144)

## 2021-10-22 PROCEDURE — 6360000002 HC RX W HCPCS: Performed by: NURSE PRACTITIONER

## 2021-10-22 PROCEDURE — 6370000000 HC RX 637 (ALT 250 FOR IP): Performed by: INTERNAL MEDICINE

## 2021-10-22 PROCEDURE — 99232 SBSQ HOSP IP/OBS MODERATE 35: CPT | Performed by: INTERNAL MEDICINE

## 2021-10-22 PROCEDURE — 2060000000 HC ICU INTERMEDIATE R&B

## 2021-10-22 PROCEDURE — 94640 AIRWAY INHALATION TREATMENT: CPT

## 2021-10-22 PROCEDURE — 2700000000 HC OXYGEN THERAPY PER DAY

## 2021-10-22 PROCEDURE — 80048 BASIC METABOLIC PNL TOTAL CA: CPT

## 2021-10-22 PROCEDURE — 36415 COLL VENOUS BLD VENIPUNCTURE: CPT

## 2021-10-22 PROCEDURE — 6370000000 HC RX 637 (ALT 250 FOR IP): Performed by: NURSE PRACTITIONER

## 2021-10-22 PROCEDURE — 85520 HEPARIN ASSAY: CPT

## 2021-10-22 PROCEDURE — 6360000002 HC RX W HCPCS: Performed by: INTERNAL MEDICINE

## 2021-10-22 PROCEDURE — 94761 N-INVAS EAR/PLS OXIMETRY MLT: CPT

## 2021-10-22 PROCEDURE — 82947 ASSAY GLUCOSE BLOOD QUANT: CPT

## 2021-10-22 PROCEDURE — 71045 X-RAY EXAM CHEST 1 VIEW: CPT

## 2021-10-22 PROCEDURE — 6370000000 HC RX 637 (ALT 250 FOR IP): Performed by: STUDENT IN AN ORGANIZED HEALTH CARE EDUCATION/TRAINING PROGRAM

## 2021-10-22 RX ORDER — METHYLPREDNISOLONE SODIUM SUCCINATE 40 MG/ML
30 INJECTION, POWDER, LYOPHILIZED, FOR SOLUTION INTRAMUSCULAR; INTRAVENOUS EVERY 8 HOURS
Status: DISCONTINUED | OUTPATIENT
Start: 2021-10-22 | End: 2021-10-26

## 2021-10-22 RX ADMIN — METHYLPREDNISOLONE SODIUM SUCCINATE 30 MG: 40 INJECTION, POWDER, FOR SOLUTION INTRAMUSCULAR; INTRAVENOUS at 18:30

## 2021-10-22 RX ADMIN — POTASSIUM CHLORIDE 40 MEQ: 20 TABLET, EXTENDED RELEASE ORAL at 08:26

## 2021-10-22 RX ADMIN — ARFORMOTEROL TARTRATE 15 MCG: 15 SOLUTION RESPIRATORY (INHALATION) at 18:16

## 2021-10-22 RX ADMIN — IPRATROPIUM BROMIDE AND ALBUTEROL SULFATE 1 AMPULE: .5; 2.5 SOLUTION RESPIRATORY (INHALATION) at 14:02

## 2021-10-22 RX ADMIN — IPRATROPIUM BROMIDE AND ALBUTEROL SULFATE 1 AMPULE: .5; 2.5 SOLUTION RESPIRATORY (INHALATION) at 07:56

## 2021-10-22 RX ADMIN — EPOETIN ALFA-EPBX 8000 UNITS: 4000 INJECTION, SOLUTION INTRAVENOUS; SUBCUTANEOUS at 08:30

## 2021-10-22 RX ADMIN — IPRATROPIUM BROMIDE AND ALBUTEROL SULFATE 1 AMPULE: .5; 2.5 SOLUTION RESPIRATORY (INHALATION) at 18:16

## 2021-10-22 RX ADMIN — CALCITRIOL 0.25 MCG: 0.25 CAPSULE ORAL at 08:26

## 2021-10-22 RX ADMIN — HEPARIN SODIUM 12 UNITS/KG/HR: 10000 INJECTION, SOLUTION INTRAVENOUS at 08:26

## 2021-10-22 RX ADMIN — METHYLPREDNISOLONE SODIUM SUCCINATE 40 MG: 40 INJECTION, POWDER, FOR SOLUTION INTRAMUSCULAR; INTRAVENOUS at 10:23

## 2021-10-22 RX ADMIN — METHYLPREDNISOLONE SODIUM SUCCINATE 40 MG: 40 INJECTION, POWDER, FOR SOLUTION INTRAMUSCULAR; INTRAVENOUS at 05:35

## 2021-10-22 RX ADMIN — FUROSEMIDE 40 MG: 10 INJECTION, SOLUTION INTRAMUSCULAR; INTRAVENOUS at 08:26

## 2021-10-22 RX ADMIN — ARFORMOTEROL TARTRATE 15 MCG: 15 SOLUTION RESPIRATORY (INHALATION) at 07:57

## 2021-10-22 RX ADMIN — FUROSEMIDE 40 MG: 10 INJECTION, SOLUTION INTRAMUSCULAR; INTRAVENOUS at 18:30

## 2021-10-22 RX ADMIN — BUDESONIDE 500 MCG: 0.5 SUSPENSION RESPIRATORY (INHALATION) at 18:16

## 2021-10-22 RX ADMIN — BUDESONIDE 500 MCG: 0.5 SUSPENSION RESPIRATORY (INHALATION) at 07:56

## 2021-10-22 RX ADMIN — ACETAMINOPHEN 650 MG: 325 TABLET ORAL at 20:11

## 2021-10-22 RX ADMIN — HEPARIN SODIUM 10.08 UNITS/KG/HR: 10000 INJECTION, SOLUTION INTRAVENOUS at 21:49

## 2021-10-22 ASSESSMENT — PAIN DESCRIPTION - LOCATION: LOCATION: CHEST

## 2021-10-22 ASSESSMENT — PAIN DESCRIPTION - ORIENTATION: ORIENTATION: RIGHT

## 2021-10-22 ASSESSMENT — PAIN DESCRIPTION - PAIN TYPE: TYPE: ACUTE PAIN

## 2021-10-22 ASSESSMENT — PAIN SCALES - GENERAL
PAINLEVEL_OUTOF10: 0
PAINLEVEL_OUTOF10: 3
PAINLEVEL_OUTOF10: 0

## 2021-10-22 ASSESSMENT — PAIN DESCRIPTION - FREQUENCY: FREQUENCY: INTERMITTENT

## 2021-10-22 ASSESSMENT — PAIN DESCRIPTION - DESCRIPTORS: DESCRIPTORS: ACHING;SORE

## 2021-10-22 ASSESSMENT — PAIN DESCRIPTION - PROGRESSION: CLINICAL_PROGRESSION: NOT CHANGED

## 2021-10-22 ASSESSMENT — PAIN DESCRIPTION - ONSET: ONSET: ON-GOING

## 2021-10-22 NOTE — PROGRESS NOTES
Occupational Therapy    DATE: 10/22/2021    NAME: Celso Sims  MRN: [id number]   : 1945    Patient not seen this date for Occupational Therapy due to:      [x] Cancel by RN or physician due to: Hold Per RN, family is present and waiting to speak with the doctor. Will continue to follow.      [] Hemodialysis    [] Critical Lab Value Level     [] Blood transfusion in progress    [] Acute or unstable cardiovascular status   _MAP < 55 or more than >115  _HR < 40 or > 130    [] Acute or unstable pulmonary status   -FiO2 > 60%   _RR < 5 or >40    _O2 sats < 85%    [] Strict Bedrest    [] Off Unit for surgery or procedure    [] Off Unit for testing       [] Pending imaging to R/O fracture    [] Refusal by Patient      [] Other            Leticia Weiss OTR/L

## 2021-10-22 NOTE — FLOWSHEET NOTE
Patient oxygen level 86% on 4L NC;patient eating dinner.  Oxygen increased to 5L;encouraged patient to take in slow deep breaths, oxygen sat up to 90%

## 2021-10-22 NOTE — RT PROTOCOL NOTE
RT Inhaler-Nebulizer Bronchodilator Protocol Note    There is a bronchodilator order in the chart from a provider indicating to follow the RT Bronchodilator Protocol and there is an Initiate RT Inhaler-Nebulizer Bronchodilator Protocol order as well (see protocol at bottom of note). CXR Findings:  XR CHEST PORTABLE    Result Date: 10/21/2021  Stable right chest tube without pneumothorax Stable bibasilar pleuroparenchymal changes       The findings from the last RT Protocol Assessment were as follows:   History Pulmonary Disease: Chronic pulmonary disease  Respiratory Pattern: Mild dyspnea at rest, irregular pattern, or RR 21-25 bpm  Breath Sounds: Slightly diminished and/or crackles  Cough: Weak, non-productive  Indication for Bronchodilator Therapy: Decreased or absent breath sounds  Bronchodilator Assessment Score: 11    Aerosolized bronchodilator medication orders have been revised according to the RT Inhaler-Nebulizer Bronchodilator Protocol below. Respiratory Therapist to perform RT Therapy Protocol Assessment initially then follow the protocol. Repeat RT Therapy Protocol Assessment PRN for score 0-3 or on second treatment, BID, and PRN for scores above 3. No Indications - adjust the frequency to every 6 hours PRN wheezing or bronchospasm, if no treatments needed after 48 hours then discontinue using Per Protocol order mode. If indication present, adjust the RT bronchodilator orders based on the Bronchodilator Assessment Score as indicated below. Use Inhaler orders unless patient has one or more of the following: on home nebulizer, not able to hold breath for 10 seconds, is not alert and oriented, cannot activate and use MDI correctly, or respiratory rate 25 breaths per minute or more, then use the equivalent nebulizer order(s) with same Frequency and PRN reasons based on the score. If a patient is on this medication at home then do not decrease Frequency below that used at home.     0-3 - enter or revise RT bronchodilator order(s) to equivalent RT Bronchodilator order with Frequency of every 4 hours PRN for wheezing or increased work of breathing using Per Protocol order mode. 4-6 - enter or revise RT Bronchodilator order(s) to two equivalent RT bronchodilator orders with one order with BID Frequency and one order with Frequency of every 4 hours PRN wheezing or increased work of breathing using Per Protocol order mode. 7-10 - enter or revise RT Bronchodilator order(s) to two equivalent RT bronchodilator orders with one order with TID Frequency and one order with Frequency of every 4 hours PRN wheezing or increased work of breathing using Per Protocol order mode. 11-13 - enter or revise RT Bronchodilator order(s) to one equivalent RT bronchodilator order with QID Frequency and an Albuterol order with Frequency of every 4 hours PRN wheezing or increased work of breathing using Per Protocol order mode. Greater than 13 - enter or revise RT Bronchodilator order(s) to one equivalent RT bronchodilator order with every 4 hours Frequency and an Albuterol order with Frequency of every 2 hours PRN wheezing or increased work of breathing using Per Protocol order mode. RT to enter RT Home Evaluation for COPD & MDI Assessment order using Per Protocol order mode.     Electronically signed by Kavya Alves RCP on 10/22/2021 at 7:59 AM

## 2021-10-22 NOTE — CARE COORDINATION
Social work: pt still being followed by Ruslan Hooker. Also if not eligible for [facility] the choices for snf are: 1. [facility] 2. [facility]  3. [facility] care 4. [facility] or house. Will need milton and Rx along with discharge order for snf if snf needed at discharge.  Avani gray

## 2021-10-22 NOTE — PROGRESS NOTES
[facility]  Office: [address], DO, Orlando Sher, DO, Britt Pulliam, DO, Char Morris Blood, DO, Kimmy Mccord MD, Jennifer Kemp MD, Rebecca Montesinos MD, Maribeth Collins MD, Dwight Jain MD, Davie Gleason MD, Michael Cottrell MD, Jessica Ellis MD, Otto Adams, DO, Gisselle Gtz, DO, Brissa Daly MD,  Miranda Clemente, DO, Neida Alejandre MD, Minda Evans MD, Fidelia Rivera MD, Cristina Joseph MD, Nidia Estrella MD, Jagdish Dumas MD, Saloni Cisneros [facility], [facility], CNP, Elmer Bernabe, CNP, Caesar Rodriguez, CNS, Garfield Nicholas, CNP, Ozzie Nix, CNP, Sonido Diaz, CNP, Kendrick Hairston, CNP, Radha Huang, CNP, Rowan Dior, CNP, Joel Rdz PA-C, Sarah Griffith, [facility] ([place], [place]), Moni Stevens, CNP, Danette Riddle, CNP, Juanis Landers, CNP, Paul Ordonez, CNP, Opal Long, CNP, Fede Castillo, CNP, Delfino Vigil, [facility]    Progress Note    10/22/2021    11:11 AM    Name:   Caity Peterson  MRN:     [id number]     [place]:      [de-identified]   Room:   [address] [place] [address] Day:  10  Admit Date:  10/12/2021  2:53 PM    PCP:   Debra Hull MD  Code Status:  Full Code    Subjective:     C/C:   Chief Complaint   Patient presents with    Respiratory Distress     EMS reported 97% on room air; pt arrives with saturation 45% on room air    Failure To Thrive     Interval History Status: improved. Feels considerably better today  Denies cp/n/v  Some sob but better    Chest tube doesn't have much drainage    Brief History:     Per my ELANA:  \"Danielle Wadsworth is a 64 y. o. female with a hx of CKD 4 and iron deficiency anemia who presented to [place] ER with Respiratory Distress and hypoxia (O2 sats 45%) and failure to thrive and is admitted to the hospital for the management of right Pleural effusion and acute renal failure.  Patient lives at home alone and has recently been having difficulty caring for herself and generalized weakness for the last few weeks . She does have chronic BLE edema but denies hx of CHF. Initial CXR showed large right pleural effusion, stat elevated BNP and D-dimer as well as BUN/creatinine.  Last took known creatinine was 2.34 in august, she reports she seen a nephrologist once. She does not wear home oxygen      10/13: US guided right thoracentesis 850ml of nonturbid straw colored fluid removed -transudative     Pleural fluid cultures negative for malignancy\"    Chest tube placed 10/18 on right for loculated effusion     Started on heparin drip for possible PE; VQ being repeated 10/24    Review of Systems:     Constitutional:  negative for chills, fevers, sweats  Respiratory:  positive for cough, dyspnea on exertion, shortness of breath  Cardiovascular:  negative for chest pain, chest pressure/discomfort, lower extremity edema, palpitations  Gastrointestinal:  negative for abdominal pain, constipation, diarrhea, nausea, vomiting  Neurological:  negative for dizziness, headache    Medications: Allergies:     Allergies   Allergen Reactions    Penicillins Swelling       Current Meds:   Scheduled Meds:    methylPREDNISolone  30 mg IntraVENous Q8H    furosemide  40 mg IntraVENous BID    budesonide  0.5 mg Nebulization BID    Arformoterol Tartrate  15 mcg Nebulization BID    ipratropium-albuterol  1 ampule Inhalation 4x daily    potassium chloride  40 mEq Oral Daily    epoetin mike-epbx  8,000 Units SubCUTAneous Once per day on Mon Wed Fri    calcitRIOL  0.25 mcg Oral Once per day on Mon Wed Fri    sodium chloride flush  5-40 mL IntraVENous 2 times per day    levofloxacin  500 mg IntraVENous Q48H    vitamin D  50,000 Units Oral Weekly     Continuous Infusions:    heparin (PORCINE) Infusion 12 Units/kg/hr (10/22/21 4994)    dextrose      sodium chloride       PRN Meds: HYDROcodone 5 mg - acetaminophen **OR** HYDROcodone 5 mg - acetaminophen, heparin (porcine), heparin (porcine), melatonin, glucose, dextrose, glucagon (rDNA), dextrose, sodium chloride flush, sodium chloride, ondansetron **OR** ondansetron, acetaminophen **OR** acetaminophen, perflutren lipid microspheres    Data:     Past Medical History:   has no past medical history on file. Social History:   reports that she quit smoking about 2 years ago. Her smoking use included cigarettes. She has never used smokeless tobacco. She reports current alcohol use. Family History:   Family History   Problem Relation Age of Onset    Hypertension Mother     Cancer Sister     Cancer Brother        Vitals:  BP (!) 95/52   Pulse 100   Temp 97.7 °F (36.5 °C) (Oral)   Resp 22   Ht 5' 4\" (1.626 m)   Wt 131 lb (59.4 kg)   SpO2 96%   BMI 22.49 kg/m²   Temp (24hrs), Av.7 °F (36.5 °C), Min:97.5 °F (36.4 °C), Max:98.4 °F (36.9 °C)    Recent Labs     10/21/21  1125 10/21/21  1649 10/21/21  1924 10/22/21  0736   POCGLU 138* 150* 207* 133*       I/O (24Hr): Intake/Output Summary (Last 24 hours) at 10/22/2021 1111  Last data filed at 10/22/2021 6995  Gross per 24 hour   Intake --   Output 670 ml   Net -670 ml       Labs:  Hematology:  Recent Labs     10/20/21  0552 10/21/21  1409   WBC 9.1  --    RBC 3.21*  --    HGB 7.9*  --    HCT 27.1*  --    MCV 84.4  --    MCH 24.6*  --    MCHC 29.2  --    RDW 20.3*  --      --    MPV 11.7  --    INR  --  1.0     Chemistry:  Recent Labs     10/20/21  0552 10/20/21  0552 10/20/21  1242 10/21/21  0519 10/22/21  0630     --   --  134* 134*   K 4.9   < > 4.4 4.5 3.8   CL 97*  --   --  95* 92*   CO2 25  --   --  25 24   GLUCOSE 143*  --   --  132* 126*   BUN 55*  --   --  61* 70*   CREATININE 2.60*  --   --  2.73* 2.95*   ANIONGAP 14  --   --  14 18*   LABGLOM 18*  --   --  17* 15*   GFRAA 22*  --   --  20* 19*   CALCIUM 8.9  --   --  8.7 8.4*    < > = values in this interval not displayed.      Recent Labs     10/20/21  2036 10/21/21  0814 10/21/21  1125 10/21/21  1649 10/21/21  1924 10/22/21  0736   POCGLU 145* 128* 138* 150* 207* 133*     ABG:No results found for: POCPH, PHART, PH, POCPCO2, MJB9GMR, PCO2, POCPO2, PO2ART, PO2, POCHCO3, LYA2HWX, HCO3, NBEA, PBEA, BEART, BE, THGBART, THB, RDP0VAT, ZVWK2HWF, A6NHMDCT, O2SAT, FIO2  Lab Results   Component Value Date/Time    SPECIAL RT HAND 6ML 10/18/2021 04:26 AM    SPECIAL RT AC 5ML 10/18/2021 04:26 AM     Lab Results   Component Value Date/Time    CULTURE NO GROWTH 4 DAYS 10/18/2021 04:26 AM    CULTURE NO GROWTH 4 DAYS 10/18/2021 04:26 AM       Radiology:  NM LUNG SCAN PERFUSION ONLY    Result Date: 10/21/2021  Exam is technically high probability for pulmonary embolism. While emboli are possible, the diminished activity within the upper lung zones could also be secondary to emphysema; a  ventilation study would be needed for confirmation. This could be obtained at a later time as clinically warranted. Improving perfusion at the lateral right lung base. Findings were discussed with Brice Rae at 12:13 on 10/21/2021. XR CHEST DECUBITUS RIGHT    Result Date: 10/16/2021  Small to moderate right and small left layering pleural effusions. XR CHEST DECUBITUS LEFT    Result Date: 10/16/2021  Small to moderate right and small left layering pleural effusions. XR CHEST PORTABLE    Result Date: 10/22/2021  Stable right chest tube without demonstrable pneumothorax Improvement in now mild right basilar opacity related to minimal atelectasis and or effusion Stable moderate left basilar opacity     XR CHEST PORTABLE    Result Date: 10/21/2021  Stable right chest tube without pneumothorax Stable bibasilar pleuroparenchymal changes     XR CHEST PORTABLE    Result Date: 10/20/2021  No significant interval change. Right chest tube in place with small right pleural effusion and right basilar opacity. Unchanged moderate left pleural effusion.      XR CHEST PORTABLE    Result Date: 10/19/2021  Stable position/appearance of right pleural catheter stable bibasilar opacities related to combined   10/22/2021  11:11 AM

## 2021-10-22 NOTE — PROGRESS NOTES
Pulmonary Critical Care Progress Note  Angela Gomez MD     Patient seen for the follow up of acute hypoxic respiratory failure, moderate pulmonary hypertension, former smoker/COPD Pleural effusion on right     Subjective:  No significant overnight events noted. She is sitting up in the chair, finishing breakfast, family at bedside. She is on oxygen at 5 L nasal cannula. She has had a less than 100 mL out of chest tube in the last 24 hours. She feels shortness of breath is better. She has occasional dry cough, no chest pain. She is on heparin drip. Examination:  Vitals: BP (!) 95/52   Pulse 100   Temp 97.7 °F (36.5 °C) (Oral)   Resp 22   Ht 5' 4\" (1.626 m)   Wt 131 lb (59.4 kg)   SpO2 96%   BMI 22.49 kg/m²   General appearance: alert and cooperative with exam, resting in bed  Neck: No JVD  Lungs: Moderate air exchange, diminished bilateral bases, positive crackles. No air leak noted in chest tube  Heart: regular rate and rhythm, S1, S2 normal, no gallop  Abdomen: Soft, non tender, + BS  Extremities: no cyanosis or clubbing. No significant edema    LABs:  CBC:   Recent Labs     10/20/21  0552   WBC 9.1   HGB 7.9*   HCT 27.1*        BMP:   Recent Labs     10/21/21  0519 10/22/21  0630   * 134*   K 4.5 3.8   CO2 25 24   BUN 61* 70*   CREATININE 2.73* 2.95*   LABGLOM 17* 15*   GLUCOSE 132* 126*     Radiology:  10/22/2021      Impression:  · Acute hypoxic respiratory failure requiring high flow oxygen  · Moderate to large right pleural effusion, s/p thoracentesis with 850 mL removed  · Small left pleural effusion, loculated  · Atelectasis/?  Trapped lung on the right  · Mild pulmonary edema  · Moderate pulmonary hypertension, RVSP 60 mmHg  · Suspected COPD/former smoker, quit 2019  · Acute kidney injury  · D-dimer, low probability for PE on VQ scan on 10/13/21    Recommendations:  · Continue oxygen via high flow nasal cannula, keep SPO2 90% or greater, wean as able.      · Repeat VQ scan with high probability of PE. Ventilation portion of VQ scan to be completed when medications are available, hopefully on Monday. Will continue heparin drip for now. · Bilateral lower extremity dopplers negative  · Incentive spirometry every hour while awake  · Symbicort  · DuoNeb every 4 hours  · Budesonide and Brovana via nebulizer every 12 hours  · IV Solu-Medrol 40 mg every 6 hours, decrease dose and frequency  · Continue Levaquin IV  · Keep pigtail catheter to suction. Monitor output. · X-ray chest in a.m. · Labs: CBC and BMP in am  · Diuresis per nephrology. Plans are to place dialysis catheter and start dialysis on Monday. · S/P IV iron x3 doses  · Pleural fluid negative for malignancy  · DVT prophylaxis, heparin drip  · PFTs as an outpatient  · Discussed with patient and family at bedside  · Discussed with Dr. Vance Dozier by nurse practitioner Lore Smiley. If unable to complete ventilation portion of scan he would be okay with CTA of the chest since plan is to start dialysis on Monday.   · Will follow with you      Electronically signed by     Marleny Davenport MD on 10/22/2021 at 3:47 PM  Pulmonary Critical Care and Sleep Medicine,  [facility]  Cell: [phone]  Office: [phone]

## 2021-10-22 NOTE — PROGRESS NOTES
Physical Therapy  DATE: 10/22/2021    NAME: Iza Anderson  MRN: [id number]   : 1945    Patient not seen this date for Physical Therapy due to:      [] Cancel by RN or physician due to:    [] Hemodialysis    [] Critical Lab Value Level     [] Blood transfusion in progress    [] Acute or unstable cardiovascular status   _MAP < 55 or more than >115  _HR < 40 or > 130    [] Acute or unstable pulmonary status   -FiO2 > 60%   _RR < 5 or >40    _O2 sats < 85%    [] Strict Bedrest    [] Off Unit for surgery or procedure    [] Off Unit for testing       [] Pending imaging to R/O fracture    [] Refusal by Patient      [x] Other Patient and family in meeting with doctor this am     [] PT being discontinued at this time. Patient independent. No further needs. [] PT being discontinued at this time as the patient has been transferred to hospice care. No further needs.       Clair Santos, PTA

## 2021-10-22 NOTE — PROGRESS NOTES
per 24 hour   Intake --   Output 670 ml   Net -670 ml     WEIGHT :  Patient Vitals for the past 96 hrs (Last 3 readings):   Weight   10/22/21 0530 131 lb (59.4 kg)   10/21/21 0630 131 lb 2 oz (59.5 kg)   10/20/21 0610 130 lb 9.6 oz (59.2 kg)     PHYSICAL EXAM      GENERAL APPEARANCE: Awake and alert x3   SKIN: Warm to touch EYES: Conjunctiva is pink   ENT: No thrush   NECK:   Midline trachea  PULMONARY: Bilateral air entry with scattered crackles   CADRDIOVASCULAR: S1 and S2 audible no S3  ABDOMEN: soft nontender, bowel sounds present, no ascites. EXTREMITIES: Trace edema.   CURRENT MEDICATIONS      HYDROcodone-acetaminophen (NORCO) 5-325 MG per tablet 1 tablet, Q4H PRN   Or  HYDROcodone-acetaminophen (NORCO) 5-325 MG per tablet 2 tablet, Q4H PRN  furosemide (LASIX) injection 40 mg, BID  heparin (porcine) injection 4,760 Units, PRN  heparin (porcine) injection 2,380 Units, PRN  heparin 25,000 units in dextrose 5% 250 mL (premix) infusion, Continuous  budesonide (PULMICORT) nebulizer suspension 500 mcg, BID  Arformoterol Tartrate (BROVANA) nebulizer solution 15 mcg, BID  methylPREDNISolone sodium (SOLU-MEDROL) injection 40 mg, Q6H  melatonin tablet 3 mg, Nightly PRN  ipratropium-albuterol (DUONEB) nebulizer solution 1 ampule, 4x daily  potassium chloride (KLOR-CON M) extended release tablet 40 mEq, Daily  epoetin mike-epbx (RETACRIT) injection 8,000 Units, Once per day on Mon Wed Fri  calcitRIOL (ROCALTROL) capsule 0.25 mcg, Once per day on Mon Wed Fri  glucose (GLUTOSE) 40 % oral gel 15 g, PRN  dextrose 50 % IV solution, PRN  glucagon (rDNA) injection 1 mg, PRN  dextrose 5 % solution, PRN  sodium chloride flush 0.9 % injection 5-40 mL, 2 times per day  sodium chloride flush 0.9 % injection 5-40 mL, PRN  0.9 % sodium chloride infusion, PRN  levoFLOXacin (LEVAQUIN) 500 MG/100ML infusion 500 mg, Q48H  vitamin D (ERGOCALCIFEROL) capsule 50,000 Units, Weekly  ondansetron (ZOFRAN-ODT) disintegrating tablet 4 mg, Q8H PRN   Or  ondansetron (ZOFRAN) injection 4 mg, Q6H PRN  acetaminophen (TYLENOL) tablet 650 mg, Q6H PRN   Or  acetaminophen (TYLENOL) suppository 650 mg, Q6H PRN  perflutren lipid microspheres (DEFINITY) injection 1.65 mg, ONCE PRN          LABS      CBC:   Recent Labs     10/20/21  0552   WBC 9.1   RBC 3.21*   HGB 7.9*   HCT 27.1*   MCV 84.4   MCH 24.6*   MCHC 29.2   RDW 20.3*      MPV 11.7      BMP:   Recent Labs     10/20/21  0552 10/20/21  0552 10/20/21  1242 10/21/21  0519 10/22/21  0630     --   --  134* 134*   K 4.9   < > 4.4 4.5 3.8   CL 97*  --   --  95* 92*   CO2 25  --   --  25 24   BUN 55*  --   --  61* 70*   CREATININE 2.60*  --   --  2.73* 2.95*   GLUCOSE 143*  --   --  132* 126*   CALCIUM 8.9  --   --  8.7 8.4*    < > = values in this interval not displayed. PHOSPHORUS:    IRON:    Lab Results   Component Value Date    IRON 22 10/13/2021     IRON SATURATION:    Lab Results   Component Value Date    LABIRON 9 10/13/2021     TIBC:    Lab Results   Component Value Date    TIBC 237 10/13/2021     FERRITIN:    Lab Results   Component Value Date    FERRITIN 20 10/13/2021       SPEP:   Lab Results   Component Value Date    PROT 4.7 10/17/2021     URINE SODIUM:    Lab Results   Component Value Date    LAZARO 40 10/13/2021      URINE CREATININE:    Lab Results   Component Value Date    LABCREA 40.5 10/17/2021    LABCREA 76.0 10/13/2021     URINALYSIS:  U/A:   Lab Results   Component Value Date    NITRU NEGATIVE 10/13/2021    COLORU Yellow 10/13/2021    PHUR 5.5 10/13/2021    WBCUA 10 TO 20 10/13/2021    RBCUA 2 TO 5 10/13/2021    MUCUS 1+ 10/13/2021    TRICHOMONAS NOT REPORTED 10/13/2021    YEAST NOT REPORTED 10/13/2021    BACTERIA NOT REPORTED 10/13/2021    SPECGRAV 1.020 10/13/2021    LEUKOCYTESUR SMALL 10/13/2021    UROBILINOGEN Normal 10/13/2021    BILIRUBINUR NEGATIVE 10/13/2021    GLUCOSEU NEGATIVE 10/13/2021    KETUA NEGATIVE 10/13/2021    AMORPHOUS 1+ 10/13/2021           ASSESSMENT    1. End-stage renal disease with creatinine clearance less than 10. Family is agreeable for dialysis. However patient is just too is unstable at this point for line placement and there is no acute emergency for initiation of dialysis. Will wait till her respiratory status is relatively better and will request tunnel catheter hopefully Monday for initiation of dialysis  2. Second VQ scan  perfusion scan is high probability and patient is on heparin drip  3. Bilateral pleural effusion status post chest tube placement for loculated pleural effusion. 4.  COPD  5. Pulmonary hypertension  6. Moderate to severe protein calorie malnutrition  7. Secondary hyperparathyroidism   8. Anemia of chronic kidney disease, hgb 7.9 today  9. Hypokalemia: Improved  PLAN      1. Continue Lasix  2.   BMP in a.m.  3.  We will follow with you   This note is created with the assistance of a speech-recognition program. While intending to generate a document that actually reflects the content of the visit, no guarantees can be provided that every mistake has been identified and corrected by editing    Electronically signed by Gino Alejandra MD on 10/22/2021 at 10:12 AM

## 2021-10-22 NOTE — RT PROTOCOL NOTE
RT Inhaler-Nebulizer Bronchodilator Protocol Note    There is a bronchodilator order in the chart from a provider indicating to follow the RT Bronchodilator Protocol and there is an Initiate RT Inhaler-Nebulizer Bronchodilator Protocol order as well (see protocol at bottom of note). CXR Findings:  XR CHEST PORTABLE    Result Date: 10/22/2021  Stable right chest tube without demonstrable pneumothorax Improvement in now mild right basilar opacity related to minimal atelectasis and or effusion Stable moderate left basilar opacity     XR CHEST PORTABLE    Result Date: 10/21/2021  Stable right chest tube without pneumothorax Stable bibasilar pleuroparenchymal changes       The findings from the last RT Protocol Assessment were as follows:   History Pulmonary Disease: Chronic pulmonary disease  Respiratory Pattern: Mild dyspnea at rest, irregular pattern, or RR 21-25 bpm  Breath Sounds: Slightly diminished and/or crackles  Cough: Weak, non-productive  Indication for Bronchodilator Therapy: Decreased or absent breath sounds  Bronchodilator Assessment Score: 11    Aerosolized bronchodilator medication orders have been revised according to the RT Inhaler-Nebulizer Bronchodilator Protocol below. Respiratory Therapist to perform RT Therapy Protocol Assessment initially then follow the protocol. Repeat RT Therapy Protocol Assessment PRN for score 0-3 or on second treatment, BID, and PRN for scores above 3. No Indications - adjust the frequency to every 6 hours PRN wheezing or bronchospasm, if no treatments needed after 48 hours then discontinue using Per Protocol order mode. If indication present, adjust the RT bronchodilator orders based on the Bronchodilator Assessment Score as indicated below.   Use Inhaler orders unless patient has one or more of the following: on home nebulizer, not able to hold breath for 10 seconds, is not alert and oriented, cannot activate and use MDI correctly, or respiratory rate 25

## 2021-10-22 NOTE — CARE COORDINATION
Discharge planning    Patient chart reviewed. Patient on 4 liters NC. Chest tube in place. Geneva Boyce from [employer] did start a precert yesterday afternoon.      Note on desk this am per family that they are also interested in [facility] or [facility].      Patient has been denied multiple snf due to multiple reasons ( see SS notes)      Pulmonary and nephrology following. Nephrology notes potential for HD and family meeting set up for today. .

## 2021-10-22 NOTE — PLAN OF CARE
Problem: Falls - Risk of:  Goal: Will remain free from falls  Description: Will remain free from falls  10/21/2021 2134 by Chester Rubio RN  Outcome: Ongoing     Problem: Nutrition  Goal: Optimal nutrition therapy  10/21/2021 2134 by Chester Rubio RN  Outcome: Ongoing     Problem: IP BALANCE  Goal: LTG - Patient will maintain balance to allow for safe/functional mobility  10/21/2021 2134 by Chester Rubio RN  Outcome: Ongoing     Problem: Skin Integrity:  Goal: Will show no infection signs and symptoms  Description: Will show no infection signs and symptoms  10/21/2021 2134 by Chester Rubio RN  Outcome: Ongoing     Problem: OXYGENATION/RESPIRATORY FUNCTION  Goal: Patient will achieve/maintain normal respiratory rate/effort  Description: Respiratory rate and effort will be within normal limits for the patient  10/21/2021 2134 by Chester Rubio RN  Outcome: Ongoing     Problem: Breathing Pattern - Ineffective:  Goal: Ability to achieve and maintain a regular respiratory rate will improve  Description: Ability to achieve and maintain a regular respiratory rate will improve  10/21/2021 2134 by Chester Rubio RN  Outcome: Ongoing     Problem: Pain:  Goal: Pain level will decrease  Description: Pain level will decrease  10/21/2021 2134 by Chester Rubio RN  Outcome: Ongoing

## 2021-10-23 ENCOUNTER — APPOINTMENT (OUTPATIENT)
Dept: GENERAL RADIOLOGY | Age: 76
DRG: 291 | End: 2021-10-23
Payer: MEDICARE

## 2021-10-23 LAB
ANION GAP SERPL CALCULATED.3IONS-SCNC: 16 MMOL/L (ref 9–17)
ANTI-XA UNFRAC HEPARIN: 0.42 IU/L (ref 0.3–0.7)
BUN BLDV-MCNC: 75 MG/DL (ref 8–23)
BUN/CREAT BLD: 25 (ref 9–20)
CALCIUM SERPL-MCNC: 7.9 MG/DL (ref 8.6–10.4)
CHLORIDE BLD-SCNC: 92 MMOL/L (ref 98–107)
CO2: 27 MMOL/L (ref 20–31)
CREAT SERPL-MCNC: 3.05 MG/DL (ref 0.5–0.9)
GFR AFRICAN AMERICAN: 18 ML/MIN
GFR NON-AFRICAN AMERICAN: 15 ML/MIN
GFR SERPL CREATININE-BSD FRML MDRD: ABNORMAL ML/MIN/{1.73_M2}
GFR SERPL CREATININE-BSD FRML MDRD: ABNORMAL ML/MIN/{1.73_M2}
GLUCOSE BLD-MCNC: 124 MG/DL (ref 70–99)
GLUCOSE BLD-MCNC: 130 MG/DL (ref 65–105)
GLUCOSE BLD-MCNC: 139 MG/DL (ref 65–105)
GLUCOSE BLD-MCNC: 167 MG/DL (ref 65–105)
POTASSIUM SERPL-SCNC: 3.7 MMOL/L (ref 3.7–5.3)
SODIUM BLD-SCNC: 135 MMOL/L (ref 135–144)

## 2021-10-23 PROCEDURE — 94761 N-INVAS EAR/PLS OXIMETRY MLT: CPT

## 2021-10-23 PROCEDURE — 80048 BASIC METABOLIC PNL TOTAL CA: CPT

## 2021-10-23 PROCEDURE — 82947 ASSAY GLUCOSE BLOOD QUANT: CPT

## 2021-10-23 PROCEDURE — 6360000002 HC RX W HCPCS: Performed by: STUDENT IN AN ORGANIZED HEALTH CARE EDUCATION/TRAINING PROGRAM

## 2021-10-23 PROCEDURE — 6370000000 HC RX 637 (ALT 250 FOR IP): Performed by: INTERNAL MEDICINE

## 2021-10-23 PROCEDURE — 36415 COLL VENOUS BLD VENIPUNCTURE: CPT

## 2021-10-23 PROCEDURE — 2700000000 HC OXYGEN THERAPY PER DAY

## 2021-10-23 PROCEDURE — 6370000000 HC RX 637 (ALT 250 FOR IP): Performed by: NURSE PRACTITIONER

## 2021-10-23 PROCEDURE — 6360000002 HC RX W HCPCS: Performed by: NURSE PRACTITIONER

## 2021-10-23 PROCEDURE — 85520 HEPARIN ASSAY: CPT

## 2021-10-23 PROCEDURE — 71045 X-RAY EXAM CHEST 1 VIEW: CPT

## 2021-10-23 PROCEDURE — 99232 SBSQ HOSP IP/OBS MODERATE 35: CPT | Performed by: INTERNAL MEDICINE

## 2021-10-23 PROCEDURE — 6370000000 HC RX 637 (ALT 250 FOR IP): Performed by: STUDENT IN AN ORGANIZED HEALTH CARE EDUCATION/TRAINING PROGRAM

## 2021-10-23 PROCEDURE — 6360000002 HC RX W HCPCS: Performed by: INTERNAL MEDICINE

## 2021-10-23 PROCEDURE — 94640 AIRWAY INHALATION TREATMENT: CPT

## 2021-10-23 PROCEDURE — 2060000000 HC ICU INTERMEDIATE R&B

## 2021-10-23 RX ADMIN — METHYLPREDNISOLONE SODIUM SUCCINATE 30 MG: 40 INJECTION, POWDER, FOR SOLUTION INTRAMUSCULAR; INTRAVENOUS at 09:27

## 2021-10-23 RX ADMIN — ACETAMINOPHEN 650 MG: 325 TABLET ORAL at 09:28

## 2021-10-23 RX ADMIN — ARFORMOTEROL TARTRATE 15 MCG: 15 SOLUTION RESPIRATORY (INHALATION) at 06:18

## 2021-10-23 RX ADMIN — BUDESONIDE 500 MCG: 0.5 SUSPENSION RESPIRATORY (INHALATION) at 19:23

## 2021-10-23 RX ADMIN — POTASSIUM CHLORIDE 40 MEQ: 20 TABLET, EXTENDED RELEASE ORAL at 09:27

## 2021-10-23 RX ADMIN — ACETAMINOPHEN 650 MG: 325 TABLET ORAL at 19:40

## 2021-10-23 RX ADMIN — IPRATROPIUM BROMIDE AND ALBUTEROL SULFATE 1 AMPULE: .5; 2.5 SOLUTION RESPIRATORY (INHALATION) at 06:18

## 2021-10-23 RX ADMIN — LEVOFLOXACIN 500 MG: 5 INJECTION, SOLUTION INTRAVENOUS at 14:34

## 2021-10-23 RX ADMIN — METHYLPREDNISOLONE SODIUM SUCCINATE 30 MG: 40 INJECTION, POWDER, FOR SOLUTION INTRAMUSCULAR; INTRAVENOUS at 17:20

## 2021-10-23 RX ADMIN — METHYLPREDNISOLONE SODIUM SUCCINATE 30 MG: 40 INJECTION, POWDER, FOR SOLUTION INTRAMUSCULAR; INTRAVENOUS at 01:34

## 2021-10-23 RX ADMIN — IPRATROPIUM BROMIDE AND ALBUTEROL SULFATE 1 AMPULE: .5; 2.5 SOLUTION RESPIRATORY (INHALATION) at 14:34

## 2021-10-23 RX ADMIN — BUDESONIDE 500 MCG: 0.5 SUSPENSION RESPIRATORY (INHALATION) at 06:18

## 2021-10-23 RX ADMIN — ARFORMOTEROL TARTRATE 15 MCG: 15 SOLUTION RESPIRATORY (INHALATION) at 19:23

## 2021-10-23 RX ADMIN — IPRATROPIUM BROMIDE AND ALBUTEROL SULFATE 1 AMPULE: .5; 2.5 SOLUTION RESPIRATORY (INHALATION) at 11:06

## 2021-10-23 RX ADMIN — FUROSEMIDE 40 MG: 10 INJECTION, SOLUTION INTRAMUSCULAR; INTRAVENOUS at 17:20

## 2021-10-23 RX ADMIN — FUROSEMIDE 40 MG: 10 INJECTION, SOLUTION INTRAMUSCULAR; INTRAVENOUS at 09:27

## 2021-10-23 RX ADMIN — IPRATROPIUM BROMIDE AND ALBUTEROL SULFATE 1 AMPULE: .5; 2.5 SOLUTION RESPIRATORY (INHALATION) at 19:23

## 2021-10-23 ASSESSMENT — PAIN DESCRIPTION - DESCRIPTORS: DESCRIPTORS: HEADACHE

## 2021-10-23 ASSESSMENT — PAIN SCALES - GENERAL
PAINLEVEL_OUTOF10: 0
PAINLEVEL_OUTOF10: 3
PAINLEVEL_OUTOF10: 1
PAINLEVEL_OUTOF10: 0
PAINLEVEL_OUTOF10: 1

## 2021-10-23 NOTE — PROGRESS NOTES
NEPHROLOGY PROGRESS NOTE      SUBJECTIVE     All events noted. My colleague had a long discussion yesterday with the family in regards to initiation of dialysis/the procedure and all the possible complications. They are agreeable what patient is yet undecided. She states that she feels much better. Still on supplemental oxygen. Appetite is improving. On heparin drip because of concerns of pulmonary embolism based upon a VQ scan. OBJECTIVE     Vitals:    10/23/21 0309 10/23/21 0515 10/23/21 0617 10/23/21 0823   BP: (!) 108/56   (!) 101/58   Pulse: 93   100   Resp: 16  20 18   Temp: 97.9 °F (36.6 °C)   98.1 °F (36.7 °C)   TempSrc: Axillary   Oral   SpO2: 91% 91% 92% 92%   Weight:       Height:         24HR INTAKE/OUTPUT:      Intake/Output Summary (Last 24 hours) at 10/23/2021 1016  Last data filed at 10/23/2021 0514  Gross per 24 hour   Intake 1007 ml   Output 1015 ml   Net -8 ml       General appearance:Awake, alert, on 5 L supplemental oxygen  HEENT: PERRLA  Respiratory::vesicular breath sounds, reduced air entry  Cardiovascular:S1 S2 normal,no gallop or organic murmur. Abdomen:Non tender/non distended. Bowel sounds present  Extremities: No Cyanosis or Clubbing,Lower extremity edema  Neurological:Alert and oriented. No abnormalities of mood, affect, memory, mentation, or behavior are noted      MEDICATIONS     Scheduled Meds:    methylPREDNISolone  30 mg IntraVENous Q8H    furosemide  40 mg IntraVENous BID    budesonide  0.5 mg Nebulization BID    Arformoterol Tartrate  15 mcg Nebulization BID    ipratropium-albuterol  1 ampule Inhalation 4x daily    potassium chloride  40 mEq Oral Daily    epoetin mike-epbx  8,000 Units SubCUTAneous Once per day on Mon Wed Fri    calcitRIOL  0.25 mcg Oral Once per day on Mon Wed Fri    sodium chloride flush  5-40 mL IntraVENous 2 times per day    levofloxacin  500 mg IntraVENous Q48H    vitamin D  50,000 Units Oral Weekly     Continuous Infusions:    heparin (PORCINE) Infusion 10.084 Units/kg/hr (10/22/21 0967)    dextrose      sodium chloride       PRN Meds:  HYDROcodone 5 mg - acetaminophen **OR** HYDROcodone 5 mg - acetaminophen, heparin (porcine), heparin (porcine), melatonin, glucose, dextrose, glucagon (rDNA), dextrose, sodium chloride flush, sodium chloride, ondansetron **OR** ondansetron, acetaminophen **OR** acetaminophen, perflutren lipid microspheres  Home Meds:                Medications Prior to Admission: amLODIPine (NORVASC) 5 MG tablet, Take 5 mg by mouth daily  magnesium oxide (MAG-OX) 400 MG tablet, Take 400 mg by mouth daily  metoprolol (LOPRESSOR) 100 MG tablet, Take 100 mg by mouth 2 times daily  folic acid (FOLVITE) 934 MCG tablet, Take 800 mcg by mouth daily  diphenhydrAMINE-APAP, sleep, (TYLENOL PM EXTRA STRENGTH)  MG tablet, Take 1 tablet by mouth nightly as needed for Sleep    INVESTIGATIONS     Last 3 CMP:    Recent Labs     10/21/21  0519 10/22/21  0630 10/23/21  0536   * 134* 135   K 4.5 3.8 3.7   CL 95* 92* 92*   CO2 25 24 27   BUN 61* 70* 75*   CREATININE 2.73* 2.95* 3.05*   CALCIUM 8.7 8.4* 7.9*       Last 3 CBC:  No results for input(s): WBC, RBC, HGB, HCT, MCV, MCH, MCHC, RDW, PLT, MPV in the last 72 hours. ASSESSMENT     #1 chronic kidney disease stage IV/V with baseline creatinine 2.8-3  #2 exhibiting uremic symptoms. Patient still undecided in regards to initiation of dialysis.   #3 moderate large right pleural effusion status post thoracocentesis 850 cc removed  #4 moderate pulmonary hypertension/RVSP 60/tricuspid regurgitation  #5 essential hypertension    PLAN     #1 continue IV Lasix  #2 she is going to decide over the weekend in regards to initiation of dialysis  #3 avoid nephrotoxins  #4 we will follow    Please do not hesitate to call with questions    This note is created with the assistance of a speech-recognition program. While intending to generate a document that actually reflects the content of the visit, no guarantees can be provided that every mistake has been identified and corrected by editing    Alyson Prakash MD MD, [facility]), [address]   10/23/2021 10:16 AM  [facility] [place]

## 2021-10-23 NOTE — PLAN OF CARE
Nutrition Problem #1: Severe malnutrition  Intervention: Food and/or Nutrient Delivery: Continue Current Diet, Start Oral Nutrition Supplement  Nutritional Goals: PO intakes are greater than 50% at meals

## 2021-10-23 NOTE — PROGRESS NOTES
[facility]  Office: [address], DO, Adebayomalaikakasia Mcnulty, DO, Mallorie Bedoya, DO, Maria Teresa Cantrellyane Terrazas, DO, Ben Harvey MD, Richar David MD, Laurel Mckeon MD, Nelson Hammond MD, Chioma Adrian MD, Kirti Jauregui MD, Ethel Hackett MD, Eddie Mcnulty MD, Angelica Gaitan, DO, Sofia Mejia, DO, Helio Yeh MD,  Danielle Calvo, DO, Dietrich Halsted, MD, Lacie Da Silva MD, Jocelyn Huizar MD, Berenice Streeter MD, Carline Bradford MD, Darinel Bush MD, Henna Phan [facility], [facility], CNP, Matt Babb, CNP, Lorenzo Austin, CNS, Mark Anthony Urrutia, CNP, Ritika Huerta, CNP, John Galan, CNP, Remigio Thompson, CNP, Ronald Decker, CNP, Norma Cage, CNP, Fauzia Tom, PA-C, David Macias, [place], Keara Mitchell, [facility], Mary Parish, CNP, Abhijeet Ward, CNP, Marisela Carrera, CNP, Franky Franklin, CNP, Brenda Edwards, CNP, Franklin Cal, [facility]    Progress Note    10/23/2021    1:22 PM    Name:   Micheal Ingram  MRN:     [id number]     [place]:      [de-identified]   Room:   [address] [place] [address] Day:  11  Admit Date:  10/12/2021  2:53 PM    PCP:   Maxime Marie MD  Code Status:  Full Code    Subjective:     C/C:   Chief Complaint   Patient presents with    Respiratory Distress     EMS reported 97% on room air; pt arrives with saturation 45% on room air    Failure To Thrive     Interval History Status: improved. Feels considerably better today  Denies cp/n/v  Some sob but better    Chest tube doesn't have much drainage    Brief History:     Per my ELANA:  \"Danielle Wadsworth is a 64 y. o. female with a hx of CKD 4 and iron deficiency anemia who presented to [place] [facility] with Respiratory Distress and hypoxia (O2 sats 45%) and failure to thrive and is admitted to the hospital for the management of right Pleural effusion and acute renal failure.  Patient lives at home alone and has recently been having difficulty caring for herself and generalized weakness for the last few weeks . She does have chronic BLE edema but denies hx of CHF. Initial CXR showed large right pleural effusion, stat elevated BNP and D-dimer as well as BUN/creatinine.  Last took known creatinine was 2.34 in august, she reports she seen a nephrologist once. She does not wear home oxygen      10/13: US guided right thoracentesis 850ml of nonturbid straw colored fluid removed -transudative     Pleural fluid cultures negative for malignancy\"    Chest tube placed 10/18 on right for loculated effusion     Started on heparin drip for possible PE; VQ being repeated 10/24    Review of Systems:     Constitutional:  negative for chills, fevers, sweats  Respiratory:  positive for cough, dyspnea on exertion, shortness of breath  Cardiovascular:  negative for chest pain, chest pressure/discomfort, lower extremity edema, palpitations  Gastrointestinal:  negative for abdominal pain, constipation, diarrhea, nausea, vomiting  Neurological:  negative for dizziness, headache    Medications: Allergies:     Allergies   Allergen Reactions    Penicillins Swelling       Current Meds:   Scheduled Meds:    methylPREDNISolone  30 mg IntraVENous Q8H    furosemide  40 mg IntraVENous BID    budesonide  0.5 mg Nebulization BID    Arformoterol Tartrate  15 mcg Nebulization BID    ipratropium-albuterol  1 ampule Inhalation 4x daily    potassium chloride  40 mEq Oral Daily    epoetin mike-epbx  8,000 Units SubCUTAneous Once per day on Mon Wed Fri    calcitRIOL  0.25 mcg Oral Once per day on Mon Wed Fri    sodium chloride flush  5-40 mL IntraVENous 2 times per day    levofloxacin  500 mg IntraVENous Q48H    vitamin D  50,000 Units Oral Weekly     Continuous Infusions:    heparin (PORCINE) Infusion 10.084 Units/kg/hr (10/22/21 9088)    dextrose      sodium chloride       PRN Meds: HYDROcodone 5 mg - acetaminophen **OR** HYDROcodone 5 mg - acetaminophen, heparin (porcine), heparin (porcine), melatonin, glucose, dextrose, glucagon (rDNA), dextrose, sodium chloride flush, sodium chloride, ondansetron **OR** ondansetron, acetaminophen **OR** acetaminophen, perflutren lipid microspheres    Data:     Past Medical History:   has no past medical history on file. Social History:   reports that she quit smoking about 2 years ago. Her smoking use included cigarettes. She has never used smokeless tobacco. She reports current alcohol use. Family History:   Family History   Problem Relation Age of Onset    Hypertension Mother     Cancer Sister     Cancer Brother        Vitals:  BP (!) 107/58   Pulse 99   Temp 98 °F (36.7 °C) (Oral)   Resp 18   Ht 5' 4\" (1.626 m)   Wt 131 lb (59.4 kg)   SpO2 93%   BMI 22.49 kg/m²   Temp (24hrs), Av °F (36.7 °C), Min:97.9 °F (36.6 °C), Max:98.1 °F (36.7 °C)    Recent Labs     10/22/21  1141 10/22/21  1716 10/22/21  2221 10/23/21  0723   POCGLU 180* 179* 146* 139*       I/O (24Hr):     Intake/Output Summary (Last 24 hours) at 10/23/2021 1322  Last data filed at 10/23/2021 0514  Gross per 24 hour   Intake 1007 ml   Output 1015 ml   Net -8 ml       Labs:  Hematology:  Recent Labs     10/21/21  1409   INR 1.0     Chemistry:  Recent Labs     10/21/21  0519 10/22/21  0630 10/23/21  0536   * 134* 135   K 4.5 3.8 3.7   CL 95* 92* 92*   CO2 25 24 27   GLUCOSE 132* 126* 124*   BUN 61* 70* 75*   CREATININE 2.73* 2.95* 3.05*   ANIONGAP 14 18* 16   LABGLOM 17* 15* 15*   GFRAA 20* 19* 18*   CALCIUM 8.7 8.4* 7.9*     Recent Labs     10/21/21  1924 10/22/21  0736 10/22/21  1141 10/22/21  1716 10/22/21  2221 10/23/21  0723   POCGLU 207* 133* 180* 179* 146* 139*     ABG:No results found for: POCPH, PHART, PH, POCPCO2, SYD6BRH, PCO2, POCPO2, PO2ART, PO2, POCHCO3, OXA5APE, HCO3, NBEA, PBEA, BEART, BE, THGBART, THB, NJH7SUJ, PRBE3LQK, I4YFBCQD, O2SAT, FIO2  Lab Results   Component Value Date/Time    SPECIAL RT HAND 6ML 10/18/2021 04:26 AM    SPECIAL RT AC 5ML 10/18/2021 04:26 AM     Lab Results   Component Value Date/Time    CULTURE NO GROWTH 5 DAYS 10/18/2021 04:26 AM    CULTURE NO GROWTH 5 DAYS 10/18/2021 04:26 AM       Radiology:  NM LUNG SCAN PERFUSION ONLY    Result Date: 10/21/2021  Exam is technically high probability for pulmonary embolism. While emboli are possible, the diminished activity within the upper lung zones could also be secondary to emphysema; a  ventilation study would be needed for confirmation. This could be obtained at a later time as clinically warranted. Improving perfusion at the lateral right lung base. Findings were discussed with Rebeka Cuellar at 12:13 on 10/21/2021. XR CHEST DECUBITUS RIGHT    Result Date: 10/16/2021  Small to moderate right and small left layering pleural effusions. XR CHEST DECUBITUS LEFT    Result Date: 10/16/2021  Small to moderate right and small left layering pleural effusions. XR CHEST PORTABLE    Result Date: 10/22/2021  Stable right chest tube without demonstrable pneumothorax Improvement in now mild right basilar opacity related to minimal atelectasis and or effusion Stable moderate left basilar opacity     XR CHEST PORTABLE    Result Date: 10/21/2021  Stable right chest tube without pneumothorax Stable bibasilar pleuroparenchymal changes     XR CHEST PORTABLE    Result Date: 10/20/2021  No significant interval change. Right chest tube in place with small right pleural effusion and right basilar opacity. Unchanged moderate left pleural effusion. XR CHEST PORTABLE    Result Date: 10/19/2021  Stable position/appearance of right pleural catheter stable bibasilar opacities related to combined pleural-parenchymal processes     XR CHEST PORTABLE    Result Date: 10/18/2021  Right-sided pleural catheter in place Decreased right pleural effusion No pneumothorax.  Unchanged left pleural effusion     IR CHEST TUBE INSERTION    Result Date: 10/18/2021  Successful ultrasound and fluoroscopy guided placement of a right chest tube       Physical Examination:        General appearance:  alert, cooperative and no distress  Mental Status:  oriented to person, place and time and normal affect  Lungs:  clear to auscultation bilaterally, normal effort  Heart:  regular rate and rhythm, no murmur  Abdomen:  soft, nontender, nondistended, normal bowel sounds, no masses, hepatomegaly, splenomegaly  Extremities:  no edema, redness, tenderness in the calves  Skin:  no gross lesions, rashes, induration    Assessment:        Hospital Problems         Last Modified POA    * (Principal) Pleural effusion on right 10/15/2021 Yes    Acute renal failure with acute renal cortical necrosis superimposed on stage 4 chronic kidney disease ([address] 10/14/2021 Yes    Acute respiratory failure with hypoxia ([address] 10/13/2021 Yes    Iron deficiency anemia 10/12/2021 Yes    Essential hypertension 10/12/2021 Yes    General weakness 10/12/2021 Yes    Vitamin D deficiency 10/13/2021 Yes    Acute diastolic heart failure ([address] 10/13/2021 Yes    Hypoxia 10/13/2021 Yes    Anemia 10/13/2021 Yes    Peripheral edema 10/13/2021 Yes    Moderate protein-calorie malnutrition ([address] 10/13/2021 Yes    Severe malnutrition ([address] 10/13/2021 Yes    Acute kidney injury ([address] 10/15/2021 Yes    Hypokalemia 10/17/2021 Yes    Other emphysema ([address] 10/17/2021 Yes          Plan:        Monitor chest tube drainage  Possible to start dialysis Monday if resp status allows  Wean o2 as able, currently n 5L nc, was on 4L yesterday  To have ventilation portion of VQ scan Sunday to definitively determine if has PE or not-cont heparin drip in meantime    [address], DO  10/23/2021  1:22 PM

## 2021-10-23 NOTE — PLAN OF CARE
Problem: Falls - Risk of:  Goal: Will remain free from falls  Description: Will remain free from falls  Outcome: Ongoing  Goal: Absence of physical injury  Description: Absence of physical injury  Outcome: Ongoing     Problem: Nutrition  Goal: Optimal nutrition therapy  Outcome: Ongoing        Problem: Skin Integrity:  Goal: Will show no infection signs and symptoms  Description: Will show no infection signs and symptoms  Outcome: Ongoing  Goal: Absence of new skin breakdown  Description: Absence of new skin breakdown  Outcome: Ongoing     Problem: OXYGENATION/RESPIRATORY FUNCTION  Goal: Patient will achieve/maintain normal respiratory rate/effort  Description: Respiratory rate and effort will be within normal limits for the patient  Outcome: Ongoing     Problem: Breathing Pattern - Ineffective:  Goal: Ability to achieve and maintain a regular respiratory rate will improve  Description: Ability to achieve and maintain a regular respiratory rate will improve  Outcome: Ongoing     Problem: Pain:  Goal: Pain level will decrease  Description: Pain level will decrease  Outcome: Ongoing  Goal: Control of acute pain  Description: Control of acute pain  Outcome: Ongoing  Goal: Control of chronic pain  Description: Control of chronic pain  Outcome: Ongoing

## 2021-10-23 NOTE — PROGRESS NOTES
Pt sitting up in bed, visitors at bedside. Sp02 noted at 85% on 5L. Instructed to monitor her conversation, rest, deep breathe. IMprovement noted to 87%. Respiratory notified. Will assess. Continue to monitor.

## 2021-10-23 NOTE — PROGRESS NOTES
Nutrition Assessment     Type and Reason for Visit: Reassess    Nutrition Recommendations/Plan:   1. Continue  ADULT DIET; Regular; Low Sodium (2 gm); Low Potassium (Less than 3000 mg/day); 1500 ml  2. Start Magic Cup at lunch and dinner  3. Monitor p.o intakes, weight and labs    Nutrition Assessment:  Patient states she is eating about 50% at meal time and has a fair appetite. Patient still has not made a decision on dialysis at this time. Will start Magic Cup 2x/day. Will monitor p.o intakes, weight and labs. Malnutrition Assessment:  Malnutrition Status: Severe malnutrition    Estimated Daily Nutrient Needs:  Energy (kcal): 4882-0736 kcal (28-30 kcal/kg; Weight Used for Energy Requirements:  Current     Protein (g): 60-72 gm of protein (1.0-1.2 gm/kg); Weight Used for Protein Requirements:  Current             Nutrition Related Findings: Edema: +2 BUe, trace BLE. Hypoactive bowel sounds. Chest tube placed on 10/18. Deciding on hemodialysis      Current Nutrition Therapies:    ADULT DIET; Regular; Low Sodium (2 gm);  Low Potassium (Less than 3000 mg/day); 1500 ml  ADULT ORAL NUTRITION SUPPLEMENT; Lunch, Dinner; Frozen Oral Supplement    Anthropometric Measures:  · Height: 5' 4\" (162.6 cm)  · Current Body Wt: 131 lb (59.4 kg)   · BMI: 22.5    Nutrition Diagnosis:   · Severe malnutrition related to inadequate protein-energy intake as evidenced by intake 0-25%, intake 26-50%, weight loss greater than or equal to 5% in 1 month      Nutrition Interventions:   Food and/or Nutrient Delivery:  Continue Current Diet, Start Oral Nutrition Supplement  Nutrition Education/Counseling:  Education not indicated   Coordination of Nutrition Care:  Continue to monitor while inpatient    Goals:  PO intakes are greater than 50% at meals       Nutrition Monitoring and Evaluation:   Behavioral-Environmental Outcomes:  None Identified   Food/Nutrient Intake Outcomes:  Food and Nutrient Intake, Supplement Intake  Physical Signs/Symptoms Outcomes:  Biochemical Data, Fluid Status or Edema, Skin, Weight, GI Status     Discharge Planning:    Continue current diet     Cem PATELN, RDN, LDN  Lead Clinical Dietitian  RD Office Phone [phone]

## 2021-10-23 NOTE — PROGRESS NOTES
Pulmonary Critical Care Progress Note       Patient seen for the follow up of acute hypoxic respiratory failure, moderate pulmonary hypertension, former smoker/COPD Pleural effusion on right     Subjective:   she had borderline blood pressure. She is on oxygen at 5 L nasal cannula. She  had 70mL out of chest tube overnight  and 200 so far. .  She feels shortness of breath is  the same. She has occasional dry cough, no chest pain. She is on heparin drip. Examination:  Vitals: BP (!) 88/56   Pulse 86   Temp 97.7 °F (36.5 °C) (Oral)   Resp 18   Ht 5' 4\" (1.626 m)   Wt 131 lb (59.4 kg)   SpO2 96%   BMI 22.49 kg/m²   General appearance: alert and cooperative with exam  Neck: No JVD  Lungs:   Decreased breath sounds no crackles. No air leak noted in left-sided chest tube  Heart: regular rate and rhythm, S1, S2 normal, no gallop  Abdomen: Soft, non tender, + BS  Extremities: no cyanosis or clubbing. No significant edema    LABs:  CBC:   No results for input(s): WBC, HGB, HCT, PLT in the last 72 hours.   BMP:   Recent Labs     10/22/21  0630 10/23/21  0536   * 135   K 3.8 3.7   CO2 24 27   BUN 70* 75*   CREATININE 2.95* 3.05*   LABGLOM 15* 15*   GLUCOSE 126* 124*     Radiology:   chest x-ray 10/23  Questionable trace focal pneumothorax laterally in the right base near the   chest tube.       Persistent by a basilar airspace disease with small left pleural effusion               10/22/2021      Impression:  · Acute hypoxic respiratory failure requiring high flow oxygen  · Moderate to large right pleural effusion, s/p thoracentesis with 850 mL removed  · Small left pleural effusion, loculated  · Atelectasis/?  Trapped lung on the right  · Mild pulmonary edema  · Moderate pulmonary hypertension, RVSP 60 mmHg  · Suspected COPD/former smoker, quit 2019  · Acute kidney injury  · D-dimer, low probability for PE on VQ scan on 10/13/21    Recommendations:  · Continue oxygen via high flow nasal cannula, keep SPO2 90% or greater, wean as able. · Incentive spirometry every hour while awake  · DuoNeb every 4 hours  · Budesonide and Brovana via nebulizer every 12 hours  · IV Solu-Medrol 40 mg every 6 hours, decrease dose and frequency  · Continue Levaquin IV  · Keep pigtail catheter to suction. Monitor output. · X-ray chest in a.m. · Diuresis per nephrology. Patient is to start dialysis on Monday. · Pleural fluid negative for malignancy  · PFTs as an outpatient  · Discussed with  RN  · Repeat VQ scan with high probability of PE. Ventilation portion of VQ scan to be completed when medications are available, hopefully  tomorrow. Will continue heparin drip for now. ·  noted that Dr. Adryan Coffman discussed with Dr. Cherie Cole by nurse practitioner Tani Love. If unable to complete ventilation portion of scan tomorrow he would be okay with CTA of the chest since plan is to start dialysis on Monday.   ·  DVT prophylaxis on heparin drip      Electronically signed by     Lynnette Luna MD on 10/23/2021 at 6:19 PM  Pulmonary Critical Care and Sleep Medicine,  [facility]: [phone]

## 2021-10-23 NOTE — FLOWSHEET NOTE
Patient 85% on 4 L NC. HOB to 45 degrees and oxygen increased to 5L NC. Encouraged pt to take slow deep breaths, oxygen up to 91%.  Will continue to monitor

## 2021-10-24 ENCOUNTER — APPOINTMENT (OUTPATIENT)
Dept: NUCLEAR MEDICINE | Age: 76
DRG: 291 | End: 2021-10-24
Payer: MEDICARE

## 2021-10-24 ENCOUNTER — APPOINTMENT (OUTPATIENT)
Dept: GENERAL RADIOLOGY | Age: 76
DRG: 291 | End: 2021-10-24
Payer: MEDICARE

## 2021-10-24 LAB
ABSOLUTE EOS #: 0 K/UL (ref 0–0.44)
ABSOLUTE IMMATURE GRANULOCYTE: 0.88 K/UL (ref 0–0.3)
ABSOLUTE LYMPH #: 0.66 K/UL (ref 1.1–3.7)
ABSOLUTE MONO #: 1.1 K/UL (ref 0.1–1.2)
ALLEN TEST: POSITIVE
ANION GAP SERPL CALCULATED.3IONS-SCNC: 17 MMOL/L (ref 9–17)
ANTI-XA UNFRAC HEPARIN: 0.41 IU/L (ref 0.3–0.7)
BASOPHILS # BLD: 0 % (ref 0–2)
BASOPHILS ABSOLUTE: 0 K/UL (ref 0–0.2)
BUN BLDV-MCNC: 78 MG/DL (ref 8–23)
BUN/CREAT BLD: 25 (ref 9–20)
CALCIUM SERPL-MCNC: 7.6 MG/DL (ref 8.6–10.4)
CHLORIDE BLD-SCNC: 93 MMOL/L (ref 98–107)
CO2: 25 MMOL/L (ref 20–31)
CREAT SERPL-MCNC: 3.11 MG/DL (ref 0.5–0.9)
CULTURE: NORMAL
CULTURE: NORMAL
DIFFERENTIAL TYPE: ABNORMAL
EOSINOPHILS RELATIVE PERCENT: 0 % (ref 1–4)
FIO2: ABNORMAL
GFR AFRICAN AMERICAN: 18 ML/MIN
GFR NON-AFRICAN AMERICAN: 14 ML/MIN
GFR SERPL CREATININE-BSD FRML MDRD: ABNORMAL ML/MIN/{1.73_M2}
GFR SERPL CREATININE-BSD FRML MDRD: ABNORMAL ML/MIN/{1.73_M2}
GLUCOSE BLD-MCNC: 154 MG/DL (ref 70–99)
GLUCOSE BLD-MCNC: 163 MG/DL (ref 65–105)
HCT VFR BLD CALC: 30.3 % (ref 36.3–47.1)
HEMOGLOBIN: 8.9 G/DL (ref 11.9–15.1)
IMMATURE GRANULOCYTES: 4 %
LYMPHOCYTES # BLD: 3 % (ref 24–43)
Lab: NORMAL
Lab: NORMAL
MCH RBC QN AUTO: 25 PG (ref 25.2–33.5)
MCHC RBC AUTO-ENTMCNC: 29.4 G/DL (ref 28.4–34.8)
MCV RBC AUTO: 85.1 FL (ref 82.6–102.9)
MODE: ABNORMAL
MONOCYTES # BLD: 5 % (ref 3–12)
MORPHOLOGY: ABNORMAL
NEGATIVE BASE EXCESS, ART: ABNORMAL (ref 0–2)
NRBC AUTOMATED: ABNORMAL PER 100 WBC
O2 DEVICE/FLOW/%: ABNORMAL
PATIENT TEMP: 37
PDW BLD-RTO: 21.8 % (ref 11.8–14.4)
PLATELET # BLD: 263 K/UL (ref 138–453)
PLATELET ESTIMATE: ABNORMAL
PMV BLD AUTO: 10.4 FL (ref 8.1–13.5)
POC HCO3: 28.9 MMOL/L (ref 21–28)
POC O2 SATURATION: 89 % (ref 94–98)
POC PCO2 TEMP: ABNORMAL MM HG
POC PCO2: 41.1 MM HG (ref 35–48)
POC PH TEMP: ABNORMAL
POC PH: 7.46 (ref 7.35–7.45)
POC PO2 TEMP: ABNORMAL MM HG
POC PO2: 53.3 MM HG (ref 83–108)
POC TCO2: 29 MMOL/L (ref 22–30)
POSITIVE BASE EXCESS, ART: 5 (ref 0–3)
POTASSIUM SERPL-SCNC: 4 MMOL/L (ref 3.7–5.3)
RBC # BLD: 3.56 M/UL (ref 3.95–5.11)
RBC # BLD: ABNORMAL 10*6/UL
SAMPLE SITE: ABNORMAL
SEG NEUTROPHILS: 88 % (ref 36–65)
SEGMENTED NEUTROPHILS ABSOLUTE COUNT: 19.36 K/UL (ref 1.5–8.1)
SODIUM BLD-SCNC: 135 MMOL/L (ref 135–144)
SPECIMEN DESCRIPTION: NORMAL
SPECIMEN DESCRIPTION: NORMAL
TCO2 (CALC), ART: ABNORMAL MMOL/L (ref 22–29)
WBC # BLD: 22 K/UL (ref 3.5–11.3)
WBC # BLD: ABNORMAL 10*3/UL

## 2021-10-24 PROCEDURE — 6360000002 HC RX W HCPCS: Performed by: STUDENT IN AN ORGANIZED HEALTH CARE EDUCATION/TRAINING PROGRAM

## 2021-10-24 PROCEDURE — 80048 BASIC METABOLIC PNL TOTAL CA: CPT

## 2021-10-24 PROCEDURE — 82803 BLOOD GASES ANY COMBINATION: CPT

## 2021-10-24 PROCEDURE — 36415 COLL VENOUS BLD VENIPUNCTURE: CPT

## 2021-10-24 PROCEDURE — 6360000002 HC RX W HCPCS: Performed by: NURSE PRACTITIONER

## 2021-10-24 PROCEDURE — 94761 N-INVAS EAR/PLS OXIMETRY MLT: CPT

## 2021-10-24 PROCEDURE — 2060000000 HC ICU INTERMEDIATE R&B

## 2021-10-24 PROCEDURE — 85520 HEPARIN ASSAY: CPT

## 2021-10-24 PROCEDURE — 6370000000 HC RX 637 (ALT 250 FOR IP): Performed by: INTERNAL MEDICINE

## 2021-10-24 PROCEDURE — 2700000000 HC OXYGEN THERAPY PER DAY

## 2021-10-24 PROCEDURE — 85025 COMPLETE CBC W/AUTO DIFF WBC: CPT

## 2021-10-24 PROCEDURE — 99232 SBSQ HOSP IP/OBS MODERATE 35: CPT | Performed by: INTERNAL MEDICINE

## 2021-10-24 PROCEDURE — 36600 WITHDRAWAL OF ARTERIAL BLOOD: CPT

## 2021-10-24 PROCEDURE — 82374 ASSAY BLOOD CARBON DIOXIDE: CPT

## 2021-10-24 PROCEDURE — 2580000003 HC RX 258: Performed by: STUDENT IN AN ORGANIZED HEALTH CARE EDUCATION/TRAINING PROGRAM

## 2021-10-24 PROCEDURE — 6370000000 HC RX 637 (ALT 250 FOR IP): Performed by: STUDENT IN AN ORGANIZED HEALTH CARE EDUCATION/TRAINING PROGRAM

## 2021-10-24 PROCEDURE — 94640 AIRWAY INHALATION TREATMENT: CPT

## 2021-10-24 PROCEDURE — 6360000002 HC RX W HCPCS: Performed by: INTERNAL MEDICINE

## 2021-10-24 PROCEDURE — 71045 X-RAY EXAM CHEST 1 VIEW: CPT

## 2021-10-24 PROCEDURE — 82947 ASSAY GLUCOSE BLOOD QUANT: CPT

## 2021-10-24 RX ADMIN — IPRATROPIUM BROMIDE AND ALBUTEROL SULFATE 1 AMPULE: .5; 2.5 SOLUTION RESPIRATORY (INHALATION) at 07:45

## 2021-10-24 RX ADMIN — BUDESONIDE 500 MCG: 0.5 SUSPENSION RESPIRATORY (INHALATION) at 19:55

## 2021-10-24 RX ADMIN — METHYLPREDNISOLONE SODIUM SUCCINATE 30 MG: 40 INJECTION, POWDER, FOR SOLUTION INTRAMUSCULAR; INTRAVENOUS at 01:42

## 2021-10-24 RX ADMIN — IPRATROPIUM BROMIDE AND ALBUTEROL SULFATE 1 AMPULE: .5; 2.5 SOLUTION RESPIRATORY (INHALATION) at 19:54

## 2021-10-24 RX ADMIN — METHYLPREDNISOLONE SODIUM SUCCINATE 30 MG: 40 INJECTION, POWDER, FOR SOLUTION INTRAMUSCULAR; INTRAVENOUS at 17:41

## 2021-10-24 RX ADMIN — METHYLPREDNISOLONE SODIUM SUCCINATE 30 MG: 40 INJECTION, POWDER, FOR SOLUTION INTRAMUSCULAR; INTRAVENOUS at 11:37

## 2021-10-24 RX ADMIN — FUROSEMIDE 40 MG: 10 INJECTION, SOLUTION INTRAMUSCULAR; INTRAVENOUS at 08:07

## 2021-10-24 RX ADMIN — SODIUM CHLORIDE, PRESERVATIVE FREE 10 ML: 5 INJECTION INTRAVENOUS at 21:36

## 2021-10-24 RX ADMIN — ARFORMOTEROL TARTRATE 15 MCG: 15 SOLUTION RESPIRATORY (INHALATION) at 07:49

## 2021-10-24 RX ADMIN — SODIUM CHLORIDE, PRESERVATIVE FREE 10 ML: 5 INJECTION INTRAVENOUS at 08:07

## 2021-10-24 RX ADMIN — FUROSEMIDE 40 MG: 10 INJECTION, SOLUTION INTRAMUSCULAR; INTRAVENOUS at 17:42

## 2021-10-24 RX ADMIN — LEVOFLOXACIN 500 MG: 5 INJECTION, SOLUTION INTRAVENOUS at 15:53

## 2021-10-24 RX ADMIN — POTASSIUM CHLORIDE 40 MEQ: 20 TABLET, EXTENDED RELEASE ORAL at 08:07

## 2021-10-24 RX ADMIN — HEPARIN SODIUM 10 UNITS/KG/HR: 10000 INJECTION, SOLUTION INTRAVENOUS at 11:37

## 2021-10-24 RX ADMIN — BUDESONIDE 500 MCG: 0.5 SUSPENSION RESPIRATORY (INHALATION) at 07:45

## 2021-10-24 RX ADMIN — ARFORMOTEROL TARTRATE 15 MCG: 15 SOLUTION RESPIRATORY (INHALATION) at 19:55

## 2021-10-24 ASSESSMENT — PAIN SCALES - GENERAL
PAINLEVEL_OUTOF10: 0

## 2021-10-24 ASSESSMENT — PAIN SCALES - WONG BAKER
WONGBAKER_NUMERICALRESPONSE: 0
WONGBAKER_NUMERICALRESPONSE: 0

## 2021-10-24 NOTE — PLAN OF CARE
Problem: Breathing Pattern - Ineffective:  Goal: Ability to achieve and maintain a regular respiratory rate will improve  Description: Ability to achieve and maintain a regular respiratory rate will improve    Pt continues on 5L O2 avg Sp02 of 93%. Chest tube to suction draining very small amt. Pt states agreeable to dialysis and plan for f/u Monday 10-25.    Outcome: Ongoing

## 2021-10-24 NOTE — PLAN OF CARE
Received call from Viibar: she desatted to 80s quickly when ventilation portion of VQ attempted, did not tolerate and had to abort scan    Larissa Terrazas, DO

## 2021-10-24 NOTE — PROGRESS NOTES
[facility]  Office: [address], DO, Isaijohn Irizarry, DO, Liseth Aldana, DO, Johny Terrazas, DO, Enma Akins MD, Radhika Mcgill MD, Smita Edward MD, Aric Brown MD, Garrett Munoz MD, Prince Pascal MD, Miroslava Escamilla MD, Valeria Yun MD, Ernestina Thibodeaux, DO, Shiraz Wells, DO, Luther Delgado MD,  Wicho Rendon DO, Debra Marcus MD, Helder Tilley MD, Luma Watkins MD, Merari Lin MD, Aaliyah Guerin MD, Williams Keys MD, Barbara Wang, [facility], [facility], [facility], Rishi Hastings, [facility], Ayaan Uriostegui, CNS, Tyrone Malik, CNP, Aby Quintana, CNP, Nivia Avilez, CNP, Kemar Young, CNP, Marcelino Felipe, CNP, Jared Patton, CNP, Luciana Maurer PA-C, Otis Portillo, [facility], Ivon Alarcon, CNP, Maycol Byrd, CNP, Brooke Singh, CNP, Joseph Tolentino, CNP, Celeste Lujan, CNP, Sandra Callaway, [facility], [facility]    Progress Note    10/24/2021    9:54 AM    Name:   Celso Sims  MRN:     [id number]     Acct:      [de-identified]   Room:   [address] [place], [place] [address] Day:  12  Admit Date:  10/12/2021  2:53 PM    PCP:   Tiffany Pérez MD  Code Status:  Full Code    Subjective:     C/C:   Chief Complaint   Patient presents with    Respiratory Distress     EMS reported 97% on room air; pt arrives with saturation 45% on room air    Failure To Thrive     Interval History Status: improved. Feels better today  Denies cp/n/v  Some sob but better    Chest tube doesn't have much drainage    Brief History:     Per my ELANA:  \"Danielle Wadsworth is a 64 y. o. female with a hx of CKD 4 and iron deficiency anemia who presented to [facility] with Respiratory Distress and hypoxia (O2 sats 45%) and failure to thrive and is admitted to the hospital for the management of right Pleural effusion and acute renal failure.  Patient lives at home alone and has recently been having difficulty caring for herself and generalized weakness for the last few weeks . She does have chronic BLE edema but denies hx of CHF. Initial CXR showed large right pleural effusion, stat elevated BNP and D-dimer as well as BUN/creatinine.  Last took known creatinine was 2.34 in august, she reports she seen a nephrologist once. She does not wear home oxygen      10/13: US guided right thoracentesis 850ml of nonturbid straw colored fluid removed -transudative     Pleural fluid cultures negative for malignancy\"    Chest tube placed 10/18 on right for loculated effusion     Started on heparin drip for possible PE; VQ being repeated 10/24    Review of Systems:     Constitutional:  negative for chills, fevers, sweats  Respiratory:  positive for cough, dyspnea on exertion, shortness of breath  Cardiovascular:  negative for chest pain, chest pressure/discomfort, lower extremity edema, palpitations  Gastrointestinal:  negative for abdominal pain, constipation, diarrhea, nausea, vomiting  Neurological:  negative for dizziness, headache    Medications: Allergies:     Allergies   Allergen Reactions    Penicillins Swelling       Current Meds:   Scheduled Meds:    methylPREDNISolone  30 mg IntraVENous Q8H    furosemide  40 mg IntraVENous BID    budesonide  0.5 mg Nebulization BID    Arformoterol Tartrate  15 mcg Nebulization BID    ipratropium-albuterol  1 ampule Inhalation 4x daily    potassium chloride  40 mEq Oral Daily    epoetin mike-epbx  8,000 Units SubCUTAneous Once per day on Mon Wed Fri    calcitRIOL  0.25 mcg Oral Once per day on Mon Wed Fri    sodium chloride flush  5-40 mL IntraVENous 2 times per day    levofloxacin  500 mg IntraVENous Q48H    vitamin D  50,000 Units Oral Weekly     Continuous Infusions:    heparin (PORCINE) Infusion 10.084 Units/kg/hr (10/22/21 0375)    dextrose      sodium chloride       PRN Meds: HYDROcodone 5 mg - acetaminophen **OR** HYDROcodone 5 mg - acetaminophen, heparin (porcine), heparin (porcine), melatonin, glucose, dextrose, glucagon (rDNA), dextrose, sodium chloride flush, sodium chloride, ondansetron **OR** ondansetron, acetaminophen **OR** acetaminophen, perflutren lipid microspheres    Data:     Past Medical History:   has no past medical history on file. Social History:   reports that she quit smoking about 2 years ago. Her smoking use included cigarettes. She has never used smokeless tobacco. She reports current alcohol use. Family History:   Family History   Problem Relation Age of Onset    Hypertension Mother     Cancer Sister     Cancer Brother        Vitals:  BP (!) 104/56   Pulse 105   Temp 97.5 °F (36.4 °C) (Oral)   Resp 18   Ht 5' 4\" (1.626 m)   Wt 133 lb 4.8 oz (60.5 kg)   SpO2 98%   BMI 22.88 kg/m²   Temp (24hrs), Av.8 °F (36.6 °C), Min:97.5 °F (36.4 °C), Max:98.1 °F (36.7 °C)    Recent Labs     10/22/21  2221 10/23/21  0723 10/23/21  1114 10/23/21  1632   POCGLU 146* 139* 130* 167*       I/O (24Hr):     Intake/Output Summary (Last 24 hours) at 10/24/2021 0954  Last data filed at 10/24/2021 1382  Gross per 24 hour   Intake 244 ml   Output 770 ml   Net -526 ml       Labs:  Hematology:  Recent Labs     10/21/21  1409   INR 1.0     Chemistry:  Recent Labs     10/22/21  0630 10/23/21  0536 10/24/21  0559   * 135 135   K 3.8 3.7 4.0   CL 92* 92* 93*   CO2 24 27 25   GLUCOSE 126* 124* 154*   BUN 70* 75* 78*   CREATININE 2.95* 3.05* 3.11*   ANIONGAP 18* 16 17   LABGLOM 15* 15* 14*   GFRAA 19* 18* 18*   CALCIUM 8.4* 7.9* 7.6*     Recent Labs     10/22/21  1141 10/22/21  1716 10/22/21  2221 10/23/21  0723 10/23/21  1114 10/23/21  1632   POCGLU 180* 179* 146* 139* 130* 167*     ABG:No results found for: POCPH, PHART, PH, POCPCO2, KXP3KPT, PCO2, POCPO2, PO2ART, PO2, POCHCO3, EGJ7XCX, HCO3, NBEA, PBEA, BEART, BE, THGBART, THB, TGU1UYX, KCRO5HMC, T7DHZBJA, O2SAT, FIO2  Lab Results   Component Value Date/Time    SPECIAL RT HAND 6ML 10/18/2021 04:26 AM    SPECIAL RT AC 5ML 10/18/2021 04:26 AM     Lab Results   Component Value Date/Time    CULTURE NO GROWTH 6 DAYS 10/18/2021 04:26 AM    CULTURE NO GROWTH 6 DAYS 10/18/2021 04:26 AM       Radiology:  NM LUNG SCAN PERFUSION ONLY    Result Date: 10/21/2021  Exam is technically high probability for pulmonary embolism. While emboli are possible, the diminished activity within the upper lung zones could also be secondary to emphysema; a  ventilation study would be needed for confirmation. This could be obtained at a later time as clinically warranted. Improving perfusion at the lateral right lung base. Findings were discussed with [place] Me[place] at 12:13 on 10/21/2021. XR CHEST DECUBITUS RIGHT    Result Date: 10/16/2021  Small to moderate right and small left layering pleural effusions. XR CHEST DECUBITUS LEFT    Result Date: 10/16/2021  Small to moderate right and small left layering pleural effusions. XR CHEST PORTABLE    Result Date: 10/22/2021  Stable right chest tube without demonstrable pneumothorax Improvement in now mild right basilar opacity related to minimal atelectasis and or effusion Stable moderate left basilar opacity     XR CHEST PORTABLE    Result Date: 10/21/2021  Stable right chest tube without pneumothorax Stable bibasilar pleuroparenchymal changes     XR CHEST PORTABLE    Result Date: 10/20/2021  No significant interval change. Right chest tube in place with small right pleural effusion and right basilar opacity. Unchanged moderate left pleural effusion. XR CHEST PORTABLE    Result Date: 10/19/2021  Stable position/appearance of right pleural catheter stable bibasilar opacities related to combined pleural-parenchymal processes     XR CHEST PORTABLE    Result Date: 10/18/2021  Right-sided pleural catheter in place Decreased right pleural effusion No pneumothorax.  Unchanged left pleural effusion     IR CHEST TUBE INSERTION    Result Date: 10/18/2021  Successful ultrasound and fluoroscopy guided placement of a right chest tube       Physical Examination: General appearance:  alert, cooperative and no distress  Mental Status:  oriented to person, place and time and normal affect  Lungs:  crackles bilaterally, normal effort  Heart:  regular rate and rhythm, no murmur  Abdomen:  soft, nontender, nondistended, normal bowel sounds, no masses, hepatomegaly, splenomegaly  Extremities:  no edema, redness, tenderness in the calves  Skin:  no gross lesions, rashes, induration    Assessment:        Hospital Problems         Last Modified POA    * (Principal) Pleural effusion on right 10/15/2021 Yes    Acute renal failure with acute renal cortical necrosis superimposed on stage 4 chronic kidney disease ([address] 10/14/2021 Yes    Acute respiratory failure with hypoxia ([address] 10/13/2021 Yes    Iron deficiency anemia 10/12/2021 Yes    Essential hypertension 10/12/2021 Yes    General weakness 10/12/2021 Yes    Vitamin D deficiency 10/13/2021 Yes    Acute diastolic heart failure ([address] 10/13/2021 Yes    Hypoxia 10/13/2021 Yes    Anemia 10/13/2021 Yes    Peripheral edema 10/13/2021 Yes    Moderate protein-calorie malnutrition ([address] 10/13/2021 Yes    Severe malnutrition ([address] 10/13/2021 Yes    Acute kidney injury ([address] 10/15/2021 Yes    Hypokalemia 10/17/2021 Yes    Other emphysema ([address] 10/17/2021 Yes          Plan:        Monitor chest tube drainage  Possible to start dialysis Monday if resp status allows  Wean o2 as able, currently n 5L nc, was on 4L yesterday  To have ventilation portion of VQ scan today to definitively determine if has PE or not-cont heparin drip in meantime    [employer] [facility], DO  10/24/2021  9:54 AM

## 2021-10-24 NOTE — PROGRESS NOTES
NEPHROLOGY PROGRESS NOTE      SUBJECTIVE     All events noted. My colleague had a long discussion on Friday with the family in regards to initiation of dialysis/the procedure and all the possible complications. They are agreeable what patient is yet undecided. She states that she feels much better. Still on supplemental oxygen 4L. Appetite is improving. On heparin drip because of concerns of pulmonary embolism based upon a VQ scan. Renal function stable    OBJECTIVE     Vitals:    10/24/21 0023 10/24/21 0258 10/24/21 0558 10/24/21 0800   BP: (!) 108/55 (!) 100/44  (!) 104/56   Pulse: 102 100  105   Resp: 12 16  18   Temp: 97.7 °F (36.5 °C) 97.9 °F (36.6 °C)  97.5 °F (36.4 °C)   TempSrc:    Oral   SpO2: (!) 89% 98%     Weight:   133 lb 4.8 oz (60.5 kg)    Height:         24HR INTAKE/OUTPUT:      Intake/Output Summary (Last 24 hours) at 10/24/2021 0832  Last data filed at 10/24/2021 0175  Gross per 24 hour   Intake 244 ml   Output 770 ml   Net -526 ml       General appearance:Awake, alert, on 5 L supplemental oxygen  HEENT: PERRLA  Respiratory::vesicular breath sounds, reduced air entry RT CHEST TUBE PRESENT  Cardiovascular:S1 S2 normal,no gallop or organic murmur. Abdomen:Non tender/non distended. Bowel sounds present  Extremities: No Cyanosis or Clubbing,Lower extremity edema  Neurological:Alert and oriented. No abnormalities of mood, affect, memory, mentation, or behavior are noted      MEDICATIONS     Scheduled Meds:    methylPREDNISolone  30 mg IntraVENous Q8H    furosemide  40 mg IntraVENous BID    budesonide  0.5 mg Nebulization BID    Arformoterol Tartrate  15 mcg Nebulization BID    ipratropium-albuterol  1 ampule Inhalation 4x daily    potassium chloride  40 mEq Oral Daily    epoetin mike-epbx  8,000 Units SubCUTAneous Once per day on Mon Wed Fri    calcitRIOL  0.25 mcg Oral Once per day on Mon Wed Fri    sodium chloride flush  5-40 mL IntraVENous 2 times per day    levofloxacin  500 mg IntraVENous Q48H    vitamin D  50,000 Units Oral Weekly     Continuous Infusions:    heparin (PORCINE) Infusion 10.084 Units/kg/hr (10/22/21 2149)    dextrose      sodium chloride       PRN Meds:  HYDROcodone 5 mg - acetaminophen **OR** HYDROcodone 5 mg - acetaminophen, heparin (porcine), heparin (porcine), melatonin, glucose, dextrose, glucagon (rDNA), dextrose, sodium chloride flush, sodium chloride, ondansetron **OR** ondansetron, acetaminophen **OR** acetaminophen, perflutren lipid microspheres  Home Meds:                Medications Prior to Admission: amLODIPine (NORVASC) 5 MG tablet, Take 5 mg by mouth daily  magnesium oxide (MAG-OX) 400 MG tablet, Take 400 mg by mouth daily  metoprolol (LOPRESSOR) 100 MG tablet, Take 100 mg by mouth 2 times daily  folic acid (FOLVITE) 097 MCG tablet, Take 800 mcg by mouth daily  diphenhydrAMINE-APAP, sleep, (TYLENOL PM EXTRA STRENGTH)  MG tablet, Take 1 tablet by mouth nightly as needed for Sleep    INVESTIGATIONS     Last 3 CMP:    Recent Labs     10/22/21  0630 10/23/21  0536 10/24/21  0559   * 135 135   K 3.8 3.7 4.0   CL 92* 92* 93*   CO2 24 27 25   BUN 70* 75* 78*   CREATININE 2.95* 3.05* 3.11*   CALCIUM 8.4* 7.9* 7.6*       Last 3 CBC:  No results for input(s): WBC, RBC, HGB, HCT, MCV, MCH, MCHC, RDW, PLT, MPV in the last 72 hours. ASSESSMENT     #1 chronic kidney disease stage IV/V with baseline creatinine 2.8-3  #2 exhibiting uremic symptoms. Patient still undecided in regards to initiation of dialysis.   #3 moderate large right pleural effusion status post thoracocentesis 850 cc removed  #4 moderate pulmonary hypertension/RVSP 60/tricuspid regurgitation  #5 essential hypertension    PLAN     #1 continue IV Lasix  #2  We will initiate hemodialysis a.m. if aggreeable  #3 avoid nephrotoxins  #4 we will follow    Please do not hesitate to call with questions    This note is created with the assistance of a speech-recognition program. While intending to generate a document that actually reflects the content of the visit, no guarantees can be provided that every mistake has been identified and corrected by editing    Juanjo Bennett MD MD, [facility] [place]   10/24/2021 8:32 AM  [facility]

## 2021-10-24 NOTE — PROGRESS NOTES
Pt unable to complete VQ scan, Sp02 dropped to low 80s with slow recovery and patient panicked as well when wearing mask . Dr. Terrazas notified and study suspended. , .

## 2021-10-24 NOTE — PLAN OF CARE
Problem: Falls - Risk of:  Goal: Will remain free from falls  Description: Will remain free from falls  Outcome: Ongoing     Problem: IP BALANCE  Goal: LTG - Patient will maintain balance to allow for safe/functional mobility  Outcome: Ongoing     Problem: Skin Integrity:  Goal: Will show no infection signs and symptoms  Description: Will show no infection signs and symptoms  Outcome: Ongoing     Problem: Pain:  Goal: Pain level will decrease  Description: Pain level will decrease  Outcome: Ongoing

## 2021-10-24 NOTE — PROGRESS NOTES
Pulmonary Critical Care Progress Note       Patient seen for the follow up of acute hypoxic respiratory failure, moderate pulmonary hypertension, former smoker/COPD Pleural effusion on right     Subjective:   she try to get the ventilation perfusion scan but could   Do a due to saturation to 80%. She is on oxygen at 5 L nasal cannula. She  Has decreased output of chest tube . She seems to be labored and has more shortness of breath. .She has occasional dry cough, no chest pain. She is on heparin drip. I ordered ABGs    Examination:  Vitals: BP (!) 115/53   Pulse 96   Temp 97.4 °F (36.3 °C) (Oral)   Resp 16   Ht 5' 4\" (1.626 m)   Wt 133 lb 4.8 oz (60.5 kg)   SpO2 98%   BMI 22.88 kg/m²   General appearance: alert and cooperative with exam  Neck: No JVD  Lungs:   Decreased breath sounds no crackles. No air leak noted in left-sided chest tube  Heart: regular rate and rhythm, S1, S2 normal, no gallop  Abdomen: Soft, non tender, + BS  Extremities: no cyanosis or clubbing. No significant edema    LABs:  CBC:   No results for input(s): WBC, HGB, HCT, PLT in the last 72 hours. BMP:   Recent Labs     10/23/21  0536 10/24/21  0559    135   K 3.7 4.0   CO2 27 25   BUN 75* 78*   CREATININE 3.05* 3.11*   LABGLOM 15* 14*   GLUCOSE 124* 154*   Results for Guanako Chowdary (MRN [id number]) as of 10/24/2021 14:04   Ref. Range 10/12/2021 15:31   D-Dimer, Quant Latest Units: mg/L FEU 2.38         Results for Guanako Chowdary (MRN [id number]) as of 10/24/2021 14:04   Ref. Range 10/24/2021 11:45   POC pH Latest Ref Range: 7.350 - 7.450  7. 456 (H)   POC pH Temp Unknown NOT REPORTED   POC pCO2 Latest Ref Range: 35.0 - 48.0 mm Hg 41.1   POC pCO2 Temp Latest Units: mm Hg NOT REPORTED   POC PO2 Latest Ref Range: 83.0 - 108.0 mm Hg 53.3 (L)   POC pO2 Temp Latest Units: mm Hg NOT REPORTED   POC HCO3 Latest Ref Range: 21.0 - 28.0 mmol/L 28.9 (H)   POC O2 SAT Latest Ref Range: 94.0 - 98.0 % 89 (L)   POC TCO2 Latest Ref Range: 22 - 30 mmol/L 29   Results for Keila Barnes (MRN [id number]) as of 10/24/2021 14:04   Ref. Range 10/13/2021 09:00   Glucose, Fluid Latest Units: mg/dL 106   LD, Fluid Latest Units: U/L 94   RBC, Fluid Latest Units: /mm3 <3,000   Lymphocytes, Body Fluid Latest Units: % 8   Monocyte Count, Fluid Latest Units: % NOT REPORTED   Neutrophil Count, Fluid Latest Units: % 9   pH, Fluid Unknown 7.5   Total Protein, Body Fluid Latest Units: g/dL 2.7   WBC, Fluid Latest Units: /mm3 76   Other Cells, Fluid Latest Units: % MESOTHELIAL CELLS     Radiology:   chest x-ray 10/24  Small stable left effusion and left lower lobe atelectatic changes.  Stable   right chest tube.  Cardiomegaly and COPD redemonstrated.              chest x-ray 10/23         CT chest 10/14        Impression:  · Acute hypoxic respiratory failure requiring high flow oxygen  · Moderate to large right pleural effusion, s/p thoracentesis with 850 mL removed  · Small left pleural effusion, loculated  · Atelectasis/?  Trapped lung on the right  · Mild pulmonary edema  · Moderate pulmonary hypertension, RVSP 60 mmHg  · Suspected COPD/former smoker, quit 2019  · Acute kidney injury  · D-dimer, low probability for PE on VQ scan on 10/13/21    Recommendations:  · Continue oxygen via nasal cannula  · Incentive spirometry every hour while awake  · DuoNeb every 4 hours  · Budesonide and Brovana via nebulizer every 12 hours  · IV Solu-Medrol  30 q.8 hours  · Continue Levaquin IV  ·  check CBC / continue heparin drip for now. ·  noted that Dr. Cathy Hillman discussed with  Nephrology cleared for CTA; will reconfirm in order CTA before hemodialysis tomorrow  · Keep pigtail catheter to suction. Monitor output. Will consider discontinuing pigtail  · X-ray chest in a.m. · Diuresis per nephrology. Patient is to start dialysis on Monday.     · Pleural fluid negative for malignancy  · PFTs as an outpatient  · Discussed with    RT  · DVT prophylaxis on heparin drip      Electronically signed by     Jean Urrutia MD on 10/24/2021 at 2:03 PM  Pulmonary Critical Care and Sleep Medicine,  [facility]: [phone]

## 2021-10-25 ENCOUNTER — APPOINTMENT (OUTPATIENT)
Dept: CT IMAGING | Age: 76
DRG: 291 | End: 2021-10-25
Payer: MEDICARE

## 2021-10-25 ENCOUNTER — APPOINTMENT (OUTPATIENT)
Dept: GENERAL RADIOLOGY | Age: 76
DRG: 291 | End: 2021-10-25
Payer: MEDICARE

## 2021-10-25 LAB
ABSOLUTE EOS #: 0 K/UL (ref 0–0.4)
ABSOLUTE IMMATURE GRANULOCYTE: 0.52 K/UL (ref 0–0.3)
ABSOLUTE LYMPH #: 0.26 K/UL (ref 1–4.8)
ABSOLUTE MONO #: 1.31 K/UL (ref 0.2–0.8)
ANION GAP SERPL CALCULATED.3IONS-SCNC: 16 MMOL/L (ref 9–17)
ANTI-XA UNFRAC HEPARIN: 0.41 IU/L (ref 0.3–0.7)
BASOPHILS # BLD: 0 %
BASOPHILS ABSOLUTE: 0 K/UL (ref 0–0.2)
BUN BLDV-MCNC: 85 MG/DL (ref 8–23)
BUN/CREAT BLD: 28 (ref 9–20)
CALCIUM SERPL-MCNC: 7.9 MG/DL (ref 8.6–10.4)
CHLORIDE BLD-SCNC: 96 MMOL/L (ref 98–107)
CO2: 25 MMOL/L (ref 20–31)
CREAT SERPL-MCNC: 3.08 MG/DL (ref 0.5–0.9)
DIFFERENTIAL TYPE: ABNORMAL
EOSINOPHILS RELATIVE PERCENT: 0 % (ref 1–4)
GFR AFRICAN AMERICAN: 18 ML/MIN
GFR NON-AFRICAN AMERICAN: 15 ML/MIN
GFR SERPL CREATININE-BSD FRML MDRD: ABNORMAL ML/MIN/{1.73_M2}
GFR SERPL CREATININE-BSD FRML MDRD: ABNORMAL ML/MIN/{1.73_M2}
GLUCOSE BLD-MCNC: 134 MG/DL (ref 65–105)
GLUCOSE BLD-MCNC: 162 MG/DL (ref 65–105)
GLUCOSE BLD-MCNC: 162 MG/DL (ref 70–99)
GLUCOSE BLD-MCNC: 167 MG/DL (ref 65–105)
GLUCOSE BLD-MCNC: 173 MG/DL (ref 65–105)
HBV SURFACE AB TITR SER: <3.5 MIU/ML
HCT VFR BLD CALC: 29.8 % (ref 36.3–47.1)
HEMOGLOBIN: 8.8 G/DL (ref 11.9–15.1)
HEPATITIS B CORE TOTAL ANTIBODY: NONREACTIVE
HEPATITIS B SURFACE ANTIGEN: NONREACTIVE
IMMATURE GRANULOCYTES: 2 %
LACTIC ACID, SEPSIS WHOLE BLOOD: ABNORMAL MMOL/L (ref 0.5–1.9)
LACTIC ACID, SEPSIS: 2.6 MMOL/L (ref 0.5–1.9)
LYMPHOCYTES # BLD: 1 % (ref 24–44)
MCH RBC QN AUTO: 24.9 PG (ref 25.2–33.5)
MCHC RBC AUTO-ENTMCNC: 29.5 G/DL (ref 28.4–34.8)
MCV RBC AUTO: 84.2 FL (ref 82.6–102.9)
MONOCYTES # BLD: 5 % (ref 1–7)
MORPHOLOGY: ABNORMAL
NRBC AUTOMATED: 0.6 PER 100 WBC
PDW BLD-RTO: 21.8 % (ref 11.8–14.4)
PLATELET # BLD: 267 K/UL (ref 138–453)
PLATELET ESTIMATE: ABNORMAL
PMV BLD AUTO: 10.5 FL (ref 8.1–13.5)
POTASSIUM SERPL-SCNC: 4.6 MMOL/L (ref 3.7–5.3)
RBC # BLD: 3.54 M/UL (ref 3.95–5.11)
RBC # BLD: ABNORMAL 10*6/UL
SEG NEUTROPHILS: 92 % (ref 36–66)
SEGMENTED NEUTROPHILS ABSOLUTE COUNT: 24.11 K/UL (ref 1.8–7.7)
SODIUM BLD-SCNC: 137 MMOL/L (ref 135–144)
WBC # BLD: 26.2 K/UL (ref 3.5–11.3)
WBC # BLD: ABNORMAL 10*3/UL

## 2021-10-25 PROCEDURE — 6370000000 HC RX 637 (ALT 250 FOR IP): Performed by: NURSE PRACTITIONER

## 2021-10-25 PROCEDURE — 6360000002 HC RX W HCPCS: Performed by: NURSE PRACTITIONER

## 2021-10-25 PROCEDURE — 97530 THERAPEUTIC ACTIVITIES: CPT

## 2021-10-25 PROCEDURE — 6360000002 HC RX W HCPCS: Performed by: INTERNAL MEDICINE

## 2021-10-25 PROCEDURE — 83605 ASSAY OF LACTIC ACID: CPT

## 2021-10-25 PROCEDURE — 6370000000 HC RX 637 (ALT 250 FOR IP): Performed by: INTERNAL MEDICINE

## 2021-10-25 PROCEDURE — 74177 CT ABD & PELVIS W/CONTRAST: CPT

## 2021-10-25 PROCEDURE — 6360000004 HC RX CONTRAST MEDICATION: Performed by: INTERNAL MEDICINE

## 2021-10-25 PROCEDURE — 85025 COMPLETE CBC W/AUTO DIFF WBC: CPT

## 2021-10-25 PROCEDURE — 71260 CT THORAX DX C+: CPT

## 2021-10-25 PROCEDURE — 2580000003 HC RX 258: Performed by: STUDENT IN AN ORGANIZED HEALTH CARE EDUCATION/TRAINING PROGRAM

## 2021-10-25 PROCEDURE — 85520 HEPARIN ASSAY: CPT

## 2021-10-25 PROCEDURE — 94640 AIRWAY INHALATION TREATMENT: CPT

## 2021-10-25 PROCEDURE — 82947 ASSAY GLUCOSE BLOOD QUANT: CPT

## 2021-10-25 PROCEDURE — 99233 SBSQ HOSP IP/OBS HIGH 50: CPT | Performed by: INTERNAL MEDICINE

## 2021-10-25 PROCEDURE — 2580000003 HC RX 258: Performed by: INTERNAL MEDICINE

## 2021-10-25 PROCEDURE — 36415 COLL VENOUS BLD VENIPUNCTURE: CPT

## 2021-10-25 PROCEDURE — 2060000000 HC ICU INTERMEDIATE R&B

## 2021-10-25 PROCEDURE — 87040 BLOOD CULTURE FOR BACTERIA: CPT

## 2021-10-25 PROCEDURE — 87340 HEPATITIS B SURFACE AG IA: CPT

## 2021-10-25 PROCEDURE — 2700000000 HC OXYGEN THERAPY PER DAY

## 2021-10-25 PROCEDURE — 86704 HEP B CORE ANTIBODY TOTAL: CPT

## 2021-10-25 PROCEDURE — 80048 BASIC METABOLIC PNL TOTAL CA: CPT

## 2021-10-25 PROCEDURE — 71045 X-RAY EXAM CHEST 1 VIEW: CPT

## 2021-10-25 PROCEDURE — 86317 IMMUNOASSAY INFECTIOUS AGENT: CPT

## 2021-10-25 PROCEDURE — 97535 SELF CARE MNGMENT TRAINING: CPT

## 2021-10-25 PROCEDURE — 94761 N-INVAS EAR/PLS OXIMETRY MLT: CPT

## 2021-10-25 PROCEDURE — 6370000000 HC RX 637 (ALT 250 FOR IP): Performed by: STUDENT IN AN ORGANIZED HEALTH CARE EDUCATION/TRAINING PROGRAM

## 2021-10-25 RX ORDER — SODIUM CHLORIDE 0.9 % (FLUSH) 0.9 %
10 SYRINGE (ML) INJECTION PRN
Status: DISCONTINUED | OUTPATIENT
Start: 2021-10-25 | End: 2021-11-04 | Stop reason: HOSPADM

## 2021-10-25 RX ORDER — HEPARIN SODIUM 5000 [USP'U]/ML
5000 INJECTION, SOLUTION INTRAVENOUS; SUBCUTANEOUS 2 TIMES DAILY
Status: DISCONTINUED | OUTPATIENT
Start: 2021-10-25 | End: 2021-11-04 | Stop reason: HOSPADM

## 2021-10-25 RX ORDER — 0.9 % SODIUM CHLORIDE 0.9 %
80 INTRAVENOUS SOLUTION INTRAVENOUS ONCE
Status: COMPLETED | OUTPATIENT
Start: 2021-10-25 | End: 2021-10-25

## 2021-10-25 RX ADMIN — HEPARIN SODIUM 5000 UNITS: 5000 INJECTION INTRAVENOUS; SUBCUTANEOUS at 20:41

## 2021-10-25 RX ADMIN — METHYLPREDNISOLONE SODIUM SUCCINATE 30 MG: 40 INJECTION, POWDER, FOR SOLUTION INTRAMUSCULAR; INTRAVENOUS at 08:19

## 2021-10-25 RX ADMIN — IPRATROPIUM BROMIDE AND ALBUTEROL SULFATE 1 AMPULE: .5; 2.5 SOLUTION RESPIRATORY (INHALATION) at 18:11

## 2021-10-25 RX ADMIN — METHYLPREDNISOLONE SODIUM SUCCINATE 30 MG: 40 INJECTION, POWDER, FOR SOLUTION INTRAMUSCULAR; INTRAVENOUS at 03:23

## 2021-10-25 RX ADMIN — SODIUM CHLORIDE, PRESERVATIVE FREE 10 ML: 5 INJECTION INTRAVENOUS at 08:19

## 2021-10-25 RX ADMIN — SODIUM CHLORIDE, PRESERVATIVE FREE 10 ML: 5 INJECTION INTRAVENOUS at 12:41

## 2021-10-25 RX ADMIN — METHYLPREDNISOLONE SODIUM SUCCINATE 30 MG: 40 INJECTION, POWDER, FOR SOLUTION INTRAMUSCULAR; INTRAVENOUS at 17:27

## 2021-10-25 RX ADMIN — IPRATROPIUM BROMIDE AND ALBUTEROL SULFATE 1 AMPULE: .5; 2.5 SOLUTION RESPIRATORY (INHALATION) at 11:13

## 2021-10-25 RX ADMIN — BUDESONIDE 500 MCG: 0.5 SUSPENSION RESPIRATORY (INHALATION) at 18:11

## 2021-10-25 RX ADMIN — HYDROCODONE BITARTRATE AND ACETAMINOPHEN 1 TABLET: 5; 325 TABLET ORAL at 06:25

## 2021-10-25 RX ADMIN — CALCITRIOL 0.25 MCG: 0.25 CAPSULE ORAL at 08:19

## 2021-10-25 RX ADMIN — POTASSIUM CHLORIDE 40 MEQ: 20 TABLET, EXTENDED RELEASE ORAL at 08:19

## 2021-10-25 RX ADMIN — SODIUM CHLORIDE 80 ML: 9 INJECTION, SOLUTION INTRAVENOUS at 12:40

## 2021-10-25 RX ADMIN — ARFORMOTEROL TARTRATE 15 MCG: 15 SOLUTION RESPIRATORY (INHALATION) at 18:11

## 2021-10-25 RX ADMIN — ACETAMINOPHEN 650 MG: 325 TABLET ORAL at 03:13

## 2021-10-25 RX ADMIN — FUROSEMIDE 40 MG: 10 INJECTION, SOLUTION INTRAMUSCULAR; INTRAVENOUS at 17:27

## 2021-10-25 RX ADMIN — FUROSEMIDE 40 MG: 10 INJECTION, SOLUTION INTRAMUSCULAR; INTRAVENOUS at 08:19

## 2021-10-25 RX ADMIN — IOPAMIDOL 75 ML: 755 INJECTION, SOLUTION INTRAVENOUS at 12:40

## 2021-10-25 RX ADMIN — SODIUM CHLORIDE, PRESERVATIVE FREE 10 ML: 5 INJECTION INTRAVENOUS at 20:44

## 2021-10-25 RX ADMIN — EPOETIN ALFA-EPBX 8000 UNITS: 4000 INJECTION, SOLUTION INTRAVENOUS; SUBCUTANEOUS at 08:34

## 2021-10-25 RX ADMIN — IPRATROPIUM BROMIDE AND ALBUTEROL SULFATE 1 AMPULE: .5; 2.5 SOLUTION RESPIRATORY (INHALATION) at 14:30

## 2021-10-25 ASSESSMENT — PAIN DESCRIPTION - DESCRIPTORS: DESCRIPTORS: HEADACHE

## 2021-10-25 ASSESSMENT — PAIN SCALES - GENERAL
PAINLEVEL_OUTOF10: 6
PAINLEVEL_OUTOF10: 0
PAINLEVEL_OUTOF10: 1
PAINLEVEL_OUTOF10: 0

## 2021-10-25 ASSESSMENT — PAIN SCALES - WONG BAKER
WONGBAKER_NUMERICALRESPONSE: 0

## 2021-10-25 ASSESSMENT — PAIN DESCRIPTION - LOCATION
LOCATION: CHEST
LOCATION: HEAD

## 2021-10-25 ASSESSMENT — PAIN DESCRIPTION - FREQUENCY: FREQUENCY: CONTINUOUS

## 2021-10-25 ASSESSMENT — PAIN DESCRIPTION - PAIN TYPE: TYPE: ACUTE PAIN

## 2021-10-25 NOTE — PROGRESS NOTES
[facility]  Office: [address], DO, Frances Luu, DO, Stephen Grayson, DO, [facility], DO, Donya Ruth MD, Ran Lancaster MD, Indy Snyder MD, Edvin Cuba MD, Song Winter MD, Mando Amezquita MD, Brayan Duncan MD, Serenity Sutton MD, Akira Branch, DO, Andrew Roberts, DO, Dulce Bean MD,  Terri Garcia, DO, Karmen Ramirez MD, Carlo Manzano MD, Freida Magana MD, Brock Hawkins MD, Evelin Garzon MD, Manisha Augustine MD, Cherelle Rdz, [facility], [facility], CNP, Arianna Knight, CNP, Harjit Ramos, CNS, Mariela Burton, CNP, Pietro Clark, CNP, Martha Coffman, CNP, Gwen Dill, CNP, Hemalatha Thornton, CNP, Chalo Yates, CNP, Flynn Schultz PA-C, Fatimah Cabrera, [facility], Ping Hebert, CNP, Keron Alfaro, CNP, Neil Vila, CNP, Azra Gusman, CNP, Giuliano Segura, CNP, Luli Laura, CNP, [facility]    Progress Note    10/25/2021    10:48 AM    Name:   Pineda Vuong  MRN:     [id number]     Acct:      [de-identified]   Room:   [address] [place] [address] Day:  13  Admit Date:  10/12/2021  2:53 PM    PCP:   Pierre Gutierrez MD  Code Status:  Full Code    Subjective:     C/C:   Chief Complaint   Patient presents with    Respiratory Distress     EMS reported 97% on room air; pt arrives with saturation 45% on room air    Failure To Thrive     Interval History Status: improved. Feels better today  Denies cp/n/v  Some sob but better however, requiring more o2    Chest tube doesn't have much drainage    Brief History:     Per my ELANA:  \"Danielle Wadsworth is a 64 y. o. female with a hx of CKD 4 and iron deficiency anemia who presented to [facility] with Respiratory Distress and hypoxia (O2 sats 45%) and failure to thrive and is admitted to the hospital for the management of right Pleural effusion and acute renal failure.  Patient lives at home alone and has recently been having difficulty caring for herself and generalized weakness for the last few weeks . She does have chronic BLE edema but denies hx of CHF. Initial CXR showed large right pleural effusion, stat elevated BNP and D-dimer as well as BUN/creatinine.  Last took known creatinine was 2.34 in august, she reports she seen a nephrologist once. She does not wear home oxygen      10/13: US guided right thoracentesis 850ml of nonturbid straw colored fluid removed -transudative     Pleural fluid cultures negative for malignancy\"    Chest tube placed 10/18 on right for loculated effusion     Started on heparin drip for possible PE; VQ being repeated 10/24    Review of Systems:     Constitutional:  negative for chills, fevers, sweats  Respiratory:  positive for cough, dyspnea on exertion, shortness of breath  Cardiovascular:  negative for chest pain, chest pressure/discomfort, lower extremity edema, palpitations  Gastrointestinal:  negative for abdominal pain, constipation, diarrhea, nausea, vomiting  Neurological:  negative for dizziness, headache    Medications: Allergies:     Allergies   Allergen Reactions    Penicillins Swelling       Current Meds:   Scheduled Meds:    methylPREDNISolone  30 mg IntraVENous Q8H    furosemide  40 mg IntraVENous BID    budesonide  0.5 mg Nebulization BID    Arformoterol Tartrate  15 mcg Nebulization BID    ipratropium-albuterol  1 ampule Inhalation 4x daily    potassium chloride  40 mEq Oral Daily    epoetin mike-epbx  8,000 Units SubCUTAneous Once per day on Mon Wed Fri    calcitRIOL  0.25 mcg Oral Once per day on Mon Wed Fri    sodium chloride flush  5-40 mL IntraVENous 2 times per day    levofloxacin  500 mg IntraVENous Q48H    vitamin D  50,000 Units Oral Weekly     Continuous Infusions:    heparin (PORCINE) Infusion 10 Units/kg/hr (10/24/21 1137)    dextrose      sodium chloride       PRN Meds: HYDROcodone 5 mg - acetaminophen **OR** HYDROcodone 5 mg - acetaminophen, heparin (porcine), heparin (porcine), melatonin, glucose, dextrose, glucagon (rDNA), dextrose, sodium chloride flush, sodium chloride, ondansetron **OR** ondansetron, acetaminophen **OR** acetaminophen, perflutren lipid microspheres    Data:     Past Medical History:   has no past medical history on file. Social History:   reports that she quit smoking about 2 years ago. Her smoking use included cigarettes. She has never used smokeless tobacco. She reports current alcohol use. Family History:   Family History   Problem Relation Age of Onset    Hypertension Mother     Cancer Sister     Cancer Brother        Vitals:  BP (!) 102/59   Pulse 106   Temp 97.5 °F (36.4 °C) (Oral)   Resp 16   Ht 5' 4\" (1.626 m)   Wt 128 lb 6 oz (58.2 kg)   SpO2 (S) 96%   BMI 22.04 kg/m²   Temp (24hrs), Av.7 °F (36.5 °C), Min:97.3 °F (36.3 °C), Max:98.3 °F (36.8 °C)    Recent Labs     10/23/21  1114 10/23/21  1632 10/24/21  2047 10/25/21  0740   POCGLU 130* 167* 163* 162*       I/O (24Hr):     Intake/Output Summary (Last 24 hours) at 10/25/2021 1048  Last data filed at 10/25/2021 0819  Gross per 24 hour   Intake 987.61 ml   Output 1422 ml   Net -434.39 ml       Labs:  Hematology:  Recent Labs     10/24/21  1447 10/25/21  0614   WBC 22.0* 26.2*   RBC 3.56* 3.54*   HGB 8.9* 8.8*   HCT 30.3* 29.8*   MCV 85.1 84.2   MCH 25.0* 24.9*   MCHC 29.4 29.5   RDW 21.8* 21.8*    267   MPV 10.4 10.5     Chemistry:  Recent Labs     10/23/21  0536 10/24/21  0559 10/25/21  0614    135 137   K 3.7 4.0 4.6   CL 92* 93* 96*   CO2 27 25 25   GLUCOSE 124* 154* 162*   BUN 75* 78* 85*   CREATININE 3.05* 3.11* 3.08*   ANIONGAP 16 17 16   LABGLOM 15* 14* 15*   GFRAA 18* 18* 18*   CALCIUM 7.9* 7.6* 7.9*     Recent Labs     10/22/21  2221 10/23/21  0723 10/23/21  1114 10/23/21  1632 10/24/21  2047 10/25/21  0740   POCGLU 146* 139* 130* 167* 163* 162*     ABG:  Lab Results   Component Value Date    POCPH 7.456 10/24/2021    POCPCO2 41.1 10/24/2021    POCPO2 53.3 10/24/2021    POCHCO3 28.9 10/24/2021    NBEA NOT REPORTED 10/24/2021    PBEA 5 10/24/2021    NBF2TLN NOT REPORTED 10/24/2021    RNTW1JPM 89 10/24/2021    FIO2 NOT REPORTED 10/24/2021     Lab Results   Component Value Date/Time    SPECIAL RT HAND 6ML 10/18/2021 04:26 AM    SPECIAL RT AC 5ML 10/18/2021 04:26 AM     Lab Results   Component Value Date/Time    CULTURE NO GROWTH 6 DAYS 10/18/2021 04:26 AM    CULTURE NO GROWTH 6 DAYS 10/18/2021 04:26 AM       Radiology:  NM LUNG SCAN PERFUSION ONLY    Result Date: 10/21/2021  Exam is technically high probability for pulmonary embolism. While emboli are possible, the diminished activity within the upper lung zones could also be secondary to emphysema; a  ventilation study would be needed for confirmation. This could be obtained at a later time as clinically warranted. Improving perfusion at the lateral right lung base. Findings were discussed with Randy Méndez at 12:13 on 10/21/2021. XR CHEST DECUBITUS RIGHT    Result Date: 10/16/2021  Small to moderate right and small left layering pleural effusions. XR CHEST DECUBITUS LEFT    Result Date: 10/16/2021  Small to moderate right and small left layering pleural effusions. XR CHEST PORTABLE    Result Date: 10/22/2021  Stable right chest tube without demonstrable pneumothorax Improvement in now mild right basilar opacity related to minimal atelectasis and or effusion Stable moderate left basilar opacity     XR CHEST PORTABLE    Result Date: 10/21/2021  Stable right chest tube without pneumothorax Stable bibasilar pleuroparenchymal changes     XR CHEST PORTABLE    Result Date: 10/20/2021  No significant interval change. Right chest tube in place with small right pleural effusion and right basilar opacity. Unchanged moderate left pleural effusion.      XR CHEST PORTABLE    Result Date: 10/19/2021  Stable position/appearance of right pleural catheter stable bibasilar opacities related to combined pleural-parenchymal processes     XR CHEST PORTABLE    Result Date: 10/18/2021  Right-sided pleural catheter in place Decreased right pleural effusion No pneumothorax.  Unchanged left pleural effusion     IR CHEST TUBE INSERTION    Result Date: 10/18/2021  Successful ultrasound and fluoroscopy guided placement of a right chest tube       Physical Examination:        General appearance:  alert, cooperative and no distress  Mental Status:  oriented to person, place and time and normal affect  Lungs:  clear bilaterally, normal effort  Heart:  regular rate and rhythm, no murmur  Abdomen:  soft, nontender, nondistended, normal bowel sounds, no masses, hepatomegaly, splenomegaly  Extremities:  no edema, redness, tenderness in the calves  Skin:  no gross lesions, rashes, induration    Assessment:        Hospital Problems         Last Modified POA    * (Principal) Pleural effusion on right 10/15/2021 Yes    Acute renal failure with acute renal cortical necrosis superimposed on stage 4 chronic kidney disease ([address] 10/14/2021 Yes    Acute respiratory failure with hypoxia ([address] 10/13/2021 Yes    Iron deficiency anemia 10/12/2021 Yes    Essential hypertension 10/12/2021 Yes    General weakness 10/12/2021 Yes    Vitamin D deficiency 10/13/2021 Yes    Acute diastolic heart failure ([address] 10/13/2021 Yes    Hypoxia 10/13/2021 Yes    Anemia 10/13/2021 Yes    Peripheral edema 10/13/2021 Yes    Moderate protein-calorie malnutrition ([address] 10/13/2021 Yes    Severe malnutrition ([address] 10/13/2021 Yes    Acute kidney injury ([address] 10/15/2021 Yes    Hypokalemia 10/17/2021 Yes    Other emphysema ([address] 10/17/2021 Yes          Plan:        Monitor chest tube drainage  Plan to start dialysis tomorrow  Wean o2 as able, currently on 6L nc, was on 5L yesterday  Will d/w renal about getting cta chest and also ct with of abd/pelvis to look for infection and PE: more hypoxia and elevation in wbc-renal fine with her getting iv dye  Could not tolerate ventilation portion of VQ so scan aborted  Workup leukocytosis (though might just be related to steroids, the elevation is higher than I would expect for that) with above CTs and blood cultures    Rishi Balbuena P Blood, DO  10/25/2021  10:48 AM

## 2021-10-25 NOTE — CARE COORDINATION
Social work: Writer informed of insurance denial for [employer]. MARISELA arranged for P2P with Dr. Angela Pollard for today. Phone # to schedule: [phone], ref: [id number].

## 2021-10-25 NOTE — PROGRESS NOTES
After return from ct, patient noted to have bubbles in the chamber. After trouble shooting, it was noted that the atrium needed to be changed. Patient tolerated chamber change. New atrium at 0 ml and old atrium output documented. Pt continues to have two clamps and dressing for emergency at bedside.

## 2021-10-25 NOTE — CARE COORDINATION
Social work: Email from [facility] stating \"they cannot accept a p2p from a third-party vendor or physician who is not employed by or working at the treating facility. \"    Rosie/NATANAEL spoke with Dr Terrazas who is agreeable to completing p2p for [facility] placement. Writer contacted [facility] again for updated request ([phone] ext [phone]).

## 2021-10-25 NOTE — PROGRESS NOTES
Ref Range: 35.0 - 48.0 mm Hg 41.1   POC pCO2 Temp Latest Units: mm Hg NOT REPORTED   POC PO2 Latest Ref Range: 83.0 - 108.0 mm Hg 53.3 (L)   POC pO2 Temp Latest Units: mm Hg NOT REPORTED   POC HCO3 Latest Ref Range: 21.0 - 28.0 mmol/L 28.9 (H)   POC O2 SAT Latest Ref Range: 94.0 - 98.0 % 89 (L)   POC TCO2 Latest Ref Range: 22 - 30 mmol/L 29   Results for Christopher Alfred (MRN [id number]) as of 10/24/2021 14:04   Ref. Range 10/13/2021 09:00   Glucose, Fluid Latest Units: mg/dL 106   LD, Fluid Latest Units: U/L 94   RBC, Fluid Latest Units: /mm3 <3,000   Lymphocytes, Body Fluid Latest Units: % 8   Monocyte Count, Fluid Latest Units: % NOT REPORTED   Neutrophil Count, Fluid Latest Units: % 9   pH, Fluid Unknown 7.5   Total Protein, Body Fluid Latest Units: g/dL 2.7   WBC, Fluid Latest Units: /mm3 76   Other Cells, Fluid Latest Units: % MESOTHELIAL CELLS     Radiology:  Chest x-ray 10/25  Small bore right chest tube in unchanged position.  Smaller but persistent   right pneumothorax; otherwise, unchanged findings.  Stable left effusion with   left lower lobe volume loss.  COPD          chest x-ray 10/24          CTA chest 10/25    Loculated left basilar effusion with adjacent consolidation representing   atelectasis versus pneumonia.       Focus of consolidation in the right lower lobe representing atelectasis   versus pneumonia.       Moderate to large right-sided pneumothorax with an indwelling pigtail   catheter in the right basilar pleural space.               CT abdomen pelvis 10/25   .  Right pneumothorax with chest tube in place.  Please see separate chest   CT report for details of this region.       2.  Large rectal colorectal stool burden and mild gaseous distention of the   colon.              CT chest 10/14        Impression:  · Acute hypoxic respiratory failure requiring high flow oxygen  · Moderate to large right pleural effusion, s/p thoracentesis with 850 mL removed  · Small left pleural effusion, loculated  · Atelectasis/?  Trapped lung on the right  · Mild pulmonary edema  · Moderate pulmonary hypertension, RVSP 60 mmHg  · Suspected COPD/former smoker, quit 2019  · Acute kidney injury  · D-dimer, low probability for PE on VQ scan on 10/13/21    Recommendations:  · Continue oxygen via nasal cannula  · Incentive spirometry every hour while awake  · DuoNeb every 4 hours  · Budesonide and Brovana via nebulizer every 12 hours  · IV Solu-Medrol  30 q.8 hours  · Continue Levaquin IV  · Discontinue  heparin drip since CTA was negative  · Discussed with IR; hold off changing tube to larger bore and follow-up chest x-ray in a.m./will consider CT surgery consult  · Keep pigtail catheter to suction. Monitor output. · X-ray chest in a.m.   · Hemodialysis per nephrology  · Pleural fluid negative for malignancy  · PFTs as an outpatient  · Discussed with    RT  · DVT prophylaxis Heparin prophylaxis      Electronically signed by     Rhonda Mina MD on 10/25/2021 at 4:49 PM  Pulmonary Critical Care and Sleep Medicine,  [facility]: [phone]

## 2021-10-25 NOTE — PROGRESS NOTES
Updated Dr. Terrazas via perfect serve of increased oxygen requirements overnight and patient refusing breathing treatments/inhalers. Orders to be placed by Dr. Terrazas.

## 2021-10-25 NOTE — PLAN OF CARE
Talked to IR, her size tube is fine for a pneumothorax and he recommends hooking the tube to suction and serial portable CXRs to follow the pneumonthorax    Ok Mantle Blood, DO

## 2021-10-25 NOTE — PROGRESS NOTES
Dr. Terrazas called RN to update about positive CT result of th moderate to large right-sided pneumothorax. Dr. Terrazas to talk with Dr. Delia Goel about results.

## 2021-10-25 NOTE — PROGRESS NOTES
Orthoses?: No  Position Activity Restriction  Other position/activity restrictions: Up with assist, telemetry, gallegos cath, O2 NC 5L, R chest tube, 1500 mL fluid restriction, heels off bed at all times  Subjective   General  Chart Reviewed: Yes  Patient assessed for rehabilitation services?: Yes  Additional Pertinent Hx: Pt. agreeable to treatment. Response to previous treatment: Patient with no complaints from previous session  Family / Caregiver Present: Yes (PCT present)  Subjective  Subjective: Pt in bed upon arrival. Pt agreeable to get up to chair. General Comment  Comments: RN Claudene Mingle) ok'd pt for therapy. Orientation  Orientation  Overall Orientation Status: Within Functional Limits  Objective    ADL  UE Bathing: Maximum assistance  LE Bathing: Dependent/Total  UE Dressing: Maximum assistance  LE Dressing: Dependent/Total  Toileting: Dependent/Total  Additional Comments: Writer assisted PCT with bed bath, changed gown/socks. Pt is MAX-DEP with all self care tasks in supine and is limited by resp status and overall weakness. Balance  Sitting Balance: Stand by assistance  Standing Balance: Moderate assistance  Bed mobility  Rolling to Left: Maximum assistance  Rolling to Right: Maximum assistance  Supine to Sit: Moderate assistance;2 Person assistance  Comment: Max verbal and hand over hand cues for sequencing movements, use of bed rail and overall safety/technique. Follow thru is limited due to resp status and weakness. SPO2 dropped to 80% and req'd extra time seated EOB with verbal cues for pursed lip breathing technique to recover. Pt has multiple lines and is DEP with managing lines. Transfers  Stand Step Transfers: Moderate assistance  Sit to stand:  Moderate assistance  Stand to sit: Moderate assistance  Transfer Comments: Pt requried Max verbal cues/tactile assist for upright posture, controlled stand to sit, squaring self/AD and reaching back to surface prior to sitting, pacing self, pursed lip breathing and awarness/assist with lines all to increase safety and reduce risk of falls. Max A to manage/maneuver walker. Cognition  Overall Cognitive Status: Exceptions  Arousal/Alertness: Appropriate responses to stimuli  Following Commands: Follows one step commands with increased time; Follows one step commands with repetition  Attention Span: Appears intact  Memory: Decreased short term memory  Safety Judgement: Decreased awareness of need for safety;Decreased awareness of need for assistance  Problem Solving: Decreased awareness of errors;Assistance required to correct errors made;Assistance required to identify errors made;Assistance required to implement solutions;Assistance required to generate solutions  Insights: Decreased awareness of deficits  Initiation: Requires cues for all  Sequencing: Requires cues for all  Cognition Comment: Pt more alert this date. Perception  Overall Perceptual Status: Impaired  Initiation: Hand over hand to initiate tasks                                   Plan   Plan  Times per week: 4-5x/wk 1x/day as karey  Times per day: Daily  Current Treatment Recommendations: Strengthening, Endurance Training, Balance Training, Functional Mobility Training, Safety Education & Training, Home Management Training, Self-Care / ADL, Equipment Evaluation, Education, & procurement, Patient/Caregiver Education & Training  Plan Comment: Cont. with stated POC. AM-PAC Score        AM-[facility] Inpatient Daily Activity Raw Score: 11 (10/25/21 1138)  AM-PAC Inpatient ADL T-Scale Score : 29.04 (10/25/21 1138)  ADL Inpatient CMS 0-100% Score: 70.42 (10/25/21 1138)  ADL Inpatient CMS G-Code Modifier : CL (10/25/21 1138)    Goals  Short term goals  Time Frame for Short term goals: By discharge, pt to demo  Short term goal 1: ADL transfers and functional mobility to CGA with use of AD as needed.   Short term goal 2: UB ADLs to SBA and LB ADLs to Min A with use of AD/AE as needed and proper pacing tech. Short term goal 3: toileting to Min A with use of AD/grab bars/BSC as needed. Short term goal 4: increased BUE strength by 1/2 grade to assist with functional tasks/I with simple B UE HEP with use of handouts as needed. Short term goal 5: bed mobility to SBA with use of bedrails as needed. Long term goals  Long term goal 1: Pt to be I with fall prevention education, EC/WS tech, pursed lip breathing tech and recommendations AE/discharge with use of handouts as needed. Patient Goals   Patient goals : To feel better!        Therapy Time   Individual Concurrent Group Co-treatment   Time In 1043         Time Out 1110         Minutes [address], [place]

## 2021-10-25 NOTE — PLAN OF CARE
Problem: Nutrition  Goal: Optimal nutrition therapy  10/24/2021 2219 by Miguel Garcia RN  Outcome: Ongoing  10/24/2021 1110 by Jg Valderrama RN  Outcome: Ongoing     Problem: Skin Integrity:  Goal: Will show no infection signs and symptoms  Description: Will show no infection signs and symptoms  10/24/2021 2219 by Miguel Garcia RN  Outcome: Ongoing  10/24/2021 1110 by Jg Valderrama RN  Outcome: Ongoing     Problem: Skin Integrity:  Goal: Absence of new skin breakdown  Description: Absence of new skin breakdown  10/24/2021 2219 by Miguel Garcia RN  Outcome: Ongoing  10/24/2021 1110 by Jg Valderrama RN  Outcome: Ongoing    Sacrum covered for protection.  No breakdown noted

## 2021-10-25 NOTE — PROGRESS NOTES
Per Dr. Lance Blanc, stop heparin drip at  0200 10/26. Ok to restart when patient is completed with procedure. Talked with Cecil Burdick pharmacist about this. To place an order in to stop dosing at 0200 and to re-evaluate when to restart medication tomorrow.

## 2021-10-25 NOTE — PROGRESS NOTES
NEPHROLOGY PROGRESS NOTE      SUBJECTIVE     All events noted. My colleague had a long discussion on Friday, 10/22/2021, with the family in regards to initiation of dialysis/the procedure and all the possible complications. They are agreeable. Patient is agreeable to initiating dialysis this week per our discussion this morning. Patient is on a heparin drip. This will need to be held for 6 hours prior to IR placing a tunneled dialysis catheter tomorrow. The patient's dialysis will be permanent. She states that she feels reasonable. Still on supplemental oxygen 6 L by nasal cannula. Appetite is improving. On heparin drip because of concerns of pulmonary embolism based upon a VQ scan. Renal function is stable. Patient discussed with Dr. Julia Barker. Patient is scheduled for CT scans today. Patient continues to be hypoxic. From a nephrology standpoint, the patient is stable to have CT scans done today with dialysis planned for tomorrow. OBJECTIVE     Vitals:    10/25/21 0630 10/25/21 0645 10/25/21 0718 10/25/21 0834   BP: 135/67   (!) 102/59   Pulse: 115   106   Resp: 26   16   Temp: 98.3 °F (36.8 °C)   97.5 °F (36.4 °C)   TempSrc: Axillary   Oral   SpO2: 91%  94% (S) 96%   Weight:  128 lb 6 oz (58.2 kg)     Height:         24HR INTAKE/OUTPUT:      Intake/Output Summary (Last 24 hours) at 10/25/2021 1043  Last data filed at 10/25/2021 0718  Gross per 24 hour   Intake 987.61 ml   Output 1422 ml   Net -434.39 ml       General appearance:  Awake, alert, on 6 L supplemental oxygen  HEENT: Pale conjunctiva, moist mucous membranes  Respiratory::vesicular breath sounds, reduced air entry RT CHEST TUBE PRESENT  Cardiovascular: Tachycardic rate, regular rhythm, positive S1-S2  Abdomen: Soft and not tender and not distended with active bowel sounds extremities: No Cyanosis or Clubbing,No lower extremity edema  Neurological:Alert and oriented. No abnormalities of mood, affect, memory, mentation, or behavior are noted      MEDICATIONS     Scheduled Meds:    methylPREDNISolone  30 mg IntraVENous Q8H    furosemide  40 mg IntraVENous BID    budesonide  0.5 mg Nebulization BID    Arformoterol Tartrate  15 mcg Nebulization BID    ipratropium-albuterol  1 ampule Inhalation 4x daily    potassium chloride  40 mEq Oral Daily    epoetin mike-epbx  8,000 Units SubCUTAneous Once per day on Mon Wed Fri    calcitRIOL  0.25 mcg Oral Once per day on Mon Wed Fri    sodium chloride flush  5-40 mL IntraVENous 2 times per day    levofloxacin  500 mg IntraVENous Q48H    vitamin D  50,000 Units Oral Weekly     Continuous Infusions:    heparin (PORCINE) Infusion 10 Units/kg/hr (10/24/21 1137)    dextrose      sodium chloride       PRN Meds:  HYDROcodone 5 mg - acetaminophen **OR** HYDROcodone 5 mg - acetaminophen, heparin (porcine), heparin (porcine), melatonin, glucose, dextrose, glucagon (rDNA), dextrose, sodium chloride flush, sodium chloride, ondansetron **OR** ondansetron, acetaminophen **OR** acetaminophen, perflutren lipid microspheres  Home Meds:                Medications Prior to Admission: amLODIPine (NORVASC) 5 MG tablet, Take 5 mg by mouth daily  magnesium oxide (MAG-OX) 400 MG tablet, Take 400 mg by mouth daily  metoprolol (LOPRESSOR) 100 MG tablet, Take 100 mg by mouth 2 times daily  folic acid (FOLVITE) 169 MCG tablet, Take 800 mcg by mouth daily  diphenhydrAMINE-APAP, sleep, (TYLENOL PM EXTRA STRENGTH)  MG tablet, Take 1 tablet by mouth nightly as needed for Sleep    INVESTIGATIONS     Last 3 CMP:    Recent Labs     10/23/21  0536 10/24/21  0559 10/25/21  0614    135 137   K 3.7 4.0 4.6   CL 92* 93* 96*   CO2 27 25 25   BUN 75* 78* 85*   CREATININE 3.05* 3.11* 3.08*   CALCIUM 7.9* 7.6* 7.9*       Last 3 CBC:  Recent Labs     10/24/21  1447 10/25/21  0614   WBC 22.0* 26.2*   RBC 3.56* 3.54*   HGB 8.9* 8.8*   HCT 30.3* 29.8*   MCV 85.1 84.2   MCH 25.0* 24.9*   MCHC 29.4 29.5   RDW 21.8* 21.8*    267 MPV 10.4 10.5       ASSESSMENT     #1  ESRD with the patient having low muscle mass and creatinine hovering around 3, indicating estimated GFR quite low especially in light of 24-hour urine collection performed during this visit  #2  Uremia. Patient is agreeable to initiation of dialysis. #3 Moderate to large right pleural effusion status post thoracocentesis 850 cc removed  #4 Moderate pulmonary hypertension/RVSP 60/tricuspid regurgitation  #5 Essential hypertension    PLAN     #1 Continue IV Lasix  #2 Discontinue heparin drip at about 2 AM on 10/26/2021  #3  Initiate dialysis tomorrow after tunneled dialysis catheter placed  #4  Consult IR to place tunneled dialysis catheter tomorrow  #5  It is okay from a nephrology standpoint for the patient to have CT scans with contrast today as she will be initiating dialysis tomorrow for long-term, permanent dialysis    Patient was discussed at length with Dr. Sydni Pham by phone today.     Please do not hesitate to call with questions    This note is created with the assistance of a speech-recognition program. While intending to generate a document that actually reflects the content of the visit, no guarantees can be provided that every mistake has been identified and corrected by editing    Nina Ortiz MD   10/25/2021 10:43 AM  [facility]

## 2021-10-25 NOTE — PROGRESS NOTES
Pt. C/o of headache. Having some dyspnea and O2 sats down to 85%. O2 increased to 5L/NC now, and then 6L @ 7:00am. Sats went up to 95%. States she feels scarred and anxious. Encouraghed to take some deep breaths. Hands are a bit shaky bilaterally.

## 2021-10-25 NOTE — PLAN OF CARE
Problem: Falls - Risk of:  Goal: Will remain free from falls  Description: Will remain free from falls  Outcome: Ongoing   No falls noted this shift. Problem: Falls - Risk of:  Goal: Absence of physical injury  Description: Absence of physical injury  Outcome: Ongoing   No injury noted this shift. Problem: Nutrition  Goal: Optimal nutrition therapy  Outcome: Ongoing   Pt tolerating po intake, but little at a time. Problem: Skin Integrity:  Goal: Will show no infection signs and symptoms  Description: Will show no infection signs and symptoms  Outcome: Ongoing   No new altered skin noted this shift. Problem: OXYGENATION/RESPIRATORY FUNCTION  Goal: Patient will achieve/maintain normal respiratory rate/effort  Description: Respiratory rate and effort will be within normal limits for the patient  Outcome: Ongoing   Pt weaned down to [facility] tolerating well. Problem: Pain:  Goal: Pain level will decrease  Description: Pain level will decrease  Outcome: Ongoing   Pt currently denies any pain.

## 2021-10-25 NOTE — PROGRESS NOTES
Physical Therapy  DATE: 10/25/2021    NAME: Sherren Coffer  MRN: [id number]   : 1945    Patient not seen this date for Physical Therapy due to:      [] Cancel by RN or physician due to:    [] Hemodialysis    [] Critical Lab Value Level     [] Blood transfusion in progress    [] Acute or unstable cardiovascular status   _MAP < 55 or more than >115  _HR < 40 or > 130    [] Acute or unstable pulmonary status   -FiO2 > 60%   _RR < 5 or >40    _O2 sats < 85%    [] Strict Bedrest    [] Off Unit for surgery or procedure    [] Off Unit for testing       [] Pending imaging to R/O fracture    [x] Refusal by Patient  Too fatigued to participate today. [] Other      [] PT being discontinued at this time. Patient independent. No further needs. [] PT being discontinued at this time as the patient has been transferred to hospice care. No further needs.       Meggan Stover, PTA

## 2021-10-26 ENCOUNTER — APPOINTMENT (OUTPATIENT)
Dept: GENERAL RADIOLOGY | Age: 76
DRG: 291 | End: 2021-10-26
Payer: MEDICARE

## 2021-10-26 ENCOUNTER — APPOINTMENT (OUTPATIENT)
Dept: INTERVENTIONAL RADIOLOGY/VASCULAR | Age: 76
DRG: 291 | End: 2021-10-26
Payer: MEDICARE

## 2021-10-26 LAB
ABSOLUTE EOS #: 0 K/UL (ref 0–0.4)
ABSOLUTE IMMATURE GRANULOCYTE: 0.31 K/UL (ref 0–0.3)
ABSOLUTE LYMPH #: 0 K/UL (ref 1–4.8)
ABSOLUTE MONO #: 0.94 K/UL (ref 0.2–0.8)
ANION GAP SERPL CALCULATED.3IONS-SCNC: 15 MMOL/L (ref 9–17)
ATYPICAL LYMPHOCYTE ABSOLUTE COUNT: 0.31 K/UL
ATYPICAL LYMPHOCYTES: 1 %
BASOPHILS # BLD: 0 %
BASOPHILS ABSOLUTE: 0 K/UL (ref 0–0.2)
BUN BLDV-MCNC: 88 MG/DL (ref 8–23)
BUN/CREAT BLD: 29 (ref 9–20)
CALCIUM SERPL-MCNC: 8.1 MG/DL (ref 8.6–10.4)
CHLORIDE BLD-SCNC: 96 MMOL/L (ref 98–107)
CO2: 25 MMOL/L (ref 20–31)
CREAT SERPL-MCNC: 3.07 MG/DL (ref 0.5–0.9)
DIFFERENTIAL TYPE: ABNORMAL
EOSINOPHILS RELATIVE PERCENT: 0 % (ref 1–4)
GFR AFRICAN AMERICAN: 18 ML/MIN
GFR NON-AFRICAN AMERICAN: 15 ML/MIN
GFR SERPL CREATININE-BSD FRML MDRD: ABNORMAL ML/MIN/{1.73_M2}
GFR SERPL CREATININE-BSD FRML MDRD: ABNORMAL ML/MIN/{1.73_M2}
GLUCOSE BLD-MCNC: 112 MG/DL (ref 65–105)
GLUCOSE BLD-MCNC: 114 MG/DL (ref 65–105)
GLUCOSE BLD-MCNC: 115 MG/DL (ref 65–105)
GLUCOSE BLD-MCNC: 136 MG/DL (ref 70–99)
GLUCOSE BLD-MCNC: 139 MG/DL (ref 65–105)
HCT VFR BLD CALC: 32 % (ref 36.3–47.1)
HEMOGLOBIN: 9.3 G/DL (ref 11.9–15.1)
IMMATURE GRANULOCYTES: 1 %
LACTIC ACID, SEPSIS WHOLE BLOOD: ABNORMAL MMOL/L (ref 0.5–1.9)
LACTIC ACID, SEPSIS: 2.2 MMOL/L (ref 0.5–1.9)
LYMPHOCYTES # BLD: 0 % (ref 24–44)
MCH RBC QN AUTO: 24.8 PG (ref 25.2–33.5)
MCHC RBC AUTO-ENTMCNC: 29.1 G/DL (ref 28.4–34.8)
MCV RBC AUTO: 85.3 FL (ref 82.6–102.9)
MONOCYTES # BLD: 3 % (ref 1–7)
MORPHOLOGY: ABNORMAL
NRBC AUTOMATED: 0.6 PER 100 WBC
NUCLEATED RED BLOOD CELLS: 1 PER 100 WBC
PDW BLD-RTO: 23.5 % (ref 11.8–14.4)
PLATELET # BLD: 300 K/UL (ref 138–453)
PLATELET ESTIMATE: ABNORMAL
PMV BLD AUTO: 10.5 FL (ref 8.1–13.5)
POTASSIUM SERPL-SCNC: 4.5 MMOL/L (ref 3.7–5.3)
RBC # BLD: 3.75 M/UL (ref 3.95–5.11)
RBC # BLD: ABNORMAL 10*6/UL
SEG NEUTROPHILS: 95 % (ref 36–66)
SEGMENTED NEUTROPHILS ABSOLUTE COUNT: 29.84 K/UL (ref 1.8–7.7)
SODIUM BLD-SCNC: 136 MMOL/L (ref 135–144)
WBC # BLD: 31.4 K/UL (ref 3.5–11.3)
WBC # BLD: ABNORMAL 10*3/UL

## 2021-10-26 PROCEDURE — 85025 COMPLETE CBC W/AUTO DIFF WBC: CPT

## 2021-10-26 PROCEDURE — 6370000000 HC RX 637 (ALT 250 FOR IP): Performed by: NURSE PRACTITIONER

## 2021-10-26 PROCEDURE — 99153 MOD SED SAME PHYS/QHP EA: CPT

## 2021-10-26 PROCEDURE — B543ZZA ULTRASONOGRAPHY OF RIGHT JUGULAR VEINS, GUIDANCE: ICD-10-PCS | Performed by: INTERNAL MEDICINE

## 2021-10-26 PROCEDURE — 99233 SBSQ HOSP IP/OBS HIGH 50: CPT | Performed by: FAMILY MEDICINE

## 2021-10-26 PROCEDURE — 6360000002 HC RX W HCPCS: Performed by: NURSE PRACTITIONER

## 2021-10-26 PROCEDURE — 2580000003 HC RX 258: Performed by: STUDENT IN AN ORGANIZED HEALTH CARE EDUCATION/TRAINING PROGRAM

## 2021-10-26 PROCEDURE — 94761 N-INVAS EAR/PLS OXIMETRY MLT: CPT

## 2021-10-26 PROCEDURE — 2709999900 IR TUNNELED CVC PLACE WO SQ PORT/PUMP > 5 YEARS

## 2021-10-26 PROCEDURE — 94640 AIRWAY INHALATION TREATMENT: CPT

## 2021-10-26 PROCEDURE — 2700000000 HC OXYGEN THERAPY PER DAY

## 2021-10-26 PROCEDURE — 82947 ASSAY GLUCOSE BLOOD QUANT: CPT

## 2021-10-26 PROCEDURE — 6360000002 HC RX W HCPCS: Performed by: RADIOLOGY

## 2021-10-26 PROCEDURE — 5A1D70Z PERFORMANCE OF URINARY FILTRATION, INTERMITTENT, LESS THAN 6 HOURS PER DAY: ICD-10-PCS | Performed by: INTERNAL MEDICINE

## 2021-10-26 PROCEDURE — 2709999900 IR CHANGE DRAINAGE CATH

## 2021-10-26 PROCEDURE — 6360000002 HC RX W HCPCS: Performed by: STUDENT IN AN ORGANIZED HEALTH CARE EDUCATION/TRAINING PROGRAM

## 2021-10-26 PROCEDURE — 6360000002 HC RX W HCPCS: Performed by: INTERNAL MEDICINE

## 2021-10-26 PROCEDURE — 90935 HEMODIALYSIS ONE EVALUATION: CPT

## 2021-10-26 PROCEDURE — 02HV33Z INSERTION OF INFUSION DEVICE INTO SUPERIOR VENA CAVA, PERCUTANEOUS APPROACH: ICD-10-PCS | Performed by: INTERNAL MEDICINE

## 2021-10-26 PROCEDURE — 99152 MOD SED SAME PHYS/QHP 5/>YRS: CPT

## 2021-10-26 PROCEDURE — 36558 INSERT TUNNELED CV CATH: CPT

## 2021-10-26 PROCEDURE — 77001 FLUOROGUIDE FOR VEIN DEVICE: CPT

## 2021-10-26 PROCEDURE — P9047 ALBUMIN (HUMAN), 25%, 50ML: HCPCS | Performed by: INTERNAL MEDICINE

## 2021-10-26 PROCEDURE — 71045 X-RAY EXAM CHEST 1 VIEW: CPT

## 2021-10-26 PROCEDURE — 87040 BLOOD CULTURE FOR BACTERIA: CPT

## 2021-10-26 PROCEDURE — 6370000000 HC RX 637 (ALT 250 FOR IP): Performed by: INTERNAL MEDICINE

## 2021-10-26 PROCEDURE — 32557 INSERT CATH PLEURA W/ IMAGE: CPT

## 2021-10-26 PROCEDURE — 76937 US GUIDE VASCULAR ACCESS: CPT

## 2021-10-26 PROCEDURE — 0JH63XZ INSERTION OF TUNNELED VASCULAR ACCESS DEVICE INTO CHEST SUBCUTANEOUS TISSUE AND FASCIA, PERCUTANEOUS APPROACH: ICD-10-PCS | Performed by: INTERNAL MEDICINE

## 2021-10-26 PROCEDURE — 80048 BASIC METABOLIC PNL TOTAL CA: CPT

## 2021-10-26 PROCEDURE — 36415 COLL VENOUS BLD VENIPUNCTURE: CPT

## 2021-10-26 PROCEDURE — 6370000000 HC RX 637 (ALT 250 FOR IP): Performed by: STUDENT IN AN ORGANIZED HEALTH CARE EDUCATION/TRAINING PROGRAM

## 2021-10-26 PROCEDURE — 2500000003 HC RX 250 WO HCPCS: Performed by: INTERNAL MEDICINE

## 2021-10-26 PROCEDURE — 2580000003 HC RX 258: Performed by: INTERNAL MEDICINE

## 2021-10-26 PROCEDURE — 2060000000 HC ICU INTERMEDIATE R&B

## 2021-10-26 RX ORDER — SODIUM CITRATE 4 % (5 ML)
5 SYRINGE (ML) MISCELLANEOUS PRN
Status: DISCONTINUED | OUTPATIENT
Start: 2021-10-26 | End: 2021-10-26

## 2021-10-26 RX ORDER — HEPARIN SODIUM 1000 [USP'U]/ML
INJECTION, SOLUTION INTRAVENOUS; SUBCUTANEOUS
Status: COMPLETED | OUTPATIENT
Start: 2021-10-26 | End: 2021-10-26

## 2021-10-26 RX ORDER — METHYLPREDNISOLONE SODIUM SUCCINATE 40 MG/ML
30 INJECTION, POWDER, LYOPHILIZED, FOR SOLUTION INTRAMUSCULAR; INTRAVENOUS EVERY 12 HOURS
Status: DISCONTINUED | OUTPATIENT
Start: 2021-10-26 | End: 2021-10-28

## 2021-10-26 RX ORDER — ALBUMIN (HUMAN) 12.5 G/50ML
25 SOLUTION INTRAVENOUS ONCE
Status: COMPLETED | OUTPATIENT
Start: 2021-10-26 | End: 2021-10-26

## 2021-10-26 RX ORDER — MIDODRINE HYDROCHLORIDE 10 MG/1
10 TABLET ORAL PRN
Status: DISCONTINUED | OUTPATIENT
Start: 2021-10-26 | End: 2021-11-04 | Stop reason: HOSPADM

## 2021-10-26 RX ORDER — MIDAZOLAM HYDROCHLORIDE 1 MG/ML
INJECTION INTRAMUSCULAR; INTRAVENOUS
Status: COMPLETED | OUTPATIENT
Start: 2021-10-26 | End: 2021-10-26

## 2021-10-26 RX ORDER — SODIUM CITRATE 4 % (5 ML)
1.9 SYRINGE (ML) MISCELLANEOUS PRN
Status: DISCONTINUED | OUTPATIENT
Start: 2021-10-26 | End: 2021-11-04 | Stop reason: HOSPADM

## 2021-10-26 RX ORDER — FENTANYL CITRATE 50 UG/ML
INJECTION, SOLUTION INTRAMUSCULAR; INTRAVENOUS
Status: COMPLETED | OUTPATIENT
Start: 2021-10-26 | End: 2021-10-26

## 2021-10-26 RX ADMIN — ALBUMIN (HUMAN) 25 G: 0.25 INJECTION, SOLUTION INTRAVENOUS at 11:18

## 2021-10-26 RX ADMIN — IPRATROPIUM BROMIDE AND ALBUTEROL SULFATE 1 AMPULE: .5; 2.5 SOLUTION RESPIRATORY (INHALATION) at 17:50

## 2021-10-26 RX ADMIN — IPRATROPIUM BROMIDE AND ALBUTEROL SULFATE 1 AMPULE: .5; 2.5 SOLUTION RESPIRATORY (INHALATION) at 10:38

## 2021-10-26 RX ADMIN — HEPARIN SODIUM 1.9 UNITS: 1000 INJECTION, SOLUTION INTRAVENOUS; SUBCUTANEOUS at 09:07

## 2021-10-26 RX ADMIN — LEVOFLOXACIN 500 MG: 5 INJECTION, SOLUTION INTRAVENOUS at 14:51

## 2021-10-26 RX ADMIN — POTASSIUM CHLORIDE 40 MEQ: 20 TABLET, EXTENDED RELEASE ORAL at 11:18

## 2021-10-26 RX ADMIN — METHYLPREDNISOLONE SODIUM SUCCINATE 30 MG: 40 INJECTION, POWDER, FOR SOLUTION INTRAMUSCULAR; INTRAVENOUS at 02:10

## 2021-10-26 RX ADMIN — MIDAZOLAM 0.5 MG: 1 INJECTION INTRAMUSCULAR; INTRAVENOUS at 08:47

## 2021-10-26 RX ADMIN — HYDROCODONE BITARTRATE AND ACETAMINOPHEN 1 TABLET: 5; 325 TABLET ORAL at 22:30

## 2021-10-26 RX ADMIN — MIDODRINE HYDROCHLORIDE 10 MG: 10 TABLET ORAL at 11:18

## 2021-10-26 RX ADMIN — ACETAMINOPHEN 650 MG: 325 TABLET ORAL at 20:14

## 2021-10-26 RX ADMIN — VANCOMYCIN HYDROCHLORIDE 1250 MG: 1 INJECTION, POWDER, LYOPHILIZED, FOR SOLUTION INTRAVENOUS at 22:30

## 2021-10-26 RX ADMIN — Medication 1.9 ML: at 12:30

## 2021-10-26 RX ADMIN — ARFORMOTEROL TARTRATE 15 MCG: 15 SOLUTION RESPIRATORY (INHALATION) at 17:50

## 2021-10-26 RX ADMIN — Medication 1.9 ML: at 12:32

## 2021-10-26 RX ADMIN — METHYLPREDNISOLONE SODIUM SUCCINATE 30 MG: 40 INJECTION, POWDER, FOR SOLUTION INTRAMUSCULAR; INTRAVENOUS at 22:29

## 2021-10-26 RX ADMIN — Medication 25 MCG: at 08:47

## 2021-10-26 RX ADMIN — HEPARIN SODIUM 5000 UNITS: 5000 INJECTION INTRAVENOUS; SUBCUTANEOUS at 22:30

## 2021-10-26 RX ADMIN — Medication 3 MG: at 23:27

## 2021-10-26 RX ADMIN — BUDESONIDE 500 MCG: 0.5 SUSPENSION RESPIRATORY (INHALATION) at 17:50

## 2021-10-26 RX ADMIN — AZTREONAM 1000 MG: 1 INJECTION, POWDER, LYOPHILIZED, FOR SOLUTION INTRAMUSCULAR; INTRAVENOUS at 20:14

## 2021-10-26 RX ADMIN — METHYLPREDNISOLONE SODIUM SUCCINATE 30 MG: 40 INJECTION, POWDER, FOR SOLUTION INTRAMUSCULAR; INTRAVENOUS at 11:18

## 2021-10-26 RX ADMIN — SODIUM CHLORIDE, PRESERVATIVE FREE 10 ML: 5 INJECTION INTRAVENOUS at 22:30

## 2021-10-26 RX ADMIN — IPRATROPIUM BROMIDE AND ALBUTEROL SULFATE 1 AMPULE: .5; 2.5 SOLUTION RESPIRATORY (INHALATION) at 14:37

## 2021-10-26 RX ADMIN — BUDESONIDE 500 MCG: 0.5 SUSPENSION RESPIRATORY (INHALATION) at 07:49

## 2021-10-26 RX ADMIN — SODIUM CHLORIDE, PRESERVATIVE FREE 10 ML: 5 INJECTION INTRAVENOUS at 11:19

## 2021-10-26 RX ADMIN — IPRATROPIUM BROMIDE AND ALBUTEROL SULFATE 1 AMPULE: .5; 2.5 SOLUTION RESPIRATORY (INHALATION) at 07:49

## 2021-10-26 ASSESSMENT — ENCOUNTER SYMPTOMS
CONSTIPATION: 0
BLOOD IN STOOL: 0
VOMITING: 0
SHORTNESS OF BREATH: 1
NAUSEA: 0
DIARRHEA: 0
COUGH: 0
CHEST TIGHTNESS: 0
WHEEZING: 0
ABDOMINAL PAIN: 0

## 2021-10-26 ASSESSMENT — PAIN SCALES - GENERAL
PAINLEVEL_OUTOF10: 2
PAINLEVEL_OUTOF10: 7
PAINLEVEL_OUTOF10: 0
PAINLEVEL_OUTOF10: 1
PAINLEVEL_OUTOF10: 7
PAINLEVEL_OUTOF10: 0

## 2021-10-26 ASSESSMENT — PAIN SCALES - WONG BAKER
WONGBAKER_NUMERICALRESPONSE: 2
WONGBAKER_NUMERICALRESPONSE: 4
WONGBAKER_NUMERICALRESPONSE: 2

## 2021-10-26 ASSESSMENT — PAIN DESCRIPTION - DESCRIPTORS: DESCRIPTORS: ACHING

## 2021-10-26 ASSESSMENT — PAIN DESCRIPTION - LOCATION
LOCATION: CHEST
LOCATION: CHEST

## 2021-10-26 ASSESSMENT — PAIN DESCRIPTION - FREQUENCY: FREQUENCY: INTERMITTENT

## 2021-10-26 ASSESSMENT — PAIN DESCRIPTION - ORIENTATION
ORIENTATION: RIGHT
ORIENTATION: RIGHT

## 2021-10-26 ASSESSMENT — PAIN DESCRIPTION - PAIN TYPE
TYPE: SURGICAL PAIN
TYPE: SURGICAL PAIN

## 2021-10-26 NOTE — PROGRESS NOTES
HEMODIALYSIS POST TREATMENT NOTE    Treatment time ordered: 120 minutes    Actual treatment time: 120 minutes    UltraFiltration Goal: 500 ml  UltraFiltration Removed: 0 ml      Pre Treatment weight: 56.2KG  Post Treatment weight: 56.2 Kg  Estimated Dry Weight:     Access used:     Central Venous Catheter:          Tunneled           Site: Rt. Chest          Access Flow: good       Sign and symptoms of infection: No          Medications or blood products given: SPA 25 GM x 1 , Proamatine 10 mg PO x 1    Chronic outpatient schedule: Unknown [facility]    Chronic outpatient unit: Unknown    Summary of response to treatment: Tolerated first acute hemodialysis fairly well, New Chest Tube and tunnel dialysis catheter inserted prior to treatment, Asymptomatic hypotension during dialysis requiring NS bolus x 3 before obtaining order for SPA 25 GM and Proamtine 10 mg. Both medications given , BP stable last half hour of dialyis . Unable to remove 500 ml as planned    Explain if orders NOT met, was physician notified:see above      ACES flowsheet faxed to patient unit/ placed in patient chart: N/A Epic charting    Post assessment completed: Yes    Report given to: Lynne Zamora RN      * Intra-treatment documented Safety Checks include the followin) Access and face visible at all times. 2) All connections and blood lines are secure with no kinks. 3) NVL alarm engaged. 4) Hemosafe device applied (if applicable). 5) No collapse of Arterial or Venous blood chambers. 6) All blood lines / pump segments in the air detectors.

## 2021-10-26 NOTE — PROGRESS NOTES
ANTIMICROBIAL STEWARDSHIP  Upon review of the patient's chart, the committee has the following recommendation for your consideration:    Indication: COPD/pleural effusion  Day of Antimicrobial Therapy:14  Recommendation   Patient is on day 14 of levofloxacin therapy for the indications above. No positive cultures. Leukocytosis is likely due to high dose steroid therapy. We recommend discontinue levofloxacin. If not, please consult ID service for further evaluation. Recent Labs     10/25/21  0614 10/26/21  0535   WBC 26.2* 31.4*     No results for input(s): PROCAL in the last 72 hours. Unnecessary or inappropriate antimicrobial use increases the risk of subsequent infections with resistant bacterial infections and drug-associated toxicities including C. difficile infection. Thank you. Lele Goode, [facility], PharmD  10/26/2021  9:43 AM    The Antimicrobial Stewardship Committee at [facility] is led by  Giovanna Lay MD, Infectious Diseases Section Chair.       Submitted by: Lele Goode [facility]

## 2021-10-26 NOTE — PLAN OF CARE
Problem: Nutrition  Goal: Optimal nutrition therapy  10/25/2021 2315 by Kylee Alves RN  Outcome: Ongoing  10/25/2021 1552 by Apryl Monsalve RN  Outcome: Ongoing     Problem: Skin Integrity:  Goal: Will show no infection signs and symptoms  Description: Will show no infection signs and symptoms  10/25/2021 2315 by Kylee Alves RN  Outcome: Ongoing  10/25/2021 1552 by Apryl Monsalve RN  Outcome: Ongoing     Problem: Skin Integrity:  Goal: Absence of new skin breakdown  Description: Absence of new skin breakdown  10/25/2021 2315 by Kylee Alves RN  Outcome: Ongoing  10/25/2021 1552 by Apryl Monsalve RN  Outcome: Ongoing     Appetite a little better.  Coccyx preventative dressing placed

## 2021-10-26 NOTE — PROGRESS NOTES
Updated Dr. Felipa Moreno, on call for pulmonary about cdiff order that was placed by Dr. Arcenio Wyman. Patient does not meet requirements per hospital policy to send the cdiff sample- patient is having soft stool, not liquid. Per Dr. Felipa Moreno, hold cdiff order for sample and re-evaluate with Dr. Arcenio Wyman in am. Concerning the urine, contact ID for futher information.

## 2021-10-26 NOTE — CONSULTS
Pharmacy Note  Vancomycin Consult (Dialysis patient) - Initial Note       TODAY'S DATE:  10/26/2021  Patient name, age:  Celso Sims, 64 y.o. Vancomycin order/consult received from: Prince Castellano MD.  Indication: Sepsis of unknown etiology  Additional antimicrobials:  aztreonam, levofloxacin      Allergies:  Penicillins   Actual Weight:    Wt Readings from Last 1 Encounters:   10/26/21 123 lb 14.4 oz (56.2 kg)     Labs/Ancillary Data  Recent Labs     10/25/21  0614 10/26/21  0535   BUN 85* 88*     Estimated Creatinine Clearance: 13 mL/min (A) (based on SCr of 3.07 mg/dL (H)). Recent Labs     10/25/21  0614 10/26/21  0535   CREATININE 3.08* 3.07*     Recent Labs     10/25/21  0614 10/26/21  0535   WBC 26.2* 31.4*     No results found for: PROCAL    Intake/Output Summary (Last 24 hours) at 10/26/2021 1806  Last data filed at 10/26/2021 1555  Gross per 24 hour   Intake 1124.9 ml   Output 2037 ml   Net -912.1 ml     Temp: 97.7 F      Recent vancomycin administrations        No vancomycin IV orders with administrations found. Orders not given:            vancomycin (VANCOCIN) 1250 mg in dextrose 5 % 250 mL IVPB    vancomycin (VANCOCIN) intermittent dosing (placeholder)                  Culture Date / Source  /  Results  10/25/21         Blood          No growth x 1 day    PLAN             Patient had first dialysis session today prior to vancomycin. Will give bolus of 1250 mg tonight. Vancomycin level NOT ORDERED. Will check daily for HD until HD schedule is determined. 2.   ONLY for suspected pneumonia or COPD: MRSA Nasal Swab: N/A. Non-respiratory infection. .      Stable HD/PD patients do not require regular renal function monitoring.       Intermittent Hemodialysis (HD)*do not use Bayesian software for dosing  Empiric Dosing Regimen:  LD = 25 mg/kg (MAX 2,000mg dose), give dialysis, then         MD  = 10-15 mg/kg after HD sessions (MAX 2,000mg per dose)  Timing and Frequency of Concentration

## 2021-10-26 NOTE — PLAN OF CARE
Problem: Falls - Risk of:  Goal: Will remain free from falls  Description: Will remain free from falls  Outcome: Ongoing   No falls noted this shift. Problem: Falls - Risk of:  Goal: Absence of physical injury  Description: Absence of physical injury  Outcome: Ongoing   No injury noted this shift. Problem: Nutrition  Goal: Optimal nutrition therapy  Outcome: Ongoing   Pt restarted on diet after being NPO for procedure. Problem: Skin Integrity:  Goal: Will show no infection signs and symptoms  Description: Will show no infection signs and symptoms  Outcome: Ongoing   No new altered skin noted this shift. Problem: OXYGENATION/RESPIRATORY FUNCTION  Goal: Patient will achieve/maintain normal respiratory rate/effort  Description: Respiratory rate and effort will be within normal limits for the patient  Outcome: Ongoing   Pt continues on 4LNC after replacement of chest tube and placement of dialysis catheter. Tolerating well. Problem: Pain:  Goal: Pain level will decrease  Description: Pain level will decrease  Outcome: Ongoing   Pt currently denies any pain.

## 2021-10-26 NOTE — PROGRESS NOTES
Renal Progress Note    Patient :  Bridget Woods; 64 y.o. MRN# [id number]  Location:  1012/1012-01  Attending:  Aylin Reddy MD  Admit Date:  10/12/2021   Hospital Day: 14      Subjective:     Patient was seen and examined. No new issues reported overnight. Patient did get tunneled catheter placement as well as chest tube exchange done today 10/26/2021. Dialysis today and to run as day 1. Currently on Lasix 40 mg IV twice daily. Urine output documented as about 1 L in the last 24 hours.     Outpatient Medications:     Medications Prior to Admission: amLODIPine (NORVASC) 5 MG tablet, Take 5 mg by mouth daily  magnesium oxide (MAG-OX) 400 MG tablet, Take 400 mg by mouth daily  metoprolol (LOPRESSOR) 100 MG tablet, Take 100 mg by mouth 2 times daily  folic acid (FOLVITE) 741 MCG tablet, Take 800 mcg by mouth daily  diphenhydrAMINE-APAP, sleep, (TYLENOL PM EXTRA STRENGTH)  MG tablet, Take 1 tablet by mouth nightly as needed for Sleep    Current Medications:     Scheduled Meds:    heparin (porcine)  5,000 Units SubCUTAneous BID    methylPREDNISolone  30 mg IntraVENous Q8H    furosemide  40 mg IntraVENous BID    budesonide  0.5 mg Nebulization BID    Arformoterol Tartrate  15 mcg Nebulization BID    ipratropium-albuterol  1 ampule Inhalation 4x daily    potassium chloride  40 mEq Oral Daily    epoetin mike-epbx  8,000 Units SubCUTAneous Once per day on Mon Wed Fri    calcitRIOL  0.25 mcg Oral Once per day on Mon Wed Fri    sodium chloride flush  5-40 mL IntraVENous 2 times per day    levofloxacin  500 mg IntraVENous Q48H    vitamin D  50,000 Units Oral Weekly     Continuous Infusions:    dextrose      sodium chloride       PRN Meds:  sodium citrate, midodrine, sodium citrate, sodium chloride flush, HYDROcodone 5 mg - acetaminophen **OR** HYDROcodone 5 mg - acetaminophen, melatonin, glucose, dextrose, glucagon (rDNA), dextrose, sodium chloride flush, sodium chloride, ondansetron **OR** ondansetron, acetaminophen **OR** acetaminophen, perflutren lipid microspheres    Input/Output:       I/O last 3 completed shifts: In: 1084.9 [P.O.:700; I.V.:384.9]  Out: 1097 [Urine:1000; Chest Tube:97]. Patient Vitals for the past 96 hrs (Last 3 readings):   Weight   10/26/21 0458 124 lb (56.2 kg)   10/25/21 0645 128 lb 6 oz (58.2 kg)   10/24/21 0558 133 lb 4.8 oz (60.5 kg)       Vital Signs:   Temperature:  Temp: 97.5 °F (36.4 °C)  TMax:   Temp (24hrs), Av.6 °F (36.4 °C), Min:97.2 °F (36.2 °C), Max:98.1 °F (36.7 °C)    Respirations:  Resp: 18  Pulse:   Pulse: 113  BP:    BP: 137/66  BP Range: Systolic (00YYI), PEN:802 , Min:68 , DYH:149       Diastolic (25DAA), PKS:57, Min:41, Max:93      Physical Examination:     General:  AAO x 3, speaking in full sentences, no accessory muscle use. HEENT: Atraumatic, normocephalic, no throat congestion, moist mucosa. Eyes:   Pupils equal, round and reactive to light, EOMI. Neck:   Supple  Chest:   Bilateral vesicular breath sounds, no rales or wheezes. Cardiac:  S1 S2 RR, no murmurs, gallops or rubs. Abdomen: Soft, non-tender, no masses or organomegaly, BS audible. :   No suprapubic or flank tenderness. Neuro:  AAO x 3, No FND. SKIN:  No rashes, good skin turgor. Extremities:  No edema.     Labs:       Recent Labs     10/24/21  1447 10/25/21  0614 10/26/21  0535   WBC 22.0* 26.2* 31.4*   RBC 3.56* 3.54* 3.75*   HGB 8.9* 8.8* 9.3*   HCT 30.3* 29.8* 32.0*   MCV 85.1 84.2 85.3   MCH 25.0* 24.9* 24.8*   MCHC 29.4 29.5 29.1   RDW 21.8* 21.8* 23.5*    267 300   MPV 10.4 10.5 10.5      BMP:   Recent Labs     10/24/21  0559 10/25/21  0614 10/26/21  0535    137 136   K 4.0 4.6 4.5   CL 93* 96* 96*   CO2 25 25 25   BUN 78* 85* 88*   CREATININE 3.11* 3.08* 3.07*   GLUCOSE 154* 162* 136*   CALCIUM 7.6* 7.9* 8.1*      SPEP:  Lab Results   Component Value Date    PROT 4.7 10/17/2021     Hep BsAg:         Lab Results   Component Value Date    HEPBSAG NONREACTIVE 10/25/2021     Urinalysis/Chemistries:      Lab Results   Component Value Date    NITRU NEGATIVE 10/13/2021    COLORU Yellow 10/13/2021    PHUR 5.5 10/13/2021    WBCUA 10 TO 20 10/13/2021    RBCUA 2 TO 5 10/13/2021    MUCUS 1+ 10/13/2021    TRICHOMONAS NOT REPORTED 10/13/2021    YEAST NOT REPORTED 10/13/2021    BACTERIA NOT REPORTED 10/13/2021    SPECGRAV 1.020 10/13/2021    LEUKOCYTESUR SMALL 10/13/2021    UROBILINOGEN Normal 10/13/2021    BILIRUBINUR NEGATIVE 10/13/2021    GLUCOSEU NEGATIVE 10/13/2021    KETUA NEGATIVE 10/13/2021    AMORPHOUS 1+ 10/13/2021     Urine Sodium:     Lab Results   Component Value Date    LAZARO 40 10/13/2021     Urine Chloride:    Lab Results   Component Value Date    CLUR 60 10/13/2021     Urine Osmolarity:   Lab Results   Component Value Date    OSMOU 347 10/12/2021      Urine Creatinine:     Lab Results   Component Value Date    LABCREA 40.5 10/17/2021    LABCREA 76.0 10/13/2021     Radiology:     Reviewed. Assessment:     1. Now ESRD on dialysis initiated on dialysis 10/26/2021. Status post tunneled catheter placement 10/26/2021.  2. Anemia. 3. Moderate to large right pleural effusion status post thoracentesis 830 cc removed. 4. Moderate pulmonary hypertension with RV SP of 60.  5. Essential hypertension. 6. COPD. 7. Moderate to severe protein calorie malnutrition. Plan:   1. To get dialysis as per table today. Orders were confirmed with the dialysis nurse. 2. Okay to hold IV Lasix for now. 3.  to help towards finding outpatient hemodialysis spot. 4. Pleural effusion/chest tube management as per pulmonology. 5. BMP in AM.  6. Will run again in a.m. as per day 2.  7. Will follow. Nutrition   Please ensure that patient is on a renal diet/TF. Avoid nephrotoxic drugs/contrast exposure. We will continue to follow along with you. Abdulkadir Mane MD  [facility]     This note is created with the assistance of kasia speech-recognition program. While intending to generate a document that actually reflects the content of the visit, no guarantees can be provided that every mistake has been identified and corrected by editing.

## 2021-10-26 NOTE — PRE SEDATION
Sedation Pre-Procedure Note    Patient Name: Neema Stevens   YOB: 1945  Room/Bed: 1012/1012-01  Medical Record Number: [id number]  Date: 10/26/2021   Time: 9:35 AM       Indication:  Renal insufficiency, pneumothorax    Consent: I have discussed with the patient and/or the patient representative the indication, alternatives, and the possible risks and/or complications of the planned procedure and the anesthesia methods. The patient and/or patient representative appear to understand and agree to proceed. Vital Signs:   Vitals:    10/26/21 0916   BP: 106/68   Pulse: 126   Resp: 27   Temp:    SpO2: (!) 81%       Past Medical History:   has no past medical history on file. Past Surgical History:   has a past surgical history that includes IR CHEST TUBE INSERTION (10/18/2021). Medications:   Scheduled Meds:    heparin (porcine)  5,000 Units SubCUTAneous BID    methylPREDNISolone  30 mg IntraVENous Q8H    furosemide  40 mg IntraVENous BID    budesonide  0.5 mg Nebulization BID    Arformoterol Tartrate  15 mcg Nebulization BID    ipratropium-albuterol  1 ampule Inhalation 4x daily    potassium chloride  40 mEq Oral Daily    epoetin mike-epbx  8,000 Units SubCUTAneous Once per day on Mon Wed Fri    calcitRIOL  0.25 mcg Oral Once per day on Mon Wed Fri    sodium chloride flush  5-40 mL IntraVENous 2 times per day    levofloxacin  500 mg IntraVENous Q48H    vitamin D  50,000 Units Oral Weekly     Continuous Infusions:    dextrose      sodium chloride       PRN Meds: sodium chloride flush, HYDROcodone 5 mg - acetaminophen **OR** HYDROcodone 5 mg - acetaminophen, melatonin, glucose, dextrose, glucagon (rDNA), dextrose, sodium chloride flush, sodium chloride, ondansetron **OR** ondansetron, acetaminophen **OR** acetaminophen, perflutren lipid microspheres  Home Meds:   Prior to Admission medications    Medication Sig Start Date End Date Taking?  Authorizing Provider   amLODIPine (NORVASC) 5 MG tablet Take 5 mg by mouth daily   Yes Historical Provider, MD   magnesium oxide (MAG-OX) 400 MG tablet Take 400 mg by mouth daily   Yes Historical Provider, MD   metoprolol (LOPRESSOR) 100 MG tablet Take 100 mg by mouth 2 times daily   Yes Historical Provider, MD   folic acid (FOLVITE) 521 MCG tablet Take 800 mcg by mouth daily   Yes Historical Provider, MD   diphenhydrAMINE-APAP, sleep, (TYLENOL PM EXTRA STRENGTH)  MG tablet Take 1 tablet by mouth nightly as needed for Sleep   Yes Historical Provider, MD     Coumadin Use Last 7 Days:  no  Antiplatelet drug therapy use last 7 days: No  Other anticoagulant use last 7 days: yes - heparin held last night  Additional Medication Information:  None      Pre-Sedation Documentation and Exam:   Vital signs have been reviewed (see flow sheet for vitals). I have reviewed the patient's history and review of systems.       Mallampati Airway Assessment:  normal neck range of motion, Mallampati Class II - (soft palate, fauces & uvula are visible)    Prior History of Anesthesia Complications:   none    ASA Classification:  Class 3 - A patient with severe systemic disease that limits activity but is not incapacitating    Sedation/ Anesthesia Plan:   intravenous sedation    Medications Planned:   midazolam (Versed) intravenously and fentanyl intravenously    Patient is an appropriate candidate for plan of sedation: yes    Electronically signed by Hal Petit MD on 10/26/2021 at 9:35 AM

## 2021-10-26 NOTE — PROGRESS NOTES
[facility]  Office: [address], DO, Parrish Pacheco, DO, Derek Denton, DO, Rafamarisol Pablo Terrazas, DO, Armani Lopez MD, Keshia Kaiser MD, Andrae Bennett MD, Serenity Clay MD, Mamie Nation MD, Ryan Nayak MD, John Arroyo MD, Keenan Veronica MD, Kate Yuen, DO, Rosanne Ledesma, DO, Sandhya Mason MD,  Esteban Mcleod, DO, Jovanni Gallagher MD, Karli Joseph MD, Jeannette Kruger MD, Key Clement MD, Tani Mcnulty MD, Don Thapa MD, Pascale [place], [facility], [facility], CNP, Kenia De La Cruz, CNP, Malcom Connor, CNS, Corinne Dennis, CNP, Jessica Dhaliwals, CNP, Candace Garces, CNP, Sofie Doherty, CNP, Emma Salazar, [facility], Matt Bradford, [facility], Mayra Caceres PA-C, Luiza Doe, [facility], Phuc Esparza, CNP, Osman Thomas, CNP, Livan Sewell, CNP, Irena Kaiser, CNP, Prerna Eason, CNP, Gisella Blake, CNP, [facility]    Progress Note    10/26/2021    1:43 PM    Name:   Jas Shukla  MRN:     [id number]     Acct:      [de-identified]   Room:   [address] [place] [address] Day:  14  Admit Date:  10/12/2021  2:53 PM    PCP:   Radha Oliveira MD  Code Status:  Full Code    Subjective:     C/C:   Chief Complaint   Patient presents with    Respiratory Distress     EMS reported 97% on room air; pt arrives with saturation 45% on room air    Failure To Thrive     Interval History Status: improved. Patient seen and examined at dialysis site , feeling better overall , still with urine output, and I/O noted   Slightly tachycardic   Patient vitals, labs and all providers notes were reviewed,from overnight shift and morning updates were noted and discussed with the nurse    Brief History:     Per my ELANA:  \"Danielle Wadsworth is a 64 y. o. female with a hx of CKD 4 and iron deficiency anemia who presented to [place] [facility] with Respiratory Distress and hypoxia (O2 sats 45%) and failure to thrive and is admitted to the hospital for the management of right Pleural effusion and acute renal failure. Patient lives at home alone and has recently been having difficulty caring for herself and generalized weakness for the last few weeks . She does have chronic BLE edema but denies hx of CHF. Initial CXR showed large right pleural effusion, stat elevated BNP and D-dimer as well as BUN/creatinine.  Last took known creatinine was 2.34 in august, she reports she seen a nephrologist once. She does not wear home oxygen   10/13: US guided right thoracentesis 850ml of nonturbid straw colored fluid removed -transudative  Pleural fluid cultures negative for malignancy\"  Chest tube placed 10/18 on right for loculated effusion   Started on heparin drip for possible PE; VQ being repeated 10/24    Review of Systems:     Review of Systems   Constitutional: Positive for activity change, appetite change and fatigue. Negative for chills, diaphoresis and fever. HENT: Negative for congestion. Eyes: Negative for visual disturbance. Respiratory: Positive for shortness of breath. Negative for cough, chest tightness and wheezing. Cardiovascular: Negative for chest pain, palpitations and leg swelling. Gastrointestinal: Negative for abdominal pain, blood in stool, constipation, diarrhea, nausea and vomiting. Genitourinary: Negative for difficulty urinating. Neurological: Positive for weakness. Negative for dizziness, light-headedness, numbness and headaches. All other systems reviewed and are negative. Medications: Allergies:     Allergies   Allergen Reactions    Penicillins Swelling       Current Meds:   Scheduled Meds:    heparin (porcine)  5,000 Units SubCUTAneous BID    methylPREDNISolone  30 mg IntraVENous Q8H    furosemide  40 mg IntraVENous BID    budesonide  0.5 mg Nebulization BID    Arformoterol Tartrate  15 mcg Nebulization BID    ipratropium-albuterol  1 ampule Inhalation 4x daily    potassium chloride  40 mEq Oral Daily    epoetin mike-epbx  8,000 Units SubCUTAneous Once per day on     calcitRIOL  0.25 mcg Oral Once per day on     sodium chloride flush  5-40 mL IntraVENous 2 times per day    levofloxacin  500 mg IntraVENous Q48H    vitamin D  50,000 Units Oral Weekly     Continuous Infusions:    dextrose      sodium chloride       PRN Meds: sodium citrate, midodrine, sodium citrate, sodium chloride flush, HYDROcodone 5 mg - acetaminophen **OR** HYDROcodone 5 mg - acetaminophen, melatonin, glucose, dextrose, glucagon (rDNA), dextrose, sodium chloride flush, sodium chloride, ondansetron **OR** ondansetron, acetaminophen **OR** acetaminophen, perflutren lipid microspheres    Data:     Past Medical History:   has no past medical history on file. Social History:   reports that she quit smoking about 2 years ago. Her smoking use included cigarettes. She has never used smokeless tobacco. She reports current alcohol use. Family History:   Family History   Problem Relation Age of Onset    Hypertension Mother     Cancer Sister     Cancer Brother        Vitals:  /66   Pulse 113   Temp 97.5 °F (36.4 °C) (Oral)   Resp 18   Ht 5' 4\" (1.626 m)   Wt 124 lb (56.2 kg)   SpO2 96%   BMI 21.28 kg/m²   Temp (24hrs), Av.6 °F (36.4 °C), Min:97.2 °F (36.2 °C), Max:98.1 °F (36.7 °C)    Recent Labs     10/25/21  1629 10/25/21  2010 10/26/21  0748 10/26/21  1321   POCGLU 167* 173* 139* 114*       I/O (24Hr):     Intake/Output Summary (Last 24 hours) at 10/26/2021 1343  Last data filed at 10/26/2021 1000  Gross per 24 hour   Intake 674.9 ml   Output 1037 ml   Net -362.1 ml       Labs:  Hematology:  Recent Labs     10/24/21  1447 10/25/21  0614 10/26/21  0535   WBC 22.0* 26.2* 31.4*   RBC 3.56* 3.54* 3.75*   HGB 8.9* 8.8* 9.3*   HCT 30.3* 29.8* 32.0*   MCV 85.1 84.2 85.3   MCH 25.0* 24.9* 24.8*   MCHC 29.4 29.5 29.1   RDW 21.8* 21.8* 23.5*    267 300   MPV 10.4 10.5 10.5     Chemistry:  Recent Labs     10/24/21  0559 10/25/21  8331 10/26/21  0535    137 136   K 4.0 4.6 4.5   CL 93* 96* 96*   CO2 25 25 25   GLUCOSE 154* 162* 136*   BUN 78* 85* 88*   CREATININE 3.11* 3.08* 3.07*   ANIONGAP 17 16 15   LABGLOM 14* 15* 15*   GFRAA 18* 18* 18*   CALCIUM 7.6* 7.9* 8.1*     Recent Labs     10/25/21  0740 10/25/21  1214 10/25/21  1629 10/25/21  2010 10/26/21  0748 10/26/21  1321   POCGLU 162* 134* 167* 173* 139* 114*     ABG:  Lab Results   Component Value Date    POCPH 7.456 10/24/2021    POCPCO2 41.1 10/24/2021    POCPO2 53.3 10/24/2021    POCHCO3 28.9 10/24/2021    NBEA NOT REPORTED 10/24/2021    PBEA 5 10/24/2021    GRO0AUJ NOT REPORTED 10/24/2021    EXSD2PYA 89 10/24/2021    FIO2 NOT REPORTED 10/24/2021     Lab Results   Component Value Date/Time    SPECIAL LH, 2ML 10/25/2021 11:30 AM     Lab Results   Component Value Date/Time    CULTURE NO GROWTH 17 HOURS 10/25/2021 11:30 AM       Radiology:  XR CHEST (SINGLE VIEW FRONTAL)    Result Date: 10/24/2021  Small stable left effusion and left lower lobe atelectatic changes. Stable right chest tube. Cardiomegaly and COPD redemonstrated. NM LUNG SCAN PERFUSION ONLY    Result Date: 10/21/2021  Exam is technically high probability for pulmonary embolism. While emboli are possible, the diminished activity within the upper lung zones could also be secondary to emphysema; a  ventilation study would be needed for confirmation. This could be obtained at a later time as clinically warranted. Improving perfusion at the lateral right lung base. Findings were discussed with Camryn Wan at 12:13 on 10/21/2021. CT ABDOMEN PELVIS W IV CONTRAST Additional Contrast? Radiologist Recommendation    Result Date: 10/25/2021  1. Right pneumothorax with chest tube in place. Please see separate chest CT report for details of this region. 2.  Large rectal colorectal stool burden and mild gaseous distention of the colon.      XR CHEST PORTABLE    Result Date: 10/25/2021  Small bore right chest tube in unchanged position. Smaller but persistent right pneumothorax; otherwise, unchanged findings. Stable left effusion with left lower lobe volume loss. COPD. XR CHEST PORTABLE    Result Date: 10/23/2021  Questionable trace focal pneumothorax laterally in the right base near the chest tube. Persistent by a basilar airspace disease with small left pleural effusion. XR CHEST PORTABLE    Result Date: 10/22/2021  Stable right chest tube without demonstrable pneumothorax Improvement in now mild right basilar opacity related to minimal atelectasis and or effusion Stable moderate left basilar opacity     XR CHEST PORTABLE    Result Date: 10/21/2021  Stable right chest tube without pneumothorax Stable bibasilar pleuroparenchymal changes     XR CHEST PORTABLE    Result Date: 10/20/2021  No significant interval change. Right chest tube in place with small right pleural effusion and right basilar opacity. Unchanged moderate left pleural effusion. XR CHEST 1 VIEW    Result Date: 10/26/2021  Interval placement of tunneled dialysis catheter with tip in appropriate position. Small right basilar pneumothorax no longer present. Chest tube upsize with tip in right base. Attention for pneumothorax on serial portable. .     CT CHEST PULMONARY EMBOLISM W CONTRAST    Addendum Date: 10/25/2021    ADDENDUM: Findings were discussed with IVA SURESH at 1:30 pm on 10/25/2021. Result Date: 10/25/2021  Loculated left basilar effusion with adjacent consolidation representing atelectasis versus pneumonia. Focus of consolidation in the right lower lobe representing atelectasis versus pneumonia. Moderate to large right-sided pneumothorax with an indwelling pigtail catheter in the right basilar pleural space. IR TUNNELED CVC PLACE WO SQ PORT/PUMP > 5 YEARS    Result Date: 10/26/2021  Successful ultrasound and fluoroscopy guided tunneled catheter placement . The tunneled dialysis catheter may be used immediately.      IR CHANGE DRAINAGE CATH    Result Date: 10/26/2021  Chest tube upsized to [address]. Attached to wall suction at which time air was aspirated consistent with pneumothorax. Patient hypoxia improved from 84 to 94%. These findings were communicated to the ICU doctor in person at 9:45 a.m. Osmar Salazar Physical Examination:        Physical Exam  Vitals and nursing note reviewed. Constitutional:       General: She is not in acute distress. Appearance: She is obese. She is ill-appearing. Interventions: Nasal cannula in place. HENT:      Head: Normocephalic and atraumatic. Eyes:      Conjunctiva/sclera: Conjunctivae normal.      Pupils: Pupils are equal, round, and reactive to light. Cardiovascular:      Rate and Rhythm: Normal rate and regular rhythm. Heart sounds: No murmur heard. Pulmonary:      Effort: Pulmonary effort is normal. No accessory muscle usage or respiratory distress. Breath sounds: No stridor. Examination of the right-lower field reveals decreased breath sounds. Examination of the left-lower field reveals decreased breath sounds. Decreased breath sounds present. No wheezing, rhonchi or rales. Abdominal:      General: Bowel sounds are normal. There is no distension. Palpations: Abdomen is soft. Abdomen is not rigid. Tenderness: There is no abdominal tenderness. There is no guarding. Musculoskeletal:         General: No tenderness. Skin:     General: Skin is warm and dry. Findings: No erythema, lesion or rash. Neurological:      Mental Status: She is alert and oriented to person, place, and time. Cranial Nerves: No cranial nerve deficit. Motor: No seizure activity. Psychiatric:         Speech: Speech normal.         Behavior: Behavior normal. Behavior is cooperative.          Assessment:        Hospital Problems         Last Modified POA    * (Principal) Pleural effusion on right 10/15/2021 Yes    Acute renal failure with acute renal cortical necrosis superimposed on stage 4 chronic kidney disease ([address] 10/14/2021 Yes    Acute respiratory failure with hypoxia ([address] 10/13/2021 Yes    Iron deficiency anemia 10/12/2021 Yes    Essential hypertension 10/12/2021 Yes    General weakness 10/12/2021 Yes    Vitamin D deficiency 10/13/2021 Yes    Acute diastolic heart failure ([address] 10/13/2021 Yes    Hypoxia 10/13/2021 Yes    Anemia 10/13/2021 Yes    Peripheral edema 10/13/2021 Yes    Moderate protein-calorie malnutrition ([address] 10/13/2021 Yes    Severe malnutrition ([address] 10/13/2021 Yes    Acute kidney injury ([address] 10/15/2021 Yes    Hypokalemia 10/17/2021 Yes    Other emphysema ([address] 10/17/2021 Yes          Plan:           Acute hypoxemic respiratory failure: Likely secondary to  pleural effusion and pulmonary edema continue support with high flow nasal cannula    Moderate to large right-sided pleural effusion: Ex lap PE thoracentesis    Pneumothorax: Status post chest tube placement, chest tube exchange this a.m. tolerable 1, continue to continuous suction, appreciate pulmonology recommendation    Acute kidney injury/ CKD 4: Likely secondary CHF  Urine retention: Patient needed hemodialysis, appreciate nephrology input. Renal diet    Acute decompensated diastolic heart failure: Diuresis initially, now on hemodialysis, continue, strict I's & O's, daily weight, cardiac diet and fluid restriction    Hyperkalemia: Currently resolved. Severe malnutrition: Continue boost and encourage p.o. intake. COPD exacerbation: Continue treatment/inhalers/IV steroids    Patient condition continues to be critical, continue monitor closely.     Plan is peer to peer with insurance to approve LTAC placement    Discussed with the patient and nurse      Lamonte Sultana MD  10/26/2021  1:43 PM

## 2021-10-26 NOTE — PROGRESS NOTES
Updated Dr. Genia Trejo on patient decreased urine output since dialysis. Pt has no urine noted in gallegos since post dialysis. Monitor per Dr. Genia Trejo, but patient will have decreased urine due to the dialysis.

## 2021-10-26 NOTE — CARE COORDINATION
Discharge planning    Chart reviewed. Patient admitted with pleural effusions. Chest tube to suction. Patient to initiate HD today after tunnel cath placed. Patient currently on 3 liters NC. Patient denied per insurance to [facility] and was to have P2P completed by our internal medicine attending but he never received phone call. Attendings have now changed. Patient has CT now to suction and has been denied several snfs throughout the stay. SS    So far the following have denied d/t acquity and/or insurance [id number] [facility], [facility], [facility], [facility] and [place] [id number] 10/25    Impression:  · Acute hypoxic respiratory failure requiring high flow oxygen  · Moderate to large right pleural effusion, s/p thoracentesis with 850 mL removed  · Small left pleural effusion, loculated  · Atelectasis/?  Trapped lung on the right  · Mild pulmonary edema  · Moderate pulmonary hypertension, RVSP 60 mmHg  · Suspected COPD/former smoker, quit 2019     Recommendations:  · IV Solu-Medrol  30 q.8 hours  · Continue Levaquin IV  · Discontinue  heparin drip since CTA was negative  · Discussed with IR; hold off changing tube to larger bore and follow-up chest x-ray in a.m./will consider CT surgery consult  · Keep pigtail catheter to suction. Monitor output.   · Pleural fluid negative for malignancy

## 2021-10-26 NOTE — PROGRESS NOTES
Nutrition Assessment     Type and Reason for Visit: Reassess    Nutrition Recommendations/Plan:   1. Continue NPO status. 2. Continue current diet Adult Diet; Regular; Low Sodium (2gm); Low Potassium diet (<3000 mg/day) with 1500 mL fluid restriction once appropriate. 3. Add Magic Cup BID once diet advances. 4. Monitor po intakes, GI status, weights and labs    Nutrition Assessment:  10/13: Moderate to large right pleural effusion, s/p thoracentesis with 850 mL removed - Pleural fluid negative for malignancy. 10/18: chest tubes placed. 10/23: moderate to large right-side pneumothorax. 10/26:  Pt getting dialysis port placed and then going directly to dialysis. Patient seen in room after dialysis- she reports feeling tired. Poor appetite/intake related to not liking hospital food. She is consuming 50-75% of magic cups. Will continue ONS. Will monitor po intakes, GI status, weights and labs. Malnutrition Assessment:  Malnutrition Status: Severe malnutrition    Estimated Daily Nutrient Needs:  Energy (kcal): 8038-0664 kcal (28-30 kcal/kg; Weight Used for Energy Requirements:  Current     Protein (g): 60-72 gm of protein (1.0-1.2 gm/kg); Weight Used for Protein Requirements:  Current          Nutrition Related Findings: Loss of appetite. Hypoactive bowel sounds. Edema: BUE +1; BLE non-pitting trace. Dialysis port placed and first session dialysis.       Current Nutrition Therapies:    Diet NPO    Anthropometric Measures:  · Height: 5' 4\" (162.6 cm)  · Current Body Wt: 124 lb (56.2 kg) (Dialysis started)   · BMI: 21.3    Nutrition Diagnosis:   · Severe malnutrition related to inadequate protein-energy intake as evidenced by intake 0-25%, intake 26-50%, weight loss greater than or equal to 5% in 1 month    · Increased nutrient needs related to increase demand for energy/nutrients as evidenced by dialysis      Nutrition Interventions:   Food and/or Nutrient Delivery:  Continue NPO (Continue ONS once diet advanced)  Nutrition Education/Counseling:  Education not indicated   Coordination of Nutrition Care:  Continue to monitor while inpatient    Goals:  PO intakes > 50% at meals       Nutrition Monitoring and Evaluation:   Behavioral-Environmental Outcomes:  None Identified   Food/Nutrient Intake Outcomes:  Diet Advancement/Tolerance, Supplement Intake, Food and Nutrient Intake  Physical Signs/Symptoms Outcomes:  Biochemical Data, Fluid Status or Edema, Skin, Weight, GI Status     Discharge Planning:     Too soon to determine     Jocelyn Sanders, 64 [address], [facility] [address]  Office Number: [phone]

## 2021-10-26 NOTE — PROGRESS NOTES
Pt to be npo for IR placing a tunneled dialysis catheter, however order for catheter was not placed by Dr. Zohreh Valadez. Paged Dr. Ketty Sandoval for order and obtained via telephone.

## 2021-10-26 NOTE — PLAN OF CARE
Nutrition Problem #1: Severe malnutrition  Intervention: Food and/or Nutrient Delivery: Continue NPO (Continue ONS once diet advanced)  Nutritional Goals: PO intakes > 50% at meals

## 2021-10-26 NOTE — PROGRESS NOTES
Dialysis Safety Checks:    Patient ID Verified (Y/N) Y     -Hepatitis Test                   Date      Result  Hepatitis B Surface Antigen   10/25/21 Negative     Hepatitis B Surface Antibody 10/25/21 Negative     Hepatitis B Core Antibody  10/25/21 Negative     -Treatment Initiation  Blood Vol Processed Goal (Liters)  Pump Speed x Treatment Hours x 60 Minutes    Target Fluid Removal 500 ml     * Intra-treatment documented Safety Checks include the followin) Access and face visible at all times. 2) All connections and blood lines are secure with no kinks. 3) NVL alarm engaged. 4) Hemosafe device applied (if applicable). 5) No collapse of Arterial or Venous blood chambers. 6) All blood lines / pump segments in the air detectors.   --------------------------------------------------------------------------------  Report received from Rj Jenkins RN

## 2021-10-26 NOTE — BRIEF OP NOTE
Brief Postoperative Note    Victor Manuel Preciado  YOB: 1945  [id number]    Pre-operative Diagnosis: Renal insufficiency, pneumothorax    Post-operative Diagnosis: Same    Procedure: TDC placement, Chest tube exchange    Anesthesia: Local and Moderate Sedation    Surgeons/Assistants: Dr. Tato Perez    Estimated Blood Loss: less than 50     Complications: Hypoxemia, 5 min at 82% during exchange. Remained responsive    Specimens: Was Not Obtained    Findings: Successful TDC placement, may be used immediately. Successful chest tube upsize.     Electronically signed by Ace Yuen MD on 10/26/2021 at 9:39 AM

## 2021-10-26 NOTE — POST SEDATION
Sedation Post Procedure Note    Patient Name: Gilles Coello   YOB: 1945  Room/Bed: 1012/1012-01  Medical Record Number: [id number]  Date: 10/26/2021   Time: 9:38 AM         Physicians/Assistants: Shakira Rosa MD, MD    Procedure Performed:  Chest tube exchange, [facility] placement    Post-Sedation Vital Signs:  Vitals:    10/26/21 0916   BP: 106/68   Pulse: 126   Resp: 27   Temp:    SpO2: (!) 81%      Vital signs were reviewed and were stable after the procedure (see flow sheet for vitals)            Post-Sedation Exam: Lungs: O2 sat 94%,and Cardiovascular: Sinus tach, stable from preprocedure           Complications: hypoxia, temporary, to 82%. Remained alert and reponsive. Subjective dyspnea for approx 5min during chest tube exchange.     Electronically signed by Shakira Rosa MD on 10/26/2021 at 9:38 AM

## 2021-10-26 NOTE — FLOWSHEET NOTE
10/26/21 0711   Provider Notification   Reason for Communication Critical Value (comment)  (WBC 31.4)   Provider Name Gianaia Matthew   Provider Notification Advance Practice Clinician (CNS/NP/CNM/CRNA/PA)   Method of Communication Secure Message   Response See orders  (urinalysis, chest xray)

## 2021-10-26 NOTE — PROGRESS NOTES
Pt noted to have small air leak when IR department brought back patient from chest tube exchange/catheter placement. Pt currently resting on 3LNC with continuous pulse ox and patient is currently to wall suction at -20. Pt denies pain and no crepitus noted to site. Will continue to monitor. Updated dialysis nurse as well of information and monitoring.

## 2021-10-26 NOTE — PROGRESS NOTES
DATE: 10/26/2021    NAME: Yana Fang  MRN: [id number]   : 1945    Patient not seen this date for Physical Therapy due to:      [] Cancel by RN or physician due to:    [] Hemodialysis    [] Critical Lab Value Level     [] Blood transfusion in progress    [] Acute or unstable cardiovascular status   _MAP < 55 or more than >115  _HR < 40 or > 130    [] Acute or unstable pulmonary status   -FiO2 > 60%   _RR < 5 or >40    _O2 sats < 85%    [] Strict Bedrest    [] Off Unit for surgery or procedure    [] Off Unit for testing       [] Pending imaging to R/O fracture    [] Refusal by Patient      [x] Other dialysis port placement then dialysis       [] PT being discontinued at this time. Patient independent. No further needs. [] PT being discontinued at this time as the patient has been transferred to hospice care. No further needs.       AUTUMN OCAMPO, PTA

## 2021-10-26 NOTE — PROGRESS NOTES
Occupational Therapy    DATE: 10/26/2021    NAME: Tiffany Castañeda  MRN: [id number]   : 1945    Patient not seen this date for Occupational Therapy due to:      [x] Cancel by RN or physician due to: Pt getting dialysis port placed and then going directly to dialysis. [] Hemodialysis    [] Critical Lab Value Level     [] Blood transfusion in progress    [] Acute or unstable cardiovascular status   _MAP < 55 or more than >115  _HR < 40 or > 130    [] Acute or unstable pulmonary status   -FiO2 > 60%   _RR < 5 or >40    _O2 sats < 85%    [] Strict Bedrest    [] Off Unit for surgery or procedure    [] Off Unit for testing       [] Pending imaging to R/O fracture    [] Refusal by Patient      [] Other      [] OT being discontinued at this time. Patient independent. No further needs. [] OT being discontinued at this time as the patient has been transferred to hospice care. No further needs.       Maxine Parker SHA

## 2021-10-26 NOTE — RT PROTOCOL NOTE
RT Inhaler-Nebulizer Bronchodilator Protocol Note    There is a bronchodilator order in the chart from a provider indicating to follow the RT Bronchodilator Protocol and there is an Initiate RT Inhaler-Nebulizer Bronchodilator Protocol order as well (see protocol at bottom of note). CXR Findings:  XR CHEST PORTABLE    Result Date: 10/25/2021  Small bore right chest tube in unchanged position. Smaller but persistent right pneumothorax; otherwise, unchanged findings. Stable left effusion with left lower lobe volume loss. COPD. The findings from the last RT Protocol Assessment were as follows:   History Pulmonary Disease: Chronic pulmonary disease  Respiratory Pattern: Mild dyspnea at rest, irregular pattern, or RR 21-25 bpm  Breath Sounds: Slightly diminished and/or crackles  Cough: Weak, non-productive  Indication for Bronchodilator Therapy: Decreased or absent breath sounds  Bronchodilator Assessment Score: 11    Aerosolized bronchodilator medication orders have been revised according to the RT Inhaler-Nebulizer Bronchodilator Protocol below. Respiratory Therapist to perform RT Therapy Protocol Assessment initially then follow the protocol. Repeat RT Therapy Protocol Assessment PRN for score 0-3 or on second treatment, BID, and PRN for scores above 3. No Indications - adjust the frequency to every 6 hours PRN wheezing or bronchospasm, if no treatments needed after 48 hours then discontinue using Per Protocol order mode. If indication present, adjust the RT bronchodilator orders based on the Bronchodilator Assessment Score as indicated below. Use Inhaler orders unless patient has one or more of the following: on home nebulizer, not able to hold breath for 10 seconds, is not alert and oriented, cannot activate and use MDI correctly, or respiratory rate 25 breaths per minute or more, then use the equivalent nebulizer order(s) with same Frequency and PRN reasons based on the score.   If a patient is on this medication at home then do not decrease Frequency below that used at home. 0-3 - enter or revise RT bronchodilator order(s) to equivalent RT Bronchodilator order with Frequency of every 4 hours PRN for wheezing or increased work of breathing using Per Protocol order mode. 4-6 - enter or revise RT Bronchodilator order(s) to two equivalent RT bronchodilator orders with one order with BID Frequency and one order with Frequency of every 4 hours PRN wheezing or increased work of breathing using Per Protocol order mode. 7-10 - enter or revise RT Bronchodilator order(s) to two equivalent RT bronchodilator orders with one order with TID Frequency and one order with Frequency of every 4 hours PRN wheezing or increased work of breathing using Per Protocol order mode. 11-13 - enter or revise RT Bronchodilator order(s) to one equivalent RT bronchodilator order with QID Frequency and an Albuterol order with Frequency of every 4 hours PRN wheezing or increased work of breathing using Per Protocol order mode. Greater than 13 - enter or revise RT Bronchodilator order(s) to one equivalent RT bronchodilator order with every 4 hours Frequency and an Albuterol order with Frequency of every 2 hours PRN wheezing or increased work of breathing using Per Protocol order mode. RT to enter RT Home Evaluation for COPD & MDI Assessment order using Per Protocol order mode.     Electronically signed by Nahed Prasad RN on 10/26/2021 at 8:00 AM

## 2021-10-27 ENCOUNTER — APPOINTMENT (OUTPATIENT)
Dept: GENERAL RADIOLOGY | Age: 76
DRG: 291 | End: 2021-10-27
Payer: MEDICARE

## 2021-10-27 LAB
ABSOLUTE EOS #: 0 K/UL (ref 0–0.4)
ABSOLUTE IMMATURE GRANULOCYTE: 1.26 K/UL (ref 0–0.3)
ABSOLUTE LYMPH #: 0.5 K/UL (ref 1–4.8)
ABSOLUTE MONO #: 0.75 K/UL (ref 0.2–0.8)
ANION GAP SERPL CALCULATED.3IONS-SCNC: 13 MMOL/L (ref 9–17)
BASOPHILS # BLD: 0 %
BASOPHILS ABSOLUTE: 0 K/UL (ref 0–0.2)
BUN BLDV-MCNC: 53 MG/DL (ref 8–23)
BUN/CREAT BLD: 24 (ref 9–20)
CALCIUM SERPL-MCNC: 8.3 MG/DL (ref 8.6–10.4)
CHLORIDE BLD-SCNC: 99 MMOL/L (ref 98–107)
CO2: 23 MMOL/L (ref 20–31)
CREAT SERPL-MCNC: 2.19 MG/DL (ref 0.5–0.9)
DIFFERENTIAL TYPE: ABNORMAL
EOSINOPHILS RELATIVE PERCENT: 0 % (ref 1–4)
GFR AFRICAN AMERICAN: 26 ML/MIN
GFR NON-AFRICAN AMERICAN: 22 ML/MIN
GFR SERPL CREATININE-BSD FRML MDRD: ABNORMAL ML/MIN/{1.73_M2}
GFR SERPL CREATININE-BSD FRML MDRD: ABNORMAL ML/MIN/{1.73_M2}
GLUCOSE BLD-MCNC: 113 MG/DL (ref 65–105)
GLUCOSE BLD-MCNC: 116 MG/DL (ref 70–99)
GLUCOSE BLD-MCNC: 89 MG/DL (ref 65–105)
GLUCOSE BLD-MCNC: 89 MG/DL (ref 65–105)
GLUCOSE BLD-MCNC: 97 MG/DL (ref 65–105)
HCT VFR BLD CALC: 29 % (ref 36.3–47.1)
HEMOGLOBIN: 8.4 G/DL (ref 11.9–15.1)
IMMATURE GRANULOCYTES: 5 %
LYMPHOCYTES # BLD: 2 % (ref 24–44)
MCH RBC QN AUTO: 24.8 PG (ref 25.2–33.5)
MCHC RBC AUTO-ENTMCNC: 29 G/DL (ref 28.4–34.8)
MCV RBC AUTO: 85.5 FL (ref 82.6–102.9)
MONOCYTES # BLD: 3 % (ref 1–7)
MORPHOLOGY: ABNORMAL
MORPHOLOGY: ABNORMAL
NRBC AUTOMATED: 0.7 PER 100 WBC
PDW BLD-RTO: 23.5 % (ref 11.8–14.4)
PLATELET # BLD: 227 K/UL (ref 138–453)
PLATELET ESTIMATE: ABNORMAL
PMV BLD AUTO: 11.2 FL (ref 8.1–13.5)
POTASSIUM SERPL-SCNC: 5 MMOL/L (ref 3.7–5.3)
RBC # BLD: 3.39 M/UL (ref 3.95–5.11)
RBC # BLD: ABNORMAL 10*6/UL
SEG NEUTROPHILS: 90 % (ref 36–66)
SEGMENTED NEUTROPHILS ABSOLUTE COUNT: 22.59 K/UL (ref 1.8–7.7)
SODIUM BLD-SCNC: 135 MMOL/L (ref 135–144)
WBC # BLD: 25.1 K/UL (ref 3.5–11.3)
WBC # BLD: ABNORMAL 10*3/UL

## 2021-10-27 PROCEDURE — 99233 SBSQ HOSP IP/OBS HIGH 50: CPT | Performed by: FAMILY MEDICINE

## 2021-10-27 PROCEDURE — 6370000000 HC RX 637 (ALT 250 FOR IP): Performed by: INTERNAL MEDICINE

## 2021-10-27 PROCEDURE — 6360000002 HC RX W HCPCS: Performed by: INTERNAL MEDICINE

## 2021-10-27 PROCEDURE — 82947 ASSAY GLUCOSE BLOOD QUANT: CPT

## 2021-10-27 PROCEDURE — 80048 BASIC METABOLIC PNL TOTAL CA: CPT

## 2021-10-27 PROCEDURE — 2060000000 HC ICU INTERMEDIATE R&B

## 2021-10-27 PROCEDURE — 2580000003 HC RX 258: Performed by: STUDENT IN AN ORGANIZED HEALTH CARE EDUCATION/TRAINING PROGRAM

## 2021-10-27 PROCEDURE — 99222 1ST HOSP IP/OBS MODERATE 55: CPT | Performed by: NURSE PRACTITIONER

## 2021-10-27 PROCEDURE — 2500000003 HC RX 250 WO HCPCS: Performed by: INTERNAL MEDICINE

## 2021-10-27 PROCEDURE — 6370000000 HC RX 637 (ALT 250 FOR IP): Performed by: STUDENT IN AN ORGANIZED HEALTH CARE EDUCATION/TRAINING PROGRAM

## 2021-10-27 PROCEDURE — 2700000000 HC OXYGEN THERAPY PER DAY

## 2021-10-27 PROCEDURE — 94640 AIRWAY INHALATION TREATMENT: CPT

## 2021-10-27 PROCEDURE — 85025 COMPLETE CBC W/AUTO DIFF WBC: CPT

## 2021-10-27 PROCEDURE — 71045 X-RAY EXAM CHEST 1 VIEW: CPT

## 2021-10-27 PROCEDURE — 94761 N-INVAS EAR/PLS OXIMETRY MLT: CPT

## 2021-10-27 PROCEDURE — 6360000002 HC RX W HCPCS: Performed by: NURSE PRACTITIONER

## 2021-10-27 PROCEDURE — 6370000000 HC RX 637 (ALT 250 FOR IP): Performed by: NURSE PRACTITIONER

## 2021-10-27 PROCEDURE — 36415 COLL VENOUS BLD VENIPUNCTURE: CPT

## 2021-10-27 PROCEDURE — 90935 HEMODIALYSIS ONE EVALUATION: CPT

## 2021-10-27 RX ORDER — LORAZEPAM 0.5 MG/1
0.5 TABLET ORAL ONCE
Status: COMPLETED | OUTPATIENT
Start: 2021-10-27 | End: 2021-10-27

## 2021-10-27 RX ADMIN — BUDESONIDE 500 MCG: 0.5 SUSPENSION RESPIRATORY (INHALATION) at 18:11

## 2021-10-27 RX ADMIN — IPRATROPIUM BROMIDE AND ALBUTEROL SULFATE 1 AMPULE: .5; 2.5 SOLUTION RESPIRATORY (INHALATION) at 18:10

## 2021-10-27 RX ADMIN — SODIUM CHLORIDE, PRESERVATIVE FREE 10 ML: 5 INJECTION INTRAVENOUS at 22:48

## 2021-10-27 RX ADMIN — Medication 1.9 ML: at 13:21

## 2021-10-27 RX ADMIN — MIDODRINE HYDROCHLORIDE 10 MG: 10 TABLET ORAL at 11:37

## 2021-10-27 RX ADMIN — SODIUM CHLORIDE, PRESERVATIVE FREE 10 ML: 5 INJECTION INTRAVENOUS at 08:23

## 2021-10-27 RX ADMIN — EPOETIN ALFA-EPBX 8000 UNITS: 4000 INJECTION, SOLUTION INTRAVENOUS; SUBCUTANEOUS at 10:38

## 2021-10-27 RX ADMIN — IPRATROPIUM BROMIDE AND ALBUTEROL SULFATE 1 AMPULE: .5; 2.5 SOLUTION RESPIRATORY (INHALATION) at 10:26

## 2021-10-27 RX ADMIN — HEPARIN SODIUM 5000 UNITS: 5000 INJECTION INTRAVENOUS; SUBCUTANEOUS at 08:20

## 2021-10-27 RX ADMIN — IPRATROPIUM BROMIDE AND ALBUTEROL SULFATE 1 AMPULE: .5; 2.5 SOLUTION RESPIRATORY (INHALATION) at 14:31

## 2021-10-27 RX ADMIN — ARFORMOTEROL TARTRATE 15 MCG: 15 SOLUTION RESPIRATORY (INHALATION) at 18:11

## 2021-10-27 RX ADMIN — CALCITRIOL 0.25 MCG: 0.25 CAPSULE ORAL at 08:20

## 2021-10-27 RX ADMIN — METHYLPREDNISOLONE SODIUM SUCCINATE 30 MG: 40 INJECTION, POWDER, FOR SOLUTION INTRAMUSCULAR; INTRAVENOUS at 10:36

## 2021-10-27 RX ADMIN — METHYLPREDNISOLONE SODIUM SUCCINATE 30 MG: 40 INJECTION, POWDER, FOR SOLUTION INTRAMUSCULAR; INTRAVENOUS at 22:48

## 2021-10-27 RX ADMIN — ERGOCALCIFEROL 50000 UNITS: 1.25 CAPSULE ORAL at 08:20

## 2021-10-27 RX ADMIN — LORAZEPAM 0.5 MG: 0.5 TABLET ORAL at 03:51

## 2021-10-27 RX ADMIN — HEPARIN SODIUM 5000 UNITS: 5000 INJECTION INTRAVENOUS; SUBCUTANEOUS at 21:01

## 2021-10-27 ASSESSMENT — ENCOUNTER SYMPTOMS
DIARRHEA: 0
CONSTIPATION: 0
VOMITING: 0
WHEEZING: 0
CHEST TIGHTNESS: 0
NAUSEA: 0
ABDOMINAL PAIN: 0
COUGH: 0
BLOOD IN STOOL: 0
SHORTNESS OF BREATH: 1

## 2021-10-27 ASSESSMENT — PAIN SCALES - WONG BAKER
WONGBAKER_NUMERICALRESPONSE: 2

## 2021-10-27 ASSESSMENT — PAIN SCALES - GENERAL
PAINLEVEL_OUTOF10: 2
PAINLEVEL_OUTOF10: 4

## 2021-10-27 NOTE — PROGRESS NOTES
Per Dr. Dina Faust, if needed, ok to change gallegos catheter out. Notified of consult for ID due to WBC elevation.

## 2021-10-27 NOTE — PROGRESS NOTES
Pulmonary Critical Care Progress Note  Marylene Shaker, MD     Patient seen for the follow up of acute hypoxic respiratory failure, moderate pulmonary hypertension, former smoker/COPD Pleural effusion on right     Subjective:  No significant overnight events noted. She is currently resting in bed, receiving dialysis. She denies significant shortness of breath any cough or chest pain. Chest tube is to suction. There is an air leak noted in chest tube. Examination:  Vitals: /73   Pulse 108   Temp 97.3 °F (36.3 °C) (Axillary)   Resp 24   Ht 5' 4\" (1.626 m)   Wt 128 lb 7 oz (58.3 kg)   SpO2 95%   BMI 22.05 kg/m²   General appearance: alert and cooperative with exam, resting in bed  Neck: No JVD  Lungs: Moderate air exchange, diminished bilateral bases, positive crackles. + air leak noted in chest tube  Heart: regular rate and rhythm, S1, S2 normal, no gallop  Abdomen: Soft, non tender, + BS  Extremities: no cyanosis or clubbing. No significant edema    LABs:  CBC:   Recent Labs     10/26/21  0535 10/27/21  0553   WBC 31.4* 25.1*   HGB 9.3* 8.4*   HCT 32.0* 29.0*    227     BMP:   Recent Labs     10/26/21  0535 10/27/21  0553    135   K 4.5 5.0   CO2 25 23   BUN 88* 53*   CREATININE 3.07* 2.19*   LABGLOM 15* 22*   GLUCOSE 136* 116*     Radiology:  10/27/2021      Impression:  · Acute hypoxic respiratory failure requiring high flow oxygen  · Moderate to large right pleural effusion, s/p thoracentesis with 850 mL removed  · S/p pigtail catheter placement 10/18/21  · Upsize of chest tube on 10/26/2021  · Small left pleural effusion, loculated  · Atelectasis/?  Trapped lung on the right  · Mild pulmonary edema  · Moderate pulmonary hypertension, RVSP 60 mmHg  · Suspected COPD/former smoker, quit 2019  · Acute kidney injury  · D-dimer, low probability for PE on VQ scan on 10/13/21. Negative CTA of the chest on 10/25/2021.   · Right pneumothorax    Recommendations:  · Oxygen via nasal cannula to keep SPO2 greater than 90%  · Incentive spirometry every hour while awake  · Symbicort  · DuoNeb every 4 hours  · Budesonide and Brovana via nebulizer every 12 hours  · IV Solu-Medrol 30 mg every 12 hours  · Continue Levaquin IV, Azactam and Vanco.  Await ID input. · Monitor blood cultures  · Keep pigtail catheter to suction. Monitor output/Air leak  · Dialysis per nephrology  · X-ray chest in a.m.   · Labs: CBC and BMP in am  · Pleural fluid negative for malignancy  · DVT prophylaxis, subcu heparin  · PFTs as an outpatient  · Discussed with patient  · Discussed with RN  · Will follow with you      Electronically signed by     ANN Tai CNP on 10/27/2021 at 10:17 AM  Pulmonary Critical Care and Sleep Medicine,  [facility] AT [place]: [phone]

## 2021-10-27 NOTE — PROGRESS NOTES
Occupational Therapy  DATE: 10/27/2021    NAME: Tay Cervantes  MRN: [id number]   : 1945    Patient not seen this date for Occupational Therapy due to:      [] Cancel by RN or physician due to:    [] Hemodialysis    [] Critical Lab Value Level     [] Blood transfusion in progress    [] Acute or unstable cardiovascular status   _MAP < 55 or more than >115  _HR < 40 or > 130    [] Acute or unstable pulmonary status   -FiO2 > 60%   _RR < 5 or >40    _O2 sats < 85%    [] Strict Bedrest    [] Off Unit for surgery or procedure    [] Off Unit for testing       [] Pending imaging to R/O fracture    [] Refusal by Patient      [x] Other Pt. Displayed increased fatigue from dialysis; difficult to arouse. Not appropriate for treatment at this time.      []OT being discontinued at this time. Patient independent. No further needs. [] OT being discontinued at this time as the patient has been transferred to hospice care. No further needs.       SHA Weems

## 2021-10-27 NOTE — PROGRESS NOTES
Patient was hypotensive mid dialysis treatment despite 10 mg of midodrine. Patient was also very uncomfortable and anxious as she was reporting right sided leg discomfort and discomfort where her chest tube is inserted. Primary RN was updated. Dr. Ga Lemons was updated via phone of the patient's poor tolerance and hypotension. Orders were given by Dr. Ga Lemons to terminate hemodialysis treatment this date.

## 2021-10-27 NOTE — PROGRESS NOTES
NEPHROLOGY PROGRESS NOTE      SUBJECTIVE     Hemodialysis initiated yesterday abdomen catheter placement for persistent uremic symptoms. Receiving IV Lasix. Blood pressure on the low side on hemodialysis. Received midodrine. More lethargic than usual.  Appetite suboptimal.    OBJECTIVE     Vitals:    10/26/21 2343 10/27/21 0337 10/27/21 0541 10/27/21 0739   BP: 123/67 (!) 109/56  120/73   Pulse: 112 108  108   Resp: 22 22 24   Temp: 97.7 °F (36.5 °C) 97.9 °F (36.6 °C)  97.3 °F (36.3 °C)   TempSrc: Oral Axillary  Axillary   SpO2: 95% 100%  95%   Weight:   128 lb 7 oz (58.3 kg)    Height:         24HR INTAKE/OUTPUT:      Intake/Output Summary (Last 24 hours) at 10/27/2021 1153  Last data filed at 10/27/2021 5057  Gross per 24 hour   Intake 1240.65 ml   Output 1315 ml   Net -74.35 ml       General appearance: Lethargic  HEENT: PERRLA  Respiratory::vesicular breath sounds, reduced air entry RT CHEST TUBE PRESENT  Cardiovascular:S1 S2 normal,no gallop or organic murmur. Abdomen:Non tender/non distended. Bowel sounds present  Extremities: No Cyanosis or Clubbing,Lower extremity edema  Neurological: Motor lethargic      MEDICATIONS     Scheduled Meds:    aztreonam  1,000 mg IntraVENous Q24H    methylPREDNISolone  30 mg IntraVENous Q12H    vancomycin (VANCOCIN) intermittent dosing (placeholder)   Other RX Placeholder    heparin (porcine)  5,000 Units SubCUTAneous BID    budesonide  0.5 mg Nebulization BID    Arformoterol Tartrate  15 mcg Nebulization BID    ipratropium-albuterol  1 ampule Inhalation 4x daily    epoetin mike-epbx  8,000 Units SubCUTAneous Once per day on Mon Wed Fri    calcitRIOL  0.25 mcg Oral Once per day on Mon Wed Fri    sodium chloride flush  5-40 mL IntraVENous 2 times per day    levofloxacin  500 mg IntraVENous Q48H    vitamin D  50,000 Units Oral Weekly     Continuous Infusions:    dextrose      sodium chloride       PRN Meds:  sodium citrate, midodrine, sodium citrate, sodium chloride flush, HYDROcodone 5 mg - acetaminophen **OR** HYDROcodone 5 mg - acetaminophen, melatonin, glucose, dextrose, glucagon (rDNA), dextrose, sodium chloride flush, sodium chloride, ondansetron **OR** ondansetron, acetaminophen **OR** acetaminophen, perflutren lipid microspheres  Home Meds:                Medications Prior to Admission: amLODIPine (NORVASC) 5 MG tablet, Take 5 mg by mouth daily  magnesium oxide (MAG-OX) 400 MG tablet, Take 400 mg by mouth daily  metoprolol (LOPRESSOR) 100 MG tablet, Take 100 mg by mouth 2 times daily  folic acid (FOLVITE) 234 MCG tablet, Take 800 mcg by mouth daily  diphenhydrAMINE-APAP, sleep, (TYLENOL PM EXTRA STRENGTH)  MG tablet, Take 1 tablet by mouth nightly as needed for Sleep    INVESTIGATIONS     Last 3 CMP:    Recent Labs     10/25/21  0614 10/26/21  0535 10/27/21  0553    136 135   K 4.6 4.5 5.0   CL 96* 96* 99   CO2 25 25 23   BUN 85* 88* 53*   CREATININE 3.08* 3.07* 2.19*   CALCIUM 7.9* 8.1* 8.3*       Last 3 CBC:  Recent Labs     10/25/21  0614 10/26/21  0535 10/27/21  0553   WBC 26.2* 31.4* 25.1*   RBC 3.54* 3.75* 3.39*   HGB 8.8* 9.3* 8.4*   HCT 29.8* 32.0* 29.0*   MCV 84.2 85.3 85.5   MCH 24.9* 24.8* 24.8*   MCHC 29.5 29.1 29.0   RDW 21.8* 23.5* 23.5*    300 227   MPV 10.5 10.5 11.2       ASSESSMENT     #1  ESRD new hemodialysis start for uremic symptoms. Catheter in place. Of  chronic kidney disease stage IV/V with baseline creatinine 2.8-3  #2 exhibiting uremic symptoms. Patient still undecided in regards to initiation of dialysis. #3 moderate large right pleural effusion status post thoracocentesis 850 cc removed  #4 moderate pulmonary hypertension/RVSP 60/tricuspid regurgitation  #5 essential hypertension    PLAN     #1  Seen and examined on hemodialysis.   Blood pressure running on the lower side prohibiting excess ultrafiltration  #2 transition to p.o. diuretics  #3 arranging for outpatient hemodialysis placement  We will follow    Please do not hesitate to call with questions    This note is created with the assistance of a speech-recognition program. While intending to generate a document that actually reflects the content of the visit, no guarantees can be provided that every mistake has been identified and corrected by editing    Juanjo Bennett MD MD, [facility]abil Lynch, [address]   10/27/2021 11:53 AM  [facility]

## 2021-10-27 NOTE — PLAN OF CARE
Problem: Falls - Risk of:  Goal: Will remain free from falls  Description: Will remain free from falls  10/26/2021 2358 by Dima Williamson RN  Outcome: Ongoing  10/26/2021 1641 by Tonia Brian RN  Outcome: Ongoing     Problem: Falls - Risk of:  Goal: Absence of physical injury  Description: Absence of physical injury  10/26/2021 2358 by Dima Williamson RN  Outcome: Ongoing  10/26/2021 1641 by Tonia Brian RN  Outcome: Ongoing     Problem: Nutrition  Goal: Optimal nutrition therapy  10/26/2021 2358 by Dima Williamson RN  Outcome: Ongoing  10/26/2021 1641 by Tonia Brian RN  Outcome: Ongoing     Appetite poor.  Encouraging fluids

## 2021-10-27 NOTE — PROGRESS NOTES
Pharmacy Note  Vancomycin Consult - Daily note   Vancomycin Therapy Day: 2  Current Dosing: Vancomycin 1250mg x 1 loading dose, then 10mg/kg (500mg) after dialysis  Current diagnosis for which MRSA is suspected/confirmed: sepsis of unknown etiology  ONLY for suspected pneumonia or COPD: MRSA nasal swab   N/A: Non respiratory infection. .        Last Temp: 97.3  Actual Weight:   Wt Readings from Last 1 Encounters:   10/27/21 126 lb 1.7 oz (57.2 kg)     Recent Labs     10/26/21  0535 10/27/21  0553   CREATININE 3.07* 2.19*     CrCl:  Patient is on dialysis. Recent Labs     10/26/21  0535 10/27/21  0553   WBC 31.4* 25.1*       Intake/Output Summary (Last 24 hours) at 10/27/2021 1452  Last data filed at 10/27/2021 0282  Gross per 24 hour   Intake 890.65 ml   Output 315 ml   Net 575.65 ml       Recent vancomycin administrations                     vancomycin (VANCOCIN) 1,250 mg in dextrose 5 % 250 mL IVPB (mg) 1,250 mg New Bag 10/26/21 2230                  ASSESSMENT/PLAN    Patient received vancomycin 1250mg loading dose yesterday at 2230 after her first dialysis session. She is receiving her second dialysis session today and will receive a 500mg x 1 dose. Plan for a vancomycin level Friday AM.         Pharmacy will Continue to follow. Thank you. Lester Wall, [facility] [place], [facility]/PharmD  10/27/2021  2:52 PM    Intermittent Hemodialysis: Not using bayesian software for dosing  Maintenance Dosing = 10-20 mg/kg after HD sessions (max 2,000 mg per dose).   Timing of concentration monitoring:  Critically ill - pre-HD level after the loading dose       Stable/stable dialysis:  pre-HD concentration after 1st or 2nd MD on dialysis 3 times/week  Pre-dialysis level Invasive MRSA Infection or Sepsis Non-Invasive Infection or Non-MRSA Infection   ? 25 mg/L Hold vancomycin dose, ? subsequent dose by 250 mg Hold vancomycin dose, ? subsequent dose by 250-500 mg   21-24 mg/L Continue current dose Decrease dose by 250 mg   15-20 mg/L Increase dose by 250 mg Continue current dose   ? 14 mg/L Increase dose by 250-500 mg Increase dose by 250 mg   Note: Pre-HD concentration monitoring is preferred, if concentrations are drawn after HD, the level must be collected no sooner than 2-4 hours after the end of the session to allow for maximum redistribution and plasma rebound.    2

## 2021-10-27 NOTE — PLAN OF CARE
Problem: Falls - Risk of:  Goal: Will remain free from falls  Description: Will remain free from falls  Outcome: Ongoing  Note: Siderails up x 2  Hourly rounding  Call light in reach  Instructed to call for assist before attempting out of bed. Remains free from falls and accidental injury at this time   Floor free from obstacles  Bed is locked and in lowest position  Adequate lighting provided  Bed alarm on, Red Falling star and Stay with Me signs posted         Problem: Nutrition  Goal: Optimal nutrition therapy  Outcome: Ongoing     Problem: Skin Integrity:  Goal: Will show no infection signs and symptoms  Description: Will show no infection signs and symptoms  Outcome: Ongoing  Note: Skin assessment completed and documented  Fab scale updated  Relieve pressure to bony prominences  Avoid shearing  Assess s/sx of infection   Air mattress in place  Turned q 2 hours  Heels elevated  Mepilex to bilateral heels/sacrum to prevent breakdown       Problem: Pain:  Goal: Pain level will decrease  Description: Pain level will decrease  Outcome: Ongoing  Note: Pain level assessment complete.    Patient educated on pain scale and control interventions  PRN pain medication given per patient request  Patient instructed to call out with new onset of pain or unrelieved pain

## 2021-10-27 NOTE — PROGRESS NOTES
HEMODIALYSIS PRE-TREATMENT NOTE    Patient Identifiers prior to treatment: name and Band    Isolation Required:C-diff                      Isolation Type: contact       (please document if patient is being managed as a PUI/COVID-19 patient)        Hepatitis status:                           Date Drawn                             Result  Hepatitis B Surface Antigen 10/25/2021     neg                     Hepatitis B Surface Antibody 10/25/2021 neg        Hepatitis B Core Antibody 10/25/2021 neg          How was Hepatitis Status verified: labs     Was a copy of the labs you documented provided to facility for the patient's chart: yes    Hemodialysis orders verified: yes    Access Within normal limits ( I.e. s/s of infection,...): yes     Pre-Assessment completed: yes    Pre-dialysis report received from: Latia Huertas Rn                      Time: 5896

## 2021-10-27 NOTE — PROGRESS NOTES
HEMODIALYSIS POST TREATMENT NOTE    Treatment time ordered: 180   Actual treatment time: 140    UltraFiltration Goal: 1500  UltraFiltration Removed: 1131      Pre Treatment weight: 58.2kg  Post Treatment weight: 57.6kg  Estimated Dry Weight: na    Access used:     Central Venous Catheter:          Tunneled or Non-tunneled: tunneled           Site: right chest          Access Flow: good      Internal Access:       AV Fistula or AV Graft: na         Site: na       Access Flow: na       Sign and symptoms of infection: no       If YES: na    Medications or blood products given: midodrine    Chronic outpatient schedule: na    Chronic outpatient unit: na    Summary of response to treatment: pt came off treatment 40 mins early due to low b/p and she was also very uncomfortable during treatment and was constantly want me to take her off treatment    Explain if orders NOT met, was physician notified:yes      ACES flowsheet faxed to patient unit/ placed in patient chart: yes    Post assessment completed: yes    Report given to: [address] [place] [address] documented Safety Checks include the followin) Access and face visible at all times. 2) All connections and blood lines are secure with no kinks. 3) NVL alarm engaged. 4) Hemosafe device applied (if applicable). 5) No collapse of Arterial or Venous blood chambers. 6) All blood lines / pump segments in the air detectors.

## 2021-10-27 NOTE — CONSULTS
[facility]  Initial Consult Note  Date: 10/27/2021    Hospital day :15     Impression:   · Acute hypoxic respiratory failure  · Right-sided pleural effusion  · Status post right-sided ultrasound-guided thoracentesis 10/13/2021  · Status post right-sided chest tube placement 10/18/2021  · Moderate to large right-sided pneumothorax  · Status post chest tube exchanged for a larger size 10/26/2021  · Small left-sided loculated pleural effusion  · Acute kidney injury on chronic kidney disease stage IV, now on hemodialysis  · Acute diastolic congestive heart failure  · Failure to thrive    Recommendations   · Patient has completed a 14 day course of levofloxacin  · Patient does not have any clinical signs of an acute infection at this time, she has completed a 14-day course of levofloxacin at this point, I will discontinue antimicrobial therapy  · She has had multiple blood cultures that remain negative  · We will continue to follow and monitor her clinical progress off antibiotics  · I did discuss the patient with Dr. Alexandria Maradiaga    Chief complaint/reason for consultation:   Sepsis    History of Present Illness:   Olive Buck is a 64y.o.-year-old female who was initially admitted on 10/12/2021. Patient has no documented medical history. Her information was obtained from the patient's chart, she is a poor historian and does not communicate much. We were consulted 10/27/2021, patient was initially admitted to the hospital 10/12/2021 via ambulance from home with reported failure to thrive according to her son. On admission, patient had complaints of diarrhea x3 days, weakness, peripheral edema, she was also hypoxic at that time, documented at 45% oxygen saturation on room air. She was placed on 6 L oxygen per nasal cannula. On chest x-ray she did have a right-sided pleural effusion. Despite this, she did not have complaints of shortness of breath or cough. She was initiated on IV Levaquin. Patient had an ultrasound-guided right-sided thoracentesis 10/13/2021 with 850 mL of nonturbid straw-colored fluid removed, fluid negative for malignancy. There is concern for right-sided trapped lung, interventional radiology did place a chest tube 10/18/2021.  10/19/2021 patient was transitioned to high flow per nasal cannula. Methylprednisone was initiated. 10/21/2021 patient had part of the VQ scan performed which was concerning for possible PE. She was initiated on IV heparin. She was subsequently transitioned back to oxygen per nasal cannula. The plan was for patient to do the ventilation portion of the VQ scan 10/24/2021; however, patient desaturated into the 80s and did not tolerate the exam.  Her white blood cell count increased at this point to 22,000 when it was previously within normal limits. Patient had CTA chest 10/25/2021 which revealed a moderate to large right-sided pneumothorax. 10/26/2021 her chest tube was exchanged to a larger sized tube. White blood cell count increased to 31,400. Her antimicrobial therapy was broadened to Levaquin as well as aztreonam and vancomycin and we were consulted 10/26/2021 due to continued leukocytosis. Patient was seen in hemodialysis and she was very lethargic. Discussed with patient's RN, does not know if patient having any diarrhea. States vital signs have been stable. I have personally reviewed the past medical history, past surgical history, medications, social history, and family history, and I have updated the database accordingly. Past Medical History:   History reviewed. No pertinent past medical history.   Past Surgical  History:     Past Surgical History:   Procedure Laterality Date    IR CHEST TUBE INSERTION  10/18/2021    IR CHEST TUBE INSERTION 10/18/2021 [facility]    IR TUNNELED CATHETER PLACEMENT GREATER THAN 5 YEARS  10/26/2021    IR TUNNELED CATHETER PLACEMENT GREATER THAN 5 YEARS 10/26/2021 [facility]     Medications:      aztreonam  1,000 mg IntraVENous Q24H    methylPREDNISolone  30 mg IntraVENous Q12H    vancomycin (VANCOCIN) intermittent dosing (placeholder)   Other RX Placeholder    heparin (porcine)  5,000 Units SubCUTAneous BID    budesonide  0.5 mg Nebulization BID    Arformoterol Tartrate  15 mcg Nebulization BID    ipratropium-albuterol  1 ampule Inhalation 4x daily    epoetin mike-epbx  8,000 Units SubCUTAneous Once per day on     calcitRIOL  0.25 mcg Oral Once per day on     sodium chloride flush  5-40 mL IntraVENous 2 times per day    levofloxacin  500 mg IntraVENous Q48H    vitamin D  50,000 Units Oral Weekly     Social History:     Social History     Socioeconomic History    Marital status:      Spouse name: Not on file    Number of children: Not on file    Years of education: Not on file    Highest education level: Not on file   Occupational History    Not on file   Tobacco Use    Smoking status: Former Smoker     Types: Cigarettes     Quit date: 2019     Years since quittin.8    Smokeless tobacco: Never Used   Substance and Sexual Activity    Alcohol use: Yes     Comment: occasional glass of wine    Drug use: Not on file    Sexual activity: Not on file   Other Topics Concern    Not on file   Social History Narrative    Not on file     Social Determinants of Health     Financial Resource Strain:     Difficulty of Paying Living Expenses:    Food Insecurity:     Worried About [facility] in the Last Year:     [facility] in the Last Year:    Transportation Needs:     Lack of Transportation (Medical):      Lack of Transportation (Non-Medical):    Physical Activity:     Days of Exercise per Week:     Minutes of Exercise per Session:    Stress:     Feeling of Stress :    Social Connections:     Frequency of Communication with Friends and Family:     Frequency of Social Gatherings with Friends and Family:  Attends [employer] Services:     Active Member of Clubs or Organizations:     Attends Club or Organization Meetings:     Marital Status:    Intimate Partner Violence:     Fear of Current or Ex-Partner:     Emotionally Abused:     Physically Abused:     Sexually Abused:      Family History:     Family History   Problem Relation Age of Onset    Hypertension Mother     Cancer Sister     Cancer Brother       Allergies:   Penicillins     Review of Systems:   Unable to perform accurate ROS. Physical Examination :   BP (!) 109/56   Pulse 108   Temp 97.9 °F (36.6 °C) (Axillary)   Resp 22   Ht 5' 4\" (1.626 m)   Wt 128 lb 7 oz (58.3 kg)   SpO2 100%   BMI 22.05 kg/m²     Temperature Range: Temp: 97.9 °F (36.6 °C) Temp  Av.6 °F (36.4 °C)  Min: 97.3 °F (36.3 °C)  Max: 97.9 °F (36.6 °C)  General Appearance: Lethargic, no signs of distress  Head: Normocephalic, without obvious abnormality, atraumatic  Eyes: Pupils equal, round, reactive, to light and accommodation; extraocular movements intact; sclera anicteric; conjunctivae pink  ENT: Oropharynx clear, without erythema, exudate, or thrush. Neck: Supple, without lymphadenopathy. Pulmonary/Chest: Clear to auscultation, without wheezes, rales, or rhonchi  Cardiovascular: Regular rate and rhythm without murmurs, rubs, or gallops. Abdomen: Soft, nontender, nondistended. Extremities: No cyanosis, clubbing, edema, or effusions. Neurologic: No gross sensory or motor deficits. Skin: Warm and dry with no rash.     Medical Decision Making:   I have independently reviewed/ordered the following labs:  CBC with Differential:   Recent Labs     10/25/21  0614 10/26/21  0535   WBC 26.2* 31.4*   HGB 8.8* 9.3*   HCT 29.8* 32.0*    300   LYMPHOPCT 1* 0*   MONOPCT 5 3     BMP:   Recent Labs     10/25/21  0614 10/26/21  0535    136   K 4.6 4.5   CL 96* 96*   CO2 25 25   BUN 85* 88*   CREATININE 3.08* 3.07*     Hepatic Function Panel: No results for input(s): PROT, LABALBU, BILIDIR, IBILI, BILITOT, ALKPHOS, ALT, AST in the last 72 hours. No results found for: PROCAL    No results found for: CRP  Lab Results   Component Value Date    FERRITIN 20 10/13/2021     No results found for: FIBRINOGEN  Lab Results   Component Value Date    DDIMER 2.38 10/12/2021     Lab Results   Component Value Date     10/13/2021       No results found for: Keira Aw    Lab Results   Component Value Date    COVID19 Not Detected 10/12/2021     No results found for requested labs within last 30 days. Imaging Studies:   XR CHEST (SINGLE VIEW FRONTAL)  10/26/21  Impression:        Interval placement of tunneled dialysis catheter with tip in appropriate   position. Small right basilar pneumothorax no longer present.  Chest tube upsize with   tip in right base.  Attention for pneumothorax on serial portable. .        CT chest  Impression:        Loculated left basilar effusion with adjacent consolidation representing   atelectasis versus pneumonia. Focus of consolidation in the right lower lobe representing atelectasis   versus pneumonia. Moderate to large right-sided pneumothorax with an indwelling pigtail   catheter in the right basilar pleural space. CT abd pelvis   Impression:       1.  Right pneumothora  x with chest tube in place.  Please see separate chest   CT report for details of this region. 2.  Large rectal colorectal stool burden and mild gaseous distention of the   colon. Cultures:     Culture, Blood 1 [[id number]] Collected: 10/25/21 1120   Order Status: Completed Specimen: Blood Updated: 10/27/21 0112    Specimen Description . BLOOD    Special Requests LFA, 1ML    Culture NO GROWTH 2 DAYS   Culture, Blood 1 [[id number]] Collected: 10/25/21 1130   Order Status: Completed Specimen: Blood Updated: 10/27/21 0112    Specimen Description . BLOOD    Special Requests LH, 2ML    Culture NO GROWTH 2 DAYS     Culture, Fungus [[id number]] Collected: 10/13/21 0900   Order Status: Completed Specimen: Thoracentesis Updated: 10/25/21 1125    Specimen Description . THORACENTESIS FLUID RT    Special Requests NOT REPORTED    Culture NO GROWTH 12 DAYS   Culture with Smear, Acid Fast Bacillius [[id number]] Collected: 10/13/21 0900   Order Status: Completed Specimen: Thoracentesis Updated: 10/25/21 0829    Specimen Description . THORACENTESIS FLUID RT    Special Requests NOT REPORTED    Direct Exam NO ACID FAST BACILLI SEEN (DIRECT SMEAR)    Culture NO GROWTH 12 DAYS   Culture, Blood 1 [[id number]] Collected: 10/18/21 0426   Order Status: Completed Specimen: Blood Updated: 10/24/21 0843    Specimen Description . BLOOD    Special Requests RT AC 5ML    Culture NO GROWTH 6 DAYS   Culture, Blood 2 [[id number]] Collected: 10/18/21 0426   Order Status: Completed Specimen: Blood Updated: 10/24/21 0843    Specimen Description . BLOOD    Special Requests RT HAND 6ML    Culture NO GROWTH 6 DAYS           Thank you for allowing us to participate in the care of this patient. Please call with questions. Electronically signed by ANN Garcia CNP on 10/27/2021 at 5:43 AM      [facility]  Shubham García [address] messaging  OFFICE: [phone]      This note is created with the assistance of a speech recognition program.  While intending to generate a document that actually reflects the content of the visit, the document can still have some errors including those of syntax and sound a like substitutions which may escape proof reading. In such instances, actual meaning can be extrapolated by contextual diversion.

## 2021-10-28 ENCOUNTER — APPOINTMENT (OUTPATIENT)
Dept: GENERAL RADIOLOGY | Age: 76
DRG: 291 | End: 2021-10-28
Payer: MEDICARE

## 2021-10-28 LAB
ABSOLUTE EOS #: 0 K/UL (ref 0–0.4)
ABSOLUTE IMMATURE GRANULOCYTE: 1.14 K/UL (ref 0–0.3)
ABSOLUTE LYMPH #: 0.46 K/UL (ref 1–4.8)
ABSOLUTE MONO #: 0.68 K/UL (ref 0.2–0.8)
ANION GAP SERPL CALCULATED.3IONS-SCNC: 12 MMOL/L (ref 9–17)
BASOPHILS # BLD: 0 %
BASOPHILS ABSOLUTE: 0 K/UL (ref 0–0.2)
BUN BLDV-MCNC: 28 MG/DL (ref 8–23)
BUN/CREAT BLD: 17 (ref 9–20)
CALCIUM SERPL-MCNC: 8.1 MG/DL (ref 8.6–10.4)
CHLORIDE BLD-SCNC: 98 MMOL/L (ref 98–107)
CO2: 25 MMOL/L (ref 20–31)
CREAT SERPL-MCNC: 1.67 MG/DL (ref 0.5–0.9)
DIFFERENTIAL TYPE: ABNORMAL
EOSINOPHILS RELATIVE PERCENT: 0 % (ref 1–4)
GFR AFRICAN AMERICAN: 36 ML/MIN
GFR NON-AFRICAN AMERICAN: 30 ML/MIN
GFR SERPL CREATININE-BSD FRML MDRD: ABNORMAL ML/MIN/{1.73_M2}
GFR SERPL CREATININE-BSD FRML MDRD: ABNORMAL ML/MIN/{1.73_M2}
GLUCOSE BLD-MCNC: 106 MG/DL (ref 70–99)
GLUCOSE BLD-MCNC: 136 MG/DL (ref 65–105)
GLUCOSE BLD-MCNC: 149 MG/DL (ref 65–105)
GLUCOSE BLD-MCNC: 168 MG/DL (ref 65–105)
GLUCOSE BLD-MCNC: 169 MG/DL (ref 65–105)
HCT VFR BLD CALC: 27.3 % (ref 36.3–47.1)
HEMOGLOBIN: 8.1 G/DL (ref 11.9–15.1)
IMMATURE GRANULOCYTES: 5 %
LYMPHOCYTES # BLD: 2 % (ref 24–44)
MCH RBC QN AUTO: 25.2 PG (ref 25.2–33.5)
MCHC RBC AUTO-ENTMCNC: 29.7 G/DL (ref 28.4–34.8)
MCV RBC AUTO: 85 FL (ref 82.6–102.9)
MONOCYTES # BLD: 3 % (ref 1–7)
MORPHOLOGY: ABNORMAL
NRBC AUTOMATED: 0.6 PER 100 WBC
PDW BLD-RTO: 23.8 % (ref 11.8–14.4)
PLATELET # BLD: 199 K/UL (ref 138–453)
PLATELET ESTIMATE: ABNORMAL
PMV BLD AUTO: 11.5 FL (ref 8.1–13.5)
POTASSIUM SERPL-SCNC: 4.2 MMOL/L (ref 3.7–5.3)
RBC # BLD: 3.21 M/UL (ref 3.95–5.11)
RBC # BLD: ABNORMAL 10*6/UL
SEG NEUTROPHILS: 90 % (ref 36–66)
SEGMENTED NEUTROPHILS ABSOLUTE COUNT: 20.52 K/UL (ref 1.8–7.7)
SODIUM BLD-SCNC: 135 MMOL/L (ref 135–144)
WBC # BLD: 22.8 K/UL (ref 3.5–11.3)
WBC # BLD: ABNORMAL 10*3/UL

## 2021-10-28 PROCEDURE — 6370000000 HC RX 637 (ALT 250 FOR IP): Performed by: NURSE PRACTITIONER

## 2021-10-28 PROCEDURE — 99232 SBSQ HOSP IP/OBS MODERATE 35: CPT | Performed by: INTERNAL MEDICINE

## 2021-10-28 PROCEDURE — 94761 N-INVAS EAR/PLS OXIMETRY MLT: CPT

## 2021-10-28 PROCEDURE — 36415 COLL VENOUS BLD VENIPUNCTURE: CPT

## 2021-10-28 PROCEDURE — 2060000000 HC ICU INTERMEDIATE R&B

## 2021-10-28 PROCEDURE — 85025 COMPLETE CBC W/AUTO DIFF WBC: CPT

## 2021-10-28 PROCEDURE — 94640 AIRWAY INHALATION TREATMENT: CPT

## 2021-10-28 PROCEDURE — 80048 BASIC METABOLIC PNL TOTAL CA: CPT

## 2021-10-28 PROCEDURE — 2700000000 HC OXYGEN THERAPY PER DAY

## 2021-10-28 PROCEDURE — 97530 THERAPEUTIC ACTIVITIES: CPT

## 2021-10-28 PROCEDURE — 6360000002 HC RX W HCPCS: Performed by: INTERNAL MEDICINE

## 2021-10-28 PROCEDURE — 99232 SBSQ HOSP IP/OBS MODERATE 35: CPT | Performed by: FAMILY MEDICINE

## 2021-10-28 PROCEDURE — 71045 X-RAY EXAM CHEST 1 VIEW: CPT

## 2021-10-28 PROCEDURE — 6360000002 HC RX W HCPCS: Performed by: NURSE PRACTITIONER

## 2021-10-28 PROCEDURE — 2580000003 HC RX 258: Performed by: STUDENT IN AN ORGANIZED HEALTH CARE EDUCATION/TRAINING PROGRAM

## 2021-10-28 PROCEDURE — 6370000000 HC RX 637 (ALT 250 FOR IP): Performed by: STUDENT IN AN ORGANIZED HEALTH CARE EDUCATION/TRAINING PROGRAM

## 2021-10-28 PROCEDURE — 82947 ASSAY GLUCOSE BLOOD QUANT: CPT

## 2021-10-28 RX ORDER — PREDNISONE 20 MG/1
40 TABLET ORAL DAILY
Status: DISCONTINUED | OUTPATIENT
Start: 2021-10-28 | End: 2021-11-01

## 2021-10-28 RX ADMIN — IPRATROPIUM BROMIDE AND ALBUTEROL SULFATE 1 AMPULE: .5; 2.5 SOLUTION RESPIRATORY (INHALATION) at 14:59

## 2021-10-28 RX ADMIN — HEPARIN SODIUM 5000 UNITS: 5000 INJECTION INTRAVENOUS; SUBCUTANEOUS at 20:13

## 2021-10-28 RX ADMIN — IPRATROPIUM BROMIDE AND ALBUTEROL SULFATE 1 AMPULE: .5; 2.5 SOLUTION RESPIRATORY (INHALATION) at 07:26

## 2021-10-28 RX ADMIN — IPRATROPIUM BROMIDE AND ALBUTEROL SULFATE 1 AMPULE: .5; 2.5 SOLUTION RESPIRATORY (INHALATION) at 19:43

## 2021-10-28 RX ADMIN — BUDESONIDE 500 MCG: 0.5 SUSPENSION RESPIRATORY (INHALATION) at 07:26

## 2021-10-28 RX ADMIN — IPRATROPIUM BROMIDE AND ALBUTEROL SULFATE 1 AMPULE: .5; 2.5 SOLUTION RESPIRATORY (INHALATION) at 11:39

## 2021-10-28 RX ADMIN — SODIUM CHLORIDE, PRESERVATIVE FREE 10 ML: 5 INJECTION INTRAVENOUS at 09:42

## 2021-10-28 RX ADMIN — ARFORMOTEROL TARTRATE 15 MCG: 15 SOLUTION RESPIRATORY (INHALATION) at 19:43

## 2021-10-28 RX ADMIN — BUDESONIDE 500 MCG: 0.5 SUSPENSION RESPIRATORY (INHALATION) at 19:43

## 2021-10-28 RX ADMIN — PREDNISONE 40 MG: 20 TABLET ORAL at 09:52

## 2021-10-28 RX ADMIN — HEPARIN SODIUM 5000 UNITS: 5000 INJECTION INTRAVENOUS; SUBCUTANEOUS at 09:42

## 2021-10-28 RX ADMIN — SODIUM CHLORIDE, PRESERVATIVE FREE 10 ML: 5 INJECTION INTRAVENOUS at 20:15

## 2021-10-28 RX ADMIN — ARFORMOTEROL TARTRATE 15 MCG: 15 SOLUTION RESPIRATORY (INHALATION) at 07:26

## 2021-10-28 ASSESSMENT — PAIN SCALES - GENERAL
PAINLEVEL_OUTOF10: 0

## 2021-10-28 ASSESSMENT — ENCOUNTER SYMPTOMS
VOMITING: 0
ABDOMINAL PAIN: 0
DIARRHEA: 0
NAUSEA: 0
COUGH: 0
CHEST TIGHTNESS: 0
BLOOD IN STOOL: 0
WHEEZING: 0
GASTROINTESTINAL NEGATIVE: 1
CONSTIPATION: 0
SHORTNESS OF BREATH: 1

## 2021-10-28 NOTE — PROGRESS NOTES
[facility]  Office: [address], DO, Frances Luu, DO, Stephen Grayson, DO, [place] [facility], DO, Donya Ruth MD, Ran Lancaster MD, Indy Snyder MD, Edvin Cuba MD, Song Winter MD, Mando Amezquita MD, Sravani Melton MD, Serenity Sutton MD, Akira Branch, DO, Andrew Roberts, DO, Dulce Bean MD,  Terri Garcia, DO, Karmen Ramirez MD, Carlo Manzano MD, Freida Magana MD, Brock Hawkins MD, Evelin Garzon MD, Manisha Augustine MD, Cherelle Rdz, [facility], [facility], CNP, Arianna Knight, CNP, Harjit Ramos, CNS, Mariela Burton, CNP, Pietro Clark, CNP, Martha Coffman, CNP, Gwen Dill, CNP, Hemalatha Thornton, CNP, Chalo Yates, CNP, Flynn Schultz PA-C, Fatimah Cabrera, [facility], Ping Hebert, CNP, Keron Alfaro, CNP, Neil Vila, CNP, Azra Gusman, CNP, Giuliano Segura, CNP, Luli Laura, CNP, Atilio Carlisle [facility]    Progress Note    10/28/2021    8:10 AM    Name:   Pineda Vuong  MRN:     [id number]     [place]:      [de-identified]   Room:   [address] [place] [address] Day:  16  Admit Date:  10/12/2021  2:53 PM    PCP:   Pierre Gutierrez MD  Code Status:  Full Code    Subjective:     C/C:   Chief Complaint   Patient presents with    Respiratory Distress     EMS reported 97% on room air; pt arrives with saturation 45% on room air    Failure To Thrive     Interval History Status: improved. Patient seen and examined at bedside, looks much better today sitting in the bed and holding for conversation and answer all the questions appropriately. She denies any new complaint. Continues to be slightly tachycardic   Continues with air leak, chest x-ray from this a.m. still pending  Patient vitals, labs and all providers notes were reviewed,from overnight shift and morning updates were noted and discussed with the nurse    Brief History:     Per my ELANA:  \"Danielle Wadsworth is a 64 y. o. female with a hx of CKD 4 and iron deficiency anemia who presented to [facility] with Respiratory Distress and hypoxia (O2 sats 45%) and failure to thrive and is admitted to the hospital for the management of right Pleural effusion and acute renal failure. Patient lives at home alone and has recently been having difficulty caring for herself and generalized weakness for the last few weeks . She does have chronic BLE edema but denies hx of CHF. Initial CXR showed large right pleural effusion, stat elevated BNP and D-dimer as well as BUN/creatinine.  Last took known creatinine was 2.34 in august, she reports she seen a nephrologist once. She does not wear home oxygen   10/13: US guided right thoracentesis 850ml of nonturbid straw colored fluid removed -transudative  Pleural fluid cultures negative for malignancy\"  Chest tube placed 10/18 on right for loculated effusion   Started on heparin drip for possible PE; VQ being repeated 10/24    Review of Systems:     Review of Systems   Constitutional: Positive for activity change and fatigue. Negative for chills, diaphoresis and fever. HENT: Negative for congestion. Eyes: Negative for visual disturbance. Respiratory: Positive for shortness of breath. Negative for cough, chest tightness and wheezing. Cardiovascular: Negative for chest pain, palpitations and leg swelling. Gastrointestinal: Negative for abdominal pain, blood in stool, constipation, diarrhea, nausea and vomiting. Genitourinary: Negative for difficulty urinating. Neurological: Positive for weakness. Negative for dizziness, light-headedness, numbness and headaches. All other systems reviewed and are negative. Medications: Allergies:     Allergies   Allergen Reactions    Penicillins Swelling       Current Meds:   Scheduled Meds:    methylPREDNISolone  30 mg IntraVENous Q12H    heparin (porcine)  5,000 Units SubCUTAneous BID    budesonide  0.5 mg Nebulization BID    Arformoterol Tartrate  15 mcg Nebulization BID    ipratropium-albuterol 1 ampule Inhalation 4x daily    epoetin mike-epbx  8,000 Units SubCUTAneous Once per day on     calcitRIOL  0.25 mcg Oral Once per day on     sodium chloride flush  5-40 mL IntraVENous 2 times per day    vitamin D  50,000 Units Oral Weekly     Continuous Infusions:    dextrose      sodium chloride       PRN Meds: sodium citrate, midodrine, sodium citrate, sodium chloride flush, HYDROcodone 5 mg - acetaminophen **OR** HYDROcodone 5 mg - acetaminophen, melatonin, glucose, dextrose, glucagon (rDNA), dextrose, sodium chloride flush, sodium chloride, ondansetron **OR** ondansetron, acetaminophen **OR** acetaminophen, perflutren lipid microspheres    Data:     Past Medical History:   has no past medical history on file. Social History:   reports that she quit smoking about 2 years ago. Her smoking use included cigarettes. She has never used smokeless tobacco. She reports current alcohol use. Family History:   Family History   Problem Relation Age of Onset    Hypertension Mother     Cancer Sister     Cancer Brother        Vitals:  /70   Pulse 105   Temp 98.4 °F (36.9 °C) (Oral)   Resp 17   Ht 5' 4\" (1.626 m)   Wt 118 lb 14.4 oz (53.9 kg)   SpO2 99%   BMI 20.41 kg/m²   Temp (24hrs), Av.6 °F (36.4 °C), Min:97.3 °F (36.3 °C), Max:98.4 °F (36.9 °C)    Recent Labs     10/27/21  0713 10/27/21  1135 10/27/21  1722 10/27/21  1923   POCGLU 113* 89 97 89       I/O (24Hr):     Intake/Output Summary (Last 24 hours) at 10/28/2021 0810  Last data filed at 10/28/2021 9115  Gross per 24 hour   Intake 490 ml   Output 368 ml   Net 122 ml       Labs:  Hematology:  Recent Labs     10/26/21  0535 10/27/21  0553 10/28/21  0536   WBC 31.4* 25.1* 22.8*   RBC 3.75* 3.39* 3.21*   HGB 9.3* 8.4* 8.1*   HCT 32.0* 29.0* 27.3*   MCV 85.3 85.5 85.0   MCH 24.8* 24.8* 25.2   MCHC 29.1 29.0 29.7   RDW 23.5* 23.5* 23.8*    227 199   MPV 10.5 11.2 11.5     Chemistry:  Recent Labs     10/26/21  0557 10/27/21  0553 10/28/21  0536    135 135   K 4.5 5.0 4.2   CL 96* 99 98   CO2 25 23 25   GLUCOSE 136* 116* 106*   BUN 88* 53* 28*   CREATININE 3.07* 2.19* 1.67*   ANIONGAP 15 13 12   LABGLOM 15* 22* 30*   GFRAA 18* 26* 36*   CALCIUM 8.1* 8.3* 8.1*     Recent Labs     10/26/21  1719 10/26/21  2029 10/27/21  0713 10/27/21  1135 10/27/21  1722 10/27/21  1923   POCGLU 112* 115* 113* 89 97 89     ABG:  Lab Results   Component Value Date    POCPH 7.456 10/24/2021    POCPCO2 41.1 10/24/2021    POCPO2 53.3 10/24/2021    POCHCO3 28.9 10/24/2021    NBEA NOT REPORTED 10/24/2021    PBEA 5 10/24/2021    XEC6GRK NOT REPORTED 10/24/2021    MAGK6FIB 89 10/24/2021    FIO2 NOT REPORTED 10/24/2021     Lab Results   Component Value Date/Time    SPECIAL RTAC,20CC 10/26/2021 06:28 PM    SPECIAL LTAC 10/26/2021 06:28 PM     Lab Results   Component Value Date/Time    CULTURE NO GROWTH 1 DAY 10/26/2021 06:28 PM    CULTURE NO GROWTH 1 DAY 10/26/2021 06:28 PM       Radiology:  XR CHEST (SINGLE VIEW FRONTAL)    Result Date: 10/24/2021  Small stable left effusion and left lower lobe atelectatic changes. Stable right chest tube. Cardiomegaly and COPD redemonstrated. NM LUNG SCAN PERFUSION ONLY    Result Date: 10/21/2021  Exam is technically high probability for pulmonary embolism. While emboli are possible, the diminished activity within the upper lung zones could also be secondary to emphysema; a  ventilation study would be needed for confirmation. This could be obtained at a later time as clinically warranted. Improving perfusion at the lateral right lung base. Findings were discussed with Tani Jefferson at 12:13 on 10/21/2021. CT ABDOMEN PELVIS W IV CONTRAST Additional Contrast? Radiologist Recommendation    Result Date: 10/25/2021  1. Right pneumothorax with chest tube in place. Please see separate chest CT report for details of this region. 2.  Large rectal colorectal stool burden and mild gaseous distention of the colon. XR CHEST PORTABLE    Result Date: 10/25/2021  Small bore right chest tube in unchanged position. Smaller but persistent right pneumothorax; otherwise, unchanged findings. Stable left effusion with left lower lobe volume loss. COPD. XR CHEST PORTABLE    Result Date: 10/23/2021  Questionable trace focal pneumothorax laterally in the right base near the chest tube. Persistent by a basilar airspace disease with small left pleural effusion. XR CHEST PORTABLE    Result Date: 10/22/2021  Stable right chest tube without demonstrable pneumothorax Improvement in now mild right basilar opacity related to minimal atelectasis and or effusion Stable moderate left basilar opacity     XR CHEST PORTABLE    Result Date: 10/21/2021  Stable right chest tube without pneumothorax Stable bibasilar pleuroparenchymal changes     XR CHEST PORTABLE    Result Date: 10/20/2021  No significant interval change. Right chest tube in place with small right pleural effusion and right basilar opacity. Unchanged moderate left pleural effusion. XR CHEST 1 VIEW    Result Date: 10/26/2021  Interval placement of tunneled dialysis catheter with tip in appropriate position. Small right basilar pneumothorax no longer present. Chest tube upsize with tip in right base. Attention for pneumothorax on serial portable. .     CT CHEST PULMONARY EMBOLISM W CONTRAST    Addendum Date: 10/25/2021    ADDENDUM: Findings were discussed with IVA SURESH at 1:30 pm on 10/25/2021. Result Date: 10/25/2021  Loculated left basilar effusion with adjacent consolidation representing atelectasis versus pneumonia. Focus of consolidation in the right lower lobe representing atelectasis versus pneumonia. Moderate to large right-sided pneumothorax with an indwelling pigtail catheter in the right basilar pleural space.      IR TUNNELED CVC PLACE WO SQ PORT/PUMP > 5 YEARS    Result Date: 10/26/2021  Successful ultrasound and fluoroscopy guided tunneled catheter placement . The tunneled dialysis catheter may be used immediately. IR CHANGE DRAINAGE CATH    Result Date: 10/26/2021  Chest tube upsized to [address]. Attached to wall suction at which time air was aspirated consistent with pneumothorax. Patient hypoxia improved from 84 to 94%. These findings were communicated to the ICU doctor in person at 9:45 a.m. Dieudonne Ordonez Physical Examination:        Physical Exam  Vitals and nursing note reviewed. Constitutional:       General: She is not in acute distress. Appearance: She is obese. She is ill-appearing. Interventions: Nasal cannula in place. HENT:      Head: Normocephalic and atraumatic. Eyes:      Conjunctiva/sclera: Conjunctivae normal.      Pupils: Pupils are equal, round, and reactive to light. Cardiovascular:      Rate and Rhythm: Normal rate and regular rhythm. Heart sounds: No murmur heard. Pulmonary:      Effort: Pulmonary effort is normal. No accessory muscle usage or respiratory distress. Breath sounds: No stridor. Examination of the right-lower field reveals decreased breath sounds. Examination of the left-lower field reveals decreased breath sounds. Decreased breath sounds present. No wheezing, rhonchi or rales. Chest:      Comments: Chest tube noted to suction, air leak noted today  Abdominal:      General: Bowel sounds are normal. There is no distension. Palpations: Abdomen is soft. Abdomen is not rigid. Tenderness: There is no abdominal tenderness. There is no guarding. Musculoskeletal:         General: No tenderness. Skin:     General: Skin is warm and dry. Findings: No erythema, lesion or rash. Neurological:      Mental Status: She is alert and oriented to person, place, and time. Cranial Nerves: No cranial nerve deficit. Motor: No seizure activity. Psychiatric:         Speech: Speech normal.         Behavior: Behavior normal. Behavior is cooperative.          Assessment: Hospital Problems         Last Modified POA    * (Principal) Pleural effusion on right 10/15/2021 Yes    Acute renal failure with acute renal cortical necrosis superimposed on stage 4 chronic kidney disease ([address] 10/14/2021 Yes    Acute respiratory failure with hypoxia ([address] 10/13/2021 Yes    Iron deficiency anemia 10/12/2021 Yes    Essential hypertension 10/12/2021 Yes    General weakness 10/12/2021 Yes    Vitamin D deficiency 10/13/2021 Yes    Acute diastolic heart failure ([address] 10/13/2021 Yes    Hypoxia 10/13/2021 Yes    Anemia 10/13/2021 Yes    Peripheral edema 10/13/2021 Yes    Moderate protein-calorie malnutrition ([address] 10/13/2021 Yes    Severe malnutrition ([address] 10/13/2021 Yes    Acute kidney injury ([address] 10/15/2021 Yes    Hypokalemia 10/17/2021 Yes    Other emphysema ([address] 10/17/2021 Yes          Plan:           Acute hypoxemic respiratory failure: Likely secondary to  pleural effusion and pulmonary edema continue support with high flow nasal cannula    Moderate to large right-sided pleural effusion: Ex lap PE thoracentesis    Pneumothorax: Status post chest tube placement, chest tube exchanged 10/26  continue to continuous suction, chest x-ray from this morning is still pending   discussed with pulmonology . Abx added per pulmonology, so far I don't see evidence of infection, fluid culture, fluids with no significant count, per ID no further need for antibiotics    Acute kidney injury/ CKD 4: Likely secondary CHF  Urine retention: Patient needed hemodialysis, appreciate nephrology input. Renal diet    Acute decompensated diastolic heart failure: Diuresis initially, now on hemodialysis, continue, strict I's & O's, daily weight, cardiac diet and fluid restriction    Hyperkalemia: Currently resolved. Severe malnutrition: Continue boost and encourage p.o. intake.     COPD exacerbation: Continue breathing treatment with DuoNeb and steroids    Discussed with the patient and nurse      Jaja Mejia, MD  10/28/2021  8:10 AM

## 2021-10-28 NOTE — PROGRESS NOTES
Occupational Therapy  Facility/Department: [facility] PROGRESSIVE CARE  Daily Treatment Note  NAME: Mayelin Ochoa  : 1945  MRN: [id number]    Date of Service: 10/28/2021    Discharge Recommendations:  Patient would benefit from continued therapy after discharge       Assessment   Performance deficits / Impairments: Decreased functional mobility ; Decreased safe awareness;Decreased cognition;Decreased ADL status; Decreased strength;Decreased endurance;Decreased high-level IADLs;Decreased fine motor control;Decreased posture;Decreased balance  Assessment: Pt. completed bed mobility with functional transfer to bedside recliner. Pt. required max assist to roll and position self to EOB. Min assist with sitting bal to tolerate 5-7 min. Pt. unable to stand with use of RW and required abdifatah stedy lift to transfer to recliner. Pt. positioned in upright position to eat lunch with set up. Pt. limited by decreased strength and functional endurance. Skilled OT is warranted to promote I/safety with ADLS to return pt to prior living arrangements with assist as needed. Prognosis: Fair  OT Education: OT Role;Plan of Care;Home Exercise Program;Energy Conservation; Family Education;IADL Safety  Patient Education: Pt. educated on proper tech for bed mobility, positioning while sitting EOB, and abdifatah stedy tech for transfer. REQUIRES OT FOLLOW UP: Yes  Activity Tolerance  Activity Tolerance: Patient Tolerated treatment well;Patient limited by fatigue  Activity Tolerance: poor  Safety Devices  Safety Devices in place: Yes  Type of devices: All fall risk precautions in place; Left in chair;Nurse notified;Call light within reach; Chair alarm in place;Gait belt;Patient at risk for falls         Patient Diagnosis(es): The primary encounter diagnosis was Hypoxia. Diagnoses of Pleural effusion on right, Peripheral edema, Anemia, unspecified type, and Acute kidney injury [facility]) were also pertinent to this visit.       has no past medical history on file.   has a past surgical history that includes IR CHEST TUBE INSERTION (10/18/2021) and IR TUNNELED CVC PLACE WO SQ PORT/PUMP > 5 YEARS (10/26/2021). Restrictions  Restrictions/Precautions  Restrictions/Precautions: General Precautions, Fall Risk, Up as Tolerated  Required Braces or Orthoses?: No  Position Activity Restriction  Other position/activity restrictions: Up with assist, telemetry, gallegos cath, O2 NC 5L, R chest tube, 1500 mL fluid restriction, heels off bed at all times  Subjective   General  Chart Reviewed: Yes  Patient assessed for rehabilitation services?: Yes  Additional Pertinent Hx: Pt. agreeable to treatment. Response to previous treatment: Patient with no complaints from previous session  Family / Caregiver Present: No  Subjective  Subjective: Pt in bed upon arrival. Pt agreeable to get up to chair. General Comment  Comments: RN Aric Gomez) ok'd pt for therapy. Orientation  Orientation  Overall Orientation Status: Within Functional Limits  Objective    ADL  Feeding: Setup        Balance  Sitting Balance: Minimal assistance  Standing Balance: Dependent/Total  Standing Balance  Time: standing < 1 min  Activity: functional ADL transfer to recliner  Functional Mobility  Functional - Mobility Device: Rolling Walker (1st attempt with RW, 2nd transfer with abdifatah stedy)  Functional Mobility Comments: Pt. unable to stand with use of RW and completed transfer to bedside recliner with abdifatah stedy with success. Bed mobility  Rolling to Left: Maximum assistance  Rolling to Right: Maximum assistance  Supine to Sit: 2 Person assistance;Maximum assistance  Sit to Supine: 2 Person assistance;Maximum assistance  Scootin Person assistance;Maximal assistance  Comment: Pt. required max assist for positioning to sit on EOB.   Transfers  Sit to stand: Maximum assistance;2 Person assistance  Stand to sit: 2 Person assistance;Maximum assistance  Transfer Comments: Pt. required max assist x2 to attempt sit to stand from EOB with use of RW. Pt. was unable to support and hold self upright. Mayelin cunningham used to transfer from EOB to recliner with max assist x2 for proper safety with lines and positioning. Cognition  Overall Cognitive Status: Exceptions  Arousal/Alertness: Appropriate responses to stimuli  Following Commands: Follows one step commands with increased time; Follows one step commands with repetition  Attention Span: Appears intact  Memory: Appears intact  Safety Judgement: Decreased awareness of need for safety;Decreased awareness of need for assistance  Problem Solving: Decreased awareness of errors;Assistance required to correct errors made;Assistance required to identify errors made;Assistance required to implement solutions;Assistance required to generate solutions  Insights: Decreased awareness of deficits  Initiation: Requires cues for some  Sequencing: Requires cues for some  Cognition Comment: Pt. displayed increased alertness and willingness to participate. Perception  Overall Perceptual Status: Impaired  Initiation: Cues to initiate tasks        Plan   Plan  Times per week: 4-5x/wk 1x/day as karey  Times per day: Daily  Current Treatment Recommendations: Strengthening, Endurance Training, Balance Training, Functional Mobility Training, Safety Education & Training, Home Management Training, Self-Care / ADL, Equipment Evaluation, Education, & procurement, Patient/Caregiver Education & Training  Plan Comment: Cont. with stated POC. AM-PAC Score        AM-PAC Inpatient Daily Activity Raw Score: 11 (10/28/21 1428)  AM-PAC Inpatient ADL T-Scale Score : 29.04 (10/28/21 1428)  ADL Inpatient CMS 0-100% Score: 70.42 (10/28/21 1428)  ADL Inpatient CMS G-Code Modifier : CL (10/28/21 1428)    Goals  Short term goals  Time Frame for Short term goals: By discharge, pt to demo  Short term goal 1: ADL transfers and functional mobility to CGA with use of AD as needed.   Short term goal 2: UB ADLs to SBA and LB ADLs to Min A with use of AD/AE as needed and proper pacing tech. Short term goal 3: toileting to Min A with use of AD/grab bars/BSC as needed. Short term goal 4: increased BUE strength by 1/2 grade to assist with functional tasks/I with simple B UE HEP with use of handouts as needed. Short term goal 5: bed mobility to SBA with use of bedrails as needed. Long term goals  Long term goal 1: Pt to be I with fall prevention education, EC/WS tech, pursed lip breathing tech and recommendations AE/discharge with use of handouts as needed. Patient Goals   Patient goals : To feel better! Therapy Time   Individual Concurrent Group Co-treatment   Time In 4225         Time Out [phone]         Minutes 45           Co-treatment with PT warranted secondary to decreased safety and independence requiring 2 skilled therapy professionals to address individual discipline's goals. OT addressing \"preparation for ADL transfer\",\"sitting balance for increased ADL performance\",\"sitting/activity tolerance\",\"functional reaching\",\"environmental safety/scanning\",\"fall prevention\",\"functional mobility for ADL transfers\",\"ability to sequence and follow directions\",\"functional UE strength\".        SHA Gusman

## 2021-10-28 NOTE — PROGRESS NOTES
Physical Therapy  Facility/Department: [facility] PROGRESSIVE CARE  Daily Treatment Note  NAME: Massiel Shafer  : 1945  MRN: [id number]    Date of Service: 10/28/2021    Discharge Recommendations:  Patient would benefit from continued therapy after discharge     Pt currently functioning below baseline. Would suggest additional therapy at time of discharge to maximize long term outcomes and prevent re-admission. Please refer to AM-PAC score for current level of function. Assessment   Body structures, Functions, Activity limitations: Decreased balance;Decreased functional mobility ; Decreased safe awareness;Decreased ADL status; Decreased strength;Decreased endurance;Decreased posture  Assessment: Patient has had decline in function and has not been appropriate for PT for several days. Patient know in on HD and had chest tube, now max x 2 for bed mobility and transfer with abdifatah cunningham. Patient will benefit from skilled PT to improve gait, transfers, balance, and strength. Prognosis: Good  Decision Making: High Complexity  PT Education: Disease Specific Education  Patient Education: Patient edcuated on deep breathing  REQUIRES PT FOLLOW UP: Yes  Activity Tolerance  Activity Tolerance: Patient limited by endurance; Patient limited by fatigue     Patient Diagnosis(es): The primary encounter diagnosis was Hypoxia. Diagnoses of Pleural effusion on right, Peripheral edema, Anemia, unspecified type, and Acute kidney injury [facility]) were also pertinent to this visit. has no past medical history on file. has a past surgical history that includes IR CHEST TUBE INSERTION (10/18/2021) and IR TUNNELED CVC PLACE WO SQ PORT/PUMP > 5 YEARS (10/26/2021). Restrictions  Restrictions/Precautions  Restrictions/Precautions: General Precautions, Fall Risk, Up as Tolerated  Required Braces or Orthoses?: No  Position Activity Restriction  Other position/activity restrictions:  Up with assist, telemetry, gallegos cath, O2 NC 5L, R chest tube, 1500 mL fluid restriction, heels off bed at all times  Subjective   General  Chart Reviewed: Yes  Response To Previous Treatment: Patient with no complaints from previous session. Family / Caregiver Present: No  Subjective  Subjective: Patient agreeable to PT and to trying to get up to chair. General Comment  Comments: Luis Fernando Byrd RN reports patient medically appropriate for PT. RN would like patient up to chair if possible. Orientation  Orientation  Overall Orientation Status: Within Functional Limits  Cognition   Cognition  Arousal/Alertness: Appropriate responses to stimuli  Following Commands: Follows one step commands with increased time; Follows one step commands with repetition  Attention Span: Appears intact  Memory: Appears intact  Safety Judgement: Decreased awareness of need for safety;Decreased awareness of need for assistance  Problem Solving: Decreased awareness of errors;Assistance required to correct errors made;Assistance required to identify errors made;Assistance required to implement solutions;Assistance required to generate solutions  Insights: Decreased awareness of deficits  Initiation: Requires cues for some  Sequencing: Requires cues for some  Objective   Bed mobility  Rolling to Left: Maximum assistance  Rolling to Right: Maximum assistance  Supine to Sit: Maximum assistance;2 Person assistance  Scooting: Maximal assistance;2 Person assistance  Comment: Patient require assist to initiate movement and verbal cues for sequencing. Transfers  Sit to Stand: Maximum Assistance;2 Person Assistance Earle white  Stand to sit: Maximum Assistance;2 Person Assistance Earle white  Bed to Chair: Maximum assistance;2 Person Assistance Earle white  Comment: Initially attempted sit to stand with RW, but patient unable to clear bed.   Ambulation  Ambulation?: No     Balance  Posture: Poor (flexed posture)  Sitting - Static: Fair;+  Sitting - Dynamic: Fair;-  Standing - Static: Poor  Standing - Dynamic: Poor         Strength RLE  Comment: hip and knee 3/5, ankle 4-/5  Strength LLE  Comment: hip and knee 3/5, ankle 4-/5     OutComes Score    AM-PAC Score  AM-PAC Inpatient Mobility Raw Score : 9 (10/28/21 1400)  AM-PAC Inpatient T-Scale Score : 30.55 (10/28/21 1400)  Mobility Inpatient CMS 0-100% Score: 81.38 (10/28/21 1400)  Mobility Inpatient CMS G-Code Modifier : CM (10/28/21 1400)          Goals  Short term goals  Time Frame for Short term goals: 12 visits  Short term goal 1: Patient will be min assist for bed mobility. Short term goal 2: Patient will be min assist for transfers. Short term goal 3: Patient will amb 50 feet with RW and mod assist.  Short term goal 4: Patient will tolerate 30 minutes of ther-ex and ther-act. Short term goal 5: Patient will be indep with HEP.   Patient Goals   Patient goals : Improve breathing    Plan    Plan  Times per week: 1-2x/d, 5-6 d/wk  Current Treatment Recommendations: Strengthening, Balance Training, Functional Mobility Training, Transfer Training, Gait Training, Endurance Training, Patient/Caregiver Education & Training, Home Exercise Program, Safety Education & Training  Safety Devices  Type of devices: Nurse notified, Gait belt, Call light within reach, All fall risk precautions in place, Left in chair, Chair alarm in place     Therapy Time   Individual Concurrent Group Co-treatment   Time In 1242         Time Out 1315         Minutes Ti Conklin1, PT

## 2021-10-28 NOTE — CARE COORDINATION
Discharge Planning    Confirmed with Filion [place] at [facility] that they have started the precert today with her insurance for pending discharge to them.

## 2021-10-28 NOTE — PROGRESS NOTES
[facility]  Progress Note    Celeste Lee  MRN: [id number]  Date: 10/28/2021  LOS: 12     Reason for F/U :   Leukocytosis    Impression :   Acute hypoxic respiratory failure  Right-sided pleural effusion status post thoracentesis 10/13/2021 and right-sided chest tube placement 10/18/2021  Moderate to large right-sided pneumothorax status post chest tube exchange for larger size 10/26/2021  Small loculated left-sided pleural effusion  Acute kidney injury on chronic kidney disease stage IV now started on hemodialysis  Acute diastolic congestive heart failure  Leukocytosis    Recommendations: The leukocytosis is overall improved and clinically the patient is improved and we will continue her off the systemic antimicrobial therapy. The culture data thus far remains negative. We will continue to follow progress and adjust therapy accordingly. The care was discussed with the patient and family member who was at bedside. Infection Control Recommendations:   Universal precautions    Discharge Planning:   Estimated Length of IV antimicrobials:   Patient will need Midline Catheter Insertion/ PICC line Insertion: No  Patient will need: Home IV , [place],  SNF,  LTAC: Undetermined  Patient willneed outpatient wound care: No    Medical Decision making / Summary of Stay:   Celeste Lee is a 64y.o.-year-old female who was initially admitted on 10/12/2021. Patient has no documented medical history. Her information was obtained from the patient's chart, she is a poor historian and does not communicate much.     We were consulted 10/27/2021, patient was initially admitted to the hospital 10/12/2021 via ambulance from home with reported failure to thrive according to her son. On admission, patient had complaints of diarrhea x3 days, weakness, peripheral edema, she was also hypoxic at that time, documented at 45% oxygen saturation on room air.   She was placed on 6 L oxygen per nasal 2.38 10/12/2021     Lab Results   Component Value Date    FERRITIN 20 10/13/2021     Lab Results   Component Value Date     10/13/2021     No results found for: FIBRINOGEN    Results in Past 30 Days  Result Component Current Result Ref Range Previous Result Ref Range   SARS-CoV-2, Rapid Not Detected (10/12/2021) Not Detected Not in Time Range      Lab Results   Component Value Date    COVID19 Not Detected 10/12/2021       [facility]. Imaging Studies:   ONE XRAY VIEW OF THE CHEST 10/28/2021 12:05 pm  Impression   Probable mild increase in small left-sided effusion with otherwise stable   exam.       Cultures:     Culture, Blood 1 [[id number]] Collected: 10/26/21 1828   Order Status: Completed Specimen: Blood Updated: 10/28/21 0713    Specimen Description . BLOOD    Special Requests RTAC,20CC    Culture NO GROWTH 1 DAY   Culture, Blood 1 [[id number]] Collected: 10/26/21 1828   Order Status: Completed Specimen: Blood Updated: 10/28/21 0713    Specimen Description . BLOOD    Special Requests LTAC    Culture NO GROWTH 1 DAY   Culture, Blood 1 [[id number]] Collected: 10/25/21 1120   Order Status: Completed Specimen: Blood Updated: 10/28/21 0015    Specimen Description . BLOOD    Special Requests LFA, 1ML    Culture NO GROWTH 3 DAYS   Culture, Blood 1 [[id number]] Collected: 10/25/21 1130   Order Status: Completed Specimen: Blood Updated: 10/28/21 0015    Specimen Description . BLOOD    Special Requests LH, 2ML    Culture NO GROWTH 3 DAYS     Culture, Fungus [[id number]] Collected: 10/13/21 0900   Order Status: Completed Specimen: Thoracentesis Updated: 10/25/21 1125    Specimen Description . THORACENTESIS FLUID RT    Special Requests NOT REPORTED    Culture NO GROWTH 12 DAYS   Culture with Smear, Acid Fast Bacillius [[id number]] Collected: 10/13/21 0900   Order Status: Completed Specimen: Thoracentesis Updated: 10/25/21 0829    Specimen Description . THORACENTESIS FLUID RT    Special Requests NOT REPORTED    Direct Exam NO ACID FAST BACILLI SEEN (DIRECT SMEAR)    Culture NO GROWTH 12 DAYS   Culture, Blood 1 [[id number]] Collected: 10/18/21 0426   Order Status: Completed Specimen: Blood Updated: 10/24/21 0843    Specimen Description . BLOOD    Special Requests RT AC 5ML    Culture NO GROWTH 6 DAYS   Culture, Blood 2 [[id number]] Collected: 10/18/21 0426   Order Status: Completed Specimen: Blood Updated: 10/24/21 0843    Specimen Description . BLOOD    Special Requests RT HAND 6ML    Culture NO GROWTH 6 DAYS   Culture, Body Fluid [[id number]] (Abnormal) Collected: 10/13/21 0900   Order Status: Completed Specimen: Body Fluid from Thoracentesis Updated: 10/19/21 1115    Specimen Description . THORACENTESIS FLUID RT    Special Requests NOT REPORTED    Direct Exam MANY NEUTROPHILSAbnormal      NO BACTERIA SEEN     Gram stain made from cytocentrifuged specimen.  Organisms and cells will be concentrated. Culture NO GROWTH 6 DAYS   Culture, Blood 1 [[id number]] Collected: 10/12/21 1537   Order Status: Completed Specimen: Blood Updated: 10/18/21 0619    Specimen Description . BLOOD    Special Requests RIGHT ANTECUBE 20CC    Culture NO GROWTH 6 DAYS   Culture, Blood 1 [[id number]] Collected: 10/12/21 1540   Order Status: Completed Specimen: Blood Updated: 10/18/21 0619    Specimen Description . BLOOD    Special Requests LEFT ANTECUBE 20CC    Culture NO GROWTH 6 DAYS   Gram stain [[id number]] Collected: 10/13/21 0800   Order Status: Canceled Specimen: Body Fluid    COVID-19, Rapid [[id number]] Collected: 10/12/21 1554   Order Status: Completed Specimen: Nasopharyngeal Swab Updated: 10/12/21 1610    Specimen Description . NASOPHARYNGEAL SWAB    SARS-CoV-2, Rapid Not Detected    Comment:        Rapid NAAT:  The specimen is NEGATIVE for SARS-CoV-2, the novel coronavirus associated with   COVID-19.         The ID NOW COVID-19 assay is designed to detect the virus that causes COVID-19 in patients with signs and symptoms of infection who are suspected of COVID-19. An individual without symptoms of COVID-19 and who is not shedding SARS-CoV-2 virus would   expect to have a negative (not detected) result in this assay. Negative results should be treated as presumptive and, if inconsistent with clinical signs   and symptoms or necessary for patient management,   should be tested with an alternative molecular assay. Negative results do not preclude   SARS-CoV-2 infection and   should not be used as the sole basis for patient management decisions.         Fact sheet for Healthcare Providers: Zev   Fact sheet for Patients: Zev           Methodology: Isothermal Nucleic Acid Amplification              Medications:      predniSONE  40 mg Oral Daily    heparin (porcine)  5,000 Units SubCUTAneous BID    budesonide  0.5 mg Nebulization BID    Arformoterol Tartrate  15 mcg Nebulization BID    ipratropium-albuterol  1 ampule Inhalation 4x daily    epoetin mike-epbx  8,000 Units SubCUTAneous Once per day on Mon Wed Fri    calcitRIOL  0.25 mcg Oral Once per day on Mon Wed Fri    sodium chloride flush  5-40 mL IntraVENous 2 times per day    vitamin D  50,000 Units Oral Weekly           [facility]  Barney Gordon MD  Perfect [employer] messaging  OFFICE: [phone]      Electronically signed by Barney Gordon MD on 10/28/2021 at 4:51 PM  Thank you for allowing us to participate in the care of this patient. Please call with questions. This note iscreated with the assistance of a speech recognition program.  While intending to generate a document that actually reflects the content of the visit, the document can still have some errors including those of syntax andsound a like substitutions which may escape proof reading. In such instances, actual meaning can be extrapolated by contextual diversion.

## 2021-10-28 NOTE — RT PROTOCOL NOTE
RT Inhaler-Nebulizer Bronchodilator Protocol Note    There is a bronchodilator order in the chart from a provider indicating to follow the RT Bronchodilator Protocol and there is an Initiate RT Inhaler-Nebulizer Bronchodilator Protocol order as well (see protocol at bottom of note). CXR Findings:  XR CHEST PORTABLE    Result Date: 10/27/2021  Right chest tube remains in place. No pneumothorax identified. The findings from the last RT Protocol Assessment were as follows:   History Pulmonary Disease: Chronic pulmonary disease  Respiratory Pattern: Mild dyspnea at rest, irregular pattern, or RR 21-25 bpm  Breath Sounds: Slightly diminished and/or crackles  Cough: Weak, non-productive  Indication for Bronchodilator Therapy: Decreased or absent breath sounds  Bronchodilator Assessment Score: 11    Aerosolized bronchodilator medication orders have been revised according to the RT Inhaler-Nebulizer Bronchodilator Protocol below. Respiratory Therapist to perform RT Therapy Protocol Assessment initially then follow the protocol. Repeat RT Therapy Protocol Assessment PRN for score 0-3 or on second treatment, BID, and PRN for scores above 3. No Indications - adjust the frequency to every 6 hours PRN wheezing or bronchospasm, if no treatments needed after 48 hours then discontinue using Per Protocol order mode. If indication present, adjust the RT bronchodilator orders based on the Bronchodilator Assessment Score as indicated below. Use Inhaler orders unless patient has one or more of the following: on home nebulizer, not able to hold breath for 10 seconds, is not alert and oriented, cannot activate and use MDI correctly, or respiratory rate 25 breaths per minute or more, then use the equivalent nebulizer order(s) with same Frequency and PRN reasons based on the score. If a patient is on this medication at home then do not decrease Frequency below that used at home.     0-3 - enter or revise RT bronchodilator order(s) to equivalent RT Bronchodilator order with Frequency of every 4 hours PRN for wheezing or increased work of breathing using Per Protocol order mode. 4-6 - enter or revise RT Bronchodilator order(s) to two equivalent RT bronchodilator orders with one order with BID Frequency and one order with Frequency of every 4 hours PRN wheezing or increased work of breathing using Per Protocol order mode. 7-10 - enter or revise RT Bronchodilator order(s) to two equivalent RT bronchodilator orders with one order with TID Frequency and one order with Frequency of every 4 hours PRN wheezing or increased work of breathing using Per Protocol order mode. 11-13 - enter or revise RT Bronchodilator order(s) to one equivalent RT bronchodilator order with QID Frequency and an Albuterol order with Frequency of every 4 hours PRN wheezing or increased work of breathing using Per Protocol order mode. Greater than 13 - enter or revise RT Bronchodilator order(s) to one equivalent RT bronchodilator order with every 4 hours Frequency and an Albuterol order with Frequency of every 2 hours PRN wheezing or increased work of breathing using Per Protocol order mode. RT to enter RT Home Evaluation for COPD & MDI Assessment order using Per Protocol order mode.     Electronically signed by Elle Mckee RCP on 10/28/2021 at 7:42 AM

## 2021-10-28 NOTE — CARE COORDINATION
Social work: Call rec'd from [facility] [employer], available chairtime is MWF 11:50, however, patient still have chest tube and SNF's cannot accept this; looking at [facility], [facility] as a result. Will continue to follow.

## 2021-10-28 NOTE — PROGRESS NOTES
Pulmonary Critical Care Progress Note  Barry Lindsey MD     Patient seen for the follow up of acute hypoxic respiratory failure, moderate pulmonary hypertension, former smoker/COPD Pleural effusion on right     Subjective:  No significant overnight events noted. She is sitting up in the bed eating breakfast.  She feels her shortness of breath is better today. She denies any chest pain or cough. Chest tube remains to suction, with positive air leak noted at this time. Examination:  Vitals: BP (!) 122/58   Pulse 110   Temp 97.3 °F (36.3 °C) (Oral)   Resp 16   Ht 5' 4\" (1.626 m)   Wt 118 lb 14.4 oz (53.9 kg)   SpO2 92%   BMI 20.41 kg/m²   General appearance: alert and cooperative with exam, resting in bed  Neck: No JVD  Lungs: Moderate air exchange, diminished bilateral bases, positive crackles. + air leak in chest tube  Heart: regular rate and rhythm, S1, S2 normal, no gallop  Abdomen: Soft, non tender, + BS  Extremities: no cyanosis or clubbing. No significant edema    LABs:  CBC:   Recent Labs     10/27/21  0553 10/28/21  0536   WBC 25.1* 22.8*   HGB 8.4* 8.1*   HCT 29.0* 27.3*    199     BMP:   Recent Labs     10/27/21  0553 10/28/21  0536    135   K 5.0 4.2   CO2 23 25   BUN 53* 28*   CREATININE 2.19* 1.67*   LABGLOM 22* 30*   GLUCOSE 116* 106*     Radiology:  10/27/2021      Impression:  · Acute hypoxic respiratory failure requiring high flow oxygen  · Moderate to large right pleural effusion, s/p thoracentesis with 850 mL removed  · S/p pigtail catheter placement 10/18/21  · Upsize of chest tube on 10/26/2021  · Small left pleural effusion, loculated  · Atelectasis/?  Trapped lung on the right  · Mild pulmonary edema  · Moderate pulmonary hypertension, RVSP 60 mmHg  · Suspected COPD/former smoker, quit 2019  · Acute kidney injury  · D-dimer, low probability for PE on VQ scan on 10/13/21. Negative CTA of the chest on 10/25/2021.   · Right pneumothorax    Recommendations:  · Oxygen via nasal cannula to keep SPO2 greater than 90%  · Incentive spirometry every hour while awake  · Symbicort  · DuoNeb every 4 hours  · Budesonide and Brovana via nebulizer every 12 hours  · Discontinue IV Solu-Medrol   · Start prednisone taper  · Monitor off of antibiotics. 14-day course of Levaquin completed. Infectious disease following  · Monitor blood cultures  · Keep pigtail catheter to suction. Monitor output/Air leak  · Dialysis per nephrology  · X-ray chest in a.m.   · Labs: CBC and BMP in am  · Pleural fluid negative for malignancy  · DVT prophylaxis, subcu heparin  · PFTs as an outpatient  · Discussed with patient  · Discussed with RN  · Will follow with you      Electronically signed by     Mohit Romero MD on 10/28/2021 at 12:48 PM  Pulmonary Critical Care and Sleep Medicine,  [facility]  Cell: [phone]  Office: [phone]

## 2021-10-28 NOTE — PROGRESS NOTES
NEPHROLOGY PROGRESS NOTE      SUBJECTIVE   Patient was seen and examined. Patient was in her bed. She was alert and awake. Patient states that now she is feeling hungry and eating better her nurses also states that she was able to finish her breakfast this morning. Patient continues to have shortness of breath but her FiO2 requirement is now down to 4 L. Continues to have chest tube for pleural drainage    OBJECTIVE     Vitals:    10/28/21 0722 10/28/21 0726 10/28/21 0737 10/28/21 0813   BP:    (!) 122/58   Pulse:    110   Resp: 18 18 17 16   Temp:    97.3 °F (36.3 °C)   TempSrc:    Oral   SpO2: 97% 96% 99% 92%   Weight:       Height:         24HR INTAKE/OUTPUT:      Intake/Output Summary (Last 24 hours) at 10/28/2021 1050  Last data filed at 10/28/2021 0620  Gross per 24 hour   Intake 490 ml   Output 368 ml   Net 122 ml       General appearance: Patient was in her bed at 45 degrees she was alert and awake. HEENT: Pulls were reactive to light  Respiratory[de-identified] Bilateral air entry and coarse breath sound. Patient has right chest tube and decreased air entry at the right base  Cardiovascular:S1 S2 normal,no gallop or organic murmur. Abdomen:Non tender/non distended. Bowel sounds present  Extremities: No edema   neurological: Alert and awake x3    MEDICATIONS     Scheduled Meds:    predniSONE  40 mg Oral Daily    heparin (porcine)  5,000 Units SubCUTAneous BID    budesonide  0.5 mg Nebulization BID    Arformoterol Tartrate  15 mcg Nebulization BID    ipratropium-albuterol  1 ampule Inhalation 4x daily    epoetin mike-epbx  8,000 Units SubCUTAneous Once per day on Mon Wed Fri    calcitRIOL  0.25 mcg Oral Once per day on Mon Wed Fri    sodium chloride flush  5-40 mL IntraVENous 2 times per day    vitamin D  50,000 Units Oral Weekly       INVESTIGATIONS     Last 3 CMP:    Recent Labs     10/26/21  0535 10/27/21  0553 10/28/21  0536    135 135   K 4.5 5.0 4.2   CL 96* 99 98   CO2 25 23 25   BUN 88* 53* 28*   CREATININE 3.07* 2.19* 1.67*   CALCIUM 8.1* 8.3* 8.1*       Last 3 CBC:  Recent Labs     10/26/21  0535 10/27/21  0553 10/28/21  0536   WBC 31.4* 25.1* 22.8*   RBC 3.75* 3.39* 3.21*   HGB 9.3* 8.4* 8.1*   HCT 32.0* 29.0* 27.3*   MCV 85.3 85.5 85.0   MCH 24.8* 24.8* 25.2   MCHC 29.1 29.0 29.7   RDW 23.5* 23.5* 23.8*    227 199   MPV 10.5 11.2 11.5       ASSESSMENT     #1  ESRD new hemodialysis start for uremic symptoms. Patient received to dialysis. Her next dialysis will be tomorrow.  is working on her outpatient spot. #2 exhibiting uremic symptoms. Started on dialysis had to dialysis session. Her appetite is relatively better now. #3 moderate large right pleural effusion status post thoracocentesis and chest tube placement  #4 moderate pulmonary hypertension/RVSP 60/tricuspid regurgitation  #5 essential hypertension: Blood pressure is under good control    PLAN     #1  Dialysis in a.m. 2.  Continue diuretics at this point  3. CBC and BMP in a.m.   Please do not hesitate to call with questions    This note is created with the assistance of a speech-recognition program. While intending to generate a document that actually reflects the content of the visit, no guarantees can be provided that every mistake has been identified and corrected by editing    Jose D Armenta MD  10/28/2021 10:50 AM  [facility]

## 2021-10-29 ENCOUNTER — APPOINTMENT (OUTPATIENT)
Dept: GENERAL RADIOLOGY | Age: 76
DRG: 291 | End: 2021-10-29
Payer: MEDICARE

## 2021-10-29 LAB
ABSOLUTE EOS #: 0 K/UL (ref 0–0.4)
ABSOLUTE IMMATURE GRANULOCYTE: 1.05 K/UL (ref 0–0.3)
ABSOLUTE LYMPH #: 0.84 K/UL (ref 1–4.8)
ABSOLUTE MONO #: 1.25 K/UL (ref 0.2–0.8)
ANION GAP SERPL CALCULATED.3IONS-SCNC: 15 MMOL/L (ref 9–17)
BASOPHILS # BLD: 0 %
BASOPHILS ABSOLUTE: 0 K/UL (ref 0–0.2)
BUN BLDV-MCNC: 38 MG/DL (ref 8–23)
BUN/CREAT BLD: 16 (ref 9–20)
CALCIUM SERPL-MCNC: 8.1 MG/DL (ref 8.6–10.4)
CHLORIDE BLD-SCNC: 97 MMOL/L (ref 98–107)
CO2: 23 MMOL/L (ref 20–31)
CREAT SERPL-MCNC: 2.36 MG/DL (ref 0.5–0.9)
DIFFERENTIAL TYPE: ABNORMAL
EOSINOPHILS RELATIVE PERCENT: 0 % (ref 1–4)
GFR AFRICAN AMERICAN: 24 ML/MIN
GFR NON-AFRICAN AMERICAN: 20 ML/MIN
GFR SERPL CREATININE-BSD FRML MDRD: ABNORMAL ML/MIN/{1.73_M2}
GFR SERPL CREATININE-BSD FRML MDRD: ABNORMAL ML/MIN/{1.73_M2}
GLUCOSE BLD-MCNC: 109 MG/DL (ref 65–105)
GLUCOSE BLD-MCNC: 123 MG/DL (ref 65–105)
GLUCOSE BLD-MCNC: 202 MG/DL (ref 65–105)
GLUCOSE BLD-MCNC: 83 MG/DL (ref 70–99)
GLUCOSE BLD-MCNC: 88 MG/DL (ref 65–105)
HCT VFR BLD CALC: 29.5 % (ref 36.3–47.1)
HEMOGLOBIN: 8.7 G/DL (ref 11.9–15.1)
IMMATURE GRANULOCYTES: 5 %
LYMPHOCYTES # BLD: 4 % (ref 24–44)
MCH RBC QN AUTO: 25.3 PG (ref 25.2–33.5)
MCHC RBC AUTO-ENTMCNC: 29.5 G/DL (ref 28.4–34.8)
MCV RBC AUTO: 85.8 FL (ref 82.6–102.9)
MONOCYTES # BLD: 6 % (ref 1–7)
MORPHOLOGY: ABNORMAL
NRBC AUTOMATED: 1 PER 100 WBC
PDW BLD-RTO: 24.4 % (ref 11.8–14.4)
PLATELET # BLD: 175 K/UL (ref 138–453)
PLATELET ESTIMATE: ABNORMAL
PMV BLD AUTO: 10.3 FL (ref 8.1–13.5)
POTASSIUM SERPL-SCNC: 4.2 MMOL/L (ref 3.7–5.3)
RBC # BLD: 3.44 M/UL (ref 3.95–5.11)
RBC # BLD: ABNORMAL 10*6/UL
SEG NEUTROPHILS: 85 % (ref 36–66)
SEGMENTED NEUTROPHILS ABSOLUTE COUNT: 17.76 K/UL (ref 1.8–7.7)
SODIUM BLD-SCNC: 135 MMOL/L (ref 135–144)
WBC # BLD: 20.9 K/UL (ref 3.5–11.3)
WBC # BLD: ABNORMAL 10*3/UL

## 2021-10-29 PROCEDURE — P9047 ALBUMIN (HUMAN), 25%, 50ML: HCPCS | Performed by: INTERNAL MEDICINE

## 2021-10-29 PROCEDURE — 99232 SBSQ HOSP IP/OBS MODERATE 35: CPT | Performed by: INTERNAL MEDICINE

## 2021-10-29 PROCEDURE — 6370000000 HC RX 637 (ALT 250 FOR IP): Performed by: INTERNAL MEDICINE

## 2021-10-29 PROCEDURE — 2500000003 HC RX 250 WO HCPCS: Performed by: INTERNAL MEDICINE

## 2021-10-29 PROCEDURE — 2580000003 HC RX 258: Performed by: STUDENT IN AN ORGANIZED HEALTH CARE EDUCATION/TRAINING PROGRAM

## 2021-10-29 PROCEDURE — 90935 HEMODIALYSIS ONE EVALUATION: CPT

## 2021-10-29 PROCEDURE — 6370000000 HC RX 637 (ALT 250 FOR IP): Performed by: STUDENT IN AN ORGANIZED HEALTH CARE EDUCATION/TRAINING PROGRAM

## 2021-10-29 PROCEDURE — 2700000000 HC OXYGEN THERAPY PER DAY

## 2021-10-29 PROCEDURE — 71045 X-RAY EXAM CHEST 1 VIEW: CPT

## 2021-10-29 PROCEDURE — 94761 N-INVAS EAR/PLS OXIMETRY MLT: CPT

## 2021-10-29 PROCEDURE — 6360000002 HC RX W HCPCS: Performed by: INTERNAL MEDICINE

## 2021-10-29 PROCEDURE — 6370000000 HC RX 637 (ALT 250 FOR IP): Performed by: NURSE PRACTITIONER

## 2021-10-29 PROCEDURE — 80048 BASIC METABOLIC PNL TOTAL CA: CPT

## 2021-10-29 PROCEDURE — 36415 COLL VENOUS BLD VENIPUNCTURE: CPT

## 2021-10-29 PROCEDURE — 94640 AIRWAY INHALATION TREATMENT: CPT

## 2021-10-29 PROCEDURE — 82947 ASSAY GLUCOSE BLOOD QUANT: CPT

## 2021-10-29 PROCEDURE — 2060000000 HC ICU INTERMEDIATE R&B

## 2021-10-29 PROCEDURE — 99232 SBSQ HOSP IP/OBS MODERATE 35: CPT | Performed by: FAMILY MEDICINE

## 2021-10-29 PROCEDURE — 85025 COMPLETE CBC W/AUTO DIFF WBC: CPT

## 2021-10-29 PROCEDURE — 6360000002 HC RX W HCPCS: Performed by: NURSE PRACTITIONER

## 2021-10-29 RX ORDER — ALBUMIN (HUMAN) 12.5 G/50ML
12.5 SOLUTION INTRAVENOUS ONCE
Status: COMPLETED | OUTPATIENT
Start: 2021-10-29 | End: 2021-10-29

## 2021-10-29 RX ADMIN — HEPARIN SODIUM 5000 UNITS: 5000 INJECTION INTRAVENOUS; SUBCUTANEOUS at 20:48

## 2021-10-29 RX ADMIN — SODIUM CHLORIDE, PRESERVATIVE FREE 10 ML: 5 INJECTION INTRAVENOUS at 20:46

## 2021-10-29 RX ADMIN — SODIUM CHLORIDE, PRESERVATIVE FREE 10 ML: 5 INJECTION INTRAVENOUS at 11:06

## 2021-10-29 RX ADMIN — PREDNISONE 40 MG: 20 TABLET ORAL at 09:48

## 2021-10-29 RX ADMIN — IPRATROPIUM BROMIDE AND ALBUTEROL SULFATE 1 AMPULE: .5; 2.5 SOLUTION RESPIRATORY (INHALATION) at 15:31

## 2021-10-29 RX ADMIN — BUDESONIDE 500 MCG: 0.5 SUSPENSION RESPIRATORY (INHALATION) at 07:54

## 2021-10-29 RX ADMIN — MIDODRINE HYDROCHLORIDE 10 MG: 10 TABLET ORAL at 11:57

## 2021-10-29 RX ADMIN — Medication 1.9 ML: at 14:37

## 2021-10-29 RX ADMIN — ARFORMOTEROL TARTRATE 15 MCG: 15 SOLUTION RESPIRATORY (INHALATION) at 07:54

## 2021-10-29 RX ADMIN — ALBUMIN (HUMAN) 12.5 G: 0.25 INJECTION, SOLUTION INTRAVENOUS at 12:45

## 2021-10-29 RX ADMIN — IPRATROPIUM BROMIDE AND ALBUTEROL SULFATE 1 AMPULE: .5; 2.5 SOLUTION RESPIRATORY (INHALATION) at 19:44

## 2021-10-29 RX ADMIN — BUDESONIDE 500 MCG: 0.5 SUSPENSION RESPIRATORY (INHALATION) at 19:44

## 2021-10-29 RX ADMIN — IPRATROPIUM BROMIDE AND ALBUTEROL SULFATE 1 AMPULE: .5; 2.5 SOLUTION RESPIRATORY (INHALATION) at 07:54

## 2021-10-29 RX ADMIN — EPOETIN ALFA-EPBX 8000 UNITS: 4000 INJECTION, SOLUTION INTRAVENOUS; SUBCUTANEOUS at 11:57

## 2021-10-29 RX ADMIN — CALCITRIOL 0.25 MCG: 0.25 CAPSULE ORAL at 09:48

## 2021-10-29 RX ADMIN — HEPARIN SODIUM 5000 UNITS: 5000 INJECTION INTRAVENOUS; SUBCUTANEOUS at 09:48

## 2021-10-29 RX ADMIN — ARFORMOTEROL TARTRATE 15 MCG: 15 SOLUTION RESPIRATORY (INHALATION) at 19:44

## 2021-10-29 ASSESSMENT — ENCOUNTER SYMPTOMS
DIARRHEA: 0
WHEEZING: 0
BLOOD IN STOOL: 0
CONSTIPATION: 0
GASTROINTESTINAL NEGATIVE: 1
VOMITING: 0
SHORTNESS OF BREATH: 1
NAUSEA: 0
ABDOMINAL PAIN: 0
COUGH: 0
CHEST TIGHTNESS: 0

## 2021-10-29 ASSESSMENT — PAIN SCALES - GENERAL
PAINLEVEL_OUTOF10: 0

## 2021-10-29 NOTE — PROGRESS NOTES
HEMODIALYSIS PRE-TREATMENT NOTE    Patient Identifiers prior to treatment: Name, , MRN    Isolation Required: No                      Isolation Type: N/A       (please document if patient is being managed as a PUI/COVID-19 patient)        Hepatitis status:                           Date Drawn                             Result  Hepatitis B Surface Antigen 10/25/21     Negative                     Hepatitis B Surface Antibody 10/25/21 Negative        Hepatitis B Core Antibody  Negative          How was Hepatitis Status verified: Epic     Was a copy of the labs you documented provided to facility for the patient's chart:     Hemodialysis orders verified: Yes    Access Within normal limits ( I.e. s/s of infection,...): tunneled CVC to right neck WNL, Dressing  Changed. Patient tolerated well.      Pre-Assessment completed: Yes    Pre-dialysis report received from: Saintclair Garret RN                      Time: 1385

## 2021-10-29 NOTE — PROGRESS NOTES
Occupational Therapy  DATE: 10/29/2021    NAME: Herman Chin  MRN: [id number]   : 1945    Patient not seen this date for Occupational Therapy due to:      [] Cancel by RN or physician due to:    [x] Hemodialysis    [] Critical Lab Value Level     [] Blood transfusion in progress    [] Acute or unstable cardiovascular status   _MAP < 55 or more than >115  _HR < 40 or > 130    [] Acute or unstable pulmonary status   -FiO2 > 60%   _RR < 5 or >40    _O2 sats < 85%    [] Strict Bedrest    [] Off Unit for surgery or procedure    [] Off Unit for testing       [] Pending imaging to R/O fracture    [] Refusal by Patient      [] Other      [] OT being discontinued at this time. Patient independent. No further needs. [] OT being discontinued at this time as the patient has been transferred to hospice care. No further needs. OT will consult with nursing prior to next treatment.         SHA Fink

## 2021-10-29 NOTE — PROGRESS NOTES
[facility]  Office: [address], DO, Zelda Nyhan, DO, Nallely Samuels, DO, Bisi Guadalupe Blood, DO, Alejandro Mckinney MD, Giulia Pulliam MD, Jeremias Martin MD, Shyam Lizarraga MD, Alva Carrillo MD, Mikhail Esteban MD, Sandra Mabry MD, Simon Garcia MD, Benigno Quesada, DO, Brenda Jaeger, DO, Ekaterina Stewart MD,  Mine Baldwin, DO, Abdiel Gaines MD, Cruzito Boyer MD, Swati Guerrero MD, Veronica Torres MD, Natasha Kilgore MD, Kaitlin Olvera MD, Steff Willis, [facility], [facility], CNP, Catrina Nieves, CNP, Marianne Zuniga, CNS, Greg Riggs, CNP, Keyla Weaver, CNP, Robert Giron, CNP, Jennifer Jacques, CNP, Corwin Sullivan, CNP, Claudia Jean, CNP, Capo Woodward PA-C, Susi Mcmanus, [facility], Lauren Awad, CNP, Nicanor Sigala, CNP, Jaciel Singh, CNP, Darin Sin, CNP, Jyoti Rosas, CNP, Nidhi Sharma, CNP, Lilibeth Pinzon [facility]    Progress Note    10/29/2021    7:55 AM    Name:   Tay Cervantes  MRN:     [id number]     [place]:      [de-identified]   Room:   [address] [place] [address] Day:  17  Admit Date:  10/12/2021  2:53 PM    PCP:   Flavio De Luna MD  Code Status:  Full Code    Subjective:     C/C:   Chief Complaint   Patient presents with    Respiratory Distress     EMS reported 97% on room air; pt arrives with saturation 45% on room air    Failure To Thrive     Interval History Status: improved. Patient seen and examined at bedside, looks much better today sitting in the bed and holding for conversation and answer all the questions appropriately. Continues to feel better with better appetite  She denies any new complaint. Still with the CT , noted air leak  Patient vitals, labs and all providers notes were reviewed,from overnight shift and morning updates were noted and discussed with the nurse    Brief History:     Per my ELANA:  \"Danielle Wadsworth is a 64 y. o. female with a hx of CKD 4 and iron deficiency anemia who presented to [facility] with Respiratory Distress and hypoxia (O2 sats 45%) and failure to thrive and is admitted to the hospital for the management of right Pleural effusion and acute renal failure. Patient lives at home alone and has recently been having difficulty caring for herself and generalized weakness for the last few weeks . She does have chronic BLE edema but denies hx of CHF. Initial CXR showed large right pleural effusion, stat elevated BNP and D-dimer as well as BUN/creatinine.  Last took known creatinine was 2.34 in august, she reports she seen a nephrologist once. She does not wear home oxygen   10/13: US guided right thoracentesis 850ml of nonturbid straw colored fluid removed -transudative  Pleural fluid cultures negative for malignancy\"  Chest tube placed 10/18 on right for loculated effusion   Started on heparin drip for possible PE; VQ being repeated 10/24    Review of Systems:     Review of Systems   Constitutional: Positive for activity change and fatigue. Negative for chills, diaphoresis and fever. HENT: Negative for congestion. Eyes: Negative for visual disturbance. Respiratory: Positive for shortness of breath. Negative for cough, chest tightness and wheezing. Cardiovascular: Negative for chest pain, palpitations and leg swelling. Gastrointestinal: Negative for abdominal pain, blood in stool, constipation, diarrhea, nausea and vomiting. Genitourinary: Negative for difficulty urinating. Neurological: Positive for weakness. Negative for dizziness, light-headedness, numbness and headaches. All other systems reviewed and are negative. Medications: Allergies:     Allergies   Allergen Reactions    Penicillins Swelling       Current Meds:   Scheduled Meds:    predniSONE  40 mg Oral Daily    heparin (porcine)  5,000 Units SubCUTAneous BID    budesonide  0.5 mg Nebulization BID    Arformoterol Tartrate  15 mcg Nebulization BID    ipratropium-albuterol  1 ampule Inhalation 4x daily    epoetin mike-epbx  8,000 Units SubCUTAneous Once per day on     calcitRIOL  0.25 mcg Oral Once per day on     sodium chloride flush  5-40 mL IntraVENous 2 times per day    vitamin D  50,000 Units Oral Weekly     Continuous Infusions:    dextrose      sodium chloride       PRN Meds: sodium citrate, midodrine, sodium citrate, sodium chloride flush, HYDROcodone 5 mg - acetaminophen **OR** HYDROcodone 5 mg - acetaminophen, melatonin, glucose, dextrose, glucagon (rDNA), dextrose, sodium chloride flush, sodium chloride, ondansetron **OR** ondansetron, acetaminophen **OR** acetaminophen, perflutren lipid microspheres    Data:     Past Medical History:   has no past medical history on file. Social History:   reports that she quit smoking about 2 years ago. Her smoking use included cigarettes. She has never used smokeless tobacco. She reports current alcohol use. Family History:   Family History   Problem Relation Age of Onset    Hypertension Mother     Cancer Sister     Cancer Brother        Vitals:  /69   Pulse 105   Temp 97.7 °F (36.5 °C) (Oral)   Resp 16   Ht 5' 4\" (1.626 m)   Wt 121 lb 1.6 oz (54.9 kg)   SpO2 99%   BMI 20.79 kg/m²   Temp (24hrs), Av.6 °F (36.4 °C), Min:97.3 °F (36.3 °C), Max:97.9 °F (36.6 °C)    Recent Labs     10/28/21  1118 10/28/21  1650 10/28/21  2042 10/29/21  0721   POCGLU 168* 149* 169* 88       I/O (24Hr):     Intake/Output Summary (Last 24 hours) at 10/29/2021 0755  Last data filed at 10/29/2021 0536  Gross per 24 hour   Intake 240 ml   Output 137 ml   Net 103 ml       Labs:  Hematology:  Recent Labs     10/27/21  0553 10/28/21  0536 10/29/21  0701   WBC 25.1* 22.8* 20.9*   RBC 3.39* 3.21* 3.44*   HGB 8.4* 8.1* 8.7*   HCT 29.0* 27.3* 29.5*   MCV 85.5 85.0 85.8   MCH 24.8* 25.2 25.3   MCHC 29.0 29.7 29.5   RDW 23.5* 23.8* 24.4*    199 175   MPV 11.2 11.5 10.3     Chemistry:  Recent Labs     10/27/21  0553 10/28/21  0536 10/29/21  0701  135 135   K 5.0 4.2 4.2   CL 99 98 97*   CO2 23 25 23   GLUCOSE 116* 106* 83   BUN 53* 28* 38*   CREATININE 2.19* 1.67* 2.36*   ANIONGAP 13 12 15   LABGLOM 22* 30* 20*   GFRAA 26* 36* 24*   CALCIUM 8.3* 8.1* 8.1*     Recent Labs     10/27/21  1923 10/28/21  0715 10/28/21  1118 10/28/21  1650 10/28/21  2042 10/29/21  0721   POCGLU 89 136* 168* 149* 169* 88     ABG:  Lab Results   Component Value Date    POCPH 7.456 10/24/2021    POCPCO2 41.1 10/24/2021    POCPO2 53.3 10/24/2021    POCHCO3 28.9 10/24/2021    NBEA NOT REPORTED 10/24/2021    PBEA 5 10/24/2021    GKS6SBT NOT REPORTED 10/24/2021    EVKV9UZL 89 10/24/2021    FIO2 NOT REPORTED 10/24/2021     Lab Results   Component Value Date/Time    SPECIAL RTAC,20CC 10/26/2021 06:28 PM    SPECIAL LTAC 10/26/2021 06:28 PM     Lab Results   Component Value Date/Time    CULTURE NO GROWTH 2 DAYS 10/26/2021 06:28 PM    CULTURE NO GROWTH 2 DAYS 10/26/2021 06:28 PM       Radiology:  XR CHEST (SINGLE VIEW FRONTAL)    Result Date: 10/24/2021  Small stable left effusion and left lower lobe atelectatic changes. Stable right chest tube. Cardiomegaly and COPD redemonstrated. NM LUNG SCAN PERFUSION ONLY    Result Date: 10/21/2021  Exam is technically high probability for pulmonary embolism. While emboli are possible, the diminished activity within the upper lung zones could also be secondary to emphysema; a  ventilation study would be needed for confirmation. This could be obtained at a later time as clinically warranted. Improving perfusion at the lateral right lung base. Findings were discussed with Anika Espitia at 12:13 on 10/21/2021. CT ABDOMEN PELVIS W IV CONTRAST Additional Contrast? Radiologist Recommendation    Result Date: 10/25/2021  1. Right pneumothorax with chest tube in place. Please see separate chest CT report for details of this region. 2.  Large rectal colorectal stool burden and mild gaseous distention of the colon.      XR CHEST PORTABLE    Result Date: 10/25/2021  Small bore right chest tube in unchanged position. Smaller but persistent right pneumothorax; otherwise, unchanged findings. Stable left effusion with left lower lobe volume loss. COPD. XR CHEST PORTABLE    Result Date: 10/23/2021  Questionable trace focal pneumothorax laterally in the right base near the chest tube. Persistent by a basilar airspace disease with small left pleural effusion. XR CHEST PORTABLE    Result Date: 10/22/2021  Stable right chest tube without demonstrable pneumothorax Improvement in now mild right basilar opacity related to minimal atelectasis and or effusion Stable moderate left basilar opacity     XR CHEST PORTABLE    Result Date: 10/21/2021  Stable right chest tube without pneumothorax Stable bibasilar pleuroparenchymal changes     XR CHEST PORTABLE    Result Date: 10/20/2021  No significant interval change. Right chest tube in place with small right pleural effusion and right basilar opacity. Unchanged moderate left pleural effusion. XR CHEST 1 VIEW    Result Date: 10/26/2021  Interval placement of tunneled dialysis catheter with tip in appropriate position. Small right basilar pneumothorax no longer present. Chest tube upsize with tip in right base. Attention for pneumothorax on serial portable. .     CT CHEST PULMONARY EMBOLISM W CONTRAST    Addendum Date: 10/25/2021    ADDENDUM: Findings were discussed with IVA SURESH at 1:30 pm on 10/25/2021. Result Date: 10/25/2021  Loculated left basilar effusion with adjacent consolidation representing atelectasis versus pneumonia. Focus of consolidation in the right lower lobe representing atelectasis versus pneumonia. Moderate to large right-sided pneumothorax with an indwelling pigtail catheter in the right basilar pleural space. IR TUNNELED CVC PLACE WO SQ PORT/PUMP > 5 YEARS    Result Date: 10/26/2021  Successful ultrasound and fluoroscopy guided tunneled catheter placement . The tunneled dialysis catheter may be used immediately. IR CHANGE DRAINAGE CATH    Result Date: 10/26/2021  Chest tube upsized to [address]. Attached to wall suction at which time air was aspirated consistent with pneumothorax. Patient hypoxia improved from 84 to 94%. These findings were communicated to the ICU doctor in person at 9:45 a.m. Benjacki Mcdonald Physical Examination:        Physical Exam  Vitals and nursing note reviewed. Constitutional:       General: She is not in acute distress. Appearance: She is obese. She is ill-appearing. Interventions: Nasal cannula in place. HENT:      Head: Normocephalic and atraumatic. Eyes:      Conjunctiva/sclera: Conjunctivae normal.      Pupils: Pupils are equal, round, and reactive to light. Cardiovascular:      Rate and Rhythm: Normal rate and regular rhythm. Heart sounds: No murmur heard. Pulmonary:      Effort: Pulmonary effort is normal. No accessory muscle usage or respiratory distress. Breath sounds: No stridor. Examination of the right-lower field reveals decreased breath sounds. Examination of the left-lower field reveals decreased breath sounds. Decreased breath sounds present. No wheezing, rhonchi or rales. Chest:      Comments: Chest tube noted to suction, air leak noted   Abdominal:      General: Bowel sounds are normal. There is no distension. Palpations: Abdomen is soft. Abdomen is not rigid. Tenderness: There is no abdominal tenderness. There is no guarding. Musculoskeletal:         General: No tenderness. Skin:     General: Skin is warm and dry. Findings: No erythema, lesion or rash. Neurological:      Mental Status: She is alert and oriented to person, place, and time. Cranial Nerves: No cranial nerve deficit. Motor: No seizure activity. Psychiatric:         Speech: Speech normal.         Behavior: Behavior normal. Behavior is cooperative.          Assessment:        Hospital Problems Last Modified POA    * (Principal) Pleural effusion on right 10/15/2021 Yes    Acute renal failure with acute renal cortical necrosis superimposed on stage 4 chronic kidney disease ([address] 10/14/2021 Yes    Acute respiratory failure with hypoxia ([address] 10/13/2021 Yes    Iron deficiency anemia 10/12/2021 Yes    Essential hypertension 10/12/2021 Yes    General weakness 10/12/2021 Yes    Vitamin D deficiency 10/13/2021 Yes    Acute diastolic heart failure ([address] 10/13/2021 Yes    Hypoxia 10/13/2021 Yes    Anemia 10/13/2021 Yes    Peripheral edema 10/13/2021 Yes    Moderate protein-calorie malnutrition ([address] 10/13/2021 Yes    Severe malnutrition ([address] 10/13/2021 Yes    Acute kidney injury ([address] 10/15/2021 Yes    Hypokalemia 10/17/2021 Yes    Other emphysema ([address] 10/17/2021 Yes          Plan:           Acute hypoxemic respiratory failure: Likely secondary to  pleural effusion and pulmonary edema continue support with high flow nasal cannula    Moderate to large right-sided pleural effusion: S/P thoracentesis with 850 mls out     Pneumothorax: Status post chest tube placement, chest tube exchanged 10/26  continue to continuous suction, serial CXR   discussed with pulmonology . Abx discontinued per ID no further need for antibiotics    Acute kidney injury/ CKD 4: Likely secondary CHF  Urine retention: Patient needed hemodialysis, appreciate nephrology input. Renal diet    Acute decompensated diastolic heart failure: Diuresis initially, now on hemodialysis, continue, strict I's & O's, daily weight, cardiac diet and fluid restriction    Hyperkalemia: Currently resolved. Severe malnutrition: Continue boost and encourage p.o. intake.     COPD exacerbation: Continue breathing treatment with DuoNeb and steroids    Discussed with the patient and nurse    Discussed with Dr Shelbie Gray ( nephrology)      Dk Arias MD  10/29/2021  7:55 AM

## 2021-10-29 NOTE — PROGRESS NOTES
[facility]  Progress Note    Tristen Reyes  MRN: [id number]  Date: 10/29/2021  LOS: 16     Reason for F/U :   Leukocytosis    Impression :   Acute hypoxic respiratory failure  Right-sided pleural effusion status post thoracentesis 10/13/2021 and right-sided chest tube placement 10/18/2021  Moderate to large right-sided pneumothorax status post chest tube exchange for larger size 10/26/2021  Small loculated left-sided pleural effusion  Acute kidney injury on chronic kidney disease stage IV now started on hemodialysis  Acute diastolic congestive heart failure  Leukocytosis    Recommendations: The patient continues to do well clinically off antimicrobial therapy. The right-sided chest tubes remain in place and the patient still has an air leak. The leukocytosis remains stable if not slightly improved  We will continue to monitor her progress of antimicrobial therapy    Infection Control Recommendations:   Universal precautions    Discharge Planning:   Estimated Length of IV antimicrobials:   Patient will need Midline Catheter Insertion/ PICC line Insertion: No  Patient will need: Home IV , [place],  SNF,  LTAC: Undetermined  Patient willneed outpatient wound care: No    Medical Decision making / Summary of Stay:   Tristen Reyes is a 64y.o.-year-old female who was initially admitted on 10/12/2021. Patient has no documented medical history. Her information was obtained from the patient's chart, she is a poor historian and does not communicate much.     We were consulted 10/27/2021, patient was initially admitted to the hospital 10/12/2021 via ambulance from home with reported failure to thrive according to her son. On admission, patient had complaints of diarrhea x3 days, weakness, peripheral edema, she was also hypoxic at that time, documented at 45% oxygen saturation on room air. She was placed on 6 L oxygen per nasal cannula.   On chest x-ray she did have a right-sided pleural effusion. Despite this, she did not have complaints of shortness of breath or cough. She was initiated on IV Levaquin. Patient had an ultrasound-guided right-sided thoracentesis 10/13/2021 with 850 mL of nonturbid straw-colored fluid removed, fluid negative for malignancy. There is concern for right-sided trapped lung, interventional radiology did place a chest tube 10/18/2021.  10/19/2021 patient was transitioned to high flow per nasal cannula. Methylprednisone was initiated. 10/21/2021 patient had part of the VQ scan performed which was concerning for possible PE. She was initiated on IV heparin. She was subsequently transitioned back to oxygen per nasal cannula. The plan was for patient to do the ventilation portion of the VQ scan 10/24/2021; however, patient desaturated into the 80s and did not tolerate the exam.  Her white blood cell count increased at this point to 22,000 when it was previously within normal limits. Patient had CTA chest 10/25/2021 which revealed a moderate to large right-sided pneumothorax. 10/26/2021 her chest tube was exchanged to a larger sized tube. White blood cell count increased to 31,400. Her antimicrobial therapy was broadened to Levaquin as well as aztreonam and vancomycin and we were consulted 10/26/2021 due to continued leukocytosis. Patient was seen in hemodialysis and she was very lethargic. Discussed with patient's RN, does not know if patient having any diarrhea. States vital signs have been stable. Current evaluation:10/29/2021    BP (!) 123/55   Pulse 113   Temp 98.1 °F (36.7 °C)   Resp 20   Ht 5' 4\" (1.626 m)   Wt 121 lb 0.5 oz (54.9 kg)   SpO2 100%   BMI 20.78 kg/m²     Temperature Range: Temp: 98.1 °F (36.7 °C) Temp  Av.7 °F (36.5 °C)  Min: 97.3 °F (36.3 °C)  Max: 98.1 °F (36.7 °C)  The patient is seen and evaluated at bedside and is awake and alert in no acute distress. No subjective fever or chills.   No abdominal pain nausea or vomiting. She continues to feel fatigued/weak. The patient just returned from hemodialysis. She remains on 3 L of oxygen by nasal cannula. Review of Systems   Constitutional: Negative. Respiratory: Positive for shortness of breath. Cardiovascular: Negative. Gastrointestinal: Negative. Genitourinary: Negative. Musculoskeletal: Negative. Skin: Negative. Neurological: Negative. Psychiatric/Behavioral: Negative. Physical Examination :     Physical Exam  Constitutional:       Appearance: She is well-developed. HENT:      Head: Normocephalic and atraumatic. Neck:      Comments: There is a right-sided hemodialysis catheter in place  Cardiovascular:      Rate and Rhythm: Regular rhythm. Heart sounds: Normal heart sounds. Pulmonary:      Effort: Pulmonary effort is normal.      Breath sounds: Rales (On the right side) present. Comments: There is a right-sided chest tube in place  Abdominal:      General: Bowel sounds are normal.      Palpations: Abdomen is soft. Musculoskeletal:      Cervical back: Neck supple. Skin:     General: Skin is warm and dry. Neurological:      Mental Status: She is alert and oriented to person, place, and time. Laboratory data:   I have independently reviewed the followinglabs:  CBC with Differential:   Recent Labs     10/28/21  0536 10/29/21  0701   WBC 22.8* 20.9*   HGB 8.1* 8.7*   HCT 27.3* 29.5*    175   LYMPHOPCT 2* 4*   MONOPCT 3 6     BMP:   Recent Labs     10/28/21  0536 10/29/21  0701    135   K 4.2 4.2   CL 98 97*   CO2 25 23   BUN 28* 38*   CREATININE 1.67* 2.36*     Hepatic Function Panel: No results for input(s): PROT, LABALBU, BILIDIR, IBILI, BILITOT, ALKPHOS, ALT, AST in the last 72 hours.       No results found for: PROCAL  No results found for: CRP  No results found for: SEDRATE      Lab Results   Component Value Date    DDIMER 2.38 10/12/2021     Lab Results   Component Value Date    FERRITIN 20 10/13/2021     Lab Results   Component Value Date     10/13/2021     No results found for: FIBRINOGEN    Results in Past 30 Days  Result Component Current Result Ref Range Previous Result Ref Range   SARS-CoV-2, Rapid Not Detected (10/12/2021) Not Detected Not in Time Range      Lab Results   Component Value Date    COVID19 Not Detected 10/12/2021       No results for input(s): VANCOTROUGH in the last 72 hours. Imaging Studies:   ONE XRAY VIEW OF THE CHEST 10/28/2021 12:05 pm  Impression   Probable mild increase in small left-sided effusion with otherwise stable   exam.       Cultures:     Culture, Blood 1 [[id number]] Collected: 10/26/21 1828   Order Status: Completed Specimen: Blood Updated: 10/29/21 0622    Specimen Description . BLOOD    Special Requests RTAC,20CC    Culture NO GROWTH 2 DAYS   Culture, Blood 1 [[id number]] Collected: 10/26/21 1828   Order Status: Completed Specimen: Blood Updated: 10/29/21 0622    Specimen Description . BLOOD    Special Requests LTAC    Culture NO GROWTH 2 DAYS     Culture, Blood 1 [[id number]] Collected: 10/25/21 1120   Order Status: Completed Specimen: Blood Updated: 10/29/21 4999    Specimen Description . BLOOD    Special Requests LFA, 1ML    Culture NO GROWTH 4 DAYS   Culture, Blood 1 [[id number]] Collected: 10/25/21 1130   Order Status: Completed Specimen: Blood Updated: 10/29/21 5890    Specimen Description . BLOOD    Special Requests LH, 2ML    Culture NO GROWTH 4 DAYS     Culture, Blood 1 [[id number]] Collected: 10/25/21 1120   Order Status: Completed Specimen: Blood Updated: 10/28/21 0015    Specimen Description . BLOOD    Special Requests LFA, 1ML    Culture NO GROWTH 3 DAYS   Culture, Blood 1 [[id number]] Collected: 10/25/21 1130   Order Status: Completed Specimen: Blood Updated: 10/28/21 0015    Specimen Description . BLOOD    Special Requests LH, 2ML    Culture NO GROWTH 3 DAYS     Culture, Fungus [[id number]] Collected: 10/13/21 0900   Order Status: Completed Specimen: Thoracentesis Updated: 10/25/21 1125    Specimen Description . THORACENTESIS FLUID RT    Special Requests NOT REPORTED    Culture NO GROWTH 12 DAYS   Culture with Smear, Acid Fast Bacillius [[id number]] Collected: 10/13/21 0900   Order Status: Completed Specimen: Thoracentesis Updated: 10/25/21 0829    Specimen Description . THORACENTESIS FLUID RT    Special Requests NOT REPORTED    Direct Exam NO ACID FAST BACILLI SEEN (DIRECT SMEAR)    Culture NO GROWTH 12 DAYS   Culture, Blood 1 [[id number]] Collected: 10/18/21 0426   Order Status: Completed Specimen: Blood Updated: 10/24/21 0843    Specimen Description . BLOOD    Special Requests RT AC 5ML    Culture NO GROWTH 6 DAYS   Culture, Blood 2 [[id number]] Collected: 10/18/21 0426   Order Status: Completed Specimen: Blood Updated: 10/24/21 0843    Specimen Description . BLOOD    Special Requests RT HAND 6ML    Culture NO GROWTH 6 DAYS   Culture, Body Fluid [[id number]] (Abnormal) Collected: 10/13/21 0900   Order Status: Completed Specimen: Body Fluid from Thoracentesis Updated: 10/19/21 1115    Specimen Description . THORACENTESIS FLUID RT    Special Requests NOT REPORTED    Direct Exam MANY NEUTROPHILSAbnormal      NO BACTERIA SEEN     Gram stain made from cytocentrifuged specimen.  Organisms and cells will be concentrated. Culture NO GROWTH 6 DAYS   Culture, Blood 1 [[id number]] Collected: 10/12/21 1537   Order Status: Completed Specimen: Blood Updated: 10/18/21 0619    Specimen Description . BLOOD    Special Requests RIGHT ANTECUBE 20CC    Culture NO GROWTH 6 DAYS   Culture, Blood 1 [[id number]] Collected: 10/12/21 1540   Order Status: Completed Specimen: Blood Updated: 10/18/21 0619    Specimen Description . BLOOD    Special Requests LEFT ANTECUBE 20CC    Culture NO GROWTH 6 DAYS   Gram stain [[id number]] Collected: 10/13/21 0800   Order Status: Canceled Specimen:  Body Fluid    COVID-19, Rapid [[id number]] Collected: 10/12/21 1554   Order Status: Completed with the assistance of a speech recognition program.  While intending to generate a document that actually reflects the content of the visit, the document can still have some errors including those of syntax andsound a like substitutions which may escape proof reading. In such instances, actual meaning can be extrapolated by contextual diversion.

## 2021-10-29 NOTE — PROGRESS NOTES
NEPHROLOGY PROGRESS NOTE      SUBJECTIVE   Patient was seen and examined. Patient was in her bed. She was eating her breakfast.  She states that her appetite is significantly improved in last 3 to 4 days. She was alert and awake. Her oxygen saturation was 94% with nasal cannula. Chest tube is still in place. OBJECTIVE     Vitals:    10/29/21 0355 10/29/21 0548 10/29/21 0715 10/29/21 0756   BP: 116/64  124/69    Pulse: 106  105    Resp: 18  16    Temp: 97.7 °F (36.5 °C)  97.7 °F (36.5 °C)    TempSrc: Oral  Oral    SpO2: 99%  99% 100%   Weight:  121 lb 1.6 oz (54.9 kg)     Height:         24HR INTAKE/OUTPUT:      Intake/Output Summary (Last 24 hours) at 10/29/2021 0943  Last data filed at 10/29/2021 0536  Gross per 24 hour   Intake 240 ml   Output 137 ml   Net 103 ml       General appearance: Comfortable alert and awake  HEENT: No tenderness over sinuses. Respiratory[de-identified] Bilateral air entry, decreased at right base. Cardiovascular:S1 S2 normal,no gallop or organic murmur. Abdomen:Non tender/non distended. Bowel sounds present  Extremities: No edema neurological: Alert and awake x3    MEDICATIONS     Scheduled Meds:    predniSONE  40 mg Oral Daily    heparin (porcine)  5,000 Units SubCUTAneous BID    budesonide  0.5 mg Nebulization BID    Arformoterol Tartrate  15 mcg Nebulization BID    ipratropium-albuterol  1 ampule Inhalation 4x daily    epoetin mike-epbx  8,000 Units SubCUTAneous Once per day on Mon Wed Fri    calcitRIOL  0.25 mcg Oral Once per day on Mon Wed Fri    sodium chloride flush  5-40 mL IntraVENous 2 times per day    vitamin D  50,000 Units Oral Weekly       INVESTIGATIONS     Last 3 CMP:    Recent Labs     10/27/21  0553 10/28/21  0536 10/29/21  0701    135 135   K 5.0 4.2 4.2   CL 99 98 97*   CO2 23 25 23   BUN 53* 28* 38*   CREATININE 2.19* 1.67* 2.36*   CALCIUM 8.3* 8.1* 8.1*       Last 3 CBC:  Recent Labs     10/27/21  0553 10/28/21  0536 10/29/21  0701   WBC 25.1* 22.8* 20.9*   RBC 3.39* 3.21* 3.44*   HGB 8.4* 8.1* 8.7*   HCT 29.0* 27.3* 29.5*   MCV 85.5 85.0 85.8   MCH 24.8* 25.2 25.3   MCHC 29.0 29.7 29.5   RDW 23.5* 23.8* 24.4*    199 175   MPV 11.2 11.5 10.3       ASSESSMENT     #1  ESRD, patient is started on hemodialysis. Patient is tolerating fairly well. Her appetite is improving  #2 exhibiting uremic symptoms. Improving after initiation of dialysis   #3 moderate large right pleural effusion status post thoracocentesis and chest tube placement  #4 moderate pulmonary hypertension/RVSP 60/tricuspid regurgitation  #5 essential hypertension: Blood pressure is under good control    PLAN     #1. Dialysis today  2. Labs as ordered  3. Patient used to be under the care of Dr. Lashell Galindo. I asked patient if she want us to contact Dr. Lashell Galindo so he can  her care. Her response was I will check with my son and I will let you know. We will wait for her decision.       This note is created with the assistance of a speech-recognition program. While intending to generate a document that actually reflects the content of the visit, no guarantees can be provided that every mistake has been identified and corrected by editing    Kathi Blake MD  10/29/2021 9:43 AM  [facility]

## 2021-10-29 NOTE — PLAN OF CARE
Problem: Falls - Risk of:  Goal: Will remain free from falls  Description: Will remain free from falls  Outcome: Ongoing     Problem: Skin Integrity:  Goal: Will show no infection signs and symptoms  Description: Will show no infection signs and symptoms  Outcome: Ongoing     Problem: Breathing Pattern - Ineffective:  Goal: Ability to achieve and maintain a regular respiratory rate will improve  Description: Ability to achieve and maintain a regular respiratory rate will improve  Outcome: Ongoing     Problem: Pain:  Goal: Control of acute pain  Description: Control of acute pain  Outcome: Ongoing

## 2021-10-29 NOTE — PROGRESS NOTES
HEMODIALYSIS POST TREATMENT NOTE    Treatment time ordered: 3 hours    Actual treatment time: 3 Hours    UltraFiltration Goal: 0  UltraFiltration Removed: 0      Pre Treatment weight: 54.9 kg  Post Treatment weight: 54.9 kg  Estimated Dry Weight:     Access used:     Central Venous Catheter:          Tunneled or Non-tunneled: Tunneled           Site: Right chest wall WNL          Access Flow: Good with lines reversed. Tolerated BFR throughout treatment      Internal Access:       AV Fistula or AV Graft:          Site:        Access Flow:        Sign and symptoms of infection:        If YES:     Medications or blood products given:  Albumin 12.5 G and Midodrine 10 mg  Chronic outpatient schedule: MAYO, treatment # 3 this date    Chronic outpatient unit:     Summary of response to treatment: Treatment ran well. Patient did have episodes of hypotension with no reported symptoms. Albumin 12.5 g and Midodrine 10 mg given for BP support      Explain if orders NOT met, was physician notified:      Samantha Sevilla faxed to patient unit/ placed in patient chart: Yes    Post assessment completed: Yes    Report given to: Blanca Hillman RN      * Intra-treatment documented Safety Checks include the followin) Access and face visible at all times. 2) All connections and blood lines are secure with no kinks. 3) NVL alarm engaged. 4) Hemosafe device applied (if applicable). 5) No collapse of Arterial or Venous blood chambers. 6) All blood lines / pump segments in the air detectors.

## 2021-10-29 NOTE — PROGRESS NOTES
to keep SPO2 greater than 90%  · Incentive spirometry every hour while awake  · Symbicort  · DuoNeb every 4 hours  · Budesonide and Brovana via nebulizer every 12 hours  · Prednisone taper  · Monitor off of antibiotics. 14-day course of Levaquin completed. Infectious disease following  · Monitor blood cultures  · Keep pigtail catheter to suction. Monitor output/Air leak. If airleak persists over the weekend and effusion remains minimal, will switch to Heimlich valve on Monday. · Dialysis per nephrology  · X-ray chest in a.m.   · Labs: CBC and BMP in am  · Pleural fluid negative for malignancy  · DVT prophylaxis, subcu heparin  · PFTs as an outpatient  · Discussed with patient  · Discussed with RN  · Will follow with you      Electronically signed by     Sherri Michael MD on 10/29/2021 at 3:15 PM  Pulmonary Critical Care and Sleep Medicine,  [facility]  Cell: [phone]  Office: [phone]

## 2021-10-29 NOTE — CARE COORDINATION
Discharge Planning:     Received a call from [facility] with [facility] and they are unable to initiate another pre cert, and have been instructed to appeal the denial. In order to start the appeal [facility] needs a formed signed by Dr. Corrina Vera. Dr. Corrina Vera was informed this morning that a form would be coming and require her signature. PS sent to her at 11:30am. Form is in CM binder behind patient's tab. Once signed fax to Hector Ledbetter with [facility] at [phone].

## 2021-10-29 NOTE — PROGRESS NOTES
Physical Therapy  DATE: 10/29/2021    NAME: Jean Pierre Skelton  MRN: [id number]   : 1945    Patient not seen this date for Physical Therapy due to:      [] Cancel by RN or physician due to:    [x] Hemodialysis    [] Critical Lab Value Level     [] Blood transfusion in progress    [] Acute or unstable cardiovascular status   _MAP < 55 or more than >115  _HR < 40 or > 130    [] Acute or unstable pulmonary status   -FiO2 > 60%   _RR < 5 or >40    _O2 sats < 85%    [] Strict Bedrest    [] Off Unit for surgery or procedure    [] Off Unit for testing       [] Pending imaging to R/O fracture    [] Refusal by Patient      [] Other      [] PT being discontinued at this time. Patient independent. No further needs. [] PT being discontinued at this time as the patient has been transferred to hospice care. No further needs.       Valentin Gaspar, PTA

## 2021-10-29 NOTE — PLAN OF CARE
Problem: Falls - Risk of:  Goal: Will remain free from falls  Description: Will remain free from falls  10/29/2021 0334 by Otis Harris RN  Note: Will remain free from falls. Fall risk assessment completed. Patient instructed to use call light. Bed locked and in lowest position, side rails up 2/4, call light and bedside table within reach, and non-skid footwear on pt. Bed alarm in use. Hourly rounding continued.         Problem: Falls - Risk of:  Goal: Absence of physical injury  Description: Absence of physical injury  Outcome: Ongoing     Problem: Skin Integrity:  Goal: Will show no infection signs and symptoms  Description: Will show no infection signs and symptoms  Outcome: Ongoing     Problem: Skin Integrity:  Goal: Absence of new skin breakdown  Description: Absence of new skin breakdown  Outcome: Ongoing     Problem: Breathing Pattern - Ineffective:  Goal: Ability to achieve and maintain a regular respiratory rate will improve  Description: Ability to achieve and maintain a regular respiratory rate will improve  Outcome: Ongoing

## 2021-10-30 ENCOUNTER — APPOINTMENT (OUTPATIENT)
Dept: GENERAL RADIOLOGY | Age: 76
DRG: 291 | End: 2021-10-30
Payer: MEDICARE

## 2021-10-30 LAB
ABSOLUTE EOS #: 0 K/UL (ref 0–0.4)
ABSOLUTE IMMATURE GRANULOCYTE: 0.91 K/UL (ref 0–0.3)
ABSOLUTE LYMPH #: 0.68 K/UL (ref 1–4.8)
ABSOLUTE MONO #: 1.14 K/UL (ref 0.2–0.8)
ANION GAP SERPL CALCULATED.3IONS-SCNC: 11 MMOL/L (ref 9–17)
BASOPHILS # BLD: 0 %
BASOPHILS ABSOLUTE: 0 K/UL (ref 0–0.2)
BUN BLDV-MCNC: 17 MG/DL (ref 8–23)
BUN/CREAT BLD: 12 (ref 9–20)
CALCIUM SERPL-MCNC: 8.1 MG/DL (ref 8.6–10.4)
CHLORIDE BLD-SCNC: 100 MMOL/L (ref 98–107)
CO2: 24 MMOL/L (ref 20–31)
CREAT SERPL-MCNC: 1.38 MG/DL (ref 0.5–0.9)
DIFFERENTIAL TYPE: ABNORMAL
EOSINOPHILS RELATIVE PERCENT: 0 % (ref 1–4)
GFR AFRICAN AMERICAN: 45 ML/MIN
GFR NON-AFRICAN AMERICAN: 37 ML/MIN
GFR SERPL CREATININE-BSD FRML MDRD: ABNORMAL ML/MIN/{1.73_M2}
GFR SERPL CREATININE-BSD FRML MDRD: ABNORMAL ML/MIN/{1.73_M2}
GLUCOSE BLD-MCNC: 116 MG/DL (ref 65–105)
GLUCOSE BLD-MCNC: 116 MG/DL (ref 65–105)
GLUCOSE BLD-MCNC: 72 MG/DL (ref 65–105)
GLUCOSE BLD-MCNC: 81 MG/DL (ref 70–99)
GLUCOSE BLD-MCNC: 95 MG/DL (ref 65–105)
HCT VFR BLD CALC: 27.3 % (ref 36.3–47.1)
HEMOGLOBIN: 7.8 G/DL (ref 11.9–15.1)
IMMATURE GRANULOCYTES: 4 %
LYMPHOCYTES # BLD: 3 % (ref 24–44)
MCH RBC QN AUTO: 25.1 PG (ref 25.2–33.5)
MCHC RBC AUTO-ENTMCNC: 28.6 G/DL (ref 28.4–34.8)
MCV RBC AUTO: 87.8 FL (ref 82.6–102.9)
MONOCYTES # BLD: 5 % (ref 1–7)
MORPHOLOGY: ABNORMAL
MORPHOLOGY: ABNORMAL
NRBC AUTOMATED: 0.7 PER 100 WBC
PDW BLD-RTO: 25.2 % (ref 11.8–14.4)
PLATELET # BLD: 148 K/UL (ref 138–453)
PLATELET ESTIMATE: ABNORMAL
PMV BLD AUTO: 11.9 FL (ref 8.1–13.5)
POTASSIUM SERPL-SCNC: 3.8 MMOL/L (ref 3.7–5.3)
RBC # BLD: 3.11 M/UL (ref 3.95–5.11)
RBC # BLD: ABNORMAL 10*6/UL
SEG NEUTROPHILS: 88 % (ref 36–66)
SEGMENTED NEUTROPHILS ABSOLUTE COUNT: 20.07 K/UL (ref 1.8–7.7)
SODIUM BLD-SCNC: 135 MMOL/L (ref 135–144)
WBC # BLD: 22.8 K/UL (ref 3.5–11.3)
WBC # BLD: ABNORMAL 10*3/UL

## 2021-10-30 PROCEDURE — 36415 COLL VENOUS BLD VENIPUNCTURE: CPT

## 2021-10-30 PROCEDURE — 6370000000 HC RX 637 (ALT 250 FOR IP): Performed by: STUDENT IN AN ORGANIZED HEALTH CARE EDUCATION/TRAINING PROGRAM

## 2021-10-30 PROCEDURE — 6360000002 HC RX W HCPCS: Performed by: INTERNAL MEDICINE

## 2021-10-30 PROCEDURE — 82947 ASSAY GLUCOSE BLOOD QUANT: CPT

## 2021-10-30 PROCEDURE — 99232 SBSQ HOSP IP/OBS MODERATE 35: CPT | Performed by: INTERNAL MEDICINE

## 2021-10-30 PROCEDURE — 80048 BASIC METABOLIC PNL TOTAL CA: CPT

## 2021-10-30 PROCEDURE — 99232 SBSQ HOSP IP/OBS MODERATE 35: CPT | Performed by: FAMILY MEDICINE

## 2021-10-30 PROCEDURE — 2060000000 HC ICU INTERMEDIATE R&B

## 2021-10-30 PROCEDURE — 85025 COMPLETE CBC W/AUTO DIFF WBC: CPT

## 2021-10-30 PROCEDURE — 6370000000 HC RX 637 (ALT 250 FOR IP): Performed by: FAMILY MEDICINE

## 2021-10-30 PROCEDURE — 6370000000 HC RX 637 (ALT 250 FOR IP): Performed by: NURSE PRACTITIONER

## 2021-10-30 PROCEDURE — 94640 AIRWAY INHALATION TREATMENT: CPT

## 2021-10-30 PROCEDURE — 6360000002 HC RX W HCPCS: Performed by: NURSE PRACTITIONER

## 2021-10-30 PROCEDURE — 2700000000 HC OXYGEN THERAPY PER DAY

## 2021-10-30 PROCEDURE — 2580000003 HC RX 258: Performed by: STUDENT IN AN ORGANIZED HEALTH CARE EDUCATION/TRAINING PROGRAM

## 2021-10-30 PROCEDURE — 94761 N-INVAS EAR/PLS OXIMETRY MLT: CPT

## 2021-10-30 PROCEDURE — 71045 X-RAY EXAM CHEST 1 VIEW: CPT

## 2021-10-30 RX ORDER — MIDODRINE HYDROCHLORIDE 10 MG/1
10 TABLET ORAL 3 TIMES DAILY PRN
Status: DISCONTINUED | OUTPATIENT
Start: 2021-10-30 | End: 2021-11-04 | Stop reason: HOSPADM

## 2021-10-30 RX ADMIN — SODIUM CHLORIDE, PRESERVATIVE FREE 10 ML: 5 INJECTION INTRAVENOUS at 08:38

## 2021-10-30 RX ADMIN — BUDESONIDE 500 MCG: 0.5 SUSPENSION RESPIRATORY (INHALATION) at 07:25

## 2021-10-30 RX ADMIN — PREDNISONE 40 MG: 20 TABLET ORAL at 08:38

## 2021-10-30 RX ADMIN — IPRATROPIUM BROMIDE AND ALBUTEROL SULFATE 1 AMPULE: .5; 2.5 SOLUTION RESPIRATORY (INHALATION) at 11:30

## 2021-10-30 RX ADMIN — HEPARIN SODIUM 5000 UNITS: 5000 INJECTION INTRAVENOUS; SUBCUTANEOUS at 08:39

## 2021-10-30 RX ADMIN — METOPROLOL TARTRATE 12.5 MG: 25 TABLET, FILM COATED ORAL at 20:20

## 2021-10-30 RX ADMIN — BUDESONIDE 500 MCG: 0.5 SUSPENSION RESPIRATORY (INHALATION) at 17:56

## 2021-10-30 RX ADMIN — SODIUM CHLORIDE, PRESERVATIVE FREE 10 ML: 5 INJECTION INTRAVENOUS at 21:00

## 2021-10-30 RX ADMIN — IPRATROPIUM BROMIDE AND ALBUTEROL SULFATE 1 AMPULE: .5; 2.5 SOLUTION RESPIRATORY (INHALATION) at 07:25

## 2021-10-30 RX ADMIN — HEPARIN SODIUM 5000 UNITS: 5000 INJECTION INTRAVENOUS; SUBCUTANEOUS at 20:20

## 2021-10-30 RX ADMIN — ARFORMOTEROL TARTRATE 15 MCG: 15 SOLUTION RESPIRATORY (INHALATION) at 17:57

## 2021-10-30 RX ADMIN — IPRATROPIUM BROMIDE AND ALBUTEROL SULFATE 1 AMPULE: .5; 2.5 SOLUTION RESPIRATORY (INHALATION) at 17:56

## 2021-10-30 RX ADMIN — ARFORMOTEROL TARTRATE 15 MCG: 15 SOLUTION RESPIRATORY (INHALATION) at 07:35

## 2021-10-30 RX ADMIN — METOPROLOL TARTRATE 12.5 MG: 25 TABLET, FILM COATED ORAL at 08:38

## 2021-10-30 ASSESSMENT — ENCOUNTER SYMPTOMS
NAUSEA: 0
SHORTNESS OF BREATH: 1
CONSTIPATION: 0
WHEEZING: 0
VOMITING: 0
BLOOD IN STOOL: 0
DIARRHEA: 0
COUGH: 0
ABDOMINAL PAIN: 0
CHEST TIGHTNESS: 0
GASTROINTESTINAL NEGATIVE: 1

## 2021-10-30 ASSESSMENT — PAIN SCALES - GENERAL
PAINLEVEL_OUTOF10: 0
PAINLEVEL_OUTOF10: 0

## 2021-10-30 NOTE — PLAN OF CARE
Problem: Falls - Risk of:  Goal: Will remain free from falls  Description: Will remain free from falls  10/30/2021 0304 by Mary Mason  Outcome: Ongoing  10/29/2021 1807 by Mario Branham RN  Outcome: Ongoing  Goal: Absence of physical injury  Description: Absence of physical injury  Outcome: Ongoing     Problem: Nutrition  Goal: Optimal nutrition therapy  Outcome: Ongoing     Problem: Skin Integrity:  Goal: Will show no infection signs and symptoms  Description: Will show no infection signs and symptoms  10/30/2021 0304 by Mary Mason  Outcome: Ongoing  10/29/2021 1807 by Mario Branham RN  Outcome: Ongoing  Goal: Absence of new skin breakdown  Description: Absence of new skin breakdown  Outcome: Ongoing     Problem: OXYGENATION/RESPIRATORY FUNCTION  Goal: Patient will achieve/maintain normal respiratory rate/effort  Description: Respiratory rate and effort will be within normal limits for the patient  Outcome: Ongoing     Problem: Breathing Pattern - Ineffective:  Goal: Ability to achieve and maintain a regular respiratory rate will improve  Description: Ability to achieve and maintain a regular respiratory rate will improve  10/30/2021 0304 by Mary Mason  Outcome: Ongoing  10/29/2021 1807 by Mario Branham RN  Outcome: Ongoing     Problem: Pain:  Goal: Pain level will decrease  Description: Pain level will decrease  Outcome: Ongoing  Goal: Control of acute pain  Description: Control of acute pain  10/30/2021 0304 by Mary Mason  Outcome: Ongoing  10/29/2021 1807 by Mario Branham RN  Outcome: Ongoing  Goal: Control of chronic pain  Description: Control of chronic pain  Outcome: Ongoing

## 2021-10-30 NOTE — RT PROTOCOL NOTE
RT Inhaler-Nebulizer Bronchodilator Protocol Note    There is a bronchodilator order in the chart from a provider indicating to follow the RT Bronchodilator Protocol and there is an Initiate RT Inhaler-Nebulizer Bronchodilator Protocol order as well (see protocol at bottom of note). CXR Findings:  XR CHEST PORTABLE    Result Date: 10/30/2021  Chronic pulmonary change with small bibasilar effusions. Stable support tubes. XR CHEST PORTABLE    Result Date: 10/29/2021  Left basilar effusion and stable right basilar chest tube. Stable right internal jugular central line. The findings from the last RT Protocol Assessment were as follows:   History Pulmonary Disease: Chronic pulmonary disease  Respiratory Pattern: Mild dyspnea at rest, irregular pattern, or RR 21-25 bpm  Breath Sounds: Slightly diminished and/or crackles  Cough: Weak, non-productive  Indication for Bronchodilator Therapy: Decreased or absent breath sounds  Bronchodilator Assessment Score: 11    Aerosolized bronchodilator medication orders have been revised according to the RT Inhaler-Nebulizer Bronchodilator Protocol below. Respiratory Therapist to perform RT Therapy Protocol Assessment initially then follow the protocol. Repeat RT Therapy Protocol Assessment PRN for score 0-3 or on second treatment, BID, and PRN for scores above 3. No Indications - adjust the frequency to every 6 hours PRN wheezing or bronchospasm, if no treatments needed after 48 hours then discontinue using Per Protocol order mode. If indication present, adjust the RT bronchodilator orders based on the Bronchodilator Assessment Score as indicated below.   Use Inhaler orders unless patient has one or more of the following: on home nebulizer, not able to hold breath for 10 seconds, is not alert and oriented, cannot activate and use MDI correctly, or respiratory rate 25 breaths per minute or more, then use the equivalent nebulizer order(s) with same Frequency and PRN reasons based on the score. If a patient is on this medication at home then do not decrease Frequency below that used at home. 0-3 - enter or revise RT bronchodilator order(s) to equivalent RT Bronchodilator order with Frequency of every 4 hours PRN for wheezing or increased work of breathing using Per Protocol order mode. 4-6 - enter or revise RT Bronchodilator order(s) to two equivalent RT bronchodilator orders with one order with BID Frequency and one order with Frequency of every 4 hours PRN wheezing or increased work of breathing using Per Protocol order mode. 7-10 - enter or revise RT Bronchodilator order(s) to two equivalent RT bronchodilator orders with one order with TID Frequency and one order with Frequency of every 4 hours PRN wheezing or increased work of breathing using Per Protocol order mode. 11-13 - enter or revise RT Bronchodilator order(s) to one equivalent RT bronchodilator order with QID Frequency and an Albuterol order with Frequency of every 4 hours PRN wheezing or increased work of breathing using Per Protocol order mode. Greater than 13 - enter or revise RT Bronchodilator order(s) to one equivalent RT bronchodilator order with every 4 hours Frequency and an Albuterol order with Frequency of every 2 hours PRN wheezing or increased work of breathing using Per Protocol order mode. RT to enter RT Home Evaluation for COPD & MDI Assessment order using Per Protocol order mode.     Electronically signed by Cristian Justice RCP on 10/30/2021 at 1:19 PM

## 2021-10-30 NOTE — PROGRESS NOTES
Dr. Matt Link rounded. Pt appears more stable from her standpoint.      Also per Dr. Deanne Bush, [facility] to remove gallegos catheter

## 2021-10-30 NOTE — PROGRESS NOTES
[facility]  Progress Note    Victor Manuel Preciado  MRN: [id number]  Date: 10/30/2021  LOS: 25     Reason for F/U :   Leukocytosis    Impression :   Acute hypoxic respiratory failure  Right-sided pleural effusion status post thoracentesis 10/13/2021 and right-sided chest tube placement 10/18/2021  Moderate to large right-sided pneumothorax status post chest tube exchange for larger size 10/26/2021  Small loculated left-sided pleural effusion  Acute kidney injury on chronic kidney disease stage IV now started on hemodialysis  Acute diastolic congestive heart failure  Leukocytosis    Recommendations:   Monitor off antibiotics  Follow CBC and renal function  Supportive care    Infection Control Recommendations:   Universal precautions    Discharge Planning:   Estimated Length of IV antimicrobials:   Patient will need Midline Catheter Insertion/ PICC line Insertion: No  Patient will need: Home IV , [place],  SNF,  LTAC: Undetermined  Patient willneed outpatient wound care: No    Medical Decision making / Summary of Stay:   Victor Manuel Preciado is a 64y.o.-year-old female who was initially admitted on 10/12/2021. Patient has no documented medical history. Her information was obtained from the patient's chart, she is a poor historian and does not communicate much.     We were consulted 10/27/2021, patient was initially admitted to the hospital 10/12/2021 via ambulance from home with reported failure to thrive according to her son. On admission, patient had complaints of diarrhea x3 days, weakness, peripheral edema, she was also hypoxic at that time, documented at 45% oxygen saturation on room air. She was placed on 6 L oxygen per nasal cannula. On chest x-ray she did have a right-sided pleural effusion. Despite this, she did not have complaints of shortness of breath or cough. She was initiated on IV Levaquin.   Patient had an ultrasound-guided right-sided thoracentesis 10/13/2021 with 850 mL of nonturbid straw-colored fluid removed, fluid negative for malignancy. There is concern for right-sided trapped lung, interventional radiology did place a chest tube 10/18/2021.  10/19/2021 patient was transitioned to high flow per nasal cannula. Methylprednisone was initiated. 10/21/2021 patient had part of the VQ scan performed which was concerning for possible PE. She was initiated on IV heparin. She was subsequently transitioned back to oxygen per nasal cannula. The plan was for patient to do the ventilation portion of the VQ scan 10/24/2021; however, patient desaturated into the 80s and did not tolerate the exam.  Her white blood cell count increased at this point to 22,000 when it was previously within normal limits. Patient had CTA chest 10/25/2021 which revealed a moderate to large right-sided pneumothorax. 10/26/2021 her chest tube was exchanged to a larger sized tube. White blood cell count increased to 31,400. Her antimicrobial therapy was broadened to Levaquin as well as aztreonam and vancomycin and we were consulted 10/26/2021 due to continued leukocytosis. Patient was seen in hemodialysis and she was very lethargic. Discussed with patient's RN, does not know if patient having any diarrhea. States vital signs have been stable. Current evaluation:10/30/2021    /61   Pulse 107   Temp 97.7 °F (36.5 °C) (Oral)   Resp 16   Ht 5' 4\" (1.626 m)   Wt 121 lb 0.5 oz (54.9 kg)   SpO2 99%   BMI 20.78 kg/m²     Temperature Range: Temp: 97.7 °F (36.5 °C) Temp  Av.7 °F (36.5 °C)  Min: 97.5 °F (36.4 °C)  Max: 98.1 °F (36.7 °C)  The patient is seen and evaluated at bedside and is awake and alert in no acute distress. This is complaining of generalized weakness, sleepy, denied increased shortness of breath, denied nausea or vomiting, no other complaints. Review of Systems   Constitutional: Negative. Respiratory: Positive for shortness of breath. Cardiovascular: Negative. Range      Lab Results   Component Value Date    COVID19 Not Detected 10/12/2021       No results for input(s): VANCEDIOUGH in the last 72 hours. Imaging Studies:   ONE XRAY VIEW OF THE CHEST 10/28/2021 12:05 pm  Impression   Probable mild increase in small left-sided effusion with otherwise stable   exam.       Cultures:     Culture, Blood 1 [[id number]] Collected: 10/26/21 1828   Order Status: Completed Specimen: Blood Updated: 10/29/21 0622    Specimen Description . BLOOD    Special Requests RTAC,20CC    Culture NO GROWTH 2 DAYS   Culture, Blood 1 [[id number]] Collected: 10/26/21 1828   Order Status: Completed Specimen: Blood Updated: 10/29/21 0622    Specimen Description . BLOOD    Special Requests LTAC    Culture NO GROWTH 2 DAYS     Culture, Blood 1 [[id number]] Collected: 10/25/21 1120   Order Status: Completed Specimen: Blood Updated: 10/29/21 5800    Specimen Description . BLOOD    Special Requests LFA, 1ML    Culture NO GROWTH 4 DAYS   Culture, Blood 1 [[id number]] Collected: 10/25/21 1130   Order Status: Completed Specimen: Blood Updated: 10/29/21 9684    Specimen Description . BLOOD    Special Requests LH, 2ML    Culture NO GROWTH 4 DAYS     Culture, Blood 1 [[id number]] Collected: 10/25/21 1120   Order Status: Completed Specimen: Blood Updated: 10/28/21 0015    Specimen Description . BLOOD    Special Requests LFA, 1ML    Culture NO GROWTH 3 DAYS   Culture, Blood 1 [[id number]] Collected: 10/25/21 1130   Order Status: Completed Specimen: Blood Updated: 10/28/21 0015    Specimen Description . BLOOD    Special Requests LH, 2ML    Culture NO GROWTH 3 DAYS     Culture, Fungus [[id number]] Collected: 10/13/21 0900   Order Status: Completed Specimen: Thoracentesis Updated: 10/25/21 1125    Specimen Description . THORACENTESIS FLUID RT    Special Requests NOT REPORTED    Culture NO GROWTH 12 DAYS   Culture with Smear, Acid Fast Bacillius [[id number]] Collected: 10/13/21 0900   Order Status: Completed Specimen: Thoracentesis Updated: 10/25/21 0829    Specimen Description . THORACENTESIS FLUID RT    Special Requests NOT REPORTED    Direct Exam NO ACID FAST BACILLI SEEN (DIRECT SMEAR)    Culture NO GROWTH 12 DAYS   Culture, Blood 1 [[id number]] Collected: 10/18/21 0426   Order Status: Completed Specimen: Blood Updated: 10/24/21 0843    Specimen Description . BLOOD    Special Requests RT AC 5ML    Culture NO GROWTH 6 DAYS   Culture, Blood 2 [[id number]] Collected: 10/18/21 0426   Order Status: Completed Specimen: Blood Updated: 10/24/21 0843    Specimen Description . BLOOD    Special Requests RT HAND 6ML    Culture NO GROWTH 6 DAYS   Culture, Body Fluid [[id number]] (Abnormal) Collected: 10/13/21 0900   Order Status: Completed Specimen: Body Fluid from Thoracentesis Updated: 10/19/21 1115    Specimen Description . THORACENTESIS FLUID RT    Special Requests NOT REPORTED    Direct Exam MANY NEUTROPHILSAbnormal      NO BACTERIA SEEN     Gram stain made from cytocentrifuged specimen.  Organisms and cells will be concentrated. Culture NO GROWTH 6 DAYS   Culture, Blood 1 [[id number]] Collected: 10/12/21 1537   Order Status: Completed Specimen: Blood Updated: 10/18/21 0619    Specimen Description . BLOOD    Special Requests RIGHT ANTECUBE 20CC    Culture NO GROWTH 6 DAYS   Culture, Blood 1 [[id number]] Collected: 10/12/21 1540   Order Status: Completed Specimen: Blood Updated: 10/18/21 0619    Specimen Description . BLOOD    Special Requests LEFT ANTECUBE 20CC    Culture NO GROWTH 6 DAYS   Gram stain [[id number]] Collected: 10/13/21 0800   Order Status: Canceled Specimen: Body Fluid    COVID-19, Rapid [[id number]] Collected: 10/12/21 1554   Order Status: Completed Specimen: Nasopharyngeal Swab Updated: 10/12/21 1610    Specimen Description . NASOPHARYNGEAL SWAB    SARS-CoV-2, Rapid Not Detected    Comment:        Rapid NAAT:  The specimen is NEGATIVE for SARS-CoV-2, the novel coronavirus associated with   COVID-19.         The ID NOW COVID-19 assay is designed to detect the virus that causes COVID-19 in patients   with signs and symptoms of infection who are suspected of COVID-19. An individual without symptoms of COVID-19 and who is not shedding SARS-CoV-2 virus would   expect to have a negative (not detected) result in this assay. Negative results should be treated as presumptive and, if inconsistent with clinical signs   and symptoms or necessary for patient management,   should be tested with an alternative molecular assay. Negative results do not preclude   SARS-CoV-2 infection and   should not be used as the sole basis for patient management decisions.         Fact sheet for Healthcare Providers: BuildHer.es   Fact sheet for Patients: BuildHer.noam           Methodology: Isothermal Nucleic Acid Amplification              Medications:      metoprolol tartrate  12.5 mg Oral BID    predniSONE  40 mg Oral Daily    heparin (porcine)  5,000 Units SubCUTAneous BID    budesonide  0.5 mg Nebulization BID    Arformoterol Tartrate  15 mcg Nebulization BID    ipratropium-albuterol  1 ampule Inhalation 4x daily    epoetin mike-epbx  8,000 Units SubCUTAneous Once per day on Mon Wed Fri    calcitRIOL  0.25 mcg Oral Once per day on Mon Wed Fri    sodium chloride flush  5-40 mL IntraVENous 2 times per day    vitamin D  50,000 Units Oral Weekly           [facility]  Oskar Wilkes MD  [employer]  OFFICE: [phone]      Electronically signed by Oskar Wilkes MD on 10/30/2021 at 11:34 AM  Thank you for allowing us to participate in the care of this patient. Please call with questions.     This note iscreated with the assistance of a speech recognition program.  While intending to generate a document that actually reflects the content of the visit, the document can still have some errors including those of syntax andsound a like substitutions which may escape proof reading. In such instances, actual meaning can be extrapolated by contextual diversion.

## 2021-10-30 NOTE — PROGRESS NOTES
[facility]  Office: [address], DO, Flaca Levi, DO, Tristen Bentley, DO, Pily Andressa Terrazas, DO, Danni Peralta MD, Vance Wagner MD, Belén Lugo MD, Carson Young MD, Velta Bernheim, MD, Eloisa Lux MD, Summer Veras MD, Silva Byrnes MD, Kellen Rg DO, Lartice Gutierrez DO, Marcie Hahn MD,  Daniella Escalera, DO, Rosy Schirmer, MD, Job Schultz MD, Zabrina Allan MD, Kaylin Giordano MD, Donna Nelson MD, Walt Davis MD, Shea Jin [facility], [facility], CNP, Isaac Heredia, CNP, Kevin Pickard, CNS, Dg Gamboa, CNP, Bhakti Vines, CNP, Kranthi Prasad, CNP, Margo Gale, CNP, Tianna Merida, CNP, Lisa Urena, CNP, Tiffani Mccord PA-C, Lolita Jean Baptiste DNP, Nubia Lemons, CNP, Olivia Michael, CNP, Aileen Rouse, CNP, Mary Rendon, CNP, Jose Ramon Keene CNP, Ramy Dias, [facility], Jane Nagel, [facility]    Progress Note    10/30/2021    8:28 AM    Name:   Radha Pinzon  MRN:     [id number]     [place]:      [de-identified]   Room:   [address] [place], [place] [address]-[facility] Day:  18  Admit Date:  10/12/2021  2:53 PM    PCP:   Duglas Dennis MD  Code Status:  Full Code    Subjective:     C/C:   Chief Complaint   Patient presents with    Respiratory Distress     EMS reported 97% on room air; pt arrives with saturation 45% on room air    Failure To Thrive     Interval History Status: improved. Patient seen and examined at bedside, looks much better today sitting in the bed and holding for conversation and answer all the questions appropriately. Continues to feel better with better appetite  She denies any new complaint. Still with the CT , noted air leak, PNTX not evident on imaging  Patient vitals, labs and all providers notes were reviewed,from overnight shift and morning updates were noted and discussed with the nurse    Brief History:     Per my ELANA:  \"Danielle Wadsworth is a 64 y. o. female with a hx of CKD 4 and iron deficiency anemia who presented to [facility] with Respiratory Distress and hypoxia (O2 sats 45%) and failure to thrive and is admitted to the hospital for the management of right Pleural effusion and acute renal failure. Patient lives at home alone and has recently been having difficulty caring for herself and generalized weakness for the last few weeks . She does have chronic BLE edema but denies hx of CHF. Initial CXR showed large right pleural effusion, stat elevated BNP and D-dimer as well as BUN/creatinine.  Last took known creatinine was 2.34 in august, she reports she seen a nephrologist once. She does not wear home oxygen   10/13: US guided right thoracentesis 850ml of nonturbid straw colored fluid removed -transudative  Pleural fluid cultures negative for malignancy\"  Chest tube placed 10/18 on right for loculated effusion   Started on heparin drip for possible PE; VQ being repeated 10/24    Review of Systems:     Review of Systems   Constitutional: Positive for activity change. Negative for chills, diaphoresis and fever. HENT: Negative for congestion. Eyes: Negative for visual disturbance. Respiratory: Negative for cough, chest tightness and wheezing. Cardiovascular: Negative for chest pain, palpitations and leg swelling. Gastrointestinal: Negative for abdominal pain, blood in stool, constipation, diarrhea, nausea and vomiting. Genitourinary: Negative for difficulty urinating. Neurological: Positive for weakness. Negative for dizziness, light-headedness, numbness and headaches. All other systems reviewed and are negative. Medications: Allergies:     Allergies   Allergen Reactions    Penicillins Swelling       Current Meds:   Scheduled Meds:    metoprolol tartrate  12.5 mg Oral BID    predniSONE  40 mg Oral Daily    heparin (porcine)  5,000 Units SubCUTAneous BID    budesonide  0.5 mg Nebulization BID    Arformoterol Tartrate  15 mcg Nebulization BID    ipratropium-albuterol  1 ampule Inhalation 4x daily    epoetin mike-epbx  8,000 Units SubCUTAneous Once per day on     calcitRIOL  0.25 mcg Oral Once per day on     sodium chloride flush  5-40 mL IntraVENous 2 times per day    vitamin D  50,000 Units Oral Weekly     Continuous Infusions:    dextrose      sodium chloride       PRN Meds: midodrine, sodium citrate, midodrine, sodium citrate, sodium chloride flush, HYDROcodone 5 mg - acetaminophen **OR** HYDROcodone 5 mg - acetaminophen, melatonin, glucose, dextrose, glucagon (rDNA), dextrose, sodium chloride flush, sodium chloride, ondansetron **OR** ondansetron, acetaminophen **OR** acetaminophen, perflutren lipid microspheres    Data:     Past Medical History:   has no past medical history on file. Social History:   reports that she quit smoking about 2 years ago. Her smoking use included cigarettes. She has never used smokeless tobacco. She reports current alcohol use. Family History:   Family History   Problem Relation Age of Onset    Hypertension Mother     Cancer Sister     Cancer Brother        Vitals:  /61   Pulse 107   Temp 97.7 °F (36.5 °C) (Oral)   Resp 16   Ht 5' 4\" (1.626 m)   Wt 121 lb 0.5 oz (54.9 kg)   SpO2 99%   BMI 20.78 kg/m²   Temp (24hrs), Av.7 °F (36.5 °C), Min:97.5 °F (36.4 °C), Max:98.1 °F (36.7 °C)    Recent Labs     10/29/21  1155 10/29/21  1625 10/29/21  2021 10/30/21  0722   POCGLU 109* 123* 202* 72       I/O (24Hr):     Intake/Output Summary (Last 24 hours) at 10/30/2021 0828  Last data filed at 10/29/2021 1802  Gross per 24 hour   Intake 1580 ml   Output 700 ml   Net 880 ml       Labs:  Hematology:  Recent Labs     10/28/21  0536 10/29/21  0701 10/30/21  0603   WBC 22.8* 20.9* 22.8*   RBC 3.21* 3.44* 3.11*   HGB 8.1* 8.7* 7.8*   HCT 27.3* 29.5* 27.3*   MCV 85.0 85.8 87.8   MCH 25.2 25.3 25.1*   MCHC 29.7 29.5 28.6   RDW 23.8* 24.4* 25.2*    175 148   MPV 11.5 10.3 11.9     Chemistry:  Recent Labs 10/28/21  0536 10/29/21  0701 10/30/21  0603    135 135   K 4.2 4.2 3.8   CL 98 97* 100   CO2 25 23 24   GLUCOSE 106* 83 81   BUN 28* 38* 17   CREATININE 1.67* 2.36* 1.38*   ANIONGAP 12 15 11   LABGLOM 30* 20* 37*   GFRAA 36* 24* 45*   CALCIUM 8.1* 8.1* 8.1*     Recent Labs     10/28/21  2042 10/29/21  0721 10/29/21  1155 10/29/21  1625 10/29/21  2021 10/30/21  0722   POCGLU 169* 88 109* 123* 202* 72     ABG:  Lab Results   Component Value Date    POCPH 7.456 10/24/2021    POCPCO2 41.1 10/24/2021    POCPO2 53.3 10/24/2021    POCHCO3 28.9 10/24/2021    NBEA NOT REPORTED 10/24/2021    PBEA 5 10/24/2021    WIZ7UEA NOT REPORTED 10/24/2021    LMVJ4QSD 89 10/24/2021    FIO2 NOT REPORTED 10/24/2021     Lab Results   Component Value Date/Time    SPECIAL RTAC,20CC 10/26/2021 06:28 PM    SPECIAL LTAC 10/26/2021 06:28 PM     Lab Results   Component Value Date/Time    CULTURE NO GROWTH 3 DAYS 10/26/2021 06:28 PM    CULTURE NO GROWTH 3 DAYS 10/26/2021 06:28 PM       Radiology:  XR CHEST (SINGLE VIEW FRONTAL)    Result Date: 10/24/2021  Small stable left effusion and left lower lobe atelectatic changes. Stable right chest tube. Cardiomegaly and COPD redemonstrated. NM LUNG SCAN PERFUSION ONLY    Result Date: 10/21/2021  Exam is technically high probability for pulmonary embolism. While emboli are possible, the diminished activity within the upper lung zones could also be secondary to emphysema; a  ventilation study would be needed for confirmation. This could be obtained at a later time as clinically warranted. Improving perfusion at the lateral right lung base. Findings were discussed with Randy Méndez at 12:13 on 10/21/2021. CT ABDOMEN PELVIS W IV CONTRAST Additional Contrast? Radiologist Recommendation    Result Date: 10/25/2021  1. Right pneumothorax with chest tube in place. Please see separate chest CT report for details of this region.  2.  Large rectal colorectal stool burden and mild gaseous distention of the colon. XR CHEST PORTABLE    Result Date: 10/25/2021  Small bore right chest tube in unchanged position. Smaller but persistent right pneumothorax; otherwise, unchanged findings. Stable left effusion with left lower lobe volume loss. COPD. XR CHEST PORTABLE    Result Date: 10/23/2021  Questionable trace focal pneumothorax laterally in the right base near the chest tube. Persistent by a basilar airspace disease with small left pleural effusion. XR CHEST PORTABLE    Result Date: 10/22/2021  Stable right chest tube without demonstrable pneumothorax Improvement in now mild right basilar opacity related to minimal atelectasis and or effusion Stable moderate left basilar opacity     XR CHEST PORTABLE    Result Date: 10/21/2021  Stable right chest tube without pneumothorax Stable bibasilar pleuroparenchymal changes     XR CHEST PORTABLE    Result Date: 10/20/2021  No significant interval change. Right chest tube in place with small right pleural effusion and right basilar opacity. Unchanged moderate left pleural effusion. XR CHEST 1 VIEW    Result Date: 10/26/2021  Interval placement of tunneled dialysis catheter with tip in appropriate position. Small right basilar pneumothorax no longer present. Chest tube upsize with tip in right base. Attention for pneumothorax on serial portable. .     CT CHEST PULMONARY EMBOLISM W CONTRAST    Addendum Date: 10/25/2021    ADDENDUM: Findings were discussed with IVA SURESH at 1:30 pm on 10/25/2021. Result Date: 10/25/2021  Loculated left basilar effusion with adjacent consolidation representing atelectasis versus pneumonia. Focus of consolidation in the right lower lobe representing atelectasis versus pneumonia. Moderate to large right-sided pneumothorax with an indwelling pigtail catheter in the right basilar pleural space.      IR TUNNELED CVC PLACE WO SQ PORT/PUMP > 5 YEARS    Result Date: 10/26/2021  Successful ultrasound and fluoroscopy guided tunneled catheter placement . The tunneled dialysis catheter may be used immediately. IR CHANGE DRAINAGE CATH    Result Date: 10/26/2021  Chest tube upsized to [address]. Attached to wall suction at which time air was aspirated consistent with pneumothorax. Patient hypoxia improved from 84 to 94%. These findings were communicated to the ICU doctor in person at 9:45 a.mLillie Becker Physical Examination:        Physical Exam  Vitals and nursing note reviewed. Constitutional:       General: She is not in acute distress. Appearance: She is obese. Interventions: Nasal cannula in place. HENT:      Head: Normocephalic and atraumatic. Eyes:      Conjunctiva/sclera: Conjunctivae normal.      Pupils: Pupils are equal, round, and reactive to light. Cardiovascular:      Rate and Rhythm: Normal rate and regular rhythm. Heart sounds: No murmur heard. Pulmonary:      Effort: Pulmonary effort is normal. No accessory muscle usage or respiratory distress. Breath sounds: No stridor. Examination of the right-lower field reveals decreased breath sounds. Examination of the left-lower field reveals decreased breath sounds. Decreased breath sounds present. No wheezing, rhonchi or rales. Chest:      Comments: Chest tube noted to suction, air leak noted today  Abdominal:      General: Bowel sounds are normal. There is no distension. Palpations: Abdomen is soft. Abdomen is not rigid. Tenderness: There is no abdominal tenderness. There is no guarding. Musculoskeletal:         General: No tenderness. Skin:     General: Skin is warm and dry. Findings: No erythema, lesion or rash. Neurological:      Mental Status: She is alert and oriented to person, place, and time. Cranial Nerves: No cranial nerve deficit. Motor: No seizure activity. Psychiatric:         Speech: Speech normal.         Behavior: Behavior normal. Behavior is cooperative.          Assessment: Hospital Problems         Last Modified POA    * (Principal) Pleural effusion on right 10/15/2021 Yes    Acute renal failure with acute renal cortical necrosis superimposed on stage 4 chronic kidney disease ([address] 10/14/2021 Yes    Acute respiratory failure with hypoxia ([address] 10/13/2021 Yes    Iron deficiency anemia 10/12/2021 Yes    Essential hypertension 10/12/2021 Yes    General weakness 10/12/2021 Yes    Vitamin D deficiency 10/13/2021 Yes    Acute diastolic heart failure ([address] 10/13/2021 Yes    Hypoxia 10/13/2021 Yes    Anemia 10/13/2021 Yes    Peripheral edema 10/13/2021 Yes    Moderate protein-calorie malnutrition ([address] 10/13/2021 Yes    Severe malnutrition ([address] 10/13/2021 Yes    Acute kidney injury ([address] 10/15/2021 Yes    Hypokalemia 10/17/2021 Yes    Other emphysema ([address] 10/17/2021 Yes          Plan:           Acute hypoxemic respiratory failure: Likely secondary to  pleural effusion and pulmonary edema , improving ,continue support with low flow nasal cannula    Moderate to large right-sided pleural effusion: S/P thoracentesis with 850 mls out     Pneumothorax: Status post chest tube placement, chest tube exchanged 10/26  continue to continuous suction, serial CXR   discussed with pulmonology . Abx discontinued per ID no further need for antibiotics    Acute kidney injury/ CKD 4: Likely secondary CHF  Urine retention: Patient needed hemodialysis, appreciate nephrology input. Renal diet    Acute decompensated diastolic heart failure: Diuresis initially, now on hemodialysis, continue, strict I's & O's, daily weight, cardiac diet and fluid restriction    Hyperkalemia: Currently resolved. Severe malnutrition: Continue boost and encourage p.o. intake.     COPD exacerbation: Continue breathing treatment with DuoNeb and steroids    Discussed with the patient and nurse    Discussed with Dr Deanne Bush ( nephrology)      Mally Branch MD  10/30/2021  8:28 AM

## 2021-10-30 NOTE — PROGRESS NOTES
Comprehensive Nutrition Assessment    Type and Reason for Visit:  Reassess    Nutrition Recommendations/Plan: Continue current and oral supplements. Nutrition Assessment:  Patient improving nutritionally. Diet advanced from NPO to Regular low potassium, low phosphorus with 1500 ml fluid restriction. Appetite is fair, consuming 26-50% of meals and 51-75% of oral supplements. Will continue current diet and supplements and monitor oral intakes. Malnutrition Assessment:  Malnutrition Status:  Severe malnutrition    Context:  Acute Illness     Findings of the 6 clinical characteristics of malnutrition:  Energy Intake:  1 - 75% or less of estimated energy requirements for 7 or more days  Weight Loss:  7 - Greater than 5% over 1 month     Body Fat Loss:  7 - Moderate body fat loss Triceps   Muscle Mass Loss:  7 - Moderate muscle mass loss Clavicles (pectoralis & deltoids), Scapula (trapezius)  Fluid Accumulation:  No significant fluid accumulation Extremities   Strength:  Not Performed    Estimated Daily Nutrient Needs:  Energy (kcal):  6695-3166 kcal (28-30 kcal/kg; Weight Used for Energy Requirements:  Current     Protein (g):  60-72 gm of protein (1.0-1.2 gm/kg); Weight Used for Protein Requirements:  Current          Nutrition Related Findings:  Edema: trace BLE, +1 BUE. Bowel sounds active. Loss of appetite (10/30). Hemodialysis M, W, F. PermCath in place. Wounds:   (Chest tube)       Current Nutrition Therapies:    ADULT DIET; Regular; Low Potassium (Less than 3000 mg/day);  Low Phosphorus (Less than 1000 mg); 1500 ml  ADULT ORAL NUTRITION SUPPLEMENT; Breakfast, Dinner; Renal Oral Supplement    Anthropometric Measures:  · Height: 5' 4\" (162.6 cm)  · Current Body Weight: 121 lb 0.5 oz (54.9 kg) (weight fluctuation due to shift in fluids.)   · Admission Body Weight: 139 lb (63 kg) (bed scale)    · Usual Body Weight: 150 lb (68 kg)     · Ideal Body Weight: 120 lbs; % Ideal Body Weight 109.2 %   · BMI: 20.8  · Adjusted Body Weight:  ; No Adjustment   · BMI Categories: Underweight (BMI less than 22) age over 72       Nutrition Diagnosis:   · Severe malnutrition related to inadequate protein-energy intake as evidenced by intake 0-25%, intake 26-50%, weight loss greater than or equal to 5% in 1 month    Nutrition Interventions:   Food and/or Nutrient Delivery:  Continue Current Diet, Continue Oral Nutrition Supplement  Nutrition Education/Counseling:  Education not indicated   Coordination of Nutrition Care:  Continue to monitor while inpatient    Goals:  PO intakes > 50% at meals       Nutrition Monitoring and Evaluation:   Behavioral-Environmental Outcomes:  None Identified   Food/Nutrient Intake Outcomes:  Food and Nutrient Intake, Supplement Intake  Physical Signs/Symptoms Outcomes:  Biochemical Data, Fluid Status or Edema, Skin, Weight, GI Status     Discharge Planning:    Continue current diet, Continue Oral Nutrition Supplement       Some areas of assessment may be incomplete due to COVID-19 precautions    Phuc Jaeger RD, LD  Office phone [phone]

## 2021-10-30 NOTE — PROGRESS NOTES
Nephrology Progress Note      SUBJECTIVE       Pt was seen and examined. No acute issues overnite. Stable hemodynamics . Chest tube in place. She was last dialyzed yesterday. Has a PermCath in place. She has an outpatient spot at [facility]  schedule at 11:50 AM.  Lomeli to be removed today. Stable blood pressures. Overall she is feeling and doing better. OBJECTIVE      CURRENT TEMPERATURE:  Temp: 97.7 °F (36.5 °C)  MAXIMUM TEMPERATURE OVER 24HRS:  Temp (24hrs), Av.7 °F (36.5 °C), Min:97.5 °F (36.4 °C), Max:98.1 °F (36.7 °C)    CURRENT RESPIRATORY RATE:  Resp: 16  CURRENT PULSE:  Pulse: 107  CURRENT BLOOD PRESSURE:  BP: 127/61  24HR BLOOD PRESSURE RANGE:  Systolic (60XTD), EGA:509 , Min:80 , BA   ; Diastolic (08QQS), EEJ:50, Min:47, Max:74    24HR INTAKE/OUTPUT:    Intake/Output Summary (Last 24 hours) at 10/30/2021 1022  Last data filed at 10/29/2021 1802  Gross per 24 hour   Intake 1340 ml   Output 700 ml   Net 640 ml     WEIGHT :  Patient Vitals for the past 96 hrs (Last 3 readings):   Weight   10/29/21 1435 121 lb 0.5 oz (54.9 kg)   10/29/21 1104 121 lb 0.5 oz (54.9 kg)   10/29/21 0548 121 lb 1.6 oz (54.9 kg)     PHYSICAL EXAM      GENERAL APPEARANCE:Awake, alert, in no acute distress  SKIN: warm and dry, no rash or erythema  EYES: conjunctivae normal and sclera anicteric  ENT: no thrush no pharyngeal congestion   NECK:   No JVD. No carotid bruits and no carotid lymphadenopathy . PULMONARY: Right-sided chest tube noted, right greater than left crackles heard  CADRDIOVASCULAR: S1 and S2 normal NO S3 and NO S4 . No rubs , no murmur. ABDOMEN: soft nontender, bowel sounds present, no organomegaly, no ascites.      EXTREMITIES: no cyanosis, clubbing or edema     CURRENT MEDICATIONS      metoprolol tartrate (LOPRESSOR) tablet 12.5 mg, BID  midodrine (PROAMATINE) tablet 10 mg, TID PRN  predniSONE (DELTASONE) tablet 40 mg, Daily  sodium citrate 4 % injection 1.9 mL, PRN  midodrine (PROAMATINE) tablet 10 mg, PRN  sodium citrate 4 % injection 1.9 mL, PRN  sodium chloride flush 0.9 % injection 10 mL, PRN  heparin (porcine) injection 5,000 Units, BID  HYDROcodone-acetaminophen (NORCO) 5-325 MG per tablet 1 tablet, Q4H PRN   Or  HYDROcodone-acetaminophen (NORCO) 5-325 MG per tablet 2 tablet, Q4H PRN  budesonide (PULMICORT) nebulizer suspension 500 mcg, BID  Arformoterol Tartrate (BROVANA) nebulizer solution 15 mcg, BID  melatonin tablet 3 mg, Nightly PRN  ipratropium-albuterol (DUONEB) nebulizer solution 1 ampule, 4x daily  epoetin mike-epbx (RETACRIT) injection 8,000 Units, Once per day on Mon Wed Fri  calcitRIOL (ROCALTROL) capsule 0.25 mcg, Once per day on Mon Wed Fri  glucose (GLUTOSE) 40 % oral gel 15 g, PRN  dextrose 50 % IV solution, PRN  glucagon (rDNA) injection 1 mg, PRN  dextrose 5 % solution, PRN  sodium chloride flush 0.9 % injection 5-40 mL, 2 times per day  sodium chloride flush 0.9 % injection 5-40 mL, PRN  0.9 % sodium chloride infusion, PRN  vitamin D (ERGOCALCIFEROL) capsule 50,000 Units, Weekly  ondansetron (ZOFRAN-ODT) disintegrating tablet 4 mg, Q8H PRN   Or  ondansetron (ZOFRAN) injection 4 mg, Q6H PRN  acetaminophen (TYLENOL) tablet 650 mg, Q6H PRN   Or  acetaminophen (TYLENOL) suppository 650 mg, Q6H PRN  perflutren lipid microspheres (DEFINITY) injection 1.65 mg, ONCE PRN          LABS      CBC:   Recent Labs     10/28/21  0536 10/29/21  0701 10/30/21  0603   WBC 22.8* 20.9* 22.8*   RBC 3.21* 3.44* 3.11*   HGB 8.1* 8.7* 7.8*   HCT 27.3* 29.5* 27.3*   MCV 85.0 85.8 87.8   MCH 25.2 25.3 25.1*   MCHC 29.7 29.5 28.6   RDW 23.8* 24.4* 25.2*    175 148   MPV 11.5 10.3 11.9      BMP:   Recent Labs     10/28/21  0536 10/29/21  0701 10/30/21  0603    135 135   K 4.2 4.2 3.8   CL 98 97* 100   CO2 25 23 24   BUN 28* 38* 17   CREATININE 1.67* 2.36* 1.38*   GLUCOSE 106* 83 81   CALCIUM 8.1* 8.1* 8.1*      IRON:    Lab Results   Component Value Date    IRON 22 10/13/2021     IRON SATURATION:    Lab Results   Component Value Date    LABIRON 9 10/13/2021     TIBC:    Lab Results   Component Value Date    TIBC 237 10/13/2021     FERRITIN:    Lab Results   Component Value Date    FERRITIN 20 10/13/2021       SPEP:   Lab Results   Component Value Date    PROT 4.7 10/17/2021       HEPBSAG:  Lab Results   Component Value Date    HEPBSAG NONREACTIVE 10/25/2021     URINE SODIUM:    Lab Results   Component Value Date    LAZARO 40 10/13/2021      URINE CREATININE:    Lab Results   Component Value Date    LABCREA 40.5 10/17/2021    LABCREA 76.0 10/13/2021     URINALYSIS:  U/A:   Lab Results   Component Value Date    NITRU NEGATIVE 10/13/2021    COLORU Yellow 10/13/2021    PHUR 5.5 10/13/2021    WBCUA 10 TO 20 10/13/2021    RBCUA 2 TO 5 10/13/2021    MUCUS 1+ 10/13/2021    TRICHOMONAS NOT REPORTED 10/13/2021    YEAST NOT REPORTED 10/13/2021    BACTERIA NOT REPORTED 10/13/2021    SPECGRAV 1.020 10/13/2021    LEUKOCYTESUR SMALL 10/13/2021    UROBILINOGEN Normal 10/13/2021    BILIRUBINUR NEGATIVE 10/13/2021    GLUCOSEU NEGATIVE 10/13/2021    KETUA NEGATIVE 10/13/2021    AMORPHOUS 1+ 10/13/2021         ASSESSMENT      1. ESRD, patient started on hemodialysis during this admission. She is actually feeling and doing better since hemoinitiated. She did have uremic symptoms. 2. HTN:  3.  Moderate right-sided pleural effusion status post thoracentesis and eventual chest tube placement because of pneumothorax on the right side. 4.  Moderate pulmonary hypertension  5. Anemia of chronic disease, started on erythropoietin    PLAN      1. Outpatient dialysis spot secured Monday Wednesday Friday at [facility]. Still has a chest tube in place, once her chest tube is out she potentially can be discharged. 2.  May remove Lomeli catheter  3. Follow up labs ordered. 4. Following along       Please do not hesitate to call with questions.     This note is created with the assistance of kasia speech-recognition program. While intending to generate a document that actually reflects the content of the visit, no guarantees can be provided that every mistake has been identified and corrected by editing    Electronically signed by Cynthia Garcia MD on 10/30/2021 at 10:22 AM

## 2021-10-31 ENCOUNTER — APPOINTMENT (OUTPATIENT)
Dept: GENERAL RADIOLOGY | Age: 76
DRG: 291 | End: 2021-10-31
Payer: MEDICARE

## 2021-10-31 LAB
ABSOLUTE EOS #: 0 K/UL (ref 0–0.4)
ABSOLUTE IMMATURE GRANULOCYTE: 0.53 K/UL (ref 0–0.3)
ABSOLUTE LYMPH #: 0.89 K/UL (ref 1–4.8)
ABSOLUTE MONO #: 1.06 K/UL (ref 0.2–0.8)
ANION GAP SERPL CALCULATED.3IONS-SCNC: 10 MMOL/L (ref 9–17)
BASOPHILS # BLD: 0 %
BASOPHILS ABSOLUTE: 0 K/UL (ref 0–0.2)
BUN BLDV-MCNC: 26 MG/DL (ref 8–23)
BUN/CREAT BLD: 14 (ref 9–20)
CALCIUM SERPL-MCNC: 8.2 MG/DL (ref 8.6–10.4)
CHLORIDE BLD-SCNC: 97 MMOL/L (ref 98–107)
CO2: 25 MMOL/L (ref 20–31)
CREAT SERPL-MCNC: 1.81 MG/DL (ref 0.5–0.9)
CULTURE: NORMAL
CULTURE: NORMAL
DIFFERENTIAL TYPE: ABNORMAL
EOSINOPHILS RELATIVE PERCENT: 0 % (ref 1–4)
GFR AFRICAN AMERICAN: 33 ML/MIN
GFR NON-AFRICAN AMERICAN: 27 ML/MIN
GFR SERPL CREATININE-BSD FRML MDRD: ABNORMAL ML/MIN/{1.73_M2}
GFR SERPL CREATININE-BSD FRML MDRD: ABNORMAL ML/MIN/{1.73_M2}
GLUCOSE BLD-MCNC: 117 MG/DL (ref 65–105)
GLUCOSE BLD-MCNC: 125 MG/DL (ref 65–105)
GLUCOSE BLD-MCNC: 68 MG/DL (ref 65–105)
GLUCOSE BLD-MCNC: 69 MG/DL (ref 70–99)
GLUCOSE BLD-MCNC: 77 MG/DL (ref 65–105)
GLUCOSE BLD-MCNC: 93 MG/DL (ref 65–105)
HCT VFR BLD CALC: 27.9 % (ref 36.3–47.1)
HEMOGLOBIN: 8.1 G/DL (ref 11.9–15.1)
IMMATURE GRANULOCYTES: 3 %
LYMPHOCYTES # BLD: 5 % (ref 24–44)
Lab: NORMAL
Lab: NORMAL
MCH RBC QN AUTO: 25.8 PG (ref 25.2–33.5)
MCHC RBC AUTO-ENTMCNC: 29 G/DL (ref 28.4–34.8)
MCV RBC AUTO: 88.9 FL (ref 82.6–102.9)
MONOCYTES # BLD: 6 % (ref 1–7)
MORPHOLOGY: ABNORMAL
NRBC AUTOMATED: 0.5 PER 100 WBC
PDW BLD-RTO: 25.2 % (ref 11.8–14.4)
PLATELET # BLD: 134 K/UL (ref 138–453)
PLATELET ESTIMATE: ABNORMAL
PMV BLD AUTO: 11.5 FL (ref 8.1–13.5)
POTASSIUM SERPL-SCNC: 3.7 MMOL/L (ref 3.7–5.3)
RBC # BLD: 3.14 M/UL (ref 3.95–5.11)
RBC # BLD: ABNORMAL 10*6/UL
SEG NEUTROPHILS: 86 % (ref 36–66)
SEGMENTED NEUTROPHILS ABSOLUTE COUNT: 15.22 K/UL (ref 1.8–7.7)
SODIUM BLD-SCNC: 132 MMOL/L (ref 135–144)
SPECIMEN DESCRIPTION: NORMAL
SPECIMEN DESCRIPTION: NORMAL
WBC # BLD: 17.7 K/UL (ref 3.5–11.3)
WBC # BLD: ABNORMAL 10*3/UL

## 2021-10-31 PROCEDURE — 6360000002 HC RX W HCPCS: Performed by: INTERNAL MEDICINE

## 2021-10-31 PROCEDURE — 6360000002 HC RX W HCPCS: Performed by: NURSE PRACTITIONER

## 2021-10-31 PROCEDURE — 99232 SBSQ HOSP IP/OBS MODERATE 35: CPT | Performed by: FAMILY MEDICINE

## 2021-10-31 PROCEDURE — 2580000003 HC RX 258: Performed by: STUDENT IN AN ORGANIZED HEALTH CARE EDUCATION/TRAINING PROGRAM

## 2021-10-31 PROCEDURE — 82947 ASSAY GLUCOSE BLOOD QUANT: CPT

## 2021-10-31 PROCEDURE — 6370000000 HC RX 637 (ALT 250 FOR IP): Performed by: STUDENT IN AN ORGANIZED HEALTH CARE EDUCATION/TRAINING PROGRAM

## 2021-10-31 PROCEDURE — 6370000000 HC RX 637 (ALT 250 FOR IP): Performed by: FAMILY MEDICINE

## 2021-10-31 PROCEDURE — 2060000000 HC ICU INTERMEDIATE R&B

## 2021-10-31 PROCEDURE — 36415 COLL VENOUS BLD VENIPUNCTURE: CPT

## 2021-10-31 PROCEDURE — 6370000000 HC RX 637 (ALT 250 FOR IP): Performed by: NURSE PRACTITIONER

## 2021-10-31 PROCEDURE — 85025 COMPLETE CBC W/AUTO DIFF WBC: CPT

## 2021-10-31 PROCEDURE — 80048 BASIC METABOLIC PNL TOTAL CA: CPT

## 2021-10-31 PROCEDURE — 2700000000 HC OXYGEN THERAPY PER DAY

## 2021-10-31 PROCEDURE — 99231 SBSQ HOSP IP/OBS SF/LOW 25: CPT | Performed by: INTERNAL MEDICINE

## 2021-10-31 PROCEDURE — 71045 X-RAY EXAM CHEST 1 VIEW: CPT

## 2021-10-31 PROCEDURE — 94640 AIRWAY INHALATION TREATMENT: CPT

## 2021-10-31 PROCEDURE — 94761 N-INVAS EAR/PLS OXIMETRY MLT: CPT

## 2021-10-31 RX ORDER — IPRATROPIUM BROMIDE AND ALBUTEROL SULFATE 2.5; .5 MG/3ML; MG/3ML
1 SOLUTION RESPIRATORY (INHALATION) 3 TIMES DAILY
Status: DISCONTINUED | OUTPATIENT
Start: 2021-10-31 | End: 2021-11-04 | Stop reason: HOSPADM

## 2021-10-31 RX ADMIN — SODIUM CHLORIDE, PRESERVATIVE FREE 10 ML: 5 INJECTION INTRAVENOUS at 09:36

## 2021-10-31 RX ADMIN — HEPARIN SODIUM 5000 UNITS: 5000 INJECTION INTRAVENOUS; SUBCUTANEOUS at 09:36

## 2021-10-31 RX ADMIN — BUDESONIDE 500 MCG: 0.5 SUSPENSION RESPIRATORY (INHALATION) at 19:18

## 2021-10-31 RX ADMIN — ARFORMOTEROL TARTRATE 15 MCG: 15 SOLUTION RESPIRATORY (INHALATION) at 19:18

## 2021-10-31 RX ADMIN — METOPROLOL TARTRATE 12.5 MG: 25 TABLET, FILM COATED ORAL at 21:01

## 2021-10-31 RX ADMIN — ARFORMOTEROL TARTRATE 15 MCG: 15 SOLUTION RESPIRATORY (INHALATION) at 07:58

## 2021-10-31 RX ADMIN — BUDESONIDE 500 MCG: 0.5 SUSPENSION RESPIRATORY (INHALATION) at 07:58

## 2021-10-31 RX ADMIN — IPRATROPIUM BROMIDE AND ALBUTEROL SULFATE 1 AMPULE: .5; 2.5 SOLUTION RESPIRATORY (INHALATION) at 19:18

## 2021-10-31 RX ADMIN — HEPARIN SODIUM 5000 UNITS: 5000 INJECTION INTRAVENOUS; SUBCUTANEOUS at 21:01

## 2021-10-31 RX ADMIN — IPRATROPIUM BROMIDE AND ALBUTEROL SULFATE 1 AMPULE: .5; 2.5 SOLUTION RESPIRATORY (INHALATION) at 14:40

## 2021-10-31 RX ADMIN — HYDROCODONE BITARTRATE AND ACETAMINOPHEN 1 TABLET: 5; 325 TABLET ORAL at 21:05

## 2021-10-31 RX ADMIN — METOPROLOL TARTRATE 12.5 MG: 25 TABLET, FILM COATED ORAL at 09:35

## 2021-10-31 RX ADMIN — SODIUM CHLORIDE, PRESERVATIVE FREE 10 ML: 5 INJECTION INTRAVENOUS at 21:15

## 2021-10-31 RX ADMIN — IPRATROPIUM BROMIDE AND ALBUTEROL SULFATE 1 AMPULE: .5; 2.5 SOLUTION RESPIRATORY (INHALATION) at 07:58

## 2021-10-31 RX ADMIN — Medication 3 MG: at 21:05

## 2021-10-31 RX ADMIN — PREDNISONE 40 MG: 20 TABLET ORAL at 09:35

## 2021-10-31 ASSESSMENT — PAIN SCALES - GENERAL
PAINLEVEL_OUTOF10: 0
PAINLEVEL_OUTOF10: 0
PAINLEVEL_OUTOF10: 5
PAINLEVEL_OUTOF10: 0
PAINLEVEL_OUTOF10: 0

## 2021-10-31 ASSESSMENT — ENCOUNTER SYMPTOMS
SHORTNESS OF BREATH: 1
VOMITING: 0
ABDOMINAL PAIN: 0
NAUSEA: 0
WHEEZING: 0
DIARRHEA: 0
CONSTIPATION: 0
BLOOD IN STOOL: 0
CHEST TIGHTNESS: 0
COUGH: 0
GASTROINTESTINAL NEGATIVE: 1

## 2021-10-31 NOTE — PROGRESS NOTES
injection 1.9 mL, PRN  midodrine (PROAMATINE) tablet 10 mg, PRN  sodium citrate 4 % injection 1.9 mL, PRN  sodium chloride flush 0.9 % injection 10 mL, PRN  heparin (porcine) injection 5,000 Units, BID  HYDROcodone-acetaminophen (NORCO) 5-325 MG per tablet 1 tablet, Q4H PRN   Or  HYDROcodone-acetaminophen (NORCO) 5-325 MG per tablet 2 tablet, Q4H PRN  budesonide (PULMICORT) nebulizer suspension 500 mcg, BID  Arformoterol Tartrate (BROVANA) nebulizer solution 15 mcg, BID  melatonin tablet 3 mg, Nightly PRN  ipratropium-albuterol (DUONEB) nebulizer solution 1 ampule, 4x daily  epoetin mike-epbx (RETACRIT) injection 8,000 Units, Once per day on Mon Wed Fri  calcitRIOL (ROCALTROL) capsule 0.25 mcg, Once per day on Mon Wed Fri  glucose (GLUTOSE) 40 % oral gel 15 g, PRN  dextrose 50 % IV solution, PRN  glucagon (rDNA) injection 1 mg, PRN  dextrose 5 % solution, PRN  sodium chloride flush 0.9 % injection 5-40 mL, 2 times per day  sodium chloride flush 0.9 % injection 5-40 mL, PRN  0.9 % sodium chloride infusion, PRN  vitamin D (ERGOCALCIFEROL) capsule 50,000 Units, Weekly  ondansetron (ZOFRAN-ODT) disintegrating tablet 4 mg, Q8H PRN   Or  ondansetron (ZOFRAN) injection 4 mg, Q6H PRN  acetaminophen (TYLENOL) tablet 650 mg, Q6H PRN   Or  acetaminophen (TYLENOL) suppository 650 mg, Q6H PRN  perflutren lipid microspheres (DEFINITY) injection 1.65 mg, ONCE PRN          LABS      CBC:   Recent Labs     10/29/21  0701 10/30/21  0603 10/31/21  0529   WBC 20.9* 22.8* 17.7*   RBC 3.44* 3.11* 3.14*   HGB 8.7* 7.8* 8.1*   HCT 29.5* 27.3* 27.9*   MCV 85.8 87.8 88.9   MCH 25.3 25.1* 25.8   MCHC 29.5 28.6 29.0   RDW 24.4* 25.2* 25.2*    148 134*   MPV 10.3 11.9 11.5      BMP:   Recent Labs     10/29/21  0701 10/30/21  0603 10/31/21  0529    135 132*   K 4.2 3.8 3.7   CL 97* 100 97*   CO2 23 24 25   BUN 38* 17 26*   CREATININE 2.36* 1.38* 1.81*   GLUCOSE 83 81 69*   CALCIUM 8.1* 8.1* 8.2*      IRON:    Lab Results   Component Value Date    IRON 22 10/13/2021     IRON SATURATION:    Lab Results   Component Value Date    LABIRON 9 10/13/2021     TIBC:    Lab Results   Component Value Date    TIBC 237 10/13/2021     FERRITIN:    Lab Results   Component Value Date    FERRITIN 20 10/13/2021       SPEP:   Lab Results   Component Value Date    PROT 4.7 10/17/2021       HEPBSAG:  Lab Results   Component Value Date    HEPBSAG NONREACTIVE 10/25/2021     URINE SODIUM:    Lab Results   Component Value Date    LAZARO 40 10/13/2021      URINE CREATININE:    Lab Results   Component Value Date    LABCREA 40.5 10/17/2021    LABCREA 76.0 10/13/2021     URINALYSIS:  U/A:   Lab Results   Component Value Date    NITRU NEGATIVE 10/13/2021    COLORU Yellow 10/13/2021    PHUR 5.5 10/13/2021    WBCUA 10 TO 20 10/13/2021    RBCUA 2 TO 5 10/13/2021    MUCUS 1+ 10/13/2021    TRICHOMONAS NOT REPORTED 10/13/2021    YEAST NOT REPORTED 10/13/2021    BACTERIA NOT REPORTED 10/13/2021    SPECGRAV 1.020 10/13/2021    LEUKOCYTESUR SMALL 10/13/2021    UROBILINOGEN Normal 10/13/2021    BILIRUBINUR NEGATIVE 10/13/2021    GLUCOSEU NEGATIVE 10/13/2021    KETUA NEGATIVE 10/13/2021    AMORPHOUS 1+ 10/13/2021         ASSESSMENT      1. ESRD, patient started on hemodialysis during this admission. She is actually feeling and doing better since hemoinitiated. She did have uremic symptoms. 2. HTN:  3.  Moderate right-sided pleural effusion status post thoracentesis and eventual chest tube placement because of pneumothorax on the right side. 4.  Moderate pulmonary hypertension  5. Anemia of chronic disease, started on erythropoietin    PLAN      1. Outpatient dialysis spot secured Monday Wednesday Friday at [place]. Still has a chest tube in place, once her chest tube is out she potentially can be discharged. 2.  Patient will need hemodialysis tomorrow  3. Follow up labs ordered. 4. Following along       Please do not hesitate to call with questions.     This note is created with the assistance of a speech-recognition program. While intending to generate a document that actually reflects the content of the visit, no guarantees can be provided that every mistake has been identified and corrected by editing    Electronically signed by Cesar Kelsey MD on 10/31/2021 at 7:42 AM

## 2021-10-31 NOTE — PLAN OF CARE
Problem: Falls - Risk of:  Goal: Will remain free from falls  Description: Will remain free from falls  10/31/2021 0217 by Юлия Dodge  Outcome: Ongoing  10/31/2021 0215 by Юлия Dodge  Outcome: Ongoing  10/30/2021 1742 by Melissa Puentes RN  Outcome: Ongoing  Goal: Absence of physical injury  Description: Absence of physical injury  10/31/2021 0217 by Юлия Dodge  Outcome: Ongoing  10/31/2021 0215 by Юлия Dodge  Outcome: Ongoing     Problem: Nutrition  Goal: Optimal nutrition therapy  10/31/2021 0217 by Юлия Dodge  Outcome: Ongoing  10/31/2021 0215 by Юлия Dodge  Outcome: Ongoing     Problem: IP BALANCE  Goal: LTG - Patient will maintain balance to allow for safe/functional mobility  10/31/2021 0217 by Юлия Dodge  Outcome: Ongoing  10/31/2021 0215 by Юлия Dodge  Outcome: Ongoing     Problem: Skin Integrity:  Goal: Will show no infection signs and symptoms  Description: Will show no infection signs and symptoms  10/31/2021 0217 by Юлия Dodge  Outcome: Ongoing  10/31/2021 0215 by Юлия Dodge  Outcome: Ongoing  Goal: Absence of new skin breakdown  Description: Absence of new skin breakdown  10/31/2021 0217 by Юлия Dodge  Outcome: Ongoing  10/31/2021 0215 by Юлия Dodge  Outcome: Ongoing  10/30/2021 1742 by Melissa Puentes RN  Outcome: Ongoing     Problem: OXYGENATION/RESPIRATORY FUNCTION  Goal: Patient will achieve/maintain normal respiratory rate/effort  Description: Respiratory rate and effort will be within normal limits for the patient  10/31/2021 0217 by Юлия Dodge  Outcome: Ongoing  10/31/2021 0215 by Юлия Dodge  Outcome: Ongoing     Problem: Breathing Pattern - Ineffective:  Goal: Ability to achieve and maintain a regular respiratory rate will improve  Description: Ability to achieve and maintain a regular respiratory rate will improve  10/31/2021 0217 by Юлия Dodge  Outcome: Ongoing  10/31/2021 0215 by Юлия Dodge  Outcome: Ongoing     Problem: Pain:  Goal: Pain level will decrease  Description: Pain level will decrease  10/31/2021 0217 by Earma Lala  Outcome: Ongoing  10/31/2021 0215 by Earma Lala  Outcome: Ongoing  Goal: Control of acute pain  Description: Control of acute pain  10/31/2021 0217 by Earma Lala  Outcome: Ongoing  10/31/2021 0215 by Earma Lala  Outcome: Ongoing  Goal: Control of chronic pain  Description: Control of chronic pain  10/31/2021 0217 by Earma Lala  Outcome: Ongoing  10/31/2021 0215 by Earma Lala  Outcome: Ongoing     Problem: Cardiac Output - Decreased:  Goal: Hemodynamic stability will improve  Description: Hemodynamic stability will improve  Outcome: Ongoing

## 2021-10-31 NOTE — RT PROTOCOL NOTE
RT Inhaler-Nebulizer Bronchodilator Protocol Note    There is a bronchodilator order in the chart from a provider indicating to follow the RT Bronchodilator Protocol and there is an Initiate RT Inhaler-Nebulizer Bronchodilator Protocol order as well (see protocol at bottom of note). CXR Findings:  XR CHEST PORTABLE    Result Date: 10/30/2021  Chronic pulmonary change with small bibasilar effusions. Stable support tubes. The findings from the last RT Protocol Assessment were as follows:   History Pulmonary Disease: Chronic pulmonary disease  Respiratory Pattern: Dyspnea on exertion or RR 21-25 bpm  Breath Sounds: Slightly diminished and/or crackles  Cough: Weak, non-productive  Indication for Bronchodilator Therapy: Decreased or absent breath sounds  Bronchodilator Assessment Score: 9    Aerosolized bronchodilator medication orders have been revised according to the RT Inhaler-Nebulizer Bronchodilator Protocol below. Respiratory Therapist to perform RT Therapy Protocol Assessment initially then follow the protocol. Repeat RT Therapy Protocol Assessment PRN for score 0-3 or on second treatment, BID, and PRN for scores above 3. No Indications - adjust the frequency to every 6 hours PRN wheezing or bronchospasm, if no treatments needed after 48 hours then discontinue using Per Protocol order mode. If indication present, adjust the RT bronchodilator orders based on the Bronchodilator Assessment Score as indicated below. Use Inhaler orders unless patient has one or more of the following: on home nebulizer, not able to hold breath for 10 seconds, is not alert and oriented, cannot activate and use MDI correctly, or respiratory rate 25 breaths per minute or more, then use the equivalent nebulizer order(s) with same Frequency and PRN reasons based on the score. If a patient is on this medication at home then do not decrease Frequency below that used at home.     0-3 - enter or revise RT bronchodilator order(s) to equivalent RT Bronchodilator order with Frequency of every 4 hours PRN for wheezing or increased work of breathing using Per Protocol order mode. 4-6 - enter or revise RT Bronchodilator order(s) to two equivalent RT bronchodilator orders with one order with BID Frequency and one order with Frequency of every 4 hours PRN wheezing or increased work of breathing using Per Protocol order mode. 7-10 - enter or revise RT Bronchodilator order(s) to two equivalent RT bronchodilator orders with one order with TID Frequency and one order with Frequency of every 4 hours PRN wheezing or increased work of breathing using Per Protocol order mode. 11-13 - enter or revise RT Bronchodilator order(s) to one equivalent RT bronchodilator order with QID Frequency and an Albuterol order with Frequency of every 4 hours PRN wheezing or increased work of breathing using Per Protocol order mode. Greater than 13 - enter or revise RT Bronchodilator order(s) to one equivalent RT bronchodilator order with every 4 hours Frequency and an Albuterol order with Frequency of every 2 hours PRN wheezing or increased work of breathing using Per Protocol order mode. RT to enter RT Home Evaluation for COPD & MDI Assessment order using Per Protocol order mode.     Electronically signed by Roc Dennis RCP on 10/31/2021 at 8:47 AM

## 2021-10-31 NOTE — PROGRESS NOTES
Pulmonary Critical Care Progress Note       Patient seen for the follow up of acute hypoxic respiratory failure, moderate pulmonary hypertension, former smoker/COPD Pleural effusion on right     Subjective:  No significant overnight events noted. Chest tube remains to 20 cm suction with small air leak. No PTX seen on imaging. Her CT output has been 50 ml past 24 hrs  Examination:  Vitals: /64   Pulse 93   Temp 98.1 °F (36.7 °C) (Oral)   Resp 18   Ht 5' 4\" (1.626 m)   Wt 124 lb 5.4 oz (56.4 kg)   SpO2 100%   BMI 21.34 kg/m²   General appearance: alert and cooperative with exam, resting in bed  Neck: No JVD  Lungs: fair air exchange, diminished bilateral bases, + air leak in chest tube, 100% on 3 l nc  Heart: regular rate and rhythm, S1, S2 normal, no gallop  Abdomen: Soft, non tender, + BS  Extremities: no cyanosis or clubbing. No significant edema    LABs:  CBC:   Recent Labs     10/30/21  0603 10/31/21  0529   WBC 22.8* 17.7*   HGB 7.8* 8.1*   HCT 27.3* 27.9*    134*     BMP:   Recent Labs     10/30/21  0603 10/31/21  0529    132*   K 3.8 3.7   CO2 24 25   BUN 17 26*   CREATININE 1.38* 1.81*   LABGLOM 37* 27*   GLUCOSE 81 69*     Radiology:  10/29/2021      Impression:  · Acute hypoxic respiratory failure requiring high flow oxygen  · Moderate to large right pleural effusion, s/p thoracentesis with 850 mL removed  · S/p pigtail catheter placement 10/18/21  · Upsize of chest tube on 10/26/2021  · Small left pleural effusion, loculated  · Atelectasis/?  Trapped lung on the right  · Mild pulmonary edema  · Moderate pulmonary hypertension, RVSP 60 mmHg  · Suspected COPD/former smoker, quit 2019  · Acute kidney injury  · D-dimer, low probability for PE on VQ scan on 10/13/21. Negative CTA of the chest on 10/25/2021.   · Right pneumothorax    Recommendations:  · Oxygen via nasal cannula to keep SPO2 greater than 90%  · Incentive spirometry every hour while awake  · Symbicort  · DuoNeb every 4 hours  · Budesonide and Brovana via nebulizer every 12 hours  · Prednisone taper  · Monitor off of antibiotics. 14-day course of Levaquin completed. Infectious disease following  · Monitor blood cultures - no growth 6, 4 days  · Keep pigtail catheter to suction. Monitor output/Air leak. If airleak persists over the weekend and effusion remains minimal, will switch to Heimlich valve on Monday.   · Dialysis per nephrology  · X-ray chest in am 11-1-21  · Labs: CBC and BMP in am  · Pleural fluid negative for malignancy  · DVT prophylaxis, subcu heparin  · PFTs as an outpatient    Plan of care discussed with Dr Dusty Gustafson, APRN - CNP on 10/31/2021 at 9:50 AM

## 2021-10-31 NOTE — PROGRESS NOTES
[facility]  Office: [address], DO, Maria Isabel Bethea, DO, Christofer Castaneda, DO, Niharika Sushila Terrazas, DO, Fauzia Faria MD, Rodo Mcintosh MD, Arlene Mcfadden MD, Nita Osei MD, Gutierrez Cerna MD, Jose Xiong MD, Sona Rojas MD, Nehemiah Thomason MD, Everton Andrade, DO, Cruzito Omalley, DO, Sil Silva MD,  Vikram Oneill, DO, Ermias Hernandez MD, Trevor Velazquez MD, Elaina Cuenca MD, Lieutenant Jonathan MD, Rhianna Sunshine MD, Mila Dorman MD, Feng Hopkins, [facility], [facility] Fay, CNP, Jamey Guardado, CNP, Lelo Degroot, CNS, Lenny Alfonso, CNP, Liam Thomas, CNP, Herbert Honeycutt, CNP, Sean Mc, CNP, Josephine Cain, CNP, Adam Headley, CNP, Jose Coffman PA-C, Marie Lozano, [facility], Giovani Chen, CNP, Roly Rodarte, CNP, Silver Douglas, CNP, Tang Light, CNP, Mikaela Monique, CNP, Lei Buckley, CNP, [facility]att[facility]    Progress Note    10/31/2021    8:40 AM    Name:   Romina Benoit  MRN:     [id number]     [place]:      [de-identified]   Room:   [address] [place] [address] Day:  23  Admit Date:  10/12/2021  2:53 PM    PCP:   Cinthya Dukes MD  Code Status:  Full Code    Subjective:     C/C:   Chief Complaint   Patient presents with    Respiratory Distress     EMS reported 97% on room air; pt arrives with saturation 45% on room air    Failure To Thrive     Interval History Status: improved. Patient seen and examined at bedside, Continues to improve and feel better on a daily basis   she denies any new complaint. Still with the CT , n did not note significant air leak today, PNTX not evident on imaging. Son at bedside  Patient vitals, labs and all providers notes were reviewed,from overnight shift and morning updates were noted and discussed with the nurse    Brief History:     Per my ELANA:  \"Daniellejacki Wadsworth is a 64 y. o. female with a hx of CKD 4 and iron deficiency anemia who presented to [place] [facility] with Respiratory Distress and hypoxia (O2 sats 45%) and failure to thrive and is admitted to the hospital for the management of right Pleural effusion and acute renal failure. Patient lives at home alone and has recently been having difficulty caring for herself and generalized weakness for the last few weeks . She does have chronic BLE edema but denies hx of CHF. Initial CXR showed large right pleural effusion, stat elevated BNP and D-dimer as well as BUN/creatinine.  Last took known creatinine was 2.34 in august, she reports she seen a nephrologist once. She does not wear home oxygen   10/13: US guided right thoracentesis 850ml of nonturbid straw colored fluid removed -transudative  Pleural fluid cultures negative for malignancy\"  Chest tube placed 10/18 on right for loculated effusion   Started on heparin drip for possible PE; VQ being repeated 10/24    Review of Systems:     Review of Systems   Constitutional: Positive for activity change. Negative for chills, diaphoresis and fever. HENT: Negative for congestion. Eyes: Negative for visual disturbance. Respiratory: Negative for cough, chest tightness and wheezing. Cardiovascular: Negative for chest pain, palpitations and leg swelling. Gastrointestinal: Negative for abdominal pain, blood in stool, constipation, diarrhea, nausea and vomiting. Genitourinary: Negative for difficulty urinating. Neurological: Negative for dizziness, light-headedness, numbness and headaches. All other systems reviewed and are negative. Medications: Allergies:     Allergies   Allergen Reactions    Penicillins Swelling       Current Meds:   Scheduled Meds:    metoprolol tartrate  12.5 mg Oral BID    predniSONE  40 mg Oral Daily    heparin (porcine)  5,000 Units SubCUTAneous BID    budesonide  0.5 mg Nebulization BID    Arformoterol Tartrate  15 mcg Nebulization BID    ipratropium-albuterol  1 ampule Inhalation 4x daily    epoetin mike-epbx  8,000 Units SubCUTAneous Once per day on Mon     calcitRIOL  0.25 mcg Oral Once per day on     sodium chloride flush  5-40 mL IntraVENous 2 times per day    vitamin D  50,000 Units Oral Weekly     Continuous Infusions:    dextrose      sodium chloride       PRN Meds: midodrine, sodium citrate, midodrine, sodium citrate, sodium chloride flush, HYDROcodone 5 mg - acetaminophen **OR** HYDROcodone 5 mg - acetaminophen, melatonin, glucose, dextrose, glucagon (rDNA), dextrose, sodium chloride flush, sodium chloride, ondansetron **OR** ondansetron, acetaminophen **OR** acetaminophen, perflutren lipid microspheres    Data:     Past Medical History:   has no past medical history on file. Social History:   reports that she quit smoking about 2 years ago. Her smoking use included cigarettes. She has never used smokeless tobacco. She reports current alcohol use. Family History:   Family History   Problem Relation Age of Onset    Hypertension Mother     Cancer Sister     Cancer Brother        Vitals:  /64   Pulse 93   Temp 98.1 °F (36.7 °C) (Oral)   Resp 18   Ht 5' 4\" (1.626 m)   Wt 124 lb 5.4 oz (56.4 kg)   SpO2 100%   BMI 21.34 kg/m²   Temp (24hrs), Av °F (36.7 °C), Min:97.7 °F (36.5 °C), Max:98.2 °F (36.8 °C)    Recent Labs     10/30/21  1654 10/30/21  1912 10/31/21  0742 10/31/21  0817   POCGLU 116* 116* 68 77       I/O (24Hr):     Intake/Output Summary (Last 24 hours) at 10/31/2021 0840  Last data filed at 10/31/2021 0620  Gross per 24 hour   Intake --   Output 280 ml   Net -280 ml       Labs:  Hematology:  Recent Labs     10/29/21  0701 10/30/21  0603 10/31/21  0529   WBC 20.9* 22.8* 17.7*   RBC 3.44* 3.11* 3.14*   HGB 8.7* 7.8* 8.1*   HCT 29.5* 27.3* 27.9*   MCV 85.8 87.8 88.9   MCH 25.3 25.1* 25.8   MCHC 29.5 28.6 29.0   RDW 24.4* 25.2* 25.2*    148 134*   MPV 10.3 11.9 11.5     Chemistry:  Recent Labs     10/29/21  0701 10/30/21  0603 10/31/21  0529    135 132*   K 4.2 3.8 3.7   CL 97* 100 97*   CO2 23 24 25   GLUCOSE 83 81 69*   BUN 38* 17 26*   CREATININE 2.36* 1.38* 1.81*   ANIONGAP 15 11 10   LABGLOM 20* 37* 27*   GFRAA 24* 45* 33*   CALCIUM 8.1* 8.1* 8.2*     Recent Labs     10/30/21  0722 10/30/21  1114 10/30/21  1654 10/30/21  1912 10/31/21  0742 10/31/21  0817   POCGLU 72 95 116* 116* 68 77     ABG:  Lab Results   Component Value Date    POCPH 7.456 10/24/2021    POCPCO2 41.1 10/24/2021    POCPO2 53.3 10/24/2021    POCHCO3 28.9 10/24/2021    NBEA NOT REPORTED 10/24/2021    PBEA 5 10/24/2021    WXE0VAC NOT REPORTED 10/24/2021    MCKX3TYB 89 10/24/2021    FIO2 NOT REPORTED 10/24/2021     Lab Results   Component Value Date/Time    SPECIAL RTAC,20CC 10/26/2021 06:28 PM    SPECIAL LTAC 10/26/2021 06:28 PM     Lab Results   Component Value Date/Time    CULTURE NO GROWTH 4 DAYS 10/26/2021 06:28 PM    CULTURE NO GROWTH 4 DAYS 10/26/2021 06:28 PM       Radiology:  XR CHEST (SINGLE VIEW FRONTAL)    Result Date: 10/24/2021  Small stable left effusion and left lower lobe atelectatic changes. Stable right chest tube. Cardiomegaly and COPD redemonstrated. NM LUNG SCAN PERFUSION ONLY    Result Date: 10/21/2021  Exam is technically high probability for pulmonary embolism. While emboli are possible, the diminished activity within the upper lung zones could also be secondary to emphysema; a  ventilation study would be needed for confirmation. This could be obtained at a later time as clinically warranted. Improving perfusion at the lateral right lung base. Findings were discussed with Maia Paul at 12:13 on 10/21/2021. CT ABDOMEN PELVIS W IV CONTRAST Additional Contrast? Radiologist Recommendation    Result Date: 10/25/2021  1. Right pneumothorax with chest tube in place. Please see separate chest CT report for details of this region. 2.  Large rectal colorectal stool burden and mild gaseous distention of the colon.      XR CHEST PORTABLE    Result Date: 10/25/2021  Small bore right chest tube in unchanged position. Smaller but persistent right pneumothorax; otherwise, unchanged findings. Stable left effusion with left lower lobe volume loss. COPD. XR CHEST PORTABLE    Result Date: 10/23/2021  Questionable trace focal pneumothorax laterally in the right base near the chest tube. Persistent by a basilar airspace disease with small left pleural effusion. XR CHEST PORTABLE    Result Date: 10/22/2021  Stable right chest tube without demonstrable pneumothorax Improvement in now mild right basilar opacity related to minimal atelectasis and or effusion Stable moderate left basilar opacity     XR CHEST PORTABLE    Result Date: 10/21/2021  Stable right chest tube without pneumothorax Stable bibasilar pleuroparenchymal changes     XR CHEST PORTABLE    Result Date: 10/20/2021  No significant interval change. Right chest tube in place with small right pleural effusion and right basilar opacity. Unchanged moderate left pleural effusion. XR CHEST 1 VIEW    Result Date: 10/26/2021  Interval placement of tunneled dialysis catheter with tip in appropriate position. Small right basilar pneumothorax no longer present. Chest tube upsize with tip in right base. Attention for pneumothorax on serial portable. .     CT CHEST PULMONARY EMBOLISM W CONTRAST    Addendum Date: 10/25/2021    ADDENDUM: Findings were discussed with IVA SURESH at 1:30 pm on 10/25/2021. Result Date: 10/25/2021  Loculated left basilar effusion with adjacent consolidation representing atelectasis versus pneumonia. Focus of consolidation in the right lower lobe representing atelectasis versus pneumonia. Moderate to large right-sided pneumothorax with an indwelling pigtail catheter in the right basilar pleural space. IR TUNNELED CVC PLACE WO SQ PORT/PUMP > 5 YEARS    Result Date: 10/26/2021  Successful ultrasound and fluoroscopy guided tunneled catheter placement . The tunneled dialysis catheter may be used immediately.      IR CHANGE Acute renal failure with acute renal cortical necrosis superimposed on stage 4 chronic kidney disease ([address] 10/14/2021 Yes    Acute respiratory failure with hypoxia ([address] 10/13/2021 Yes    Iron deficiency anemia 10/12/2021 Yes    Essential hypertension 10/12/2021 Yes    General weakness 10/12/2021 Yes    Vitamin D deficiency 10/13/2021 Yes    Acute diastolic heart failure ([address] 10/13/2021 Yes    Hypoxia 10/13/2021 Yes    Anemia 10/13/2021 Yes    Peripheral edema 10/13/2021 Yes    Moderate protein-calorie malnutrition ([address] 10/13/2021 Yes    Severe malnutrition ([address] 10/13/2021 Yes    Acute kidney injury ([address] 10/15/2021 Yes    Hypokalemia 10/17/2021 Yes    Other emphysema ([address] 10/17/2021 Yes          Plan:           Acute hypoxemic respiratory failure: Likely secondary to  pleural effusion and pulmonary edema , improving ,continue support with low flow nasal cannula    Moderate to large right-sided pleural effusion: S/P thoracentesis with 850 mls out     Pneumothorax: Status post chest tube placement, chest tube exchanged 10/26  continue to continuous suction, serial CXR   discussed with pulmonology , tentative plan for Heimlich valve if leak persists   Abx discontinued per ID no further need for antibiotics    Acute kidney injury/ CKD 4: Likely secondary CHF, Patient needed hemodialysis, appreciate nephrology input. Renal diet  Securing the    Acute decompensated diastolic heart failure: Diuresis initially, now on hemodialysis, continue, strict I's & O's, daily weight, cardiac diet and fluid restriction    Hyperkalemia: Currently resolved. Severe malnutrition: Continue boost and encourage p.o. intake.     COPD exacerbation: Continue breathing treatment with DuoNeb and steroids    Discussed with the patient and nurse    Discussed with pulmonology      Edvin Cuba MD  10/31/2021  8:40 AM

## 2021-10-31 NOTE — PROGRESS NOTES
[facility]  Progress Note    Judie Bird  MRN: [id number]  Date: 10/31/2021  LOS: 23     Reason for F/U :   Leukocytosis    Impression :   Acute hypoxic respiratory failure  Right-sided pleural effusion status post thoracentesis 10/13/2021 and right-sided chest tube placement 10/18/2021  Moderate to large right-sided pneumothorax status post chest tube exchange for larger size 10/26/2021  Small loculated left-sided pleural effusion  Acute kidney injury on chronic kidney disease stage IV now started on hemodialysis  Acute diastolic congestive heart failure  Leukocytosis    Recommendations:   Monitor off antibiotics  Follow CBC and renal function  Supportive care    Infection Control Recommendations:   Universal precautions    Discharge Planning:   Estimated Length of IV antimicrobials:   Patient will need Midline Catheter Insertion/ PICC line Insertion: No  Patient will need: Home IV , [facility],  [facility],  LTAC: Undetermined  Patient willneed outpatient wound care: No    Medical Decision making / Summary of Stay:   Judie Bird is a 64y.o.-year-old female who was initially admitted on 10/12/2021. Patient has no documented medical history. Her information was obtained from the patient's chart, she is a poor historian and does not communicate much.     We were consulted 10/27/2021, patient was initially admitted to the hospital 10/12/2021 via ambulance from home with reported failure to thrive according to her son. On admission, patient had complaints of diarrhea x3 days, weakness, peripheral edema, she was also hypoxic at that time, documented at 45% oxygen saturation on room air. She was placed on 6 L oxygen per nasal cannula. On chest x-ray she did have a right-sided pleural effusion. Despite this, she did not have complaints of shortness of breath or cough. She was initiated on IV Levaquin.   Patient had an ultrasound-guided right-sided thoracentesis 10/13/2021 with 850 mL of nonturbid straw-colored fluid removed, fluid negative for malignancy. There is concern for right-sided trapped lung, interventional radiology did place a chest tube 10/18/2021.  10/19/2021 patient was transitioned to high flow per nasal cannula. Methylprednisone was initiated. 10/21/2021 patient had part of the VQ scan performed which was concerning for possible PE. She was initiated on IV heparin. She was subsequently transitioned back to oxygen per nasal cannula. The plan was for patient to do the ventilation portion of the VQ scan 10/24/2021; however, patient desaturated into the 80s and did not tolerate the exam.  Her white blood cell count increased at this point to 22,000 when it was previously within normal limits. Patient had CTA chest 10/25/2021 which revealed a moderate to large right-sided pneumothorax. 10/26/2021 her chest tube was exchanged to a larger sized tube. White blood cell count increased to 31,400. Her antimicrobial therapy was broadened to Levaquin as well as aztreonam and vancomycin and we were consulted 10/26/2021 due to continued leukocytosis. Patient was seen in hemodialysis and she was very lethargic. Discussed with patient's RN, does not know if patient having any diarrhea. States vital signs have been stable. Current evaluation:10/31/2021    /64   Pulse 93   Temp 98.1 °F (36.7 °C) (Oral)   Resp 18   Ht 5' 4\" (1.626 m)   Wt 124 lb 5.4 oz (56.4 kg)   SpO2 100%   BMI 21.34 kg/m²     Temperature Range: Temp: 98.1 °F (36.7 °C) Temp  Av °F (36.7 °C)  Min: 97.7 °F (36.5 °C)  Max: 98.2 °F (36.8 °C)  The patient is seen and evaluated at bedside and is awake and alert in no acute distress. She is feeling better, denied increased shortness of breath, denied significant cough, on oxygen per nasal cannula, eating breakfast, denied vomiting or diarrhea, no other complaints. WBC 17.7 bending down. On 40 mg of p.o. prednisone daily.   Blood cultures from 10/26/2021 no growth. Review of Systems   Constitutional: Negative. Respiratory: Positive for shortness of breath. Cardiovascular: Negative. Gastrointestinal: Negative. Genitourinary: Negative. Musculoskeletal: Negative. Skin: Negative. Neurological: Negative. Psychiatric/Behavioral: Negative. Physical Examination :     Physical Exam  Constitutional:       Appearance: She is well-developed. HENT:      Head: Normocephalic and atraumatic. Neck:      Comments: There is a right-sided hemodialysis catheter in place  Cardiovascular:      Rate and Rhythm: Regular rhythm. Heart sounds: Normal heart sounds. Pulmonary:      Effort: Pulmonary effort is normal.      Breath sounds: Rales (On the right side) present. Comments: There is a right-sided chest tube in place  Abdominal:      General: Bowel sounds are normal.      Palpations: Abdomen is soft. Musculoskeletal:      Cervical back: Neck supple. Skin:     General: Skin is warm and dry. Neurological:      Mental Status: She is alert and oriented to person, place, and time. Laboratory data:   I have independently reviewed the followinglabs:  CBC with Differential:   Recent Labs     10/30/21  0603 10/31/21  0529   WBC 22.8* 17.7*   HGB 7.8* 8.1*   HCT 27.3* 27.9*    134*   LYMPHOPCT 3* 5*   MONOPCT 5 6     BMP:   Recent Labs     10/30/21  0603 10/31/21  0529    132*   K 3.8 3.7    97*   CO2 24 25   BUN 17 26*   CREATININE 1.38* 1.81*     Hepatic Function Panel: No results for input(s): PROT, LABALBU, BILIDIR, IBILI, BILITOT, ALKPHOS, ALT, AST in the last 72 hours.       No results found for: PROCAL  No results found for: CRP  No results found for: SEDRATE      Lab Results   Component Value Date    DDIMER 2.38 10/12/2021     Lab Results   Component Value Date    FERRITIN 20 10/13/2021     Lab Results   Component Value Date     10/13/2021     No results found for: FIBRINOGEN    Results in Past 30 Days  Result Component Current Result Ref Range Previous Result Ref Range   SARS-CoV-2, Rapid Not Detected (10/12/2021) Not Detected Not in Time Range      Lab Results   Component Value Date    COVID19 Not Detected 10/12/2021       No results for input(s): CONSTANCE in the last 72 hours. Imaging Studies:   ONE XRAY VIEW OF THE CHEST 10/28/2021 12:05 pm  Impression   Probable mild increase in small left-sided effusion with otherwise stable   exam.       Cultures:     Culture, Blood 1 [[id number]] Collected: 10/26/21 1828   Order Status: Completed Specimen: Blood Updated: 10/29/21 0622    Specimen Description . BLOOD    Special Requests RTAC,20CC    Culture NO GROWTH 2 DAYS   Culture, Blood 1 [[id number]] Collected: 10/26/21 1828   Order Status: Completed Specimen: Blood Updated: 10/29/21 0622    Specimen Description . BLOOD    Special Requests LTAC    Culture NO GROWTH 2 DAYS     Culture, Blood 1 [[id number]] Collected: 10/25/21 1120   Order Status: Completed Specimen: Blood Updated: 10/29/21 2001    Specimen Description . BLOOD    Special Requests LFA, 1ML    Culture NO GROWTH 4 DAYS   Culture, Blood 1 [[id number]] Collected: 10/25/21 1130   Order Status: Completed Specimen: Blood Updated: 10/29/21 4158    Specimen Description . BLOOD    Special Requests LH, 2ML    Culture NO GROWTH 4 DAYS     Culture, Blood 1 [[id number]] Collected: 10/25/21 1120   Order Status: Completed Specimen: Blood Updated: 10/28/21 0015    Specimen Description . BLOOD    Special Requests LFA, 1ML    Culture NO GROWTH 3 DAYS   Culture, Blood 1 [[id number]] Collected: 10/25/21 1130   Order Status: Completed Specimen: Blood Updated: 10/28/21 0015    Specimen Description . BLOOD    Special Requests LH, 2ML    Culture NO GROWTH 3 DAYS     Culture, Fungus [[id number]] Collected: 10/13/21 0900   Order Status: Completed Specimen: Thoracentesis Updated: 10/25/21 1125    Specimen Description . THORACENTESIS FLUID RT    Special Requests NOT REPORTED Culture NO GROWTH 12 DAYS   Culture with Smear, Acid Fast Bacillius [[id number]] Collected: 10/13/21 0900   Order Status: Completed Specimen: Thoracentesis Updated: 10/25/21 0829    Specimen Description . THORACENTESIS FLUID RT    Special Requests NOT REPORTED    Direct Exam NO ACID FAST BACILLI SEEN (DIRECT SMEAR)    Culture NO GROWTH 12 DAYS   Culture, Blood 1 [[id number]] Collected: 10/18/21 0426   Order Status: Completed Specimen: Blood Updated: 10/24/21 0843    Specimen Description . BLOOD    Special Requests RT AC 5ML    Culture NO GROWTH 6 DAYS   Culture, Blood 2 [[id number]] Collected: 10/18/21 0426   Order Status: Completed Specimen: Blood Updated: 10/24/21 0843    Specimen Description . BLOOD    Special Requests RT HAND 6ML    Culture NO GROWTH 6 DAYS   Culture, Body Fluid [[id number]] (Abnormal) Collected: 10/13/21 0900   Order Status: Completed Specimen: Body Fluid from Thoracentesis Updated: 10/19/21 1115    Specimen Description . THORACENTESIS FLUID RT    Special Requests NOT REPORTED    Direct Exam MANY NEUTROPHILSAbnormal      NO BACTERIA SEEN     Gram stain made from cytocentrifuged specimen.  Organisms and cells will be concentrated. Culture NO GROWTH 6 DAYS   Culture, Blood 1 [[id number]] Collected: 10/12/21 1537   Order Status: Completed Specimen: Blood Updated: 10/18/21 0619    Specimen Description . BLOOD    Special Requests RIGHT ANTECUBE 20CC    Culture NO GROWTH 6 DAYS   Culture, Blood 1 [[id number]] Collected: 10/12/21 1540   Order Status: Completed Specimen: Blood Updated: 10/18/21 0619    Specimen Description . BLOOD    Special Requests LEFT ANTECUBE 20CC    Culture NO GROWTH 6 DAYS   Gram stain [[id number]] Collected: 10/13/21 0800   Order Status: Canceled Specimen: Body Fluid    COVID-19, Rapid [[id number]] Collected: 10/12/21 1554   Order Status: Completed Specimen: Nasopharyngeal Swab Updated: 10/12/21 1610    Specimen Description . NASOPHARYNGEAL SWAB    SARS-CoV-2, Rapid Not Detected Comment:        Rapid NAAT:  The specimen is NEGATIVE for SARS-CoV-2, the novel coronavirus associated with   COVID-19.         The ID NOW COVID-19 assay is designed to detect the virus that causes COVID-19 in patients   with signs and symptoms of infection who are suspected of COVID-19. An individual without symptoms of COVID-19 and who is not shedding SARS-CoV-2 virus would   expect to have a negative (not detected) result in this assay. Negative results should be treated as presumptive and, if inconsistent with clinical signs   and symptoms or necessary for patient management,   should be tested with an alternative molecular assay. Negative results do not preclude   SARS-CoV-2 infection and   should not be used as the sole basis for patient management decisions.         Fact sheet for Healthcare Providers: Zev   Fact sheet for Patients: Zev           Methodology: Isothermal Nucleic Acid Amplification              Medications:      ipratropium-albuterol  1 ampule Inhalation TID    metoprolol tartrate  12.5 mg Oral BID    predniSONE  40 mg Oral Daily    heparin (porcine)  5,000 Units SubCUTAneous BID    budesonide  0.5 mg Nebulization BID    Arformoterol Tartrate  15 mcg Nebulization BID    epoetin mike-epbx  8,000 Units SubCUTAneous Once per day on Mon Wed Fri    calcitRIOL  0.25 mcg Oral Once per day on Mon Wed Fri    sodium chloride flush  5-40 mL IntraVENous 2 times per day    vitamin D  50,000 Units Oral Weekly           [facility]  Nadya Yoon MD  [employer] messaging  OFFICE: [phone]      Electronically signed by Nadya Yoon MD on 10/31/2021 at 8:49 AM  Thank you for allowing us to participate in the care of this patient. Please call with questions.     This note iscreated with the assistance of a speech recognition program.  While intending to generate a document that actually reflects the content of the visit, the document can still have some errors including those of syntax andsound a like substitutions which may escape proof reading. In such instances, actual meaning can be extrapolated by contextual diversion.

## 2021-11-01 ENCOUNTER — APPOINTMENT (OUTPATIENT)
Dept: GENERAL RADIOLOGY | Age: 76
DRG: 291 | End: 2021-11-01
Payer: MEDICARE

## 2021-11-01 LAB
ABSOLUTE EOS #: 0 K/UL (ref 0–0.4)
ABSOLUTE IMMATURE GRANULOCYTE: 0.36 K/UL (ref 0–0.3)
ABSOLUTE LYMPH #: 0.54 K/UL (ref 1–4.8)
ABSOLUTE MONO #: 0.9 K/UL (ref 0.2–0.8)
ANION GAP SERPL CALCULATED.3IONS-SCNC: 13 MMOL/L (ref 9–17)
BASOPHILS # BLD: 0 %
BASOPHILS ABSOLUTE: 0 K/UL (ref 0–0.2)
BUN BLDV-MCNC: 33 MG/DL (ref 8–23)
BUN/CREAT BLD: 17 (ref 9–20)
CALCIUM SERPL-MCNC: 8.2 MG/DL (ref 8.6–10.4)
CHLORIDE BLD-SCNC: 97 MMOL/L (ref 98–107)
CO2: 22 MMOL/L (ref 20–31)
CREAT SERPL-MCNC: 1.93 MG/DL (ref 0.5–0.9)
DIFFERENTIAL TYPE: ABNORMAL
EOSINOPHILS RELATIVE PERCENT: 0 % (ref 1–4)
GFR AFRICAN AMERICAN: 30 ML/MIN
GFR NON-AFRICAN AMERICAN: 25 ML/MIN
GFR SERPL CREATININE-BSD FRML MDRD: ABNORMAL ML/MIN/{1.73_M2}
GFR SERPL CREATININE-BSD FRML MDRD: ABNORMAL ML/MIN/{1.73_M2}
GLUCOSE BLD-MCNC: 124 MG/DL (ref 65–105)
GLUCOSE BLD-MCNC: 70 MG/DL (ref 65–105)
GLUCOSE BLD-MCNC: 81 MG/DL (ref 70–99)
HCT VFR BLD CALC: 30 % (ref 36.3–47.1)
HEMOGLOBIN: 8.7 G/DL (ref 11.9–15.1)
IMMATURE GRANULOCYTES: 2 %
LYMPHOCYTES # BLD: 3 % (ref 24–44)
MCH RBC QN AUTO: 26 PG (ref 25.2–33.5)
MCHC RBC AUTO-ENTMCNC: 29 G/DL (ref 28.4–34.8)
MCV RBC AUTO: 89.6 FL (ref 82.6–102.9)
MONOCYTES # BLD: 5 % (ref 1–7)
MORPHOLOGY: ABNORMAL
NRBC AUTOMATED: 0.2 PER 100 WBC
PDW BLD-RTO: 25.7 % (ref 11.8–14.4)
PLATELET # BLD: 141 K/UL (ref 138–453)
PLATELET ESTIMATE: ABNORMAL
PMV BLD AUTO: 11.5 FL (ref 8.1–13.5)
POTASSIUM SERPL-SCNC: 4.2 MMOL/L (ref 3.7–5.3)
RBC # BLD: 3.35 M/UL (ref 3.95–5.11)
RBC # BLD: ABNORMAL 10*6/UL
SEG NEUTROPHILS: 90 % (ref 36–66)
SEGMENTED NEUTROPHILS ABSOLUTE COUNT: 16.2 K/UL (ref 1.8–7.7)
SODIUM BLD-SCNC: 132 MMOL/L (ref 135–144)
WBC # BLD: 18 K/UL (ref 3.5–11.3)
WBC # BLD: ABNORMAL 10*3/UL

## 2021-11-01 PROCEDURE — 80048 BASIC METABOLIC PNL TOTAL CA: CPT

## 2021-11-01 PROCEDURE — 6360000002 HC RX W HCPCS: Performed by: NURSE PRACTITIONER

## 2021-11-01 PROCEDURE — 6370000000 HC RX 637 (ALT 250 FOR IP): Performed by: STUDENT IN AN ORGANIZED HEALTH CARE EDUCATION/TRAINING PROGRAM

## 2021-11-01 PROCEDURE — 2580000003 HC RX 258: Performed by: STUDENT IN AN ORGANIZED HEALTH CARE EDUCATION/TRAINING PROGRAM

## 2021-11-01 PROCEDURE — 94761 N-INVAS EAR/PLS OXIMETRY MLT: CPT

## 2021-11-01 PROCEDURE — 6360000002 HC RX W HCPCS: Performed by: INTERNAL MEDICINE

## 2021-11-01 PROCEDURE — P9047 ALBUMIN (HUMAN), 25%, 50ML: HCPCS | Performed by: INTERNAL MEDICINE

## 2021-11-01 PROCEDURE — 2700000000 HC OXYGEN THERAPY PER DAY

## 2021-11-01 PROCEDURE — 6370000000 HC RX 637 (ALT 250 FOR IP): Performed by: FAMILY MEDICINE

## 2021-11-01 PROCEDURE — 99232 SBSQ HOSP IP/OBS MODERATE 35: CPT | Performed by: INTERNAL MEDICINE

## 2021-11-01 PROCEDURE — 6370000000 HC RX 637 (ALT 250 FOR IP): Performed by: NURSE PRACTITIONER

## 2021-11-01 PROCEDURE — 36415 COLL VENOUS BLD VENIPUNCTURE: CPT

## 2021-11-01 PROCEDURE — 85025 COMPLETE CBC W/AUTO DIFF WBC: CPT

## 2021-11-01 PROCEDURE — 6370000000 HC RX 637 (ALT 250 FOR IP): Performed by: INTERNAL MEDICINE

## 2021-11-01 PROCEDURE — 71045 X-RAY EXAM CHEST 1 VIEW: CPT

## 2021-11-01 PROCEDURE — 90935 HEMODIALYSIS ONE EVALUATION: CPT

## 2021-11-01 PROCEDURE — 2060000000 HC ICU INTERMEDIATE R&B

## 2021-11-01 PROCEDURE — 82947 ASSAY GLUCOSE BLOOD QUANT: CPT

## 2021-11-01 PROCEDURE — 94640 AIRWAY INHALATION TREATMENT: CPT

## 2021-11-01 PROCEDURE — 2500000003 HC RX 250 WO HCPCS: Performed by: INTERNAL MEDICINE

## 2021-11-01 PROCEDURE — 99232 SBSQ HOSP IP/OBS MODERATE 35: CPT | Performed by: FAMILY MEDICINE

## 2021-11-01 RX ORDER — ALBUMIN (HUMAN) 12.5 G/50ML
25 SOLUTION INTRAVENOUS
Status: COMPLETED | OUTPATIENT
Start: 2021-11-01 | End: 2021-11-01

## 2021-11-01 RX ADMIN — IPRATROPIUM BROMIDE AND ALBUTEROL SULFATE 1 AMPULE: .5; 2.5 SOLUTION RESPIRATORY (INHALATION) at 19:38

## 2021-11-01 RX ADMIN — ARFORMOTEROL TARTRATE 15 MCG: 15 SOLUTION RESPIRATORY (INHALATION) at 07:43

## 2021-11-01 RX ADMIN — MIDODRINE HYDROCHLORIDE 10 MG: 10 TABLET ORAL at 15:51

## 2021-11-01 RX ADMIN — METOPROLOL TARTRATE 12.5 MG: 25 TABLET, FILM COATED ORAL at 08:36

## 2021-11-01 RX ADMIN — Medication 1.9 ML: at 18:40

## 2021-11-01 RX ADMIN — BUDESONIDE 500 MCG: 0.5 SUSPENSION RESPIRATORY (INHALATION) at 19:38

## 2021-11-01 RX ADMIN — PREDNISONE 40 MG: 20 TABLET ORAL at 08:37

## 2021-11-01 RX ADMIN — IPRATROPIUM BROMIDE AND ALBUTEROL SULFATE 1 AMPULE: .5; 2.5 SOLUTION RESPIRATORY (INHALATION) at 14:22

## 2021-11-01 RX ADMIN — IPRATROPIUM BROMIDE AND ALBUTEROL SULFATE 1 AMPULE: .5; 2.5 SOLUTION RESPIRATORY (INHALATION) at 07:34

## 2021-11-01 RX ADMIN — EPOETIN ALFA-EPBX 8000 UNITS: 4000 INJECTION, SOLUTION INTRAVENOUS; SUBCUTANEOUS at 11:37

## 2021-11-01 RX ADMIN — METOPROLOL TARTRATE 12.5 MG: 25 TABLET, FILM COATED ORAL at 22:10

## 2021-11-01 RX ADMIN — ARFORMOTEROL TARTRATE 15 MCG: 15 SOLUTION RESPIRATORY (INHALATION) at 19:38

## 2021-11-01 RX ADMIN — SODIUM CHLORIDE, PRESERVATIVE FREE 10 ML: 5 INJECTION INTRAVENOUS at 22:10

## 2021-11-01 RX ADMIN — HEPARIN SODIUM 5000 UNITS: 5000 INJECTION INTRAVENOUS; SUBCUTANEOUS at 08:37

## 2021-11-01 RX ADMIN — CALCITRIOL 0.25 MCG: 0.25 CAPSULE ORAL at 08:37

## 2021-11-01 RX ADMIN — BUDESONIDE 500 MCG: 0.5 SUSPENSION RESPIRATORY (INHALATION) at 07:34

## 2021-11-01 RX ADMIN — HYDROCODONE BITARTRATE AND ACETAMINOPHEN 1 TABLET: 5; 325 TABLET ORAL at 23:46

## 2021-11-01 RX ADMIN — HEPARIN SODIUM 5000 UNITS: 5000 INJECTION INTRAVENOUS; SUBCUTANEOUS at 22:10

## 2021-11-01 RX ADMIN — Medication 1.9 ML: at 18:39

## 2021-11-01 RX ADMIN — ALBUMIN (HUMAN) 25 G: 0.25 INJECTION, SOLUTION INTRAVENOUS at 16:49

## 2021-11-01 RX ADMIN — SODIUM CHLORIDE, PRESERVATIVE FREE 10 ML: 5 INJECTION INTRAVENOUS at 08:37

## 2021-11-01 ASSESSMENT — ENCOUNTER SYMPTOMS
CHEST TIGHTNESS: 0
GASTROINTESTINAL NEGATIVE: 1
COUGH: 0
ABDOMINAL PAIN: 0
NAUSEA: 0
WHEEZING: 0
CONSTIPATION: 0
DIARRHEA: 0
VOMITING: 0
SHORTNESS OF BREATH: 1
BLOOD IN STOOL: 0

## 2021-11-01 ASSESSMENT — PAIN SCALES - GENERAL
PAINLEVEL_OUTOF10: 4
PAINLEVEL_OUTOF10: 0
PAINLEVEL_OUTOF10: 0
PAINLEVEL_OUTOF10: 2

## 2021-11-01 NOTE — PROGRESS NOTES
[facility]  Progress Note    Tay Cervantes  MRN: [id number]  Date: 11/1/2021  LOS: 21     Reason for F/U :   Leukocytosis    Impression :   Acute hypoxic respiratory failure  Right-sided pleural effusion   status post thoracentesis 10/13/2021   right-sided chest tube placement 10/18/2021  Moderate to large right-sided pneumothorax status post chest tube exchange for larger size 10/26/2021  Small loculated left-sided pleural effusion  Acute kidney injury on chronic kidney disease stage IV now started on hemodialysis  Acute diastolic congestive heart failure  Leukocytosis    Recommendations: The patient remained stable off antimicrobial therapy. Clinically she is doing well with no clinical deterioration. The leukocytosis overall has improved and has been stable around 1718. From infectious disease standpoint the patient is okay for discharge and I will sign off. Infection Control Recommendations:   Universal precautions    Discharge Planning:   Patient will need Midline Catheter Insertion/ PICC line Insertion: No  Patient will need: Home IV , [place],  SNF,  LTAC: Undetermined  Patient willneed outpatient wound care: No    Medical Decision making / Summary of Stay:   Tay Cervantes is a 64y.o.-year-old female who was initially admitted on 10/12/2021. Patient has no documented medical history. Her information was obtained from the patient's chart, she is a poor historian and does not communicate much.     We were consulted 10/27/2021, patient was initially admitted to the hospital 10/12/2021 via ambulance from home with reported failure to thrive according to her son. On admission, patient had complaints of diarrhea x3 days, weakness, peripheral edema, she was also hypoxic at that time, documented at 45% oxygen saturation on room air. She was placed on 6 L oxygen per nasal cannula. On chest x-ray she did have a right-sided pleural effusion.  Despite this, she did not have weak.  The chest tubes on the right have been clamped. No subjective fevers or chills. The son is in the room at the time of my evaluation. Review of Systems   Constitutional: Negative. Respiratory: Positive for shortness of breath. Cardiovascular: Negative. Gastrointestinal: Negative. Genitourinary: Negative. Musculoskeletal: Negative. Skin: Negative. Neurological: Negative. Psychiatric/Behavioral: Negative. Physical Examination :     Physical Exam  Constitutional:       Appearance: She is well-developed. HENT:      Head: Normocephalic and atraumatic. Neck:      Comments: There is a right-sided hemodialysis catheter in place  Cardiovascular:      Rate and Rhythm: Regular rhythm. Heart sounds: Normal heart sounds. Pulmonary:      Effort: Pulmonary effort is normal.      Breath sounds: Rales (On the right side) present. Comments: There is a right-sided chest tube in place  Abdominal:      General: Bowel sounds are normal.      Palpations: Abdomen is soft. Musculoskeletal:      Cervical back: Neck supple. Skin:     General: Skin is warm and dry. Neurological:      Mental Status: She is alert and oriented to person, place, and time. Laboratory data:   I have independently reviewed the followinglabs:  CBC with Differential:   Recent Labs     10/31/21  0529 11/01/21  0533   WBC 17.7* 18.0*   HGB 8.1* 8.7*   HCT 27.9* 30.0*   * 141   LYMPHOPCT 5* 3*   MONOPCT 6 5     BMP:   Recent Labs     10/31/21  0529 11/01/21  0533   * 132*   K 3.7 4.2   CL 97* 97*   CO2 25 22   BUN 26* 33*   CREATININE 1.81* 1.93*     Hepatic Function Panel: No results for input(s): PROT, LABALBU, BILIDIR, IBILI, BILITOT, ALKPHOS, ALT, AST in the last 72 hours.       No results found for: PROCAL  No results found for: CRP  No results found for: SEDRATE      Lab Results   Component Value Date    DDIMER 2.38 10/12/2021     Lab Results   Component Value Date    FERRITIN 20 10/13/2021     Lab Results   Component Value Date     10/13/2021     No results found for: FIBRINOGEN    Results in Past 30 Days  Result Component Current Result Ref Range Previous Result Ref Range   SARS-CoV-2, Rapid Not Detected (10/12/2021) Not Detected Not in Time Range      Lab Results   Component Value Date    COVID19 Not Detected 10/12/2021       No results for input(s): VANCOTROUGH in the last 72 hours. Imaging Studies:   ONE XRAY VIEW OF THE CHEST 10/28/2021 12:05 pm  Impression   Probable mild increase in small left-sided effusion with otherwise stable   exam.       Cultures:     Culture, Fungus [[id number]] Collected: 10/13/21 0900   Order Status: Completed Specimen: Thoracentesis Updated: 11/01/21 1110    Specimen Description . THORACENTESIS FLUID RT    Special Requests NOT REPORTED    Culture NO GROWTH 19 DAYS   Culture with Smear, Acid Fast Bacillius [[id number]] Collected: 10/13/21 0900   Order Status: Completed Specimen: Thoracentesis Updated: 11/01/21 1030    Specimen Description . THORACENTESIS FLUID RT    Special Requests NOT REPORTED    Direct Exam NO ACID FAST BACILLI SEEN (DIRECT SMEAR)    Culture NO GROWTH 19 DAYS   Culture, Blood 1 [[id number]] Collected: 10/26/21 1828   Order Status: Completed Specimen: Blood Updated: 11/01/21 0351    Specimen Description . BLOOD    Special Requests RTAC,20CC    Culture NO GROWTH 5 DAYS   Culture, Blood 1 [[id number]] Collected: 10/26/21 1828   Order Status: Completed Specimen: Blood Updated: 11/01/21 0351    Specimen Description . BLOOD    Special Requests LTAC    Culture NO GROWTH 5 DAYS   Culture, Blood 1 [[id number]] Collected: 10/25/21 1120   Order Status: Completed Specimen: Blood Updated: 10/31/21 0509    Specimen Description . BLOOD    Special Requests LFA, 1ML    Culture NO GROWTH 6 DAYS   Culture, Blood 1 [[id number]] Collected: 10/25/21 1130   Order Status: Completed Specimen: Blood Updated: 10/31/21 0509    Specimen Description . BLOOD Special Requests LH, 2ML    Culture NO GROWTH 6 DAYS     Culture with Smear, Acid Fast Bacillius [[id number]] Collected: 10/13/21 0900   Order Status: Completed Specimen: Thoracentesis Updated: 10/25/21 0829    Specimen Description . THORACENTESIS FLUID RT    Special Requests NOT REPORTED    Direct Exam NO ACID FAST BACILLI SEEN (DIRECT SMEAR)    Culture NO GROWTH 12 DAYS   Culture, Blood 1 [[id number]] Collected: 10/18/21 0426   Order Status: Completed Specimen: Blood Updated: 10/24/21 0843    Specimen Description . BLOOD    Special Requests RT AC 5ML    Culture NO GROWTH 6 DAYS   Culture, Blood 2 [[id number]] Collected: 10/18/21 0426   Order Status: Completed Specimen: Blood Updated: 10/24/21 0843    Specimen Description . BLOOD    Special Requests RT HAND 6ML    Culture NO GROWTH 6 DAYS   Culture, Body Fluid [[id number]] (Abnormal) Collected: 10/13/21 0900   Order Status: Completed Specimen: Body Fluid from Thoracentesis Updated: 10/19/21 1115    Specimen Description . THORACENTESIS FLUID RT    Special Requests NOT REPORTED    Direct Exam MANY NEUTROPHILSAbnormal      NO BACTERIA SEEN     Gram stain made from cytocentrifuged specimen.  Organisms and cells will be concentrated. Culture NO GROWTH 6 DAYS   Culture, Blood 1 [[id number]] Collected: 10/12/21 1537   Order Status: Completed Specimen: Blood Updated: 10/18/21 0619    Specimen Description . BLOOD    Special Requests RIGHT ANTECUBE 20CC    Culture NO GROWTH 6 DAYS   Culture, Blood 1 [[id number]] Collected: 10/12/21 1540   Order Status: Completed Specimen: Blood Updated: 10/18/21 0619    Specimen Description . BLOOD    Special Requests LEFT ANTECUBE 20CC    Culture NO GROWTH 6 DAYS   Gram stain [[id number]] Collected: 10/13/21 0800   Order Status: Canceled Specimen: Body Fluid    COVID-19, Rapid [[id number]] Collected: 10/12/21 1554   Order Status: Completed Specimen: Nasopharyngeal Swab Updated: 10/12/21 1610    Specimen Description . NASOPHARYNGEAL SWAB program.  While intending to generate a document that actually reflects the content of the visit, the document can still have some errors including those of syntax andsound a like substitutions which may escape proof reading. In such instances, actual meaning can be extrapolated by contextual diversion.

## 2021-11-01 NOTE — PLAN OF CARE
Problem: Falls - Risk of:  Goal: Will remain free from falls  Description: Will remain free from falls  Outcome: Ongoing  Goal: Absence of physical injury  Description: Absence of physical injury  Outcome: Ongoing     Problem: Nutrition  Goal: Optimal nutrition therapy  Outcome: Ongoing     Problem: IP BALANCE  Goal: LTG - Patient will maintain balance to allow for safe/functional mobility  Outcome: Ongoing     Problem: Skin Integrity:  Goal: Will show no infection signs and symptoms  Description: Will show no infection signs and symptoms  Outcome: Ongoing  Goal: Absence of new skin breakdown  Description: Absence of new skin breakdown  Outcome: Ongoing     Problem: OXYGENATION/RESPIRATORY FUNCTION  Goal: Patient will achieve/maintain normal respiratory rate/effort  Description: Respiratory rate and effort will be within normal limits for the patient  Outcome: Ongoing     Problem: Breathing Pattern - Ineffective:  Goal: Ability to achieve and maintain a regular respiratory rate will improve  Description: Ability to achieve and maintain a regular respiratory rate will improve  Outcome: Ongoing     Problem: Pain:  Goal: Pain level will decrease  Description: Pain level will decrease  Outcome: Ongoing  Goal: Control of acute pain  Description: Control of acute pain  Outcome: Ongoing  Goal: Control of chronic pain  Description: Control of chronic pain  Outcome: Ongoing     Problem: Cardiac Output - Decreased:  Goal: Hemodynamic stability will improve  Description: Hemodynamic stability will improve  Outcome: Ongoing      Educated Danielle on atypical s/s of pain; medicated for pain to good effect. Continues on O2. Chest tube draining scant amounts of fluid.

## 2021-11-01 NOTE — RT PROTOCOL NOTE
RT Inhaler-Nebulizer Bronchodilator Protocol Note    There is a bronchodilator order in the chart from a provider indicating to follow the RT Bronchodilator Protocol and there is an Initiate RT Inhaler-Nebulizer Bronchodilator Protocol order as well (see protocol at bottom of note). CXR Findings:  XR CHEST PORTABLE    Result Date: 11/1/2021  Right chest tube remains in place. No pneumothorax or significant right pleural effusion. XR CHEST PORTABLE    Result Date: 10/31/2021  Little change from prior examination. Small bilateral effusions and atelectasis. The findings from the last RT Protocol Assessment were as follows:   History Pulmonary Disease: Chronic pulmonary disease  Respiratory Pattern: Dyspnea on exertion or RR 21-25 bpm  Breath Sounds: Slightly diminished and/or crackles  Cough: Weak, non-productive  Indication for Bronchodilator Therapy: Decreased or absent breath sounds  Bronchodilator Assessment Score: 9    Aerosolized bronchodilator medication orders have been revised according to the RT Inhaler-Nebulizer Bronchodilator Protocol below. Respiratory Therapist to perform RT Therapy Protocol Assessment initially then follow the protocol. Repeat RT Therapy Protocol Assessment PRN for score 0-3 or on second treatment, BID, and PRN for scores above 3. No Indications - adjust the frequency to every 6 hours PRN wheezing or bronchospasm, if no treatments needed after 48 hours then discontinue using Per Protocol order mode. If indication present, adjust the RT bronchodilator orders based on the Bronchodilator Assessment Score as indicated below. Use Inhaler orders unless patient has one or more of the following: on home nebulizer, not able to hold breath for 10 seconds, is not alert and oriented, cannot activate and use MDI correctly, or respiratory rate 25 breaths per minute or more, then use the equivalent nebulizer order(s) with same Frequency and PRN reasons based on the score. If a patient is on this medication at home then do not decrease Frequency below that used at home. 0-3 - enter or revise RT bronchodilator order(s) to equivalent RT Bronchodilator order with Frequency of every 4 hours PRN for wheezing or increased work of breathing using Per Protocol order mode. 4-6 - enter or revise RT Bronchodilator order(s) to two equivalent RT bronchodilator orders with one order with BID Frequency and one order with Frequency of every 4 hours PRN wheezing or increased work of breathing using Per Protocol order mode. 7-10 - enter or revise RT Bronchodilator order(s) to two equivalent RT bronchodilator orders with one order with TID Frequency and one order with Frequency of every 4 hours PRN wheezing or increased work of breathing using Per Protocol order mode. 11-13 - enter or revise RT Bronchodilator order(s) to one equivalent RT bronchodilator order with QID Frequency and an Albuterol order with Frequency of every 4 hours PRN wheezing or increased work of breathing using Per Protocol order mode. Greater than 13 - enter or revise RT Bronchodilator order(s) to one equivalent RT bronchodilator order with every 4 hours Frequency and an Albuterol order with Frequency of every 2 hours PRN wheezing or increased work of breathing using Per Protocol order mode. RT to enter RT Home Evaluation for COPD & MDI Assessment order using Per Protocol order mode.     Electronically signed by Agustín Camara RCP on 11/1/2021 at 7:58 AM

## 2021-11-01 NOTE — PROGRESS NOTES
Writer updated Dr. Blake Higgins on BP of 86/50 and tachycardia. New orders received. No fluid removal with dialysis. Will continue to monitor patient.

## 2021-11-01 NOTE — CARE COORDINATION
Social work: Writer informed in report potential for chest tube removal soon. As a result, patient may be able to go to SNF opposed to [facility]. [address], [address] [facility] are following. In addition, [employer] [facility] is following for outpatient dialysis, 11:50chair time. Per Allyson/RN patient has been up to chair with therapy. Updates faxed to [facility] central intake today.

## 2021-11-01 NOTE — PROGRESS NOTES
Treatment time: 210 minutes    Net UF: 0 mL    Pre weight: 47.8 kg  Post weight: 47.8 kg  EDW: n/a    Access used: CVC right chest  Access function: good    Medications or blood products given: 10 mg midodrine, 25 g albumin    Regular outpatient schedule: ÁNGEL Rodriguez    Summary of response to treatment: Patient tolerated treatment fairly, Dr. Giuliano Harris notified of hypotension during treatment and goal reduced to zero, albumin given, new dressing applied to catheter site, report given to PRADIP Valadez. Copy of dialysis treatment record placed in chart, to be scanned into EMR.

## 2021-11-01 NOTE — CARE COORDINATION
Discharge Planning:    Spoke with Lamonte Collier at [facility] and they received the signed letter by Dr. Dieudonne Olivo and have initiated the appeal process in case the chest tube is unable to be removed and patient needs to d/c to [facility].

## 2021-11-01 NOTE — PROGRESS NOTES
[facility]  Office: [address], DO, Author Sarika, DO, Anabella Carmona, DO, Irena Terrazas, DO, Janna Watson MD, Daniel Hart MD, Diana Goldberg MD, Carolan Schilder, MD, Mariia Shanks MD, Bianka South MD, Amie Carreno MD, Forrest Perez MD, Erick Leal DO, Yoanna العلي DO, Carmella Garcia MD,  Gisselle Pinedo DO, Guillermina Mcnamara MD, Lacey Correia MD, Cheryle Ferguson MD, Gael Pressley MD, Uli Shepherd MD, Ed Biggs MD, Cierra Vizcarra [facility], CNP, Anirudh Sommer, CNP, Nicanor Lynn, CNS, Wallace Hernandez, CNP, Marty Ling, CNP, Chika Dickerson, CNP, Castro Hamm, CNP, Memo Rueda, CNP, Paulina Moore, CNP, Anderw Turner PA-C, Ajit Nair, [facility], Lio Gomez, CNP, Lysbeth Angelucci, CNP, Shefali Pavon, CNP, Jose Manzano, CNP, Stephanie Gomez, CNP, Siobhan Scott, CNP, Marbin Ronquillo, [facility]    Progress Note    11/1/2021    8:06 AM    Name:   Zuleima David  MRN:     [id number]     [place]:      [de-identified]   Room:   [address] [place] [address] Day:  20  Admit Date:  10/12/2021  2:53 PM    PCP:   Bibi Crawford MD  Code Status:  Full Code    Subjective:     C/C:   Chief Complaint   Patient presents with    Respiratory Distress     EMS reported 97% on room air; pt arrives with saturation 45% on room air    Failure To Thrive     Interval History Status: improved. Patient seen and examined at bedside, Continues to improve and feel better on a daily basis   she denies any new complaint. Still with the CT , I do not appreciate any air leak today, chest x-ray continue with resolved pneumothorax   Patient vitals, labs and all providers notes were reviewed,from overnight shift and morning updates were noted and discussed with the nurse    Brief History:     Per my ELANA:  \"Danielle Wadsworth is a 64 y. o. female with a hx of CKD 4 and iron deficiency anemia who presented to [facility] with Respiratory Distress and hypoxia (O2 sats 45%) and failure to thrive and is admitted to the hospital for the management of right Pleural effusion and acute renal failure. Patient lives at home alone and has recently been having difficulty caring for herself and generalized weakness for the last few weeks . She does have chronic BLE edema but denies hx of CHF. Initial CXR showed large right pleural effusion, stat elevated BNP and D-dimer as well as BUN/creatinine.  Last took known creatinine was 2.34 in august, she reports she seen a nephrologist once. She does not wear home oxygen   10/13: US guided right thoracentesis 850ml of nonturbid straw colored fluid removed -transudative  Pleural fluid cultures negative for malignancy\"  Chest tube placed 10/18 on right for loculated effusion   Started on heparin drip for possible PE; VQ being repeated 10/24    Review of Systems:     Review of Systems   Constitutional: Positive for activity change. Negative for chills, diaphoresis and fever. HENT: Negative for congestion. Eyes: Negative for visual disturbance. Respiratory: Negative for cough, chest tightness and wheezing. Cardiovascular: Negative for chest pain, palpitations and leg swelling. Gastrointestinal: Negative for abdominal pain, blood in stool, constipation, diarrhea, nausea and vomiting. Genitourinary: Negative for difficulty urinating. Neurological: Negative for dizziness, light-headedness, numbness and headaches. All other systems reviewed and are negative. Medications: Allergies:     Allergies   Allergen Reactions    Penicillins Swelling       Current Meds:   Scheduled Meds:    ipratropium-albuterol  1 ampule Inhalation TID    metoprolol tartrate  12.5 mg Oral BID    predniSONE  40 mg Oral Daily    heparin (porcine)  5,000 Units SubCUTAneous BID    budesonide  0.5 mg Nebulization BID    Arformoterol Tartrate  15 mcg Nebulization BID    epoetin mike-epbx  8,000 Units SubCUTAneous Once per day on Mon Wed Fri    calcitRIOL  0.25 mcg Oral Once per day on     sodium chloride flush  5-40 mL IntraVENous 2 times per day    vitamin D  50,000 Units Oral Weekly     Continuous Infusions:    dextrose      sodium chloride       PRN Meds: midodrine, sodium citrate, midodrine, sodium citrate, sodium chloride flush, HYDROcodone 5 mg - acetaminophen **OR** HYDROcodone 5 mg - acetaminophen, melatonin, glucose, dextrose, glucagon (rDNA), dextrose, sodium chloride flush, sodium chloride, ondansetron **OR** ondansetron, acetaminophen **OR** acetaminophen, perflutren lipid microspheres    Data:     Past Medical History:   has no past medical history on file. Social History:   reports that she quit smoking about 2 years ago. Her smoking use included cigarettes. She has never used smokeless tobacco. She reports current alcohol use. Family History:   Family History   Problem Relation Age of Onset    Hypertension Mother     Cancer Sister     Cancer Brother        Vitals:  /63   Pulse 97   Temp 97.5 °F (36.4 °C) (Oral)   Resp 18   Ht 5' 4\" (1.626 m)   Wt 105 lb 6.4 oz (47.8 kg)   SpO2 95%   BMI 18.09 kg/m²   Temp (24hrs), Av.5 °F (36.4 °C), Min:97.3 °F (36.3 °C), Max:97.7 °F (36.5 °C)    Recent Labs     10/31/21  1057 10/31/21  1630 10/31/21  2055 21  0725   POCGLU 93 125* 117* 70       I/O (24Hr):     Intake/Output Summary (Last 24 hours) at 2021 0806  Last data filed at 2021 6199  Gross per 24 hour   Intake 360 ml   Output 50 ml   Net 310 ml       Labs:  Hematology:  Recent Labs     10/30/21  0603 10/31/21  0529 21  0533   WBC 22.8* 17.7* 18.0*   RBC 3.11* 3.14* 3.35*   HGB 7.8* 8.1* 8.7*   HCT 27.3* 27.9* 30.0*   MCV 87.8 88.9 89.6   MCH 25.1* 25.8 26.0   MCHC 28.6 29.0 29.0   RDW 25.2* 25.2* 25.7*    134* 141   MPV 11.9 11.5 11.5     Chemistry:  Recent Labs     10/30/21  0603 10/31/21  0529 21  0533    132* 132*   K 3.8 3.7 4.2    97* 97*   CO2 24 25 22   GLUCOSE 81 69* 81   BUN 17 26* 33*   CREATININE 1.38* 1.81* 1.93*   ANIONGAP 11 10 13   LABGLOM 37* 27* 25*   GFRAA 45* 33* 30*   CALCIUM 8.1* 8.2* 8.2*     Recent Labs     10/31/21  0742 10/31/21  0817 10/31/21  1057 10/31/21  1630 10/31/21  2055 11/01/21  0725   POCGLU 68 77 93 125* 117* 70     ABG:  Lab Results   Component Value Date    POCPH 7.456 10/24/2021    POCPCO2 41.1 10/24/2021    POCPO2 53.3 10/24/2021    POCHCO3 28.9 10/24/2021    NBEA NOT REPORTED 10/24/2021    PBEA 5 10/24/2021    OSK7KJR NOT REPORTED 10/24/2021    OKIW9OTX 89 10/24/2021    FIO2 NOT REPORTED 10/24/2021     Lab Results   Component Value Date/Time    SPECIAL RTAC,20CC 10/26/2021 06:28 PM    SPECIAL LTAC 10/26/2021 06:28 PM     Lab Results   Component Value Date/Time    CULTURE NO GROWTH 5 DAYS 10/26/2021 06:28 PM    CULTURE NO GROWTH 5 DAYS 10/26/2021 06:28 PM       Radiology:  XR CHEST (SINGLE VIEW FRONTAL)    Result Date: 10/24/2021  Small stable left effusion and left lower lobe atelectatic changes. Stable right chest tube. Cardiomegaly and COPD redemonstrated. NM LUNG SCAN PERFUSION ONLY    Result Date: 10/21/2021  Exam is technically high probability for pulmonary embolism. While emboli are possible, the diminished activity within the upper lung zones could also be secondary to emphysema; a  ventilation study would be needed for confirmation. This could be obtained at a later time as clinically warranted. Improving perfusion at the lateral right lung base. Findings were discussed with Fortunato Tiwari at 12:13 on 10/21/2021. CT ABDOMEN PELVIS W IV CONTRAST Additional Contrast? Radiologist Recommendation    Result Date: 10/25/2021  1. Right pneumothorax with chest tube in place. Please see separate chest CT report for details of this region. 2.  Large rectal colorectal stool burden and mild gaseous distention of the colon.      XR CHEST PORTABLE    Result Date: 10/25/2021  Small bore right chest tube in unchanged position. Smaller but persistent right pneumothorax; otherwise, unchanged findings. Stable left effusion with left lower lobe volume loss. COPD. XR CHEST PORTABLE    Result Date: 10/23/2021  Questionable trace focal pneumothorax laterally in the right base near the chest tube. Persistent by a basilar airspace disease with small left pleural effusion. XR CHEST PORTABLE    Result Date: 10/22/2021  Stable right chest tube without demonstrable pneumothorax Improvement in now mild right basilar opacity related to minimal atelectasis and or effusion Stable moderate left basilar opacity     XR CHEST PORTABLE    Result Date: 10/21/2021  Stable right chest tube without pneumothorax Stable bibasilar pleuroparenchymal changes     XR CHEST PORTABLE    Result Date: 10/20/2021  No significant interval change. Right chest tube in place with small right pleural effusion and right basilar opacity. Unchanged moderate left pleural effusion. XR CHEST 1 VIEW    Result Date: 10/26/2021  Interval placement of tunneled dialysis catheter with tip in appropriate position. Small right basilar pneumothorax no longer present. Chest tube upsize with tip in right base. Attention for pneumothorax on serial portable. .     CT CHEST PULMONARY EMBOLISM W CONTRAST    Addendum Date: 10/25/2021    ADDENDUM: Findings were discussed with IVA SURESH at 1:30 pm on 10/25/2021. Result Date: 10/25/2021  Loculated left basilar effusion with adjacent consolidation representing atelectasis versus pneumonia. Focus of consolidation in the right lower lobe representing atelectasis versus pneumonia. Moderate to large right-sided pneumothorax with an indwelling pigtail catheter in the right basilar pleural space. IR TUNNELED CVC PLACE WO SQ PORT/PUMP > 5 YEARS    Result Date: 10/26/2021  Successful ultrasound and fluoroscopy guided tunneled catheter placement . The tunneled dialysis catheter may be used immediately.      IR CHANGE DRAINAGE CATH    Result Date: 10/26/2021  Chest tube upsized to [address]. Attached to wall suction at which time air was aspirated consistent with pneumothorax. Patient hypoxia improved from 84 to 94%. These findings were communicated to the ICU doctor in person at 9:45 a.m. Soheila Wade Physical Examination:        Physical Exam  Vitals and nursing note reviewed. Constitutional:       General: She is not in acute distress. Appearance: She is obese. Interventions: Nasal cannula in place. HENT:      Head: Normocephalic and atraumatic. Eyes:      Conjunctiva/sclera: Conjunctivae normal.      Pupils: Pupils are equal, round, and reactive to light. Cardiovascular:      Rate and Rhythm: Normal rate and regular rhythm. Heart sounds: No murmur heard. Pulmonary:      Effort: Pulmonary effort is normal. No accessory muscle usage or respiratory distress. Breath sounds: No stridor. Examination of the right-lower field reveals decreased breath sounds. Examination of the left-lower field reveals decreased breath sounds. Decreased breath sounds present. No wheezing, rhonchi or rales. Chest:      Comments: Chest tube noted to suction, no air leak noted today  Abdominal:      General: Bowel sounds are normal. There is no distension. Palpations: Abdomen is soft. Abdomen is not rigid. Tenderness: There is no abdominal tenderness. There is no guarding. Musculoskeletal:         General: No tenderness. Skin:     General: Skin is warm and dry. Findings: No erythema, lesion or rash. Neurological:      Mental Status: She is alert and oriented to person, place, and time. Cranial Nerves: No cranial nerve deficit. Motor: No seizure activity. Psychiatric:         Speech: Speech normal.         Behavior: Behavior normal. Behavior is cooperative.          Assessment:        Hospital Problems         Last Modified POA    * (Principal) Pleural effusion on right 10/15/2021 Yes    Acute renal failure with acute renal cortical necrosis superimposed on stage 4 chronic kidney disease ([address] 10/14/2021 Yes    Acute respiratory failure with hypoxia ([address] 10/13/2021 Yes    Iron deficiency anemia 10/12/2021 Yes    Essential hypertension 10/12/2021 Yes    General weakness 10/12/2021 Yes    Vitamin D deficiency 10/13/2021 Yes    Acute diastolic heart failure ([address] 10/13/2021 Yes    Hypoxia 10/13/2021 Yes    Anemia 10/13/2021 Yes    Peripheral edema 10/13/2021 Yes    Moderate protein-calorie malnutrition ([address] 10/13/2021 Yes    Severe malnutrition ([address] 10/13/2021 Yes    Acute kidney injury ([address] 10/15/2021 Yes    Hypokalemia 10/17/2021 Yes    Other emphysema ([address] 10/17/2021 Yes          Plan:           Acute hypoxemic respiratory failure: Likely secondary to  pleural effusion and pulmonary edema , improving ,continue support with low flow nasal cannula    Moderate to large right-sided pleural effusion: S/P thoracentesis with 850 mls out, malignancy ruled out. Pneumothorax: Status post chest tube placement, chest tube exchanged 10/26  continue to continuous suction, serial CXR  No air leak appreciated likely to switch to waterseal and reassess later today, appreciate pulmonology input   Abx discontinued per ID [had full 14 days of Levaquin],  no further need for antibiotics    Acute kidney injury/ CKD 4: Likely secondary CHF, Patient needed hemodialysis, appreciate nephrology input. Renal diet  Securing dialysis spot as outpatient    Acute decompensated diastolic heart failure: Diuresis initially, now on hemodialysis, continue, strict I's & O's, daily weight, cardiac diet and fluid restriction    Hyperkalemia: Currently resolved. Severe malnutrition: Continue boost and encourage p.o. intake.     COPD exacerbation: Continue breathing treatment with DuoNeb and steroids    Continue PT/OT    Discussed with the patient and nurse    Discharge planning to SNF      Suhail Hollins MD  11/1/2021  8:06 AM

## 2021-11-01 NOTE — PROGRESS NOTES
Nephrology Progress Note      SUBJECTIVE       Pt was seen and examined. No acute issues overnite. Stable hemodynamics . Right-sided chest tube in place. She was last dialyzed Friday 10/29/2021. Has a PermCath in place. She has an outpatient spot at [facility]  schedule at 11:50 AM.  Lomeli removed   Stable blood pressures. Overall she is feeling and doing better. WBC seem to be trending down  Plan is to clamp chest tube today repeat chest x-ray in 4 hours and if she does not accumulate any fluid keep it clamped till tomorrow if continues to be stable chest tube will be removed tomorrow. OBJECTIVE      CURRENT TEMPERATURE:  Temp: 97.5 °F (36.4 °C)  MAXIMUM TEMPERATURE OVER 24HRS:  Temp (24hrs), Av.5 °F (36.4 °C), Min:97.3 °F (36.3 °C), Max:97.7 °F (36.5 °C)    CURRENT RESPIRATORY RATE:  Resp: 18  CURRENT PULSE:  Pulse: 97  CURRENT BLOOD PRESSURE:  BP: 106/63  24HR BLOOD PRESSURE RANGE:  Systolic (83SHR), NNZ:812 , Min:104 , YYV:218   ; Diastolic (44BZK), IPX:18, Min:56, Max:75    24HR INTAKE/OUTPUT:      Intake/Output Summary (Last 24 hours) at 2021 1147  Last data filed at 2021 0620  Gross per 24 hour   Intake 360 ml   Output 50 ml   Net 310 ml     WEIGHT :  Patient Vitals for the past 96 hrs (Last 3 readings):   Weight   21 0609 105 lb 6.4 oz (47.8 kg)   10/31/21 0606 124 lb 5.4 oz (56.4 kg)   10/29/21 1435 121 lb 0.5 oz (54.9 kg)     PHYSICAL EXAM      GENERAL APPEARANCE:Awake, alert, in no acute distress  SKIN: warm and dry, no rash or erythema  EYES: conjunctivae normal and sclera anicteric  ENT: no thrush no pharyngeal congestion   NECK:   No JVD. No carotid bruits and no carotid lymphadenopathy . PULMONARY: Right-sided chest tube noted, right greater than left crackles heard  CADRDIOVASCULAR: S1 and S2 normal NO S3 and NO S4 . No rubs , no murmur. ABDOMEN: soft nontender, bowel sounds present, no organomegaly, no ascites.      EXTREMITIES: no cyanosis, clubbing or edema     CURRENT MEDICATIONS      [START ON 11/2/2021] predniSONE (DELTASONE) tablet 30 mg, Daily  ipratropium-albuterol (DUONEB) nebulizer solution 1 ampule, TID  metoprolol tartrate (LOPRESSOR) tablet 12.5 mg, BID  midodrine (PROAMATINE) tablet 10 mg, TID PRN  sodium citrate 4 % injection 1.9 mL, PRN  midodrine (PROAMATINE) tablet 10 mg, PRN  sodium citrate 4 % injection 1.9 mL, PRN  sodium chloride flush 0.9 % injection 10 mL, PRN  heparin (porcine) injection 5,000 Units, BID  HYDROcodone-acetaminophen (NORCO) 5-325 MG per tablet 1 tablet, Q4H PRN   Or  HYDROcodone-acetaminophen (NORCO) 5-325 MG per tablet 2 tablet, Q4H PRN  budesonide (PULMICORT) nebulizer suspension 500 mcg, BID  Arformoterol Tartrate (BROVANA) nebulizer solution 15 mcg, BID  melatonin tablet 3 mg, Nightly PRN  epoetin mike-epbx (RETACRIT) injection 8,000 Units, Once per day on Mon Wed Fri  calcitRIOL (ROCALTROL) capsule 0.25 mcg, Once per day on Mon Wed Fri  glucose (GLUTOSE) 40 % oral gel 15 g, PRN  dextrose 50 % IV solution, PRN  glucagon (rDNA) injection 1 mg, PRN  dextrose 5 % solution, PRN  sodium chloride flush 0.9 % injection 5-40 mL, 2 times per day  sodium chloride flush 0.9 % injection 5-40 mL, PRN  0.9 % sodium chloride infusion, PRN  vitamin D (ERGOCALCIFEROL) capsule 50,000 Units, Weekly  ondansetron (ZOFRAN-ODT) disintegrating tablet 4 mg, Q8H PRN   Or  ondansetron (ZOFRAN) injection 4 mg, Q6H PRN  acetaminophen (TYLENOL) tablet 650 mg, Q6H PRN   Or  acetaminophen (TYLENOL) suppository 650 mg, Q6H PRN  perflutren lipid microspheres (DEFINITY) injection 1.65 mg, ONCE PRN          LABS      CBC:   Recent Labs     10/30/21  0603 10/31/21  0529 11/01/21  0533   WBC 22.8* 17.7* 18.0*   RBC 3.11* 3.14* 3.35*   HGB 7.8* 8.1* 8.7*   HCT 27.3* 27.9* 30.0*   MCV 87.8 88.9 89.6   MCH 25.1* 25.8 26.0   MCHC 28.6 29.0 29.0   RDW 25.2* 25.2* 25.7*    134* 141   MPV 11.9 11.5 11.5      BMP:   Recent Labs 10/30/21  0603 10/31/21  0529 11/01/21  0533    132* 132*   K 3.8 3.7 4.2    97* 97*   CO2 24 25 22   BUN 17 26* 33*   CREATININE 1.38* 1.81* 1.93*   GLUCOSE 81 69* 81   CALCIUM 8.1* 8.2* 8.2*      IRON:    Lab Results   Component Value Date    IRON 22 10/13/2021     IRON SATURATION:    Lab Results   Component Value Date    LABIRON 9 10/13/2021     TIBC:    Lab Results   Component Value Date    TIBC 237 10/13/2021     FERRITIN:    Lab Results   Component Value Date    FERRITIN 20 10/13/2021       SPEP:   Lab Results   Component Value Date    PROT 4.7 10/17/2021       HEPBSAG:  Lab Results   Component Value Date    HEPBSAG NONREACTIVE 10/25/2021     URINE SODIUM:    Lab Results   Component Value Date    LAZARO 40 10/13/2021      URINE CREATININE:    Lab Results   Component Value Date    LABCREA 40.5 10/17/2021    LABCREA 76.0 10/13/2021     URINALYSIS:  U/A:   Lab Results   Component Value Date    NITRU NEGATIVE 10/13/2021    COLORU Yellow 10/13/2021    PHUR 5.5 10/13/2021    WBCUA 10 TO 20 10/13/2021    RBCUA 2 TO 5 10/13/2021    MUCUS 1+ 10/13/2021    TRICHOMONAS NOT REPORTED 10/13/2021    YEAST NOT REPORTED 10/13/2021    BACTERIA NOT REPORTED 10/13/2021    SPECGRAV 1.020 10/13/2021    LEUKOCYTESUR SMALL 10/13/2021    UROBILINOGEN Normal 10/13/2021    BILIRUBINUR NEGATIVE 10/13/2021    GLUCOSEU NEGATIVE 10/13/2021    KETUA NEGATIVE 10/13/2021    AMORPHOUS 1+ 10/13/2021         ASSESSMENT      1. ESRD, patient started on hemodialysis during this admission. She is actually feeling and doing better since hemoinitiated. She did have uremic symptoms. 2. HTN:  3.  Moderate right-sided pleural effusion status post thoracentesis and eventual chest tube placement because of pneumothorax on the right side. 4.  Moderate pulmonary hypertension  5. Anemia of chronic disease, started on erythropoietin    PLAN      1. Outpatient dialysis spot secured Monday Wednesday Friday at [facility].   Still has a chest tube in

## 2021-11-01 NOTE — PROGRESS NOTES
HEMODIALYSIS PRE-TREATMENT NOTE    Patient Identifiers prior to treatment: Name,     Isolation Required: No                      Isolation Type: n/a       (please document if patient is being managed as a PUI/COVID-19 patient)        Hepatitis status:                           Date Drawn                             Result  Hepatitis B Surface Antigen 10/25/21     neg                     Hepatitis B Surface Antibody 10/25/21 neg        Hepatitis B Core Antibody 10/25/21 neg          How was Hepatitis Status verified: Epic     Was a copy of the labs you documented provided to facility for the patient's chart: Epic    Hemodialysis orders verified: Yes, with Dr. Ami Bedoya Within normal limits ( I.e. s/s of infection,...): Yes, WNL     Pre-Assessment completed: Yes    Pre-dialysis report received from: Scot Robles RN                      Time: 0613

## 2021-11-01 NOTE — PROGRESS NOTES
Occupational Therapy  DATE: 2021    NAME: Jas Shukla  MRN: [id number]   : 1945    Patient not seen this date for Occupational Therapy due to:      [] Cancel by RN or physician due to:    [] Hemodialysis    [] Critical Lab Value Level     [] Blood transfusion in progress    [] Acute or unstable cardiovascular status   _MAP < 55 or more than >115  _HR < 40 or > 130    [] Acute or unstable pulmonary status   -FiO2 > 60%   _RR < 5 or >40    _O2 sats < 85%    [] Strict Bedrest    [] Off Unit for surgery or procedure    [] Off Unit for testing       [] Pending imaging to R/O fracture    [x] Refusal by Patient      [] Other      [] OT being discontinued at this time. Patient independent. No further needs. []OT being discontinued at this time as the patient has been transferred to hospice care. No further needs. Pt. Declined treatment d/t leaving for dialysis soon and feeling too fatigued for treatment.       SHA Carlin

## 2021-11-01 NOTE — PROGRESS NOTES
pneumothorax    Recommendations:  · Oxygen via nasal cannula to keep SPO2 greater than 90%  · Incentive spirometry every hour while awake  · Clamp chest tube, repeat x-ray chest in 4 hours, if no pneumothorax will keep clamped until a.m. repeat x-ray chest in a.m. and if x-ray chest is stable will remove chest tube. · Symbicort  · DuoNeb every 4 hours  · Budesonide and Brovana via nebulizer every 12 hours  · Prednisone taper  · Monitor off of antibiotics. 14-day course of Levaquin completed. Infectious disease following  · Dialysis per nephrology  · X-ray chest in a.m.   · Labs: CBC and BMP in am  · Pleural fluid negative for malignancy  · DVT prophylaxis, subcu heparin  · PFTs as an outpatient  · Discussed with patient  · Discussed with RN  · Will follow with you      Electronically signed by     ANN Hensley CNP on 11/1/2021 at 9:23 AM  Pulmonary Critical Care and Sleep Medicine,  [facility] AT Tram: [phone]

## 2021-11-02 ENCOUNTER — APPOINTMENT (OUTPATIENT)
Dept: GENERAL RADIOLOGY | Age: 76
DRG: 291 | End: 2021-11-02
Payer: MEDICARE

## 2021-11-02 LAB
ABSOLUTE EOS #: 0 K/UL (ref 0–0.44)
ABSOLUTE IMMATURE GRANULOCYTE: 0.46 K/UL (ref 0–0.3)
ABSOLUTE LYMPH #: 0.68 K/UL (ref 1.1–3.7)
ABSOLUTE MONO #: 1.14 K/UL (ref 0.1–1.2)
ANION GAP SERPL CALCULATED.3IONS-SCNC: 10 MMOL/L (ref 9–17)
BASOPHILS # BLD: 0 % (ref 0–2)
BASOPHILS ABSOLUTE: 0 K/UL (ref 0–0.2)
BUN BLDV-MCNC: 16 MG/DL (ref 8–23)
BUN/CREAT BLD: 13 (ref 9–20)
CALCIUM SERPL-MCNC: 8 MG/DL (ref 8.6–10.4)
CHLORIDE BLD-SCNC: 98 MMOL/L (ref 98–107)
CO2: 26 MMOL/L (ref 20–31)
CREAT SERPL-MCNC: 1.22 MG/DL (ref 0.5–0.9)
CULTURE: NORMAL
CULTURE: NORMAL
DIFFERENTIAL TYPE: ABNORMAL
EOSINOPHILS RELATIVE PERCENT: 0 % (ref 1–4)
GFR AFRICAN AMERICAN: 52 ML/MIN
GFR NON-AFRICAN AMERICAN: 43 ML/MIN
GFR SERPL CREATININE-BSD FRML MDRD: ABNORMAL ML/MIN/{1.73_M2}
GFR SERPL CREATININE-BSD FRML MDRD: ABNORMAL ML/MIN/{1.73_M2}
GLUCOSE BLD-MCNC: 115 MG/DL (ref 65–105)
GLUCOSE BLD-MCNC: 117 MG/DL (ref 65–105)
GLUCOSE BLD-MCNC: 134 MG/DL (ref 65–105)
GLUCOSE BLD-MCNC: 71 MG/DL (ref 70–99)
GLUCOSE BLD-MCNC: 73 MG/DL (ref 65–105)
GLUCOSE BLD-MCNC: 95 MG/DL (ref 65–105)
GLUCOSE BLD-MCNC: 97 MG/DL (ref 65–105)
HCT VFR BLD CALC: 27 % (ref 36.3–47.1)
HEMOGLOBIN: 7.7 G/DL (ref 11.9–15.1)
IMMATURE GRANULOCYTES: 2 %
LYMPHOCYTES # BLD: 3 % (ref 24–43)
Lab: NORMAL
Lab: NORMAL
MAGNESIUM: 1.9 MG/DL (ref 1.6–2.6)
MCH RBC QN AUTO: 26 PG (ref 25.2–33.5)
MCHC RBC AUTO-ENTMCNC: 28.5 G/DL (ref 28.4–34.8)
MCV RBC AUTO: 91.2 FL (ref 82.6–102.9)
MONOCYTES # BLD: 5 % (ref 3–12)
MORPHOLOGY: ABNORMAL
NRBC AUTOMATED: 0.2 PER 100 WBC
PDW BLD-RTO: 26.4 % (ref 11.8–14.4)
PLATELET # BLD: 94 K/UL (ref 138–453)
PLATELET ESTIMATE: ABNORMAL
PMV BLD AUTO: ABNORMAL FL (ref 8.1–13.5)
POTASSIUM SERPL-SCNC: 3.5 MMOL/L (ref 3.7–5.3)
RBC # BLD: 2.96 M/UL (ref 3.95–5.11)
RBC # BLD: ABNORMAL 10*6/UL
SEG NEUTROPHILS: 90 % (ref 36–65)
SEGMENTED NEUTROPHILS ABSOLUTE COUNT: 20.52 K/UL (ref 1.5–8.1)
SODIUM BLD-SCNC: 134 MMOL/L (ref 135–144)
SPECIMEN DESCRIPTION: NORMAL
SPECIMEN DESCRIPTION: NORMAL
WBC # BLD: 22.8 K/UL (ref 3.5–11.3)
WBC # BLD: ABNORMAL 10*3/UL

## 2021-11-02 PROCEDURE — 6370000000 HC RX 637 (ALT 250 FOR IP): Performed by: STUDENT IN AN ORGANIZED HEALTH CARE EDUCATION/TRAINING PROGRAM

## 2021-11-02 PROCEDURE — 85025 COMPLETE CBC W/AUTO DIFF WBC: CPT

## 2021-11-02 PROCEDURE — 2060000000 HC ICU INTERMEDIATE R&B

## 2021-11-02 PROCEDURE — 97110 THERAPEUTIC EXERCISES: CPT

## 2021-11-02 PROCEDURE — 2580000003 HC RX 258: Performed by: STUDENT IN AN ORGANIZED HEALTH CARE EDUCATION/TRAINING PROGRAM

## 2021-11-02 PROCEDURE — 36415 COLL VENOUS BLD VENIPUNCTURE: CPT

## 2021-11-02 PROCEDURE — 2700000000 HC OXYGEN THERAPY PER DAY

## 2021-11-02 PROCEDURE — 6360000002 HC RX W HCPCS: Performed by: NURSE PRACTITIONER

## 2021-11-02 PROCEDURE — 6370000000 HC RX 637 (ALT 250 FOR IP): Performed by: NURSE PRACTITIONER

## 2021-11-02 PROCEDURE — 97530 THERAPEUTIC ACTIVITIES: CPT

## 2021-11-02 PROCEDURE — 99232 SBSQ HOSP IP/OBS MODERATE 35: CPT | Performed by: INTERNAL MEDICINE

## 2021-11-02 PROCEDURE — 80048 BASIC METABOLIC PNL TOTAL CA: CPT

## 2021-11-02 PROCEDURE — 6370000000 HC RX 637 (ALT 250 FOR IP): Performed by: FAMILY MEDICINE

## 2021-11-02 PROCEDURE — 83735 ASSAY OF MAGNESIUM: CPT

## 2021-11-02 PROCEDURE — 97535 SELF CARE MNGMENT TRAINING: CPT | Performed by: NURSE PRACTITIONER

## 2021-11-02 PROCEDURE — 94761 N-INVAS EAR/PLS OXIMETRY MLT: CPT

## 2021-11-02 PROCEDURE — 71045 X-RAY EXAM CHEST 1 VIEW: CPT

## 2021-11-02 PROCEDURE — 82947 ASSAY GLUCOSE BLOOD QUANT: CPT

## 2021-11-02 PROCEDURE — 94640 AIRWAY INHALATION TREATMENT: CPT

## 2021-11-02 RX ADMIN — IPRATROPIUM BROMIDE AND ALBUTEROL SULFATE 1 AMPULE: .5; 2.5 SOLUTION RESPIRATORY (INHALATION) at 07:46

## 2021-11-02 RX ADMIN — METOPROLOL TARTRATE 12.5 MG: 25 TABLET, FILM COATED ORAL at 09:40

## 2021-11-02 RX ADMIN — SODIUM CHLORIDE, PRESERVATIVE FREE 10 ML: 5 INJECTION INTRAVENOUS at 22:25

## 2021-11-02 RX ADMIN — ARFORMOTEROL TARTRATE 15 MCG: 15 SOLUTION RESPIRATORY (INHALATION) at 19:08

## 2021-11-02 RX ADMIN — METOPROLOL TARTRATE 12.5 MG: 25 TABLET, FILM COATED ORAL at 22:24

## 2021-11-02 RX ADMIN — SODIUM CHLORIDE, PRESERVATIVE FREE 10 ML: 5 INJECTION INTRAVENOUS at 09:05

## 2021-11-02 RX ADMIN — BUDESONIDE 500 MCG: 0.5 SUSPENSION RESPIRATORY (INHALATION) at 07:46

## 2021-11-02 RX ADMIN — BUDESONIDE 500 MCG: 0.5 SUSPENSION RESPIRATORY (INHALATION) at 19:08

## 2021-11-02 RX ADMIN — ARFORMOTEROL TARTRATE 15 MCG: 15 SOLUTION RESPIRATORY (INHALATION) at 07:46

## 2021-11-02 RX ADMIN — PREDNISONE 30 MG: 20 TABLET ORAL at 09:40

## 2021-11-02 RX ADMIN — IPRATROPIUM BROMIDE AND ALBUTEROL SULFATE 1 AMPULE: .5; 2.5 SOLUTION RESPIRATORY (INHALATION) at 14:14

## 2021-11-02 RX ADMIN — IPRATROPIUM BROMIDE AND ALBUTEROL SULFATE 1 AMPULE: .5; 2.5 SOLUTION RESPIRATORY (INHALATION) at 19:08

## 2021-11-02 ASSESSMENT — PAIN SCALES - GENERAL
PAINLEVEL_OUTOF10: 2
PAINLEVEL_OUTOF10: 0

## 2021-11-02 ASSESSMENT — ENCOUNTER SYMPTOMS
COUGH: 1
SHORTNESS OF BREATH: 1
ABDOMINAL PAIN: 0
NAUSEA: 0
VOMITING: 0

## 2021-11-02 NOTE — PROGRESS NOTES
Physical Therapy  Facility/Department: [facility] PROGRESSIVE CARE  Daily Treatment Note  NAME: Iza Anderson  : 1945  MRN: [id number]    Date of Service: 2021    Discharge Recommendations:  Patient would benefit from continued therapy after discharge     Pt currently functioning below baseline. Would suggest additional therapy at time of discharge to maximize long term outcomes and prevent re-admission. Please refer to AM-PAC score for current level of function. Assessment   Body structures, Functions, Activity limitations: Decreased balance;Decreased functional mobility ; Decreased safe awareness;Decreased ADL status; Decreased strength;Decreased endurance;Decreased posture  Assessment: Patient s/p chest tube removal this dated. Patient with global deconditioning and generalized weakness. Requires Max x2 assist in [address] for transfer this date. Patient would benefit from continued skilled PT treatment to maximize return to PLOF. Prognosis: Good  Decision Making: High Complexity  PT Education: Disease Specific Education  Patient Education: Patient edcuated on deep breathing  Activity Tolerance  Activity Tolerance: Patient limited by endurance; Patient limited by fatigue  Activity Tolerance: Patient with decreased tolerance to activities and requires increased time for motion with SpO2 maintained above 88% this visit. Patient Diagnosis(es): The primary encounter diagnosis was Hypoxia. Diagnoses of Pleural effusion on right, Peripheral edema, Anemia, unspecified type, and Acute kidney injury [facility]) were also pertinent to this visit. has no past medical history on file. has a past surgical history that includes IR CHEST TUBE INSERTION (10/18/2021) and IR TUNNELED CVC PLACE WO SQ PORT/PUMP > 5 YEARS (10/26/2021).     Restrictions  Restrictions/Precautions  Restrictions/Precautions: General Precautions, Fall Risk, Up as Tolerated  Required Braces or Orthoses?: No  Position Activity Restriction  Other position/activity restrictions: Up with assist, telemetry, gallegos cath, O2 NC 5L, R chest tube, 1500 mL fluid restriction, heels off bed at all times  Subjective   General  Chart Reviewed: Yes  Response To Previous Treatment: Patient with no complaints from previous session. Family / Caregiver Present: No  Subjective  Subjective: Patient agreeable to PT and to trying to get up to chair. General Comment  Comments: Allyson RN reports patient medically appropriate for PT. RN would like patient up to chair if possible. Orientation  Orientation  Overall Orientation Status: Within Functional Limits  Cognition      Objective   Bed mobility  Bridging: Maximum assistance  Rolling to Left: Maximum assistance  Rolling to Right: Maximum assistance  Supine to Sit: Maximum assistance  Sit to Supine: Maximum assistance  Scooting: Maximal assistance  Comment: Patient requires MAX 1 assist for positioning to seated EOB posture this date. Transfers  Sit to Stand: Maximum Assistance;2 Person Assistance  Stand to sit: Maximum Assistance;2 Person Assistance  Bed to Chair: Maximum assistance;2 Person Assistance  Stand Pivot Transfers: Maximum Assistance;2 Person Assistance  Comment: Initially attempted sit to stand with RW, but patient unable to clear bed. Darin Jordan for safety and support with good results, patient requires max x2 for sit to stand and stand to sit for support, stability and motion initiation. Ambulation  Ambulation?: No        Exercises  Comments: Educated and  patient performs LUKE LE circulation ex's, pursed lip breathing to increase control of breathing.  Initiated by breathing through the nose and blowing out with pursed lip to increase O2 into lungs & LE ex's to move what moves to maintain strength & joint congruency         Comment: assisted pt to bedside chair       AM-PAC Score  AM-PAC Inpatient Mobility Raw Score : 9 (11/02/21 5802)  AM-PAC Inpatient T-Scale Score : 30.55 (11/02/21 1423)  Mobility Inpatient CMS 0-100% Score: 81.38 (11/02/21 1423)  Mobility Inpatient CMS G-Code Modifier : CM (11/02/21 1423)          Goals  Short term goals  Time Frame for Short term goals: 12 visits  Short term goal 1: Patient will be min assist for bed mobility. Short term goal 2: Patient will be min assist for transfers. Short term goal 3: Patient will amb 50 feet with RW and mod assist.  Short term goal 4: Patient will tolerate 30 minutes of ther-ex and ther-act. Short term goal 5: Patient will be indep with HEP. Patient Goals   Patient goals : Improve breathing    Plan    Plan  Times per week: 1-2x/d, 5-6 d/wk  Current Treatment Recommendations: Strengthening, Balance Training, Functional Mobility Training, Transfer Training, Gait Training, Endurance Training, Patient/Caregiver Education & Training, Home Exercise Program, Safety Education & Training  Safety Devices  Type of devices: Nurse notified, Gait belt, Call light within reach, All fall risk precautions in place, Left in chair, Chair alarm in place     Therapy Time   Individual Concurrent Group Co-treatment   Time In 4864         Time Out 1322         Minutes 24               Co-treatment with OT warranted secondary to decreased safety and independence requiring 2 skilled therapy professionals to address individual discipline's goals. PT addressing core postural control, motion initiation, bed mobility, and transfer training skills.   Walker Black, PTA

## 2021-11-02 NOTE — PROGRESS NOTES
Renal Progress Note    Patient :  Andrae Irizarry; 64 y.o. MRN# [id number]  Location:  1012/1012-01  Attending:  Jose Luis Mejia DO  Admit Date:  10/12/2021   Hospital Day: 21      Subjective:     Patient was seen and examined. No new issues reported overnight. Patient did receive regular dialysis yesterday ran for 210 minutes and did not require much UF.  setting up outpatient dialysis spot on Monday Wednesday Friday schedule at [facility]. Now has tunneled catheter in place which was placed on 10/26/2021.     Outpatient Medications:     Medications Prior to Admission: amLODIPine (NORVASC) 5 MG tablet, Take 5 mg by mouth daily  magnesium oxide (MAG-OX) 400 MG tablet, Take 400 mg by mouth daily  metoprolol (LOPRESSOR) 100 MG tablet, Take 100 mg by mouth 2 times daily  folic acid (FOLVITE) 175 MCG tablet, Take 800 mcg by mouth daily  diphenhydrAMINE-APAP, sleep, (TYLENOL PM EXTRA STRENGTH)  MG tablet, Take 1 tablet by mouth nightly as needed for Sleep    Current Medications:     Scheduled Meds:    predniSONE  30 mg Oral Daily    ipratropium-albuterol  1 ampule Inhalation TID    metoprolol tartrate  12.5 mg Oral BID    heparin (porcine)  5,000 Units SubCUTAneous BID    budesonide  0.5 mg Nebulization BID    Arformoterol Tartrate  15 mcg Nebulization BID    epoetin mike-epbx  8,000 Units SubCUTAneous Once per day on Mon Wed Fri    calcitRIOL  0.25 mcg Oral Once per day on Mon Wed Fri    sodium chloride flush  5-40 mL IntraVENous 2 times per day    vitamin D  50,000 Units Oral Weekly     Continuous Infusions:    dextrose      sodium chloride       PRN Meds:  midodrine, sodium citrate, midodrine, sodium citrate, sodium chloride flush, HYDROcodone 5 mg - acetaminophen **OR** HYDROcodone 5 mg - acetaminophen, melatonin, glucose, dextrose, glucagon (rDNA), dextrose, sodium chloride flush, sodium chloride, ondansetron **OR** ondansetron, acetaminophen **OR** acetaminophen, perflutren lipid microspheres    Input/Output:       I/O last 3 completed shifts: In: 240 [P.O.:240]  Out: - .      Patient Vitals for the past 96 hrs (Last 3 readings):   Weight   21 0555 129 lb 6 oz (58.7 kg)   21 1528 105 lb 6.1 oz (47.8 kg)   21 0609 105 lb 6.4 oz (47.8 kg)       Vital Signs:   Temperature:  Temp: 97.7 °F (36.5 °C)  TMax:   Temp (24hrs), Av.7 °F (36.5 °C), Min:97.5 °F (36.4 °C), Max:97.7 °F (36.5 °C)    Respirations:  Resp: 16  Pulse:   Pulse: 97  BP:    BP: 131/68  BP Range: Systolic (52DTY), PQD:136 , Min:99 , LCC:136       Diastolic (81FNI), PVF:57, Min:54, Max:68      Physical Examination:     General:  AAO x 3, speaking in full sentences, no accessory muscle use. HEENT: Atraumatic, normocephalic, no throat congestion, moist mucosa. Eyes:   Pupils equal, round and reactive to light, EOMI. Neck:   Supple  Chest:   Bilateral vesicular breath sounds, no rales or wheezes. Positive right-sided chest tube. Cardiac:  S1 S2 RR, no murmurs, gallops or rubs. Abdomen: Soft, non-tender, no masses or organomegaly, BS audible. :   No suprapubic or flank tenderness. Neuro:  AAO x 3, No FND. SKIN:  No rashes, good skin turgor. Extremities:  No edema.     Labs:       Recent Labs     10/31/21  0529 21  0533 21  0702   WBC 17.7* 18.0* 22.8*   RBC 3.14* 3.35* 2.96*   HGB 8.1* 8.7* 7.7*   HCT 27.9* 30.0* 27.0*   MCV 88.9 89.6 91.2   MCH 25.8 26.0 26.0   MCHC 29.0 29.0 28.5   RDW 25.2* 25.7* 26.4*   * 141 94*   MPV 11.5 11.5 Abnormal      BMP:   Recent Labs     10/31/21  0529 21  0533 21  0702   * 132* 134*   K 3.7 4.2 3.5*   CL 97* 97* 98   CO2 25 22 26   BUN 26* 33* 16   CREATININE 1.81* 1.93* 1.22*   GLUCOSE 69* 81 71   CALCIUM 8.2* 8.2* 8.0*     Magnesium:    Recent Labs     21  0702   MG 1.9     SPEP:  Lab Results   Component Value Date    PROT 4.7 10/17/2021     Hep BsAg:         Lab Results   Component Value Date    HEPBSAG NONREACTIVE 10/25/2021     Urinalysis/Chemistries:      Lab Results   Component Value Date    NITRU NEGATIVE 10/13/2021    COLORU Yellow 10/13/2021    PHUR 5.5 10/13/2021    WBCUA 10 TO 20 10/13/2021    RBCUA 2 TO 5 10/13/2021    MUCUS 1+ 10/13/2021    TRICHOMONAS NOT REPORTED 10/13/2021    YEAST NOT REPORTED 10/13/2021    BACTERIA NOT REPORTED 10/13/2021    SPECGRAV 1.020 10/13/2021    LEUKOCYTESUR SMALL 10/13/2021    UROBILINOGEN Normal 10/13/2021    BILIRUBINUR NEGATIVE 10/13/2021    GLUCOSEU NEGATIVE 10/13/2021    KETUA NEGATIVE 10/13/2021    AMORPHOUS 1+ 10/13/2021     Urine Sodium:     Lab Results   Component Value Date    LAZARO 40 10/13/2021     Urine Chloride:    Lab Results   Component Value Date    CLUR 60 10/13/2021     Urine Osmolarity:   Lab Results   Component Value Date    OSMOU 347 10/12/2021      Urine Creatinine:     Lab Results   Component Value Date    LABCREA 40.5 10/17/2021    LABCREA 76.0 10/13/2021     Radiology:     Reviewed. Assessment:     1. Now ESRD on HD required initiation of dialysis this admission on 10/26/2021. Has tunneled catheter in place which was placed on 10/26/2021.  2.  Moderate right-sided pleural effusion status post thoracentesis and eventual chest displacement because of pneumothorax on the right side. 3.  Moderate pulmonary hypertension. 4.  Anemia of chronic disease. 5.  Hypertension. 6.  Acute diastolic congestive heart failure. Plan:   1. No acute need for dialysis today. Will get regular dialysis in a.m. as per [facility] schedule. 2.   working toward setting up outpatient hemodialysis spot possibly at [facility]. 3.  BMP in AM.  4.  Will follow. Nutrition   Please ensure that patient is on a renal diet/TF. Avoid nephrotoxic drugs/contrast exposure. We will continue to follow along with you. Abdulkadir Borrego MD  [facility]     This note is created with the assistance of a speech-recognition program. While intending to generate a document that actually reflects the content of the visit, no guarantees can be provided that every mistake has been identified and corrected by editing.

## 2021-11-02 NOTE — CARE COORDINATION
Spoke with [facility] [place] and they do not have any beds and will probably not have any for the rest of the week. Spoke with [facility] and they do have beds, will take to Director for review and will begin precert if accepted.

## 2021-11-02 NOTE — CARE COORDINATION
DC Planning    Spoke with [place] from [facility]-no determination on appeal at this time. ID has signd off.

## 2021-11-02 NOTE — CARE COORDINATION
Social work:  Spoke with Natalya pt financially cleared for HD MWF 11:50 at [facility], medical clearance from clinic is pending. Regarding snf, referrals have been faxed to the following facilities. [facility]: full  [facility]: full  [facility]: out of network  [facility]: full  [facility]: full  [facility]: reviewing   [facility]: reviewing     Called son and provided update, he is okay with [facility] or [facility]. [facility] Exemption Notification) needs to be submitted online prior to discharge from the hospital to SNF.  cannot submit HENS until discharge order is placed by attending physician and discharge disposition is \"skilled nursing facility. \"

## 2021-11-02 NOTE — PROGRESS NOTES
Pulmonary Critical Care Progress Note  Paige Rodríguez CNP/Vivienne Gonzalez MD     Patient seen for the follow up of acute hypoxic respiratory failure, moderate pulmonary hypertension, former smoker/COPD Pleural effusion on right     Subjective:  No significant overnight events noted. She sitting up in the bed eating breakfast.  She denies significant shortness of breath or chest pain. She has occasional dry cough. Her chest tube has been clamped since yesterday morning and repeat x-rays x2 without any evidence of significant recurrence of effusion or pneumothorax. Examination:  Vitals: /68   Pulse 97   Temp 97.7 °F (36.5 °C) (Oral)   Resp 16   Ht 5' 4\" (1.626 m)   Wt 129 lb 6 oz (58.7 kg)   SpO2 91%   BMI 22.21 kg/m²   General appearance: alert and cooperative with exam, resting in bed  Neck: No JVD  Lungs: Moderate air exchange, diminished bilateral bases, positive crackles. Chest tube clamped. Heart: regular rate and rhythm, S1, S2 normal, no gallop  Abdomen: Soft, non tender, + BS  Extremities: no cyanosis or clubbing. No significant edema    LABs:  CBC:   Recent Labs     11/01/21  0533 11/02/21  0702   WBC 18.0* 22.8*   HGB 8.7* 7.7*   HCT 30.0* 27.0*    94*     BMP:   Recent Labs     11/01/21  0533 11/02/21  0702   * 134*   K 4.2 3.5*   CO2 22 26   BUN 33* 16   CREATININE 1.93* 1.22*   LABGLOM 25* 43*   GLUCOSE 81 71     Radiology:  X-ray chest 11/2/2021      Impression:  · Acute hypoxic respiratory failure requiring high flow oxygen  · Moderate to large right pleural effusion, s/p thoracentesis with 850 mL removed  · S/p pigtail catheter placement 10/18/21  · Upsize of chest tube on 10/26/2021  · Small left pleural effusion, loculated  · Atelectasis/?  Trapped lung on the right  · Mild pulmonary edema  · Moderate pulmonary hypertension, RVSP 60 mmHg  · Suspected COPD/former smoker, quit 2019  · Acute kidney injury  · D-dimer, low probability for PE on VQ scan on 10/13/21. Negative CTA of the chest on 10/25/2021. · Right pneumothorax    Recommendations:  · Oxygen via nasal cannula to keep SPO2 greater than 90%  · Incentive spirometry every hour while awake  · Will ask IR to remove chest tube. · Symbicort  · DuoNeb every 4 hours  · Budesonide and Brovana via nebulizer every 12 hours  · Prednisone taper  · Monitor off of antibiotics. 14-day course of Levaquin completed. · Dialysis per nephrology  · X-ray chest in a.m.   · Labs: CBC and BMP in am  · Pleural fluid negative for malignancy  · DVT prophylaxis, subcu heparin  · PFTs as an outpatient  · Discussed with patient  · Discussed with RN  · Will follow with you      Electronically signed by     ANN Arias CNP on 11/2/2021 at 9:09 AM  Pulmonary Critical Care and Sleep Medicine,  [facility] AT [place]: [phone]

## 2021-11-02 NOTE — RT PROTOCOL NOTE
RT Inhaler-Nebulizer Bronchodilator Protocol Note    There is a bronchodilator order in the chart from a provider indicating to follow the RT Bronchodilator Protocol and there is an Initiate RT Inhaler-Nebulizer Bronchodilator Protocol order as well (see protocol at bottom of note). CXR Findings:  XR CHEST PORTABLE    Result Date: 11/2/2021  No significant change from prior study. Indwelling pigtail terminus chest tube bilateral effusions in appearance of probable atelectasis left side. XR CHEST PORTABLE    Result Date: 11/1/2021  Right-sided PleurX catheter. No recurrent pneumothorax identified Cardiomegaly. Small left-sided effusion and atelectasis. XR CHEST PORTABLE    Result Date: 11/1/2021  Right chest tube remains in place. No pneumothorax or significant right pleural effusion. The findings from the last RT Protocol Assessment were as follows:   History Pulmonary Disease: Chronic pulmonary disease  Respiratory Pattern: Dyspnea on exertion or RR 21-25 bpm  Breath Sounds: Slightly diminished and/or crackles  Cough: Weak, non-productive  Indication for Bronchodilator Therapy: Decreased or absent breath sounds  Bronchodilator Assessment Score: 9    Aerosolized bronchodilator medication orders have been revised according to the RT Inhaler-Nebulizer Bronchodilator Protocol below. Respiratory Therapist to perform RT Therapy Protocol Assessment initially then follow the protocol. Repeat RT Therapy Protocol Assessment PRN for score 0-3 or on second treatment, BID, and PRN for scores above 3. No Indications - adjust the frequency to every 6 hours PRN wheezing or bronchospasm, if no treatments needed after 48 hours then discontinue using Per Protocol order mode. If indication present, adjust the RT bronchodilator orders based on the Bronchodilator Assessment Score as indicated below.   Use Inhaler orders unless patient has one or more of the following: on home nebulizer, not able to hold breath for 10 seconds, is not alert and oriented, cannot activate and use MDI correctly, or respiratory rate 25 breaths per minute or more, then use the equivalent nebulizer order(s) with same Frequency and PRN reasons based on the score. If a patient is on this medication at home then do not decrease Frequency below that used at home. 0-3 - enter or revise RT bronchodilator order(s) to equivalent RT Bronchodilator order with Frequency of every 4 hours PRN for wheezing or increased work of breathing using Per Protocol order mode. 4-6 - enter or revise RT Bronchodilator order(s) to two equivalent RT bronchodilator orders with one order with BID Frequency and one order with Frequency of every 4 hours PRN wheezing or increased work of breathing using Per Protocol order mode. 7-10 - enter or revise RT Bronchodilator order(s) to two equivalent RT bronchodilator orders with one order with TID Frequency and one order with Frequency of every 4 hours PRN wheezing or increased work of breathing using Per Protocol order mode. 11-13 - enter or revise RT Bronchodilator order(s) to one equivalent RT bronchodilator order with QID Frequency and an Albuterol order with Frequency of every 4 hours PRN wheezing or increased work of breathing using Per Protocol order mode. Greater than 13 - enter or revise RT Bronchodilator order(s) to one equivalent RT bronchodilator order with every 4 hours Frequency and an Albuterol order with Frequency of every 2 hours PRN wheezing or increased work of breathing using Per Protocol order mode. RT to enter RT Home Evaluation for COPD & MDI Assessment order using Per Protocol order mode.     Electronically signed by Alla Wade RCP on 11/2/2021 at 1:17 PM

## 2021-11-02 NOTE — PLAN OF CARE
Problem: Falls - Risk of:  Goal: Will remain free from falls  Description: Will remain free from falls  Outcome: Ongoing     Problem: Nutrition  Goal: Optimal nutrition therapy  Outcome: Ongoing     Problem: Skin Integrity:  Goal: Will show no infection signs and symptoms  Description: Will show no infection signs and symptoms  Outcome: Ongoing  Note: Waffle mattress on bed and properly inflated   Q2 turn  Meiplex to coccyx    Dressing changed as ordered and prn     Problem: Pain:  Goal: Pain level will decrease  Description: Pain level will decrease  Outcome: Ongoing

## 2021-11-02 NOTE — PLAN OF CARE
Problem: Falls - Risk of:  Goal: Will remain free from falls  Description: Will remain free from falls  Outcome: Ongoing  Note: Patient will remain free from falls. Fall risk assessment completed. Call light and personal items within reach. Bed locked, in lowest position, side rails up x2. Bed alarm activated. Non-skid socks in place. Hourly rounding implemented. Will continue to monitor.        Problem: Falls - Risk of:  Goal: Absence of physical injury  Description: Absence of physical injury  Outcome: Ongoing     Problem: Nutrition  Goal: Optimal nutrition therapy  Outcome: Ongoing     Problem: Pain:  Goal: Pain level will decrease  Description: Pain level will decrease  Outcome: Ongoing

## 2021-11-02 NOTE — PROGRESS NOTES
Occupational Therapy  Facility/Department: [facility] PROGRESSIVE CARE  Daily Treatment Note  NAME: Jeremy Modi  : 1945  MRN: [id number]    Date of Service: 2021    Discharge Recommendations:  Patient would benefit from continued therapy after discharge   Due to recent hospitalization and medical condition, pt would benefit from additional therapy at time of discharge to ensure safety. Please refer to the AM-PAC score for current functional status. OT Equipment Recommendations  Equipment Needed: Yes  Mobility Devices: ADL Assistive Devices  ADL Assistive Devices: Emergency Alert System;Long-handled Shoe Horn;Long-handled Sponge;Reacher;Sock-Aid Hard;Grab Bars - shower; Shower Chair with back    Assessment   Performance deficits / Impairments: Decreased functional mobility ; Decreased safe awareness;Decreased cognition;Decreased ADL status; Decreased strength;Decreased endurance;Decreased high-level IADLs;Decreased fine motor control;Decreased posture;Decreased balance  Assessment: Pt would benefit from continued skilled OT services to address deficits limiting safe return home to [facility], including balance, strength, coordination, ROM, and cognition. Prognosis: Fair  OT Education: OT Role;Plan of Care;Energy Conservation;Transfer Training;Precautions  Patient Education: Upright posture, breathing tech, importance of movement  REQUIRES OT FOLLOW UP: Yes  Activity Tolerance  Activity Tolerance: Patient Tolerated treatment well;Patient limited by fatigue  Safety Devices  Safety Devices in place: Yes  Type of devices: All fall risk precautions in place; Left in chair;Nurse notified;Call light within reach; Chair alarm in place;Gait belt;Patient at risk for falls         Patient Diagnosis(es): The primary encounter diagnosis was Hypoxia. Diagnoses of Pleural effusion on right, Peripheral edema, Anemia, unspecified type, and Acute kidney injury [facility]) were also pertinent to this visit.       has no past medical history on file. has a past surgical history that includes IR CHEST TUBE INSERTION (10/18/2021) and IR TUNNELED CVC PLACE WO SQ PORT/PUMP > 5 YEARS (10/26/2021). Restrictions  Restrictions/Precautions  Restrictions/Precautions: General Precautions, Fall Risk, Up as Tolerated  Required Braces or Orthoses?: No  Position Activity Restriction  Other position/activity restrictions: Up with assist, telemetry, gallegos cath, O2 NC 5L, R chest tube, 1500 mL fluid restriction, heels off bed at all times  Subjective   General  Chart Reviewed: Yes  Patient assessed for rehabilitation services?: Yes  Additional Pertinent Hx: Pt. agreeable to treatment. Response to previous treatment: Patient with no complaints from previous session  Family / Caregiver Present: No  Subjective  Subjective: Pt in bed upon arrival. Pt agreeable to get up to chair. General Comment  Comments: PRADIP Telles) ok'd pt for therapy. Oxygen Therapy  O2 Device: Nasal cannula  O2 Flow Rate (L/min): 2 L/min   Orientation  Orientation  Overall Orientation Status: Within Functional Limits  Objective             Balance  Sitting Balance: Contact guard assistance  Standing Balance: Dependent/Total  Bed mobility  Supine to Sit: Maximum assistance  Scooting: Maximal assistance  Comment: With use of bed rails, HOB raised, and pt holding onto writer to assist to upright position. Transfers  Sit to stand: Maximum assistance;2 Person assistance  Stand to sit: 2 Person assistance;Maximum assistance  Transfer Comments: Pt. required max assist x2 to attempt sit to stand from EOB with use of RW. Pt. was unable to support and hold self upright. Florencia cunningham used to transfer from EOB to recliner with max assist x2 for proper safety with lines and positioning. Pt unable to hold head in upright positon for greather than 20 seconds despite max VC and encouragement.       Cognition  Overall Cognitive Status: Exceptions  Arousal/Alertness: Appropriate responses to stimuli  Following Commands: Follows one step commands with increased time; Follows one step commands with repetition  Attention Span: Appears intact  Memory: Appears intact  Safety Judgement: Decreased awareness of need for safety;Decreased awareness of need for assistance  Problem Solving: Decreased awareness of errors;Assistance required to correct errors made;Assistance required to identify errors made;Assistance required to implement solutions;Assistance required to generate solutions  Insights: Decreased awareness of deficits  Initiation: Requires cues for some  Sequencing: Requires cues for some        Plan   Plan  Times per week: 4-5x/wk 1x/day as karey  Times per day: Daily  Current Treatment Recommendations: Strengthening, Endurance Training, Balance Training, Functional Mobility Training, Safety Education & Training, Home Management Training, Self-Care / ADL, Equipment Evaluation, Education, & procurement, Patient/Caregiver Education & Training  Plan Comment: Cont. with stated POC. AM-PAC Score        AM-[facility] Inpatient Daily Activity Raw Score: 14 (11/02/21 1449)  AM-PAC Inpatient ADL T-Scale Score : 33.39 (11/02/21 1449)  ADL Inpatient CMS 0-100% Score: 59.67 (11/02/21 1449)  ADL Inpatient CMS G-Code Modifier : CK (11/02/21 1449)    Goals  Short term goals  Time Frame for Short term goals: By discharge, pt to demo  Short term goal 1: ADL transfers and functional mobility to CGA with use of AD as needed. Short term goal 2: UB ADLs to SBA and LB ADLs to Min A with use of AD/AE as needed and proper pacing tech. Short term goal 3: toileting to Min A with use of AD/grab bars/BSC as needed. Short term goal 4: increased BUE strength by 1/2 grade to assist with functional tasks/I with simple B UE HEP with use of handouts as needed. Short term goal 5: bed mobility to SBA with use of bedrails as needed.   Long term goals  Long term goal 1: Pt to be I with fall prevention education, EC/WS tech, pursed lip breathing tech and recommendations AE/discharge with use of handouts as needed. Patient Goals   Patient goals : To feel better! Therapy Time   Individual Concurrent Group Co-treatment   Time In       0   Time Out       1530   Minutes       24     RN reports patient is medically stable for therapy treatment this date. Chart reviewed prior to treatment and patient is agreeable for therapy. All lines intact and patient positioned comfortably at end of treatment. All patient needs addressed prior to ending therapy session. Co-treatment with PT warranted secondary to decreased safety and independence requiring 2 skilled therapy professionals to address individual discipline's goals. OT addressing preparation for ADL transfer, sitting balance for increased ADL performance, sitting/activity tolerance, functional reaching, environmental safety/scanning, fall prevention, ability to sequence and follow directions and functional UE strength.          SHA Westfall

## 2021-11-02 NOTE — ADT AUTH CERT
Renal Failure, Acute - Care Day 20 (10/31/2021) by iNghat Zamora RN       Review Status Review Entered   Completed 11/2/2021 08:50      Criteria Review      Care Day: 20 Care Date: 10/31/2021 Level of Care: Intermediate Care    Guideline Day 2    Level Of Care    (X) Floor    Clinical Status    (X) * Electrolyte abnormalities absent or improved    ( ) * Acid-base abnormalities absent or improved    (X) * Hypotension absent    Activity    (X) Activity as tolerated    Routes    (X) Parenteral or oral hydration    (X) Parenteral or oral medications    11/2/2021 8:50 AM EDT by Benjamín Vinse      meds noted below    (X) Oral diet as tolerated    Interventions    (X) Monitor electrolytes, renal function tests, acid-base, and volume status    Medications    (X) Possible medical therapies    11/2/2021 8:50 AM EDT by Benjamín Vines      Still with the CT , n did not note significant air leak today, PNTX not evident on imaging. Son at bedside    * Milestone   Additional Notes   10/31      Neph      Chest tube in place.  She was last dialyzed Friday.   Has a PermCath in place. She has an outpatient spot at [facility] Monday Wednesday Friday schedule at 11:50 AM.   Lomeli removed    Stable blood pressures.  Overall she is feeling and doing better. WBC seem to be trending down      PLAN        1.  Outpatient dialysis spot secured Monday Wednesday Friday at [facility] has a chest tube in place, once her chest tube is out she potentially can be discharged.               2.  Patient will need hemodialysis tomorrow   3. Follow up labs ordered. 4. Following along    ---------------   IM   Still with the CT , n did not note significant air leak today, PNTX not evident on imaging.    Son at bedside   Patient vitals, labs and all providers notes were reviewed,from overnight shift and morning updates were noted and discussed with the nurse        98.1 (36.7)  18  93  129/64  -  Supine  -  O2 @3 L/min per NC  Sat 100 %      Physical Exam   Vitals and nursing note reviewed. Constitutional:        General: She is not in acute distress.      Appearance: She is obese.       Interventions: Nasal cannula in place. HENT:       Head: Normocephalic and atraumatic. Eyes:       Conjunctiva/sclera: Conjunctivae normal.       Pupils: Pupils are equal, round, and reactive to light. Cardiovascular:       Rate and Rhythm: Normal rate and regular rhythm.       Heart sounds: No murmur heard. Pulmonary:       Effort: Pulmonary effort is normal. No accessory muscle usage or respiratory distress.       Breath sounds: No stridor. Examination of the right-lower field reveals decreased breath sounds. Examination of the left-lower field reveals decreased breath sounds. Decreased breath sounds present. No wheezing, rhonchi or rales. Chest:       Comments: Chest tube noted to suction, less air leak noted today   Abdominal:       General: Bowel sounds are normal. There is no distension.       Palpations: Abdomen is soft. Abdomen is not rigid.       Tenderness: There is no abdominal tenderness. There is no guarding. Musculoskeletal:          General: No tenderness. Skin:      General: Skin is warm and dry.       Findings: No erythema, lesion or rash. Neurological:       Mental Status: She is alert and oriented to person, place, and time.       Cranial Nerves: No cranial nerve deficit.       Motor: No seizure activity.     Psychiatric:          Speech: Speech normal.          Behavior: Behavior normal. Behavior is cooperative.        Plan:    Acute hypoxemic respiratory failure: Likely secondary to  pleural effusion and pulmonary edema , improving ,continue support with low flow nasal cannula       Moderate to large right-sided pleural effusion: S/P thoracentesis with 850 mls out        Pneumothorax: Status post chest tube placement, chest tube exchanged 10/26  continue to continuous suction, serial CXR    discussed with pulmonology , tentative plan for Heimlich valve if leak persists    Abx discontinued per ID no further need for antibiotics       Acute kidney injury/ CKD 4: Likely secondary CHF, Patient needed hemodialysis, appreciate nephrology input. Renal diet   Securing the       Acute decompensated diastolic heart failure: Diuresis initially, now on hemodialysis, continue, strict I's & O's, daily weight, cardiac diet and fluid restriction       Hyperkalemia: Currently resolved.       Severe malnutrition: Continue boost and encourage p.o. intake.       COPD exacerbation: Continue breathing treatment with DuoNeb and steroids       Discussed with the patient and nurse       Discussed with pulmonology    --------------------------   Pulm      Patient seen for the follow up of acute hypoxic respiratory failure, moderate pulmonary hypertension, former smoker/COPD Pleural effusion on right        Subjective:   No significant overnight events noted. Chest tube remains to 20 cm suction with small air leak. No PTX seen on imaging. Her CT output has been 50 ml past 24 hrs      Recommendations:   · Oxygen via nasal cannula to keep SPO2 greater than 90%   · Incentive spirometry every hour while awake   · Symbicort   · DuoNeb every 4 hours   · Budesonide and Brovana via nebulizer every 12 hours   · Prednisone taper   · Monitor off of antibiotics.  14-day course of Levaquin completed.  Infectious disease following   · Monitor blood cultures - no growth 6, 4 days   · Keep pigtail catheter to suction.  Monitor output/Air leak.  If airleak persists over the weekend and effusion remains minimal, will switch to Heimlich valve on Monday.    · Dialysis per nephrology   · X-ray chest in am 11-1-21   · Labs: CBC and BMP in am   · Pleural fluid negative for malignancy   · DVT prophylaxis, subcu heparin   · PFTs as an outpatient   --------------   ID   · Monitor off antibiotics   · Follow CBC and renal function   · Supportive care    ------------- CXR:Little change from prior examination.  Small bilateral effusions and   atelectasis.       10/31/2021 05:29   Sodium: 132 (L)   Potassium: 3.7   Chloride: 97 (L)   CO2: 25   BUN: 26 (H)   Creatinine: 1.81 (H)   Bun/Cre Ratio: 14   Anion Gap: 10   GFR Non-: 27 (L)   GFR : 33 (L)   Glucose: 69 (L)   Calcium: 8.2 (L)   GFR Comment: Pend   GFR Staging: NOT REPORTED   WBC: 17.7 (H)   RBC: 3.14 (L)   Hemoglobin Quant: 8.1 (L)   Hematocrit: 27.9 (L)   MCV: 88.9   MCH: 25.8   MCHC: 29.0   MPV: 11.5   RDW: 25.2 (H)   Platelet Count: 843 (L)   Platelet Estimate: NOT REPORTED   Absolute Mono #: 1.06 (H)   Eosinophils %: 0 (L)   Basophils Absolute: 0.00   Differential Type: NOT REPORTED   Seg Neutrophils: 86 (H)   Segs Absolute: 15.22 (H)   Lymphocytes: 5 (L)   Absolute Lymph #: 0.89 (L)   Monocytes: 6   Absolute Eos #: 0.00   Basophils: 0   Immature Granulocytes: 3 (H)   WBC Morphology: NOT REPORTED   RBC Morphology: NOT REPORTED   Morphology: ANISOCYTOSIS PRESENT      10/31/2021 07:42   POC Glucose: 68      10/31/2021 08:17   POC Glucose: 77      10/31/2021 10:57   POC Glucose: 93      10/31/2021 16:30   POC Glucose: 125 (H)      10/31/2021 20:55   POC Glucose: 117 (H)   ----------      Scheduled Medications   · ipratropium-albuterol 1 ampule Inhalation TID   · metoprolol tartrate 12.5 mg Oral BID   · predniSONE 40 mg Oral Daily   · heparin (porcine) 5,000 Units SubCUTAneous BID   · budesonide 0.5 mg Nebulization BID   · Arformoterol Tartrate 15 mcg Nebulization BID   · epoetin mike-epbx 8,000 Units SubCUTAneous Once per day on Mon Wed Fri   · calcitRIOL 0.25 mcg Oral Once per day on Mon Wed Fri   · sodium chloride flush 5-40 mL IntraVENous 2 times per day   · vitamin D 50,000 Units Oral Weekly       PRN   HYDROcodone-acetaminophen (NORCO) 5-325 MG per tablet 1 tablet  PO x1   melatonin tablet 3 mg  PO x1         Renal Failure, Acute - Care Day 15 (10/26/2021) by Goran Garcia, RN       Review Status Review Entered   Completed 10/27/2021 08:54      Criteria Review      Care Day: 15 Care Date: 10/26/2021 Level of Care: Intermediate Care    Guideline Day 2    Level Of Care    (X) Floor    Clinical Status    (X) * Electrolyte abnormalities absent or improved    ( ) * Acid-base abnormalities absent or improved    ( ) * Hypotension absent    10/27/2021 8:53 AM EDT by Sydni Goodman      BP- low 79/41 , high 137/66  O2 @ 4L/NC, Sats 81-96%, RR 18-27    Activity    (X) Activity as tolerated    Routes    (X) Parenteral or oral hydration    (X) Parenteral or oral medications    10/27/2021 8:53 AM EDT by Mervin Marin noted below,    (X) Oral diet as tolerated    10/27/2021 8:53 AM EDT by Sydni Goodman      ADULT DIET; Regular; Low Potassium (Less than 3000 mg/day); Low Phosphorus (Less than 1000 mg); 1500 ml    Interventions    (X) Monitor electrolytes, renal function tests, acid-base, and volume status    Medications    (X) Possible medical therapies    * Milestone   Additional Notes   10/26      Rad procedure      Indication:  Renal insufficiency, pneumothorax      Vitals:     10/26/21 0916   BP: 106/68   Pulse: 126   Resp: 27   Temp:     SpO2: (!) 81%      Post-operative Diagnosis: Same       Procedure: TDC placement, Chest tube exchange       Anesthesia: Local and Moderate Sedation       Estimated Blood Loss: less than 50        Complications: Hypoxemia, 5 min at 82% during exchange. Remained responsive       Specimens: Was Not Obtained       Findings: Successful TDC placement, may be used immediately.  Successful chest tube upsize.   ---------------------      IM   Slightly tachycardic    Patient vitals, labs and all providers notes were reviewed,from overnight shift and morning updates were noted and discussed with the nurse      /66   Pulse 113   Temp 97.5 °F (36.4 °C) (Oral)   Resp 18   Ht 5' 4\" (1.626 m)   Wt 124 lb (56.2 kg)   SpO2 96%   BMI 21.28 kg/m² Plan:       Physical Exam   Vitals and nursing note reviewed. Constitutional:        General: She is not in acute distress.      Appearance: She is obese. She is ill-appearing.       Interventions: Nasal cannula in place. HENT:       Head: Normocephalic and atraumatic. Eyes:       Conjunctiva/sclera: Conjunctivae normal.       Pupils: Pupils are equal, round, and reactive to light. Cardiovascular:       Rate and Rhythm: Normal rate and regular rhythm.       Heart sounds: No murmur heard. Pulmonary:       Effort: Pulmonary effort is normal. No accessory muscle usage or respiratory distress.       Breath sounds: No stridor. Examination of the right-lower field reveals decreased breath sounds. Examination of the left-lower field reveals decreased breath sounds. Decreased breath sounds present. No wheezing, rhonchi or rales. Abdominal:       General: Bowel sounds are normal. There is no distension.       Palpations: Abdomen is soft. Abdomen is not rigid.       Tenderness: There is no abdominal tenderness. There is no guarding. Musculoskeletal:          General: No tenderness. Skin:      General: Skin is warm and dry.       Findings: No erythema, lesion or rash. Neurological:       Mental Status: She is alert and oriented to person, place, and time.       Cranial Nerves: No cranial nerve deficit.       Motor: No seizure activity.     Psychiatric:          Speech: Speech normal.          Behavior: Behavior normal. Behavior is cooperative.         A/P    Acute hypoxemic respiratory failure: Likely secondary to  pleural effusion and pulmonary edema continue support with high flow nasal cannula       Moderate to large right-sided pleural effusion: Ex lap PE thoracentesis       Pneumothorax: Status post chest tube placement, chest tube exchange this a.m. tolerable 1, continue to continuous suction, appreciate pulmonology recommendation       Acute kidney injury/ CKD 4: Likely secondary CHF Urine retention: Patient needed hemodialysis, appreciate nephrology input. Renal diet       Acute decompensated diastolic heart failure: Diuresis initially, now on hemodialysis, continue, strict I's & O's, daily weight, cardiac diet and fluid restriction       Hyperkalemia: Currently resolved.       Severe malnutrition: Continue boost and encourage p.o. intake.       COPD exacerbation: Continue treatment/inhalers/IV steroids       Patient condition continues to be critical, continue monitor closely.       Plan is peer to peer with insurance to approve LTAC placement   -----------------------   Neph      Patient did get tunneled catheter placement as well as chest tube exchange done today 10/26/2021. Dialysis today and to run as day 1. Currently on Lasix 40 mg IV twice daily. Urine output documented as about 1 L in the last 24 hours. Plan:   1. To get dialysis as per table today. Orders were confirmed with the dialysis nurse. 2. Okay to hold IV Lasix for now. 3.  to help towards finding outpatient hemodialysis spot. 4. Pleural effusion/chest tube management as per pulmonology. 5. BMP in AM.   6. Will run again in a.m. as per day 2.   7. Will follow. ------------------------   Pulm      She had chest tube exchange .  She had hemodialysis and feels tired. Kamini Cruz had bowel movement after laxative. She has improved shortness of breath down to 4 L oxygen. .She has occasional dry cough, no chest pain.        Recommendations:   · Continue oxygen via nasal cannula   · Incentive spirometry every hour while awake   · DuoNeb every 4 hours   · Budesonide and Brovana via nebulizer every 12 hours   · Make IV Solu-Medrol  30 q. 12 hours   · Continue Levaquin IV add Azactam and Vanco   · Blood cultures x2    · Urine culture   · Stool C. difficile toxin   · Consult ID    · follow-up chest x-ray in a.m.   · consultCT surgery consult   · Keep pigtail catheter to suction.  Monitor output.    · X-ray chest in a.m. · Hemodialysis per nephrology    · Pleural fluid negative for malignancy   · PFTs as an outpatient   · Discussed with RN   · DVT prophylaxis Heparin prophylaxis    -------------          10/26/2021 05:35   Sodium: 136   Potassium: 4.5   Chloride: 96 (L)   CO2: 25   BUN: 88 (H)   Creatinine: 3.07 (H)   Bun/Cre Ratio: 29 (H)   Anion Gap: 15   GFR Non-: 15 (L)   GFR : 18 (L)   Glucose: 136 (H)   Calcium: 8.1 (L)   GFR Comment: Pend   GFR Staging: NOT REPORTED   WBC: 31.4 (HH)   RBC: 3.75 (L)   Hemoglobin Quant: 9.3 (L)   Hematocrit: 32.0 (L)   MCV: 85.3   MCH: 24.8 (L)   MCHC: 29.1   MPV: 10.5   RDW: 23.5 (H)   Platelet Count: 450   Platelet Estimate: NOT REPORTED   Absolute Mono #: 0.94 (H)   Eosinophils %: 0 (L)   Basophils Absolute: 0.00   Differential Type: NOT REPORTED   Seg Neutrophils: 95 (H)   Segs Absolute: 29.84 (H)   Lymphocytes: 0 (L)   Absolute Lymph #: 0.00 (L)   Monocytes: 3   Absolute Eos #: 0.00   Basophils: 0   Immature Granulocytes: 1 (H)   Atypical Lymphocytes Absolute: 0.31   Atypical Lymphocytes: 1   Nucleated Red Blood Cells: 1 (H)   WBC Morphology: NOT REPORTED   RBC Morphology: NOT REPORTED   Morphology: 1+ TARGET CELLS   Absolute Immature Granulocyte: 0.31 (H)   NRBC Automated: 0.6 (H)      10/26/2021 05:35   Morphology: HYPOCHROMIA PRESENT      10/26/2021 05:35   Morphology: ANISOCYTOSIS PRESENT      10/26/2021 07:48   POC Glucose: 139 (H)      10/26/2021 09:03   IR TUNNELED CATHETER PLACEMENT GREATER THAN 5 YEARS:    Successful ultrasound and fluoroscopy guided tunneled catheter placement . The tunneled dialysis catheter may be used immediately. 10/26/2021 09:21   IR CHANGE DRAINAGE CATH:    Chest tube upsized to 16 [place].  Attached to wall suction at which time air   was aspirated consistent with pneumothorax.  Patient hypoxia improved from 84 to 94%.  These findings were communicated to the ICU doctor in person at 9:45 a.m. Lu Hurd 10/26/2021 09:34   XR CHEST LIMITED ONE VIEW:    Interval placement of tunneled dialysis catheter with tip in appropriate   position. Small right basilar pneumothorax no longer present.  Chest tube upsize with   tip in right base.  Attention for pneumothorax on serial portable. .         10/26/2021 13:21   POC Glucose: 114 (H)      10/26/2021 17:19   POC Glucose: 112 (H)      10/26/2021 20:29   POC Glucose: 115 (H)   -------------   Scheduled Medications   · heparin (porcine) 5,000 Units SubCUTAneous BID   · methylPREDNISolone 30 mg IntraVENous Q8H   · furosemide 40 mg IntraVENous BID   · budesonide 0.5 mg Nebulization BID   · Arformoterol Tartrate 15 mcg Nebulization BID   · ipratropium-albuterol 1 ampule Inhalation 4x daily   · potassium chloride 40 mEq Oral Daily   · epoetin mike-epbx 8,000 Units SubCUTAneous Once per day on Mon Wed Fri   · calcitRIOL 0.25 mcg Oral Once per day on Mon Wed Fri   · sodium chloride flush 5-40 mL IntraVENous 2 times per day   · levofloxacin 500 mg IntraVENous Q48H   · vitamin D 50,000 Units Oral Weekly   vancomycin (VANCOCIN) 1,250 mg in dextrose 5 % 250 mL IVPB  IV x1      albumin human 25 % IV solution 25 g  IV x1      PRN   acetaminophen (TYLENOL) tablet 650 mg PO x1   fentaNYL (SUBLIMAZE) injection IV x1   heparin (porcine) injection IV x2   HYDROcodone-acetaminophen (NORCO) 5-325 MG per tablet 1 tablet  PO x1   melatonin tablet 3 mg  PO x1   midodrine (PROAMATINE) tablet 10 mg  PO x1   sodium citrate 4 % injection 1.9 mL  Intracath x2       Continuous Infusions:

## 2021-11-02 NOTE — PROGRESS NOTES
[facility]  Office: [address], DO, Shikha Moran, DO, Amy Barry, DO, Arcenio Hong Blood, DO, David Johnson MD, Blank Radford MD, Cinthya Chawla MD, Kevin Martinez MD, Aftab Macairo MD, Maninder Caceres MD, Bonny Ling MD, Arjun Pineda MD, Dee Antony, DO, Pb Cisneros DO, Mary العلي MD,  Sanchez Lawson DO, Melissa Ramirez MD, Nayely Godinez MD, Suri Ellsworth MD, Josefa Arenas MD, Michelle Cruz MD, Ivette Humphries MD, Atilio Ortega, [facility], [facility], CNP, Debbie Nichols, CNP, Ladan Johnson, CNS, Gabo Alves, CNP, Adela Johnson, CNP, [facility] Guadarrama, CNP, Rakesh Cotto, [facility], Evette Hendricks, [facility], Sonya Pedersen, CNP, Rowan Oppenheim, PA-C, Pratima Murphy, [facility], Mel Rivero, CNP, Tho Chavez, CNP, Soo Slaughter, CNP, Sowmya Kumar, CNP, Delmy Jones, CNP, Sharon Bear, CNP, Sabiha Dodd, Sandi Glasgow [address]    Progress Note    11/2/2021    6:09 PM    Name:   Mar Hanley  MRN:     [id number]     [place]:      [de-identified]   Room:   [address] [place] [address] Day:  21  Admit Date:  10/12/2021  2:53 PM    PCP:   Cam Greene MD  Code Status:  Full Code    Subjective:     C/C:   Chief Complaint   Patient presents with    Respiratory Distress     EMS reported 97% on room air; pt arrives with saturation 45% on room air    Failure To Thrive     Interval History Status:   Comfortable  Mild pain at chest tube site  No shortness of breath  No cough/sputum    Data Base Updates:  Afebrile  O2 sats satisfactory    [place]66ct/dL     ERH22sq/dL   CREATININE1. 22High mg/dL   Bun/Cre Ratio13     Calcium8. 0Low mg/dL   Ytqkbt165Hjl mmol/L   Potassium3.5Low     Follow-up chest x-ray:  Impression:  No significant change from prior study. Indwelling pigtail terminus chest   tube bilateral effusions in appearance of probable atelectasis left side.        Brief History:     As documented in the medical record:  Corin Houser is a 64 y.o. female with a hx of CKD 4 and iron deficiency anemia who presented to [facility] with Respiratory Distress and hypoxia (O2 sats 45%) and failure to thrive and is admitted to the hospital for the management of right Pleural effusion and acute renal failure. Patient lives at home alone and has recently been having difficulty caring for herself and generalized weakness for the last few weeks . She does have chronic BLE edema but denies hx of CHF. Initial CXR showed large right pleural effusion, stat elevated BNP and D-dimer as well as BUN/creatinine. Last took known creatinine was 2.34 in august, she reports she seen a nephrologist once. She does not wear home oxygen   10/13: US guided right thoracentesis 850ml of nonturbid straw colored fluid removed -transudative  Pleural fluid cultures negative for malignancy\"  Chest tube placed 10/18 on right for loculated effusion   Started on heparin drip for possible PE; VQ being repeated 10/24    Chest tube clamped 11/2       Past Medical History:   has no past medical history on file. Social History:   reports that she quit smoking about 2 years ago. Her smoking use included cigarettes. She has never used smokeless tobacco. She reports current alcohol use. Family History:   Family History   Problem Relation Age of Onset    Hypertension Mother     Cancer Sister     Cancer Brother        Review of Systems:     Review of Systems   Constitutional: Positive for activity change (Decreased). Negative for fever. Respiratory: Positive for cough (Occasional, no sputum) and shortness of breath (Mild dyspnea on exertion). Cardiovascular: Positive for chest pain (Mild pain at chest tube site). Negative for palpitations. Gastrointestinal: Negative for abdominal pain, nausea and vomiting. Genitourinary: Negative for flank pain and hematuria.          Physical Examination:        Vitals  /68   Pulse 90   Temp 97.6 °F (36.4 °C) (Oral)   Resp 16   Ht 5' 4\" (1.626 m)   Wt 129 lb 6 oz (58.7 kg)   SpO2 96%   BMI 22.21 kg/m²   Temp (24hrs), Av.6 °F (36.4 °C), Min:97.5 °F (36.4 °C), Max:97.7 °F (36.5 °C)    Recent Labs     21  0743 21  1228 21  1621   POCGLU 117* 73 97 115*       Physical Exam  Vitals reviewed. Constitutional:       General: She is not in acute distress. Appearance: She is not diaphoretic. HENT:      Head: Normocephalic. Nose: Nose normal.   Eyes:      General: No scleral icterus. Conjunctiva/sclera: Conjunctivae normal.   Neck:      Trachea: No tracheal deviation. Cardiovascular:      Rate and Rhythm: Normal rate and regular rhythm. Pulmonary:      Effort: Pulmonary effort is normal. No respiratory distress. Breath sounds: Normal breath sounds. No wheezing or rales. Comments: Chest tube clamped  No crepitation  No subcutaneous emphysema  Chest:      Chest wall: No tenderness. Abdominal:      General: Bowel sounds are normal. There is no distension. Palpations: Abdomen is soft. Tenderness: There is no abdominal tenderness. Musculoskeletal:         General: No tenderness. Cervical back: Neck supple. Skin:     General: Skin is warm and dry. Neurological:      Mental Status: She is alert. Comments: The patient is having trouble providing historical data          Medications: Allergies:     Allergies   Allergen Reactions    Penicillins Swelling       Current Meds:   Scheduled Meds:    predniSONE  30 mg Oral Daily    ipratropium-albuterol  1 ampule Inhalation TID    metoprolol tartrate  12.5 mg Oral BID    heparin (porcine)  5,000 Units SubCUTAneous BID    budesonide  0.5 mg Nebulization BID    Arformoterol Tartrate  15 mcg Nebulization BID    epoetin mike-epbx  8,000 Units SubCUTAneous Once per day on     calcitRIOL  0.25 mcg Oral Once per day on     sodium chloride flush  5-40 mL IntraVENous 2 times per day    vitamin D  50,000 Units Oral Weekly Continuous Infusions:    dextrose      sodium chloride       PRN Meds: midodrine, sodium citrate, midodrine, sodium citrate, sodium chloride flush, HYDROcodone 5 mg - acetaminophen **OR** HYDROcodone 5 mg - acetaminophen, melatonin, glucose, dextrose, glucagon (rDNA), dextrose, sodium chloride flush, sodium chloride, ondansetron **OR** ondansetron, acetaminophen **OR** acetaminophen, perflutren lipid microspheres    Data:     I/O (24Hr):     Intake/Output Summary (Last 24 hours) at 11/2/2021 1809  Last data filed at 11/2/2021 0644  Gross per 24 hour   Intake 240 ml   Output --   Net 240 ml       Labs:  Hematology:  Recent Labs     10/31/21  0529 11/01/21  0533 11/02/21  0702   WBC 17.7* 18.0* 22.8*   RBC 3.14* 3.35* 2.96*   HGB 8.1* 8.7* 7.7*   HCT 27.9* 30.0* 27.0*   MCV 88.9 89.6 91.2   MCH 25.8 26.0 26.0   MCHC 29.0 29.0 28.5   RDW 25.2* 25.7* 26.4*   * 141 94*   MPV 11.5 11.5 Abnormal     Chemistry:  Recent Labs     10/31/21  0529 11/01/21  0533 11/02/21  0702   * 132* 134*   K 3.7 4.2 3.5*   CL 97* 97* 98   CO2 25 22 26   GLUCOSE 69* 81 71   BUN 26* 33* 16   CREATININE 1.81* 1.93* 1.22*   MG  --   --  1.9   ANIONGAP 10 13 10   LABGLOM 27* 25* 43*   GFRAA 33* 30* 52*   CALCIUM 8.2* 8.2* 8.0*     Recent Labs     11/01/21  1127 11/01/21  1625 11/01/21 2017 11/02/21  0743 11/02/21  1228 11/02/21 1621   POCGLU 124* 134* 117* 73 97 115*     ABG:  Lab Results   Component Value Date    POCPH 7.456 10/24/2021    POCPCO2 41.1 10/24/2021    POCPO2 53.3 10/24/2021    POCHCO3 28.9 10/24/2021    NBEA NOT REPORTED 10/24/2021    PBEA 5 10/24/2021    TMK0FDQ NOT REPORTED 10/24/2021    QRRN6XYN 89 10/24/2021    FIO2 NOT REPORTED 10/24/2021     Lab Results   Component Value Date/Time    SPECIAL RTAC,20CC 10/26/2021 06:28 PM    SPECIAL LTAC 10/26/2021 06:28 PM     Lab Results   Component Value Date/Time    CULTURE NO GROWTH 6 DAYS 10/26/2021 06:28 PM    CULTURE NO GROWTH 6 DAYS 10/26/2021 06:28 PM Essential hypertension [I10] 10/12/2021    General weakness [R53.1] 10/12/2021    Acute respiratory failure with hypoxia (HCC) [J96.01]          Plan:        Pulmonary evaluation  Chest tube clamped  Nephrology eval & f/u as scheduled  Dialysis as scheduled  Check bun and creatinine  Avoid nephrotoxins  Correct electrolyte abnormalities  Anemia w/u on outpatient basis is suggested    Blood Pressure - Monitor and control  Antibiotics per Infectious Disease service   Observing off antibiotics at this time  DVT prophylaxis  Anticipate DC of chest tube  Discharge planning  Will discharge when arrangements complete and ok with other services.   Follow-up with PCP in one week, Candace Guaman MD  Notify PCP of discharge      IP CONSULT TO NEPHROLOGY  IP CONSULT TO SOCIAL WORK  IP CONSULT TO PULMONOLOGY  IP CONSULT TO INFECTIOUS DISEASES    Olga Robb DO  11/2/2021  6:09 PM

## 2021-11-03 ENCOUNTER — APPOINTMENT (OUTPATIENT)
Dept: GENERAL RADIOLOGY | Age: 76
DRG: 291 | End: 2021-11-03
Payer: MEDICARE

## 2021-11-03 PROBLEM — J44.9 COPD (CHRONIC OBSTRUCTIVE PULMONARY DISEASE) (HCC): Status: ACTIVE | Noted: 2021-11-03

## 2021-11-03 PROBLEM — N18.6 END-STAGE RENAL DISEASE (ESRD) (HCC): Status: ACTIVE | Noted: 2021-11-03

## 2021-11-03 PROBLEM — I27.20 PULMONARY HYPERTENSION (HCC): Status: ACTIVE | Noted: 2021-11-03

## 2021-11-03 PROBLEM — D69.6 THROMBOCYTOPENIA (HCC): Status: ACTIVE | Noted: 2021-11-03

## 2021-11-03 LAB
ABSOLUTE EOS #: 0 K/UL (ref 0–0.4)
ABSOLUTE IMMATURE GRANULOCYTE: 0.18 K/UL (ref 0–0.3)
ABSOLUTE LYMPH #: 0.53 K/UL (ref 1–4.8)
ABSOLUTE MONO #: 0.7 K/UL (ref 0.2–0.8)
ANION GAP SERPL CALCULATED.3IONS-SCNC: 13 MMOL/L (ref 9–17)
BASOPHILS # BLD: 0 %
BASOPHILS ABSOLUTE: 0 K/UL (ref 0–0.2)
BUN BLDV-MCNC: 25 MG/DL (ref 8–23)
BUN/CREAT BLD: 17 (ref 9–20)
CALCIUM SERPL-MCNC: 8.3 MG/DL (ref 8.6–10.4)
CHLORIDE BLD-SCNC: 98 MMOL/L (ref 98–107)
CO2: 25 MMOL/L (ref 20–31)
CREAT SERPL-MCNC: 1.44 MG/DL (ref 0.5–0.9)
DIFFERENTIAL TYPE: ABNORMAL
EOSINOPHILS RELATIVE PERCENT: 0 % (ref 1–4)
GFR AFRICAN AMERICAN: 43 ML/MIN
GFR NON-AFRICAN AMERICAN: 35 ML/MIN
GFR SERPL CREATININE-BSD FRML MDRD: ABNORMAL ML/MIN/{1.73_M2}
GFR SERPL CREATININE-BSD FRML MDRD: ABNORMAL ML/MIN/{1.73_M2}
GLUCOSE BLD-MCNC: 107 MG/DL (ref 65–105)
GLUCOSE BLD-MCNC: 114 MG/DL (ref 65–105)
GLUCOSE BLD-MCNC: 123 MG/DL (ref 65–105)
GLUCOSE BLD-MCNC: 64 MG/DL (ref 65–105)
GLUCOSE BLD-MCNC: 67 MG/DL (ref 70–99)
GLUCOSE BLD-MCNC: 89 MG/DL (ref 65–105)
HCT VFR BLD CALC: 31 % (ref 36.3–47.1)
HEMOGLOBIN: 9.2 G/DL (ref 11.9–15.1)
IMMATURE GRANULOCYTES: 1 %
LACTIC ACID, SEPSIS WHOLE BLOOD: NORMAL MMOL/L (ref 0.5–1.9)
LACTIC ACID, SEPSIS: 1.6 MMOL/L (ref 0.5–1.9)
LYMPHOCYTES # BLD: 3 % (ref 24–44)
MCH RBC QN AUTO: 26.6 PG (ref 25.2–33.5)
MCHC RBC AUTO-ENTMCNC: 29.7 G/DL (ref 28.4–34.8)
MCV RBC AUTO: 89.6 FL (ref 82.6–102.9)
MONOCYTES # BLD: 4 % (ref 1–7)
MORPHOLOGY: ABNORMAL
NRBC AUTOMATED: 0.1 PER 100 WBC
PDW BLD-RTO: 26.8 % (ref 11.8–14.4)
PLATELET # BLD: 113 K/UL (ref 138–453)
PLATELET ESTIMATE: ABNORMAL
PMV BLD AUTO: 10.6 FL (ref 8.1–13.5)
POTASSIUM SERPL-SCNC: 3.7 MMOL/L (ref 3.7–5.3)
RBC # BLD: 3.46 M/UL (ref 3.95–5.11)
RBC # BLD: ABNORMAL 10*6/UL
SEG NEUTROPHILS: 92 % (ref 36–66)
SEGMENTED NEUTROPHILS ABSOLUTE COUNT: 16.19 K/UL (ref 1.8–7.7)
SODIUM BLD-SCNC: 136 MMOL/L (ref 135–144)
WBC # BLD: 17.6 K/UL (ref 3.5–11.3)
WBC # BLD: ABNORMAL 10*3/UL

## 2021-11-03 PROCEDURE — 6370000000 HC RX 637 (ALT 250 FOR IP): Performed by: INTERNAL MEDICINE

## 2021-11-03 PROCEDURE — P9047 ALBUMIN (HUMAN), 25%, 50ML: HCPCS | Performed by: INTERNAL MEDICINE

## 2021-11-03 PROCEDURE — 2060000000 HC ICU INTERMEDIATE R&B

## 2021-11-03 PROCEDURE — 99232 SBSQ HOSP IP/OBS MODERATE 35: CPT | Performed by: INTERNAL MEDICINE

## 2021-11-03 PROCEDURE — 83605 ASSAY OF LACTIC ACID: CPT

## 2021-11-03 PROCEDURE — 6370000000 HC RX 637 (ALT 250 FOR IP): Performed by: STUDENT IN AN ORGANIZED HEALTH CARE EDUCATION/TRAINING PROGRAM

## 2021-11-03 PROCEDURE — 71045 X-RAY EXAM CHEST 1 VIEW: CPT

## 2021-11-03 PROCEDURE — 2580000003 HC RX 258: Performed by: STUDENT IN AN ORGANIZED HEALTH CARE EDUCATION/TRAINING PROGRAM

## 2021-11-03 PROCEDURE — 6360000002 HC RX W HCPCS: Performed by: INTERNAL MEDICINE

## 2021-11-03 PROCEDURE — 90935 HEMODIALYSIS ONE EVALUATION: CPT

## 2021-11-03 PROCEDURE — 85025 COMPLETE CBC W/AUTO DIFF WBC: CPT

## 2021-11-03 PROCEDURE — 2700000000 HC OXYGEN THERAPY PER DAY

## 2021-11-03 PROCEDURE — 94640 AIRWAY INHALATION TREATMENT: CPT

## 2021-11-03 PROCEDURE — 6370000000 HC RX 637 (ALT 250 FOR IP): Performed by: FAMILY MEDICINE

## 2021-11-03 PROCEDURE — 6370000000 HC RX 637 (ALT 250 FOR IP): Performed by: NURSE PRACTITIONER

## 2021-11-03 PROCEDURE — 82947 ASSAY GLUCOSE BLOOD QUANT: CPT

## 2021-11-03 PROCEDURE — 36415 COLL VENOUS BLD VENIPUNCTURE: CPT

## 2021-11-03 PROCEDURE — 80048 BASIC METABOLIC PNL TOTAL CA: CPT

## 2021-11-03 PROCEDURE — 2500000003 HC RX 250 WO HCPCS: Performed by: INTERNAL MEDICINE

## 2021-11-03 PROCEDURE — 6360000002 HC RX W HCPCS: Performed by: NURSE PRACTITIONER

## 2021-11-03 PROCEDURE — 94761 N-INVAS EAR/PLS OXIMETRY MLT: CPT

## 2021-11-03 RX ORDER — MIDODRINE HYDROCHLORIDE 10 MG/1
10 TABLET ORAL 3 TIMES DAILY PRN
DISCHARGE
Start: 2021-11-03

## 2021-11-03 RX ORDER — LANOLIN ALCOHOL/MO/W.PET/CERES
3 CREAM (GRAM) TOPICAL NIGHTLY PRN
Qty: 20 TABLET | Refills: 0 | DISCHARGE
Start: 2021-11-03

## 2021-11-03 RX ORDER — BUDESONIDE 0.5 MG/2ML
0.5 INHALANT ORAL 2 TIMES DAILY
Qty: 60 EACH | Refills: 3 | Status: SHIPPED | OUTPATIENT
Start: 2021-11-03

## 2021-11-03 RX ORDER — PREDNISONE 10 MG/1
30 TABLET ORAL DAILY
Qty: 30 TABLET | Refills: 0 | Status: SHIPPED | OUTPATIENT
Start: 2021-11-03 | End: 2021-11-04 | Stop reason: HOSPADM

## 2021-11-03 RX ORDER — ERGOCALCIFEROL 1.25 MG/1
50000 CAPSULE ORAL WEEKLY
Qty: 5 CAPSULE | DISCHARGE
Start: 2021-11-03

## 2021-11-03 RX ORDER — CALCITRIOL 0.25 UG/1
0.25 CAPSULE, LIQUID FILLED ORAL
Qty: 30 CAPSULE | Refills: 3 | DISCHARGE
Start: 2021-11-03

## 2021-11-03 RX ORDER — IPRATROPIUM BROMIDE AND ALBUTEROL SULFATE 2.5; .5 MG/3ML; MG/3ML
3 SOLUTION RESPIRATORY (INHALATION) 3 TIMES DAILY
Qty: 120 ML | Refills: 1 | Status: SHIPPED | OUTPATIENT
Start: 2021-11-03

## 2021-11-03 RX ORDER — ALBUMIN (HUMAN) 12.5 G/50ML
25 SOLUTION INTRAVENOUS ONCE
Status: COMPLETED | OUTPATIENT
Start: 2021-11-03 | End: 2021-11-03

## 2021-11-03 RX ORDER — ARFORMOTEROL TARTRATE 15 UG/2ML
15 SOLUTION RESPIRATORY (INHALATION) 2 TIMES DAILY
Qty: 120 ML | Refills: 3 | Status: SHIPPED | OUTPATIENT
Start: 2021-11-03

## 2021-11-03 RX ADMIN — IPRATROPIUM BROMIDE AND ALBUTEROL SULFATE 1 AMPULE: .5; 2.5 SOLUTION RESPIRATORY (INHALATION) at 14:39

## 2021-11-03 RX ADMIN — EPOETIN ALFA-EPBX 8000 UNITS: 4000 INJECTION, SOLUTION INTRAVENOUS; SUBCUTANEOUS at 14:23

## 2021-11-03 RX ADMIN — BUDESONIDE 500 MCG: 0.5 SUSPENSION RESPIRATORY (INHALATION) at 19:52

## 2021-11-03 RX ADMIN — ERGOCALCIFEROL 50000 UNITS: 1.25 CAPSULE ORAL at 14:22

## 2021-11-03 RX ADMIN — IPRATROPIUM BROMIDE AND ALBUTEROL SULFATE 1 AMPULE: .5; 2.5 SOLUTION RESPIRATORY (INHALATION) at 19:52

## 2021-11-03 RX ADMIN — ALBUMIN (HUMAN) 25 G: 0.25 INJECTION, SOLUTION INTRAVENOUS at 10:01

## 2021-11-03 RX ADMIN — IPRATROPIUM BROMIDE AND ALBUTEROL SULFATE 1 AMPULE: .5; 2.5 SOLUTION RESPIRATORY (INHALATION) at 07:43

## 2021-11-03 RX ADMIN — MIDODRINE HYDROCHLORIDE 10 MG: 10 TABLET ORAL at 09:33

## 2021-11-03 RX ADMIN — ARFORMOTEROL TARTRATE 15 MCG: 15 SOLUTION RESPIRATORY (INHALATION) at 19:52

## 2021-11-03 RX ADMIN — CALCITRIOL 0.25 MCG: 0.25 CAPSULE ORAL at 14:22

## 2021-11-03 RX ADMIN — METOPROLOL TARTRATE 12.5 MG: 25 TABLET, FILM COATED ORAL at 21:39

## 2021-11-03 RX ADMIN — Medication 1.9 ML: at 12:59

## 2021-11-03 RX ADMIN — BUDESONIDE 500 MCG: 0.5 SUSPENSION RESPIRATORY (INHALATION) at 07:44

## 2021-11-03 RX ADMIN — ARFORMOTEROL TARTRATE 15 MCG: 15 SOLUTION RESPIRATORY (INHALATION) at 07:51

## 2021-11-03 RX ADMIN — PREDNISONE 30 MG: 20 TABLET ORAL at 14:23

## 2021-11-03 RX ADMIN — SODIUM CHLORIDE, PRESERVATIVE FREE 10 ML: 5 INJECTION INTRAVENOUS at 14:27

## 2021-11-03 RX ADMIN — SODIUM CHLORIDE, PRESERVATIVE FREE 10 ML: 5 INJECTION INTRAVENOUS at 21:39

## 2021-11-03 ASSESSMENT — ENCOUNTER SYMPTOMS
SHORTNESS OF BREATH: 1
NAUSEA: 0
VOMITING: 0
ABDOMINAL PAIN: 0
COUGH: 1

## 2021-11-03 ASSESSMENT — PAIN SCALES - GENERAL
PAINLEVEL_OUTOF10: 0

## 2021-11-03 NOTE — PROGRESS NOTES
Renal Progress Note    Patient :  Tristen Reyes; 64 y.o. MRN# [id number]  Location:  1012/1012-01  Attending:  Zenaida Huston DO  Admit Date:  10/12/2021   Hospital Day: 22      Subjective:     Patient was seen and examined on HD. Tolerating a procedure well. No new issues reported overnight. Patient is now getting dialysis as per [facility] schedule. Right-sided chest tube- removed.  setting up outpatient dialysis spot on Monday Wednesday Friday schedule at [facility]. Now has tunneled catheter in place which was placed on 10/26/2021.     Outpatient Medications:     Medications Prior to Admission: amLODIPine (NORVASC) 5 MG tablet, Take 5 mg by mouth daily  magnesium oxide (MAG-OX) 400 MG tablet, Take 400 mg by mouth daily  metoprolol (LOPRESSOR) 100 MG tablet, Take 100 mg by mouth 2 times daily  folic acid (FOLVITE) 979 MCG tablet, Take 800 mcg by mouth daily  diphenhydrAMINE-APAP, sleep, (TYLENOL PM EXTRA STRENGTH)  MG tablet, Take 1 tablet by mouth nightly as needed for Sleep    Current Medications:     Scheduled Meds:    albumin human  25 g IntraVENous Once    predniSONE  30 mg Oral Daily    ipratropium-albuterol  1 ampule Inhalation TID    metoprolol tartrate  12.5 mg Oral BID    heparin (porcine)  5,000 Units SubCUTAneous BID    budesonide  0.5 mg Nebulization BID    Arformoterol Tartrate  15 mcg Nebulization BID    epoetin mike-epbx  8,000 Units SubCUTAneous Once per day on Mon Wed Fri    calcitRIOL  0.25 mcg Oral Once per day on Mon Wed Fri    sodium chloride flush  5-40 mL IntraVENous 2 times per day    vitamin D  50,000 Units Oral Weekly     Continuous Infusions:    dextrose      sodium chloride       PRN Meds:  midodrine, sodium citrate, midodrine, sodium citrate, sodium chloride flush, HYDROcodone 5 mg - acetaminophen **OR** HYDROcodone 5 mg - acetaminophen, melatonin, glucose, dextrose, glucagon (rDNA), dextrose, sodium chloride flush, sodium chloride, ondansetron **OR** ondansetron, acetaminophen **OR** acetaminophen, perflutren lipid microspheres    Input/Output:       No intake/output data recorded. .      Patient Vitals for the past 96 hrs (Last 3 readings):   Weight   21 0601 107 lb 14.4 oz (48.9 kg)   21 0555 129 lb 6 oz (58.7 kg)   21 1528 105 lb 6.1 oz (47.8 kg)       Vital Signs:   Temperature:  Temp: 97.5 °F (36.4 °C)  TMax:   Temp (24hrs), Av.7 °F (36.5 °C), Min:97.5 °F (36.4 °C), Max:98.1 °F (36.7 °C)    Respirations:  Resp: 18  Pulse:   Pulse: 110  BP:    BP: (!) 99/57  BP Range: Systolic (16KGW), AOB:691 , Min:82 , SBU:474       Diastolic (60TKI), WW, Min:54, Max:69      Physical Examination:     General:  AAO x 3, speaking in full sentences, no accessory muscle use. HEENT: Atraumatic, normocephalic, no throat congestion, moist mucosa. Eyes:   Pupils equal, round and reactive to light, EOMI. Neck:   Supple  Chest:   Bilateral vesicular breath sounds, no rales or wheezes. Cardiac:  S1 S2 RR, no murmurs, gallops or rubs. Abdomen: Soft, non-tender, no masses or organomegaly, BS audible. :   No suprapubic or flank tenderness. Neuro:  AAO x 3, No FND. SKIN:  No rashes, good skin turgor. Extremities:  No edema.     Labs:       Recent Labs     21  0533 21  0702 21  0553   WBC 18.0* 22.8* 17.6*   RBC 3.35* 2.96* 3.46*   HGB 8.7* 7.7* 9.2*   HCT 30.0* 27.0* 31.0*   MCV 89.6 91.2 89.6   MCH 26.0 26.0 26.6   MCHC 29.0 28.5 29.7   RDW 25.7* 26.4* 26.8*    94* 113*   MPV 11.5 Abnormal 10.6      BMP:   Recent Labs     21  0533 21  0702 21  0553   * 134* 136   K 4.2 3.5* 3.7   CL 97* 98 98   CO2 22 26 25   BUN 33* 16 25*   CREATININE 1.93* 1.22* 1.44*   GLUCOSE 81 71 67*   CALCIUM 8.2* 8.0* 8.3*     Magnesium:    Recent Labs     21  0702   MG 1.9     SPEP:  Lab Results   Component Value Date    PROT 4.7 10/17/2021     Hep BsAg:         Lab Results   Component Value Date HEPBSAG NONREACTIVE 10/25/2021     Urinalysis/Chemistries:      Lab Results   Component Value Date    NITRU NEGATIVE 10/13/2021    COLORU Yellow 10/13/2021    PHUR 5.5 10/13/2021    WBCUA 10 TO 20 10/13/2021    RBCUA 2 TO 5 10/13/2021    MUCUS 1+ 10/13/2021    TRICHOMONAS NOT REPORTED 10/13/2021    YEAST NOT REPORTED 10/13/2021    BACTERIA NOT REPORTED 10/13/2021    SPECGRAV 1.020 10/13/2021    LEUKOCYTESUR SMALL 10/13/2021    UROBILINOGEN Normal 10/13/2021    BILIRUBINUR NEGATIVE 10/13/2021    GLUCOSEU NEGATIVE 10/13/2021    KETUA NEGATIVE 10/13/2021    AMORPHOUS 1+ 10/13/2021     Urine Sodium:     Lab Results   Component Value Date    LAZARO 40 10/13/2021     Urine Chloride:    Lab Results   Component Value Date    CLUR 60 10/13/2021     Urine Osmolarity:   Lab Results   Component Value Date    OSMOU 347 10/12/2021      Urine Creatinine:     Lab Results   Component Value Date    LABCREA 40.5 10/17/2021    LABCREA 76.0 10/13/2021     Radiology:     Reviewed. Assessment:     1. Now ESRD on HD required initiation of dialysis this admission on 10/26/2021. Has tunneled catheter in place which was placed on 10/26/2021.  2.  Moderate right-sided pleural effusion status post thoracentesis and eventual chest displacement because of pneumothorax on the right side. 3.  Moderate pulmonary hypertension. 4.  Anemia of chronic disease. 5.  Hypertension. 6.  Acute diastolic congestive heart failure. Plan:   1. Patient was seen and examined on HD at bedside. Orders were confirmed with the HD nurse. 2.   working toward setting up outpatient hemodialysis spot possibly at [facility]. 3.  BMP in AM.  4.  Will follow. Nutrition   Please ensure that patient is on a renal diet/TF. Avoid nephrotoxic drugs/contrast exposure. We will continue to follow along with you. Abdulkadir Delgadillo MD  [facility]     This note is created with the assistance of a speech-recognition

## 2021-11-03 NOTE — PROGRESS NOTES
[facility]  Office: [address], DO, Oj Reeves, DO, Enrikebeatriz Levine, DO, Mia Terrazas, DO, Elan Fleming MD, Leila Arenas MD, Karlo Stewart MD, Aylin Reddy MD, Moreno Schaeffer MD, Bruno Duarte MD, Hiram Loaz MD, Phuong Arredondo MD, Jeffery Latham, DO, Manpreet Goldberg, DO, Bernard Meyer MD,  Jordy Staff, DO, Simon Antunez MD, Jun Guerrero MD, Lucille Winters MD, Jenaro Benavides MD, Maddison Stewart MD, Bertha Mc MD, Felicitas Iglesias, CNP, [facility] Ch[facility]osso, CNP, Janette Canjasmin, CNP, Anita Fret, CNS, Felicitas Maricruz, CNP, Crhissy Wury, CNP, Brigette Brain, CNP, Hitesh Starks, CNP, Laina Singer, CNP, Juan Jose Ashley, CNP, Beth Smiley PA-C, Brittany Calles, DNP, Kaiser Lynch, CNP, Mary Napoles, CNP, Tiana Carreno, CNP, Anne Smith, CNP, Remigio Soto, CNP, Elsie Ramirez, CNP, Sandi Pappas 148    Progress Note    11/3/2021    1:04 PM    Name:   Bridget Woods  MRN:     [id number]     [place]:      [de-identified]   Room:   [address] [place] [address] Day:  25  Admit Date:  10/12/2021  2:53 PM    PCP:   Debra Munoz MD  Code Status:  Full Code    Subjective:     C/C:   Chief Complaint   Patient presents with    Respiratory Distress     EMS reported 97% on room air; pt arrives with saturation 45% on room air    Failure To Thrive     Interval History Status:   Chest tube out  O2 sats have dropped into the 80s at times  Dialysis in progress  BP low at initiation of dialysis, asymptomatic  Midodrine given  Albumin given    Database updates:  Cultures: No growth    Wgffenk95Haa mg/dL     PAW37Tbuk mg/dL   CREATININE1. 44High mg/dL   Bun/Cre Ratio17     Calcium8.3Low     WBC17.6High k/uL  Hemoglobin9.2Low   Platelet count 420    Brief History:     As documented in the medical record:  Shamar Reveles is a 64 y.o. female with a hx of CKD 4 and iron deficiency anemia who presented to [facility] with Respiratory Distress and hypoxia (O2 Family History:   Family History   Problem Relation Age of Onset    Hypertension Mother     Cancer Sister     Cancer Brother        Review of Systems:     Review of Systems   Constitutional: Positive for activity change (Decreased). Negative for fever. Respiratory: Positive for cough (Occasional, no sputum) and shortness of breath (Mild dyspnea on exertion). Cardiovascular: Negative for chest pain and palpitations. Gastrointestinal: Negative for abdominal pain, nausea and vomiting. Genitourinary: Negative for flank pain and hematuria. Physical Examination:        Vitals  /64   Pulse 103   Temp 97.5 °F (36.4 °C) (Oral)   Resp 18   Ht 5' 4\" (1.626 m)   Wt 107 lb 14.4 oz (48.9 kg)   SpO2 94%   BMI 18.52 kg/m²   Temp (24hrs), Av.7 °F (36.5 °C), Min:97.5 °F (36.4 °C), Max:98.1 °F (36.7 °C)    Recent Labs     21  1621 21  2016 21  0756 21  0848   POCGLU 115* 95 64* 107*       Physical Exam  Vitals reviewed. Constitutional:       General: She is not in acute distress. Appearance: She is not diaphoretic. HENT:      Head: Normocephalic. Nose: Nose normal.   Eyes:      General: No scleral icterus. Conjunctiva/sclera: Conjunctivae normal.   Neck:      Trachea: No tracheal deviation. Cardiovascular:      Rate and Rhythm: Normal rate and regular rhythm. Pulmonary:      Effort: Pulmonary effort is normal. No respiratory distress. Breath sounds: Normal breath sounds. No wheezing or rales. Comments: Chest tube site appears stable  No crepitation  No subcutaneous emphysema  Chest:      Chest wall: No tenderness. Abdominal:      General: Bowel sounds are normal. There is no distension. Palpations: Abdomen is soft. Tenderness: There is no abdominal tenderness. Musculoskeletal:         General: No tenderness. Cervical back: Neck supple. Skin:     General: Skin is warm and dry.    Neurological:      Mental Status: She is alert. Comments: The patient is having trouble providing historical data          Medications: Allergies: Allergies   Allergen Reactions    Penicillins Swelling       Current Meds:   Scheduled Meds:    predniSONE  30 mg Oral Daily    ipratropium-albuterol  1 ampule Inhalation TID    metoprolol tartrate  12.5 mg Oral BID    heparin (porcine)  5,000 Units SubCUTAneous BID    budesonide  0.5 mg Nebulization BID    Arformoterol Tartrate  15 mcg Nebulization BID    epoetin mike-epbx  8,000 Units SubCUTAneous Once per day on Mon Wed Fri    calcitRIOL  0.25 mcg Oral Once per day on Mon Wed Fri    sodium chloride flush  5-40 mL IntraVENous 2 times per day    vitamin D  50,000 Units Oral Weekly     Continuous Infusions:    dextrose      sodium chloride       PRN Meds: midodrine, sodium citrate, midodrine, sodium citrate, sodium chloride flush, HYDROcodone 5 mg - acetaminophen **OR** HYDROcodone 5 mg - acetaminophen, melatonin, glucose, dextrose, glucagon (rDNA), dextrose, sodium chloride flush, sodium chloride, ondansetron **OR** ondansetron, acetaminophen **OR** acetaminophen, perflutren lipid microspheres    Data:     I/O (24Hr):   No intake or output data in the 24 hours ending 11/03/21 1304    Labs:  Hematology:  Recent Labs     11/01/21 0533 11/02/21 0702 11/03/21  0553   WBC 18.0* 22.8* 17.6*   RBC 3.35* 2.96* 3.46*   HGB 8.7* 7.7* 9.2*   HCT 30.0* 27.0* 31.0*   MCV 89.6 91.2 89.6   MCH 26.0 26.0 26.6   MCHC 29.0 28.5 29.7   RDW 25.7* 26.4* 26.8*    94* 113*   MPV 11.5 Abnormal 10.6     Chemistry:  Recent Labs     11/01/21  0533 11/02/21  0702 11/03/21  0553   * 134* 136   K 4.2 3.5* 3.7   CL 97* 98 98   CO2 22 26 25   GLUCOSE 81 71 67*   BUN 33* 16 25*   CREATININE 1.93* 1.22* 1.44*   MG  --  1.9  --    ANIONGAP 13 10 13   LABGLOM 25* 43* 35*   GFRAA 30* 52* 43*   CALCIUM 8.2* 8.0* 8.3*     Recent Labs     11/02/21  0743 11/02/21  1228 11/02/21  1621 11/02/21 2016 11/03/21  0756 11/03/21  0848   POCGLU 73 97 115* 95 64* 107*     ABG:  Lab Results   Component Value Date    POCPH 7.456 10/24/2021    POCPCO2 41.1 10/24/2021    POCPO2 53.3 10/24/2021    POCHCO3 28.9 10/24/2021    NBEA NOT REPORTED 10/24/2021    PBEA 5 10/24/2021    ATB7SUK NOT REPORTED 10/24/2021    QSHL1SYE 89 10/24/2021    FIO2 NOT REPORTED 10/24/2021     Lab Results   Component Value Date/Time    SPECIAL RTAC,20CC 10/26/2021 06:28 PM    SPECIAL LTAC 10/26/2021 06:28 PM     Lab Results   Component Value Date/Time    CULTURE NO GROWTH 6 DAYS 10/26/2021 06:28 PM    CULTURE NO GROWTH 6 DAYS 10/26/2021 06:28 PM       Radiology:  XR CHEST PORTABLE    Result Date: 11/2/2021  No significant change from prior study. Indwelling pigtail terminus chest tube bilateral effusions in appearance of probable atelectasis left side. XR CHEST PORTABLE    Result Date: 11/1/2021  Right-sided PleurX catheter. No recurrent pneumothorax identified Cardiomegaly. Small left-sided effusion and atelectasis. XR CHEST PORTABLE    Result Date: 11/1/2021  Right chest tube remains in place. No pneumothorax or significant right pleural effusion. XR CHEST PORTABLE    Result Date: 10/31/2021  Little change from prior examination. Small bilateral effusions and atelectasis. XR CHEST PORTABLE    Result Date: 10/30/2021  Chronic pulmonary change with small bibasilar effusions. Stable support tubes. XR CHEST PORTABLE    Result Date: 10/29/2021  Left basilar effusion and stable right basilar chest tube. Stable right internal jugular central line. XR CHEST PORTABLE    Result Date: 10/28/2021  Probable mild increase in small left-sided effusion with otherwise stable exam.     XR CHEST PORTABLE    Result Date: 10/27/2021  Right chest tube remains in place. No pneumothorax identified.        Assessment:        Primary Problem  Pleural effusion on right    Active Hospital Problems    Diagnosis Date Noted    Pulmonary hypertension ([address] [I27.20] 11/03/2021    COPD (chronic obstructive pulmonary disease) (HCC) [J44.9] 11/03/2021    Thrombocytopenia (HCC) [D69.6] 11/03/2021    End-stage renal disease (ESRD) ([facility] [N18.6] 11/03/2021    Hypokalemia [E87.6] 10/17/2021    Other emphysema ([facility] [address] [facility] [J43.8] 10/17/2021    Acute kidney injury ([facility] [address] [facility] [N17.9]     Vitamin D deficiency [E55.9] 10/13/2021    Acute diastolic heart failure (HCC) [I50.31] 10/13/2021    Moderate protein-calorie malnutrition (HCC) [E44.0] 10/13/2021    Severe malnutrition (HCC) [E43] 10/13/2021    Hypoxia [R09.02]     Anemia [D64.9]     Peripheral edema [R60.9]     Pleural effusion on right [J90] 10/12/2021    Iron deficiency anemia [D50.9] 10/12/2021    Essential hypertension [I10] 10/12/2021    General weakness [R53.1] 10/12/2021    Acute respiratory failure with hypoxia ([facility] [address] [facility] [J96.01]          Plan:        Optimize cardio-pulmonary function  Pulmonary eval & f/u as scheduled Dr Kaleb Walker  Chest tube removed  Thoracentesis as needed  Oxygen supplementation  Respiratory Therapy and Bronchodilators prn   Nephrology eval & f/u as scheduled Dr Jared Ellsworth  Dialysis as scheduled  Check bun and creatinine  Avoid nephrotoxins  Correct electrolyte abnormalities  Anemia w/u on outpatient basis is suggested    Blood Pressure - Monitor and control  Midodrine as needed  Antibiotics per Infectious Disease service   Observing off antibiotics at this time  DVT prophylaxis  Discharge planning  Will discharge when arrangements complete and ok with other services.   Follow-up with PCP in one week, Cinthya Dukes MD  Notify PCP of discharge  Med rec done  EDWARD done  DCP 35 min+      IP CONSULT TO NEPHROLOGY  IP CONSULT TO SOCIAL WORK  IP CONSULT TO PULMONOLOGY  IP CONSULT TO INFECTIOUS DISEASES    Asiya Mcgill DO  11/3/2021  1:04 PM

## 2021-11-03 NOTE — FLOWSHEET NOTE
11/03/21 1030 11/03/21 1032   Oxygen Therapy   SpO2 (!) 88 % 91 %   O2 Flow Rate (L/min) 2 L/min 6 L/min     After starting HD, O2 demands increased.      11/03/21 0945 11/03/21 1000   Vital Signs   Pulse 109 104   BP 84/63 114/69     Patient requiring blood pressure support with midodrine and albumin     Will continue to monitor    Will update services

## 2021-11-03 NOTE — PLAN OF CARE
Problem: Falls - Risk of:  Goal: Will remain free from falls  Description: Will remain free from falls  11/3/2021 1215 by Almita Morse RN  Outcome: Ongoing  Siderails up x 2  Hourly rounding. Call light in reach. Instructed to call for assist before attempting out of bed. Remains free from falls and accidental injury at this time. Floor free from obstacles, and bed is locked and in lowest position. Adequate lighting provided. Bed alarm on. Fall sticker on wristband.  Fall Sign posted in doorway       Problem: Falls - Risk of:  Goal: Absence of physical injury  Description: Absence of physical injury  11/3/2021 1215 by Almita Morse RN  Outcome: Ongoing     Problem: Nutrition  Goal: Optimal nutrition therapy  11/3/2021 1215 by Almita Morse RN  Outcome: Ongoing     Problem: IP BALANCE  Goal: LTG - Patient will maintain balance to allow for safe/functional mobility  Outcome: Ongoing     Problem: Skin Integrity:  Goal: Will show no infection signs and symptoms  Description: Will show no infection signs and symptoms  Outcome: Ongoing     Problem: Skin Integrity:  Goal: Absence of new skin breakdown  Description: Absence of new skin breakdown  11/3/2021 1215 by Almita Morse RN  Outcome: Ongoing     Problem: OXYGENATION/RESPIRATORY FUNCTION  Goal: Patient will achieve/maintain normal respiratory rate/effort  Description: Respiratory rate and effort will be within normal limits for the patient  Outcome: Ongoing     Problem: Breathing Pattern - Ineffective:  Goal: Ability to achieve and maintain a regular respiratory rate will improve  Description: Ability to achieve and maintain a regular respiratory rate will improve  Outcome: Ongoing     Problem: Pain:  Goal: Pain level will decrease  Description: Pain level will decrease  Outcome: Ongoing     Problem: Pain:  Goal: Control of acute pain  Description: Control of acute pain  Outcome: Ongoing     Problem: Pain:  Goal: Control of chronic pain  Description: Control of chronic pain  Outcome: Ongoing     Problem: Cardiac Output - Decreased:  Goal: Hemodynamic stability will improve  Description: Hemodynamic stability will improve  Outcome: Ongoing Yes

## 2021-11-03 NOTE — PROGRESS NOTES
HEMODIALYSIS POST TREATMENT NOTE    Treatment time ordered: 3.5  Actual treatment time: 3.5    UltraFiltration Goal: 1kilo  UltraFiltration Removed: 1kilo      Pre Treatment weight: 48.9  Post Treatment weight: 47.9  Estimated Dry Weight: tbd    Access used:     Central Venous Catheter:          Tunneled or Non-tunneled: tunnel           Site: r chest          Access Flow: good      Internal Access:       AV Fistula or AV Graft: na         Site: na       Access Flow: na       Sign and symptoms of infection: no       If YES: na    Medications or blood products given: albumin and midodrine for bp support    Ch[facility] outpatient schedule: [facility]     Chronic outpatient unit: [facility]    Summary of response to treatment: tolerated fair. Explain if orders NOT met, was physician notified:shavon      ACES flowsheet faxed to patient unit/ placed in patient chart: yes    Post assessment completed: yes    Report given to: yarely      * Intra-treatment documented Safety Checks include the followin) Access and face visible at all times. 2) All connections and blood lines are secure with no kinks. 3) NVL alarm engaged. 4) Hemosafe device applied (if applicable). 5) No collapse of Arterial or Venous blood chambers. 6) All blood lines / pump segments in the air detectors.

## 2021-11-03 NOTE — PROGRESS NOTES
Pulmonary Critical Care Progress Note  Hiwot Calle CNP/Vivienne Gonzalez MD     Patient seen for the follow up of acute hypoxic respiratory failure, moderate pulmonary hypertension, former smoker/COPD Pleural effusion on right     Subjective:  No significant overnight events noted. She had chest tube removed yesterday after being clamped with no recurrence of pneumothorax after 24 hours. She had increased O2 requirement and shortness of breath on dialysis today, currently on 6 L nasal cannula which is an increase from 2 L previously. Examination:  Vitals: /64   Pulse 107   Temp 97.5 °F (36.4 °C) (Oral)   Resp 18   Ht 5' 4\" (1.626 m)   Wt 107 lb 14.4 oz (48.9 kg)   SpO2 91%   BMI 18.52 kg/m²   General appearance: alert and cooperative with exam, resting in bed  Neck: No JVD  Lungs: Moderate air exchange, diminished bilateral bases, positive crackles. Heart: regular rate and rhythm, S1, S2 normal, no gallop  Abdomen: Soft, non tender, + BS  Extremities: no cyanosis or clubbing. No significant edema    LABs:  CBC:   Recent Labs     11/02/21  0702 11/03/21  0553   WBC 22.8* 17.6*   HGB 7.7* 9.2*   HCT 27.0* 31.0*   PLT 94* 113*     BMP:   Recent Labs     11/02/21  0702 11/03/21  0553   * 136   K 3.5* 3.7   CO2 26 25   BUN 16 25*   CREATININE 1.22* 1.44*   LABGLOM 43* 35*   GLUCOSE 71 67*     Radiology:  X-ray chest 11/3/2021  Pending    Impression:  · Acute hypoxic respiratory failure requiring high flow oxygen  · Moderate to large right pleural effusion, s/p thoracentesis with 850 mL removed  · S/p pigtail catheter placement 10/18/21  · Upsize of chest tube on 10/26/2021  · Small left pleural effusion, loculated  · Atelectasis/?  Trapped lung on the right  · Mild pulmonary edema  · Moderate pulmonary hypertension, RVSP 60 mmHg  · Suspected COPD/former smoker, quit 2019  · Acute kidney injury  · D-dimer, low probability for PE on VQ scan on 10/13/21.   Negative CTA of the chest on

## 2021-11-03 NOTE — CARE COORDINATION
Social work: Call rec'd from [employer], denial upheld for [facility] placement. Case now referral to 3rd party reviewers, Jovany [phone]. Regarding SNF, [address] [place] [address] Carina and Kartik are both reviewing.   Chairtime pending medical clearance at [facility] MWF 11:50PM.

## 2021-11-03 NOTE — PROGRESS NOTES
Outcomes:  Food and Nutrient Intake, Supplement Intake  Physical Signs/Symptoms Outcomes:  Biochemical Data, Fluid Status or Edema, Skin, Weight, GI Status     Discharge Planning:    Continue Oral Nutrition Supplement, Continue current diet         Jason   AMANDAN, RDN, LDN  Lead Clinical Dietitian  RD Office Phone [phone]

## 2021-11-03 NOTE — PLAN OF CARE
Nutrition Problem #1: Severe malnutrition  Intervention: Food and/or Nutrient Delivery: Continue Current Diet, Continue Oral Nutrition Supplement  Nutritional Goals: PO intakes > 50% at meals

## 2021-11-03 NOTE — PROGRESS NOTES
Occupational Therapy  DATE: 11/3/2021    NAME: Celeste Lee  MRN: [id number]   : 1945    Patient not seen this date for Occupational Therapy due to:      [] Cancel by RN or physician due to:    [x] Hemodialysis    [] Critical Lab Value Level     [] Blood transfusion in progress    [] Acute or unstable cardiovascular status   _MAP < 55 or more than >115  _HR < 40 or > 130    [] Acute or unstable pulmonary status   -FiO2 > 60%   _RR < 5 or >40    _O2 sats < 85%    [] Strict Bedrest    [] Off Unit for surgery or procedure    [] Off Unit for testing       [] Pending imaging to R/O fracture    [] Refusal by Patient      [] Other      [] OT being discontinued at this time. Patient independent. No further needs. [] OT being discontinued at this time as the patient has been transferred to hospice care. No further needs.     Pt. N/A d/t leaving for dialysis  Purch, SHA

## 2021-11-04 ENCOUNTER — APPOINTMENT (OUTPATIENT)
Dept: GENERAL RADIOLOGY | Age: 76
DRG: 291 | End: 2021-11-04
Payer: MEDICARE

## 2021-11-04 VITALS
TEMPERATURE: 98.1 F | HEIGHT: 64 IN | SYSTOLIC BLOOD PRESSURE: 116 MMHG | RESPIRATION RATE: 16 BRPM | HEART RATE: 91 BPM | BODY MASS INDEX: 20.53 KG/M2 | OXYGEN SATURATION: 97 % | DIASTOLIC BLOOD PRESSURE: 52 MMHG | WEIGHT: 120.25 LBS

## 2021-11-04 LAB
ABSOLUTE EOS #: 0 K/UL (ref 0–0.4)
ABSOLUTE IMMATURE GRANULOCYTE: 0.2 K/UL (ref 0–0.3)
ABSOLUTE LYMPH #: 0.2 K/UL (ref 1–4.8)
ABSOLUTE MONO #: 0.59 K/UL (ref 0.2–0.8)
ANION GAP SERPL CALCULATED.3IONS-SCNC: 13 MMOL/L (ref 9–17)
BASOPHILS # BLD: 0 %
BASOPHILS ABSOLUTE: 0 K/UL (ref 0–0.2)
BUN BLDV-MCNC: 14 MG/DL (ref 8–23)
BUN/CREAT BLD: 12 (ref 9–20)
CALCIUM SERPL-MCNC: 8.3 MG/DL (ref 8.6–10.4)
CHLORIDE BLD-SCNC: 97 MMOL/L (ref 98–107)
CO2: 25 MMOL/L (ref 20–31)
CREAT SERPL-MCNC: 1.13 MG/DL (ref 0.5–0.9)
DIFFERENTIAL TYPE: ABNORMAL
EOSINOPHILS RELATIVE PERCENT: 0 % (ref 1–4)
GFR AFRICAN AMERICAN: 56 ML/MIN
GFR NON-AFRICAN AMERICAN: 47 ML/MIN
GFR SERPL CREATININE-BSD FRML MDRD: ABNORMAL ML/MIN/{1.73_M2}
GFR SERPL CREATININE-BSD FRML MDRD: ABNORMAL ML/MIN/{1.73_M2}
GLUCOSE BLD-MCNC: 125 MG/DL (ref 65–105)
GLUCOSE BLD-MCNC: 73 MG/DL (ref 65–105)
GLUCOSE BLD-MCNC: 80 MG/DL (ref 70–99)
HCT VFR BLD CALC: 26.9 % (ref 36.3–47.1)
HEMOGLOBIN: 7.9 G/DL (ref 11.9–15.1)
IMMATURE GRANULOCYTES: 1 %
LACTIC ACID, SEPSIS WHOLE BLOOD: NORMAL MMOL/L (ref 0.5–1.9)
LACTIC ACID, SEPSIS: 1.3 MMOL/L (ref 0.5–1.9)
LYMPHOCYTES # BLD: 1 % (ref 24–44)
MAGNESIUM: 1.9 MG/DL (ref 1.6–2.6)
MCH RBC QN AUTO: 27 PG (ref 25.2–33.5)
MCHC RBC AUTO-ENTMCNC: 29.4 G/DL (ref 28.4–34.8)
MCV RBC AUTO: 91.8 FL (ref 82.6–102.9)
MONOCYTES # BLD: 3 % (ref 1–7)
MORPHOLOGY: ABNORMAL
MORPHOLOGY: ABNORMAL
NRBC AUTOMATED: 0.1 PER 100 WBC
PDW BLD-RTO: 27.1 % (ref 11.8–14.4)
PLATELET # BLD: 61 K/UL (ref 138–453)
PLATELET ESTIMATE: ABNORMAL
PMV BLD AUTO: ABNORMAL FL (ref 8.1–13.5)
POTASSIUM SERPL-SCNC: 3.3 MMOL/L (ref 3.7–5.3)
RBC # BLD: 2.93 M/UL (ref 3.95–5.11)
RBC # BLD: ABNORMAL 10*6/UL
SEG NEUTROPHILS: 95 % (ref 36–66)
SEGMENTED NEUTROPHILS ABSOLUTE COUNT: 18.81 K/UL (ref 1.8–7.7)
SODIUM BLD-SCNC: 135 MMOL/L (ref 135–144)
WBC # BLD: 19.8 K/UL (ref 3.5–11.3)
WBC # BLD: ABNORMAL 10*3/UL

## 2021-11-04 PROCEDURE — 2700000000 HC OXYGEN THERAPY PER DAY

## 2021-11-04 PROCEDURE — 85025 COMPLETE CBC W/AUTO DIFF WBC: CPT

## 2021-11-04 PROCEDURE — 83735 ASSAY OF MAGNESIUM: CPT

## 2021-11-04 PROCEDURE — 97110 THERAPEUTIC EXERCISES: CPT

## 2021-11-04 PROCEDURE — 83605 ASSAY OF LACTIC ACID: CPT

## 2021-11-04 PROCEDURE — 94640 AIRWAY INHALATION TREATMENT: CPT

## 2021-11-04 PROCEDURE — 99239 HOSP IP/OBS DSCHRG MGMT >30: CPT | Performed by: INTERNAL MEDICINE

## 2021-11-04 PROCEDURE — 6360000002 HC RX W HCPCS: Performed by: NURSE PRACTITIONER

## 2021-11-04 PROCEDURE — 71045 X-RAY EXAM CHEST 1 VIEW: CPT

## 2021-11-04 PROCEDURE — 36415 COLL VENOUS BLD VENIPUNCTURE: CPT

## 2021-11-04 PROCEDURE — 97112 NEUROMUSCULAR REEDUCATION: CPT

## 2021-11-04 PROCEDURE — 6370000000 HC RX 637 (ALT 250 FOR IP): Performed by: NURSE PRACTITIONER

## 2021-11-04 PROCEDURE — 80048 BASIC METABOLIC PNL TOTAL CA: CPT

## 2021-11-04 PROCEDURE — 2580000003 HC RX 258: Performed by: STUDENT IN AN ORGANIZED HEALTH CARE EDUCATION/TRAINING PROGRAM

## 2021-11-04 PROCEDURE — 97530 THERAPEUTIC ACTIVITIES: CPT

## 2021-11-04 PROCEDURE — 6370000000 HC RX 637 (ALT 250 FOR IP): Performed by: STUDENT IN AN ORGANIZED HEALTH CARE EDUCATION/TRAINING PROGRAM

## 2021-11-04 PROCEDURE — 6370000000 HC RX 637 (ALT 250 FOR IP): Performed by: FAMILY MEDICINE

## 2021-11-04 PROCEDURE — 82947 ASSAY GLUCOSE BLOOD QUANT: CPT

## 2021-11-04 PROCEDURE — 97535 SELF CARE MNGMENT TRAINING: CPT

## 2021-11-04 RX ORDER — PREDNISONE 20 MG/1
20 TABLET ORAL DAILY
Qty: 10 TABLET | Refills: 0 | DISCHARGE
Start: 2021-11-05 | End: 2021-11-15

## 2021-11-04 RX ORDER — PREDNISONE 20 MG/1
20 TABLET ORAL DAILY
Status: DISCONTINUED | OUTPATIENT
Start: 2021-11-05 | End: 2021-11-04 | Stop reason: HOSPADM

## 2021-11-04 RX ADMIN — ARFORMOTEROL TARTRATE 15 MCG: 15 SOLUTION RESPIRATORY (INHALATION) at 09:42

## 2021-11-04 RX ADMIN — BUDESONIDE 500 MCG: 0.5 SUSPENSION RESPIRATORY (INHALATION) at 09:42

## 2021-11-04 RX ADMIN — METOPROLOL TARTRATE 12.5 MG: 25 TABLET, FILM COATED ORAL at 09:00

## 2021-11-04 RX ADMIN — IPRATROPIUM BROMIDE AND ALBUTEROL SULFATE 1 AMPULE: .5; 2.5 SOLUTION RESPIRATORY (INHALATION) at 09:42

## 2021-11-04 RX ADMIN — PREDNISONE 30 MG: 20 TABLET ORAL at 09:00

## 2021-11-04 RX ADMIN — SODIUM CHLORIDE, PRESERVATIVE FREE 10 ML: 5 INJECTION INTRAVENOUS at 09:01

## 2021-11-04 RX ADMIN — IPRATROPIUM BROMIDE AND ALBUTEROL SULFATE 1 AMPULE: .5; 2.5 SOLUTION RESPIRATORY (INHALATION) at 14:58

## 2021-11-04 ASSESSMENT — PAIN SCALES - GENERAL
PAINLEVEL_OUTOF10: 0

## 2021-11-04 ASSESSMENT — ENCOUNTER SYMPTOMS
COUGH: 1
SHORTNESS OF BREATH: 1
VOMITING: 0
ABDOMINAL PAIN: 0
NAUSEA: 0

## 2021-11-04 NOTE — PROGRESS NOTES
Renal Progress Note    Patient :  Stephen Mcleod; 64 y.o. MRN# [id number]  Location:  1012/1012-01  Attending:  Diamante Abarca DO  Admit Date:  10/12/2021   Hospital Day: 23      Subjective:     Patient had hemodialysis yesterday, developed hypotension initially into treatment, required ProAmatine also required supplemental oxygen. Admits that she does get anxious prior to her treatment. She could be developing a neurocardiogenic event initially on dialysis. Outpatient spot arranged at the [facility]. unit. Mobility restricted at present. PT OT evaluation in progress. If no significant improvement in mobility is seen she may have to go to [facility] which provides inpatient dialysis like [place] [address] or [facility]. [facility]. In good spirits at present. Hemodynamically stable oxygen saturations good on 2 L of oxygen. Patient is now getting dialysis as per [facility] schedule. Right-sided chest tube- removed. Chest x-ray reviewed no significant effusion seen on the right side.  setting up outpatient dialysis spot on Monday Wednesday Friday schedule at [facility]. Now has tunneled catheter in place which was placed on 10/26/2021.     Outpatient Medications:     Medications Prior to Admission: magnesium oxide (MAG-OX) 400 MG tablet, Take 400 mg by mouth daily  folic acid (FOLVITE) 590 MCG tablet, Take 800 mcg by mouth daily  [DISCONTINUED] amLODIPine (NORVASC) 5 MG tablet, Take 5 mg by mouth daily  [DISCONTINUED] metoprolol (LOPRESSOR) 100 MG tablet, Take 100 mg by mouth 2 times daily  [DISCONTINUED] diphenhydrAMINE-APAP, sleep, (TYLENOL PM EXTRA STRENGTH)  MG tablet, Take 1 tablet by mouth nightly as needed for Sleep    Current Medications:     Scheduled Meds:    predniSONE  30 mg Oral Daily    ipratropium-albuterol  1 ampule Inhalation TID    metoprolol tartrate  12.5 mg Oral BID    heparin (porcine)  5,000 Units SubCUTAneous BID    budesonide  0.5 mg Nebulization BID    Arformoterol Tartrate  15 mcg Nebulization BID    epoetin mike-epbx  8,000 Units SubCUTAneous Once per day on     calcitRIOL  0.25 mcg Oral Once per day on     sodium chloride flush  5-40 mL IntraVENous 2 times per day    vitamin D  50,000 Units Oral Weekly     Continuous Infusions:    dextrose      sodium chloride       PRN Meds:  midodrine, sodium citrate, midodrine, sodium citrate, sodium chloride flush, HYDROcodone 5 mg - acetaminophen **OR** HYDROcodone 5 mg - acetaminophen, melatonin, glucose, dextrose, glucagon (rDNA), dextrose, sodium chloride flush, sodium chloride, ondansetron **OR** ondansetron, acetaminophen **OR** acetaminophen, perflutren lipid microspheres    Input/Output:       I/O last 3 completed shifts: In: 48 [IV Piggyback:50]  Out: - .      Patient Vitals for the past 96 hrs (Last 3 readings):   Weight   21 0503 120 lb 4 oz (54.5 kg)   21 0601 107 lb 14.4 oz (48.9 kg)   21 0555 129 lb 6 oz (58.7 kg)       Vital Signs:   Temperature:  Temp: 97.9 °F (36.6 °C)  TMax:   Temp (24hrs), Av.8 °F (36.6 °C), Min:97.5 °F (36.4 °C), Max:98.1 °F (36.7 °C)    Respirations:  Resp: 20  Pulse:   Pulse: 94  BP:    BP: 128/83  BP Range: Systolic (01YKB), YXP:103 , Min:84 , RP       Diastolic (05EJD), WTH:85, Min:57, Max:83      Physical Examination:     General:  AAO x 3, speaking in full sentences, no accessory muscle use. HEENT: Atraumatic, normocephalic, no throat congestion, moist mucosa. Eyes:   Pupils equal, round and reactive to light, EOMI. Neck:   Supple  Chest:   Bilateral vesicular breath sounds, no rales or wheezes. Cardiac:  S1 S2 RR, no murmurs, gallops or rubs. Abdomen: Soft, non-tender, no masses or organomegaly, BS audible. :   No suprapubic or flank tenderness. Neuro:  AAO x 3, No FND. SKIN:  No rashes, good skin turgor. Extremities:  No edema.     Labs:       Recent Labs     21  4726 11/03/21  0553 11/04/21  0545   WBC 22.8* 17.6* 19.8*   RBC 2.96* 3.46* 2.93*   HGB 7.7* 9.2* 7.9*   HCT 27.0* 31.0* 26.9*   MCV 91.2 89.6 91.8   MCH 26.0 26.6 27.0   MCHC 28.5 29.7 29.4   RDW 26.4* 26.8* 27.1*   PLT 94* 113* 61*   MPV Abnormal 10.6 Abnormal      BMP:   Recent Labs     11/02/21  0702 11/03/21  0553 11/04/21  0545   * 136 135   K 3.5* 3.7 3.3*   CL 98 98 97*   CO2 26 25 25   BUN 16 25* 14   CREATININE 1.22* 1.44* 1.13*   GLUCOSE 71 67* 80   CALCIUM 8.0* 8.3* 8.3*     Magnesium:    Recent Labs     11/02/21  0702 11/04/21  0545   MG 1.9 1.9     SPEP:  Lab Results   Component Value Date    PROT 4.7 10/17/2021     Hep BsAg:         Lab Results   Component Value Date    HEPBSAG NONREACTIVE 10/25/2021     Urinalysis/Chemistries:      Lab Results   Component Value Date    NITRU NEGATIVE 10/13/2021    COLORU Yellow 10/13/2021    PHUR 5.5 10/13/2021    WBCUA 10 TO 20 10/13/2021    RBCUA 2 TO 5 10/13/2021    MUCUS 1+ 10/13/2021    TRICHOMONAS NOT REPORTED 10/13/2021    YEAST NOT REPORTED 10/13/2021    BACTERIA NOT REPORTED 10/13/2021    SPECGRAV 1.020 10/13/2021    LEUKOCYTESUR SMALL 10/13/2021    UROBILINOGEN Normal 10/13/2021    BILIRUBINUR NEGATIVE 10/13/2021    GLUCOSEU NEGATIVE 10/13/2021    KETUA NEGATIVE 10/13/2021    AMORPHOUS 1+ 10/13/2021     Urine Sodium:     Lab Results   Component Value Date    LAZARO 40 10/13/2021     Urine Chloride:    Lab Results   Component Value Date    CLUR 60 10/13/2021     Urine Osmolarity:   Lab Results   Component Value Date    OSMOU 347 10/12/2021      Urine Creatinine:     Lab Results   Component Value Date    LABCREA 40.5 10/17/2021    LABCREA 76.0 10/13/2021     Radiology:     Reviewed. Assessment:     1. Now ESRD on HD required initiation of dialysis this admission on 10/26/2021. Has tunneled catheter in place which was placed on 10/26/2021. Outpatient dialysis port arranged Monday Wednesday Friday at the [facility]. unit  2.   Intradialytic hypotension likely from neurocardiogenic factors as evidenced by heightened anxiety hypoxia and sudden drop in blood pressure which then improves steadily. 2.  Moderate right-sided pleural effusion status post thoracentesis and eventual chest displacement because of pneumothorax on the right side. Chest tube now removed no significant respiratory compromise  3. Moderate pulmonary hypertension. 4.  Anemia of chronic disease. 5.  Hypertension. 6.  Acute diastolic congestive heart failure. 7.  Severe physical deconditioning with restricted mobility PT OT evaluation in progress  Plan:   1. Would recommend ProAmatine 10 mg preadmit treatment if hypotension develops  2. Stable for discharge from nephrology standpoint  3. We will await physical therapy and Occupational Therapy decision regarding placement  4. Will follow. 5.  Next dialysis tomorrow    Nutrition   Please ensure that patient is on a renal diet/TF. Avoid nephrotoxic drugs/contrast exposure. We will continue to follow along with you.

## 2021-11-04 NOTE — PROGRESS NOTES
Occupational Therapy  Facility/Department: [facility] PROGRESSIVE CARE  Daily Treatment Note  NAME: Zuleima David  : 1945  MRN: [id number]    Date of Service: 2021    Discharge Recommendations:  Patient would benefit from continued therapy after discharge       Assessment   Performance deficits / Impairments: Decreased functional mobility ; Decreased safe awareness;Decreased cognition;Decreased ADL status; Decreased strength;Decreased endurance;Decreased high-level IADLs;Decreased fine motor control;Decreased posture;Decreased balance  Assessment: Pt. completed functional transfer from EOB to recliner with abdifatah cunningham of max assist x2. Pt. followed cues on proper tech and initiated stance when cues. Pt. completed 2 sets of UE ROM to promote mobility and endurance. Pt. limited by decreased strength and functional mobility. OT warranted to promote I/safety to return pt to prior living arrangement with assist as needed. Prognosis: Fair  OT Education: OT Role;Plan of Care;Energy Conservation;Transfer Training;Precautions  Patient Education: Pt. educated on importance of mobility and proper positioning to promote health and strength. REQUIRES OT FOLLOW UP: Yes  Activity Tolerance  Activity Tolerance: Patient Tolerated treatment well;Patient limited by fatigue  Activity Tolerance: fair-  Safety Devices  Safety Devices in place: Yes  Type of devices: All fall risk precautions in place;Call light within reach; Chair alarm in place;Nurse notified; Left in chair;Patient at risk for falls         Patient Diagnosis(es): The primary encounter diagnosis was Hypoxia. Diagnoses of Pleural effusion on right, Peripheral edema, Anemia, unspecified type, and Acute kidney injury [facility]) were also pertinent to this visit. has a past medical history of Pulmonary hypertension ([address]    has a past surgical history that includes IR CHEST TUBE INSERTION (10/18/2021) and IR TUNNELED CVC PLACE WO SQ PORT/PUMP > 5 YEARS (10/26/2021). Restrictions  Restrictions/Precautions  Restrictions/Precautions: General Precautions, Fall Risk, Up as Tolerated  Required Braces or Orthoses?: No  Position Activity Restriction  Other position/activity restrictions: Up with assist, telemetry, gallegos cath, O2 NC 5L, R chest tube, 1500 mL fluid restriction, heels off bed at all times  Subjective   General  Chart Reviewed: Yes  Patient assessed for rehabilitation services?: Yes  Additional Pertinent Hx: Pt. agreeable to treatment. Response to previous treatment: Patient with no complaints from previous session  Family / Caregiver Present: No  Subjective  Subjective: Pt in bed upon arrival. Pt agreeable to get up to chair. General Comment  Comments: PRADIP Bell) ok'd pt for therapy. Orientation  Orientation  Overall Orientation Status: Within Functional Limits  Objective             Balance  Sitting Balance: Contact guard assistance  Standing Balance: Dependent/Total  Standing Balance  Time: standing < 1 min  Activity: functional ADL transfer to recliner  Functional Mobility  Functional - Mobility Device: Rolling Walker  Assist Level: Maximum assistance  Functional Mobility Comments: Marielena cunningham  to stand and complete transfer from bed to recliner. Bed mobility  Supine to Sit: Moderate assistance;2 Person assistance  Sit to Supine: Moderate assistance;2 Person assistance  Scooting: Moderate assistance;2 Person assistance  Comment: Pt. required verbal/tactle cues for hand placement for use of siderails. Transfers  Sit to stand: Maximum assistance;2 Person assistance  Stand to sit: 2 Person assistance;Maximum assistance  Transfer Comments: Marielena Javon cunningham used to complete transfer from EOB to recliner. Pt. required mod verbal cues on proper tech of abdifatah cunningham to assist with proper positioning. Cognition  Overall Cognitive Status: Exceptions  Arousal/Alertness: Appropriate responses to stimuli  Following Commands:  Follows one step commands with increased time; Follows one step commands with repetition  Attention Span: Appears intact  Memory: Appears intact  Safety Judgement: Decreased awareness of need for safety;Decreased awareness of need for assistance  Problem Solving: Decreased awareness of errors;Assistance required to correct errors made;Assistance required to identify errors made;Assistance required to implement solutions;Assistance required to generate solutions  Insights: Decreased awareness of deficits  Initiation: Requires cues for some  Sequencing: Requires cues for some  Cognition Comment: Pt. pleasant and cooperative with treatment     Perception  Overall Perceptual Status: Impaired  Initiation: Cues to initiate tasks        Type of ROM/Therapeutic Exercise  Type of ROM/Therapeutic Exercise: AROM  Comment: Pt. completed 2 sets of UE exercise to promote mobility and functional endurance. Exercises  Shoulder Flexion: x10  Shoulder Extension: x10  Elbow Flexion: x10  Elbow Extension: x10      Plan   Plan  Times per week: 4-5x/wk 1x/day as karey  Times per day: Daily  Current Treatment Recommendations: Strengthening, Endurance Training, Balance Training, Functional Mobility Training, Safety Education & Training, Home Management Training, Self-Care / ADL, Equipment Evaluation, Education, & procurement, Patient/Caregiver Education & Training  Plan Comment: Cont. with stated POC. AM-PAC Score        [facility] Inpatient Daily Activity Raw Score: 14 (11/04/21 1530)  AM-PAC Inpatient ADL T-Scale Score : 33.39 (11/04/21 1530)  ADL Inpatient CMS 0-100% Score: 59.67 (11/04/21 1530)  ADL Inpatient CMS G-Code Modifier : CK (11/04/21 1530)    Goals  Short term goals  Time Frame for Short term goals: By discharge, pt to demo  Short term goal 1: ADL transfers and functional mobility to CGA with use of AD as needed. Short term goal 2: UB ADLs to SBA and LB ADLs to Min A with use of AD/AE as needed and proper pacing tech.   Short term goal 3: toileting to Min A with use of AD/grab bars/BSC as needed. Short term goal 4: increased BUE strength by 1/2 grade to assist with functional tasks/I with simple B UE HEP with use of handouts as needed. Short term goal 5: bed mobility to SBA with use of bedrails as needed. Long term goals  Long term goal 1: Pt to be I with fall prevention education, EC/WS tech, pursed lip breathing tech and recommendations AE/discharge with use of handouts as needed. Patient Goals   Patient goals : To feel better! Therapy Time   Individual Concurrent Group Co-treatment   Time In 18         Time Out 1150         Minutes 32              Co-treatment with PT warranted secondary to decreased safety and independence requiring 2 skilled therapy professionals to address individual discipline's goals. OT addressing \"preparation for ADL transfer\",\"sitting balance for increased ADL performance\",\"sitting/activity tolerance\",\"functional reaching\",\"environmental safety/scanning\",\"fall prevention\",\"functional mobility for ADL transfers\",\"ability to sequence and follow directions\",\"functional UE strength\".     SHA Napoles

## 2021-11-04 NOTE — CARE COORDINATION
Social work; spoke to son to advise of 6:30 pm transfer to [facility]. Will fax [place]ton next.   Dearl Denise gray

## 2021-11-04 NOTE — CARE COORDINATION
Spoke with [employer] and we have received medical clearance for dialysis at [facility]..

## 2021-11-04 NOTE — PROGRESS NOTES
[facility]  Office: [address], DO, [place], DO, [facility], DO, Leland Sims Blood, DO, Sammi Pepe MD, Sherrie Sheldon MD, Gerardo Richardson MD, Jaja Mejia MD, Leandro Prasad MD, Papa Yañez MD, Hudson Champion MD, Kamini Souza MD, Harjeet Hammond, DO, Evita Lucas, DO, Aleyda Molina MD,  Geoffrey Hernández, DO, Roque Keene MD, Murphy Badillo MD, Diamond Dickerson MD, Esvin Bass MD, Neel Dunn MD, Anne Fleming MD, Panfilo Torres, [facility], [facility] Aponte, [facility], Vazquez Joshua, CNP, Kyler Lynch, [facility], Clarisse Jon, CNP, Arline Kam, CNP, Aletha Acevedo, CNP, Baron Lesch, CNP, Kai Navarrete, [facility], Debby Samson, CNP, Nasrin Avila PA-C, [place] Parks, [facility], Michael Torres, CNP, Elio Baig, CNP, Windy Doan, CNP, Vlad Wheeler, CNP, Anne Beth, CNP, Ilaina Cuevas, CNP, Arun Olmedo, [address] [place] [address]    Progress Note    11/4/2021    11:26 AM    Name:   Tristen Reyes  MRN:     [id number]     [place]:      [de-identified]   Room:   [address] [place] [address] Day:  23  Admit Date:  10/12/2021  2:53 PM    PCP:   Terrence Yuen MD  Code Status:  Full Code    Subjective:     C/C:   Chief Complaint   Patient presents with    Respiratory Distress     EMS reported 97% on room air; pt arrives with saturation 45% on room air    Failure To Thrive     Interval History Status:   Comfortable  Doing well with breakfast  Weak  Still nonambulatory  Awaiting placement    Database updates:  Afebrile  O2 sats 98%    WBC19.8High k/uL RBC2. 93Low m/uL Hemoglobin7. 9Low     Calcium8.3Low mg/dL   Hhaxbi169zdwd/L   Potassium3.3Low     Follow-up chest x-ray:  No pneumothorax. No change in left effusion and left basilar opacity. Double lumen catheter in site 2.        Brief History:     As documented in the medical record:  Светлана Wheeler is a 64 y.o. female with a hx of CKD 4 and iron deficiency anemia who presented to [place] ER with Respiratory Distress and hypoxia (O2 sats 45%) and failure to thrive and is admitted to the hospital for the management of right Pleural effusion and acute renal failure. Patient lives at home alone and has recently been having difficulty caring for herself and generalized weakness for the last few weeks . She does have chronic BLE edema but denies hx of CHF. Initial CXR showed large right pleural effusion, stat elevated BNP and D-dimer as well as BUN/creatinine. Last took known creatinine was 2.34 in august, she reports she seen a nephrologist once. She does not wear home oxygen   10/13: US guided right thoracentesis 850ml of nonturbid straw colored fluid removed -transudative  Pleural fluid cultures negative for malignancy\"  Chest tube placed 10/18 on right for loculated effusion   Started on heparin drip for possible PE; VQ being repeated 10/24    Chest tube clamped and removed 11/2     Database has included:  Anemia work-up:  Results for Adilene Wells (MRN [id number]) as of 11/3/2021 12:54   Ref. Range 10/13/2021 09:53   Ferritin Latest Ref Range: 13 - 150 ug/L 20   Iron Latest Ref Range: 37 - 145 ug/dL 22 (L)   Iron Saturation Latest Ref Range: 20 - 55 % 9 (L)   UIBC Latest Ref Range: 112 - 347 ug/dL 215   TIBC Latest Ref Range: 250 - 450 ug/dL 237 (L)   Folate Latest Ref Range: >4.8 ng/mL 12.3   Vitamin B-12 Latest Ref Range: 232 - 1245 pg/mL 1647 (H)     Echo:  Summary  Left ventricle is normal in size  Global left ventricular systolic function is normal  Estimated ejection fraction is 65 % . Moderate tricuspid regurgitation. Moderate pulmonary hypertension. Anterior echo free space suggestive of adipose tissue is seen. Pleural effusion noted  Normal aortic root dimension. The patient's condition was stabilized  Her chest tube was removed  Discharge planning initiated    Past Medical History:   has a past medical history of Pulmonary hypertension ([address] Social History:   reports that she quit smoking about 2 years ago. Her smoking use included cigarettes. She has never used smokeless tobacco. She reports current alcohol use. Family History:   Family History   Problem Relation Age of Onset    Hypertension Mother     Cancer Sister     Cancer Brother        Review of Systems:     Review of Systems   Constitutional: Positive for activity change (Decreased). Negative for fever. Respiratory: Positive for cough (Occasional, no sputum) and shortness of breath (Mild dyspnea on exertion). Cardiovascular: Negative for chest pain and palpitations. Gastrointestinal: Negative for abdominal pain, nausea and vomiting. Genitourinary: Negative for flank pain and hematuria. Physical Examination:        Vitals  /83   Pulse 94   Temp 97.9 °F (36.6 °C) (Oral)   Resp 18   Ht 5' 4\" (1.626 m)   Wt 120 lb 4 oz (54.5 kg)   SpO2 100%   BMI 20.64 kg/m²   Temp (24hrs), Av.8 °F (36.6 °C), Min:97.5 °F (36.4 °C), Max:98.1 °F (36.7 °C)    Recent Labs     21  1116 21  1638 21  2046 21  0837   POCGLU 89 114* 123* 73       Physical Exam  Vitals reviewed. Constitutional:       General: She is not in acute distress. Appearance: She is not diaphoretic. HENT:      Head: Normocephalic. Nose: Nose normal.   Eyes:      General: No scleral icterus. Conjunctiva/sclera: Conjunctivae normal.   Neck:      Trachea: No tracheal deviation. Cardiovascular:      Rate and Rhythm: Normal rate and regular rhythm. Pulmonary:      Effort: Pulmonary effort is normal. No respiratory distress. Breath sounds: Normal breath sounds. No wheezing or rales. Comments: Chest tube site appears stable  No crepitation  No subcutaneous emphysema  Chest:      Chest wall: No tenderness. Abdominal:      General: Bowel sounds are normal. There is no distension. Palpations: Abdomen is soft. Tenderness: There is no abdominal tenderness. Musculoskeletal:         General: No tenderness.       Cervical back: Neck supple. Skin:     General: Skin is warm and dry. Neurological:      Mental Status: She is alert. Comments: The patient is having trouble providing historical data          Medications: Allergies: Allergies   Allergen Reactions    Penicillins Swelling       Current Meds:   Scheduled Meds:    [START ON 11/5/2021] predniSONE  20 mg Oral Daily    ipratropium-albuterol  1 ampule Inhalation TID    metoprolol tartrate  12.5 mg Oral BID    heparin (porcine)  5,000 Units SubCUTAneous BID    budesonide  0.5 mg Nebulization BID    Arformoterol Tartrate  15 mcg Nebulization BID    epoetin mike-epbx  8,000 Units SubCUTAneous Once per day on Mon Wed Fri    calcitRIOL  0.25 mcg Oral Once per day on Mon Wed Fri    sodium chloride flush  5-40 mL IntraVENous 2 times per day    vitamin D  50,000 Units Oral Weekly     Continuous Infusions:    dextrose      sodium chloride       PRN Meds: midodrine, sodium citrate, midodrine, sodium citrate, sodium chloride flush, HYDROcodone 5 mg - acetaminophen **OR** HYDROcodone 5 mg - acetaminophen, melatonin, glucose, dextrose, glucagon (rDNA), dextrose, sodium chloride flush, sodium chloride, ondansetron **OR** ondansetron, acetaminophen **OR** acetaminophen, perflutren lipid microspheres    Data:     I/O (24Hr):     Intake/Output Summary (Last 24 hours) at 11/4/2021 1126  Last data filed at 11/3/2021 1411  Gross per 24 hour   Intake 50 ml   Output --   Net 50 ml       Labs:  Hematology:  Recent Labs     11/02/21  0702 11/03/21  0553 11/04/21  0545   WBC 22.8* 17.6* 19.8*   RBC 2.96* 3.46* 2.93*   HGB 7.7* 9.2* 7.9*   HCT 27.0* 31.0* 26.9*   MCV 91.2 89.6 91.8   MCH 26.0 26.6 27.0   MCHC 28.5 29.7 29.4   RDW 26.4* 26.8* 27.1*   PLT 94* 113* 61*   MPV Abnormal 10.6 Abnormal     Chemistry:  Recent Labs     11/02/21  0702 11/03/21  0553 11/04/21  0545   * 136 135   K 3.5* 3.7 3.3*   CL 98 98 97*   CO2 26 25 25   GLUCOSE 71 67* 80   BUN 16 25* 14 CREATININE 1.22* 1.44* 1.13*   MG 1.9  --  1.9   ANIONGAP 10 13 13   LABGLOM 43* 35* 47*   GFRAA 52* 43* 56*   CALCIUM 8.0* 8.3* 8.3*     Recent Labs     11/03/21  0756 11/03/21  0848 11/03/21  1116 11/03/21  1638 11/03/21  2046 11/04/21  0837   POCGLU 64* 107* 89 114* 123* 73     ABG:  Lab Results   Component Value Date    POCPH 7.456 10/24/2021    POCPCO2 41.1 10/24/2021    POCPO2 53.3 10/24/2021    POCHCO3 28.9 10/24/2021    NBEA NOT REPORTED 10/24/2021    PBEA 5 10/24/2021    LLN0LYJ NOT REPORTED 10/24/2021    MZFZ7MIA 89 10/24/2021    FIO2 NOT REPORTED 10/24/2021     Lab Results   Component Value Date/Time    SPECIAL RTAC,20CC 10/26/2021 06:28 PM    SPECIAL LTAC 10/26/2021 06:28 PM     Lab Results   Component Value Date/Time    CULTURE NO GROWTH 6 DAYS 10/26/2021 06:28 PM    CULTURE NO GROWTH 6 DAYS 10/26/2021 06:28 PM       Radiology:  XR CHEST PORTABLE    Result Date: 11/2/2021  No significant change from prior study. Indwelling pigtail terminus chest tube bilateral effusions in appearance of probable atelectasis left side. XR CHEST PORTABLE    Result Date: 11/1/2021  Right-sided PleurX catheter. No recurrent pneumothorax identified Cardiomegaly. Small left-sided effusion and atelectasis. XR CHEST PORTABLE    Result Date: 11/1/2021  Right chest tube remains in place. No pneumothorax or significant right pleural effusion. XR CHEST PORTABLE    Result Date: 10/31/2021  Little change from prior examination. Small bilateral effusions and atelectasis. XR CHEST PORTABLE    Result Date: 10/30/2021  Chronic pulmonary change with small bibasilar effusions. Stable support tubes. XR CHEST PORTABLE    Result Date: 10/29/2021  Left basilar effusion and stable right basilar chest tube. Stable right internal jugular central line.      XR CHEST PORTABLE    Result Date: 10/28/2021  Probable mild increase in small left-sided effusion with otherwise stable exam.     XR CHEST PORTABLE    Result Date: 10/27/2021  Right chest tube remains in place. No pneumothorax identified. Assessment:        Primary Problem  Pleural effusion on right    Active Hospital Problems    Diagnosis Date Noted    Pulmonary hypertension ([facility] [I27.20] 11/03/2021    COPD (chronic obstructive pulmonary disease) ([facility] [J44.9] 11/03/2021    Thrombocytopenia (HCC) [D69.6] 11/03/2021    End-stage renal disease (ESRD) ([facility] [N18.6] 11/03/2021    Hypokalemia [E87.6] 10/17/2021    Other emphysema ([facility] [J43.8] 10/17/2021    Acute kidney injury ([facility] [N17.9]     Vitamin D deficiency [E55.9] 10/13/2021    Acute diastolic heart failure (HCC) [I50.31] 10/13/2021    Moderate protein-calorie malnutrition (HCC) [E44.0] 10/13/2021    Severe malnutrition (HCC) [E43] 10/13/2021    Hypoxia [R09.02]     Anemia [D64.9]     Peripheral edema [R60.9]     Pleural effusion on right [J90] 10/12/2021    Iron deficiency anemia [D50.9] 10/12/2021    Essential hypertension [I10] 10/12/2021    General weakness [R53.1] 10/12/2021    Acute respiratory failure with hypoxia ([facility] [J96.01]          Plan:        Optimize cardio-pulmonary function  Pulmonary eval & f/u as scheduled Dr Michael Henry  Chest tube removed  Thoracentesis as needed  Oxygen supplementation  Respiratory Therapy and Bronchodilators prn   Nephrology eval & f/u as scheduled Dr Debi Rubin  Dialysis as scheduled  Check bun and creatinine  Avoid nephrotoxins  Correct electrolyte abnormalities  Anemia w/u on outpatient basis is suggested    Blood Pressure - Monitor and control  Midodrine as needed  Antibiotics per Infectious Disease service   Observing off antibiotics at this time  DVT prophylaxis  Discharge planning  Will discharge when arrangements complete and ok with other services.   Follow-up with PCP in one week, Avis Pérez MD  Notify PCP of discharge  Med rec done  EDWARD done  DCP 35 min+      IP CONSULT TO NEPHROLOGY  IP CONSULT TO SOCIAL WORK  IP CONSULT TO PULMONOLOGY  IP CONSULT TO INFECTIOUS DISEASES    Marianela Luna DO  11/4/2021  11:26 AM

## 2021-11-04 NOTE — PROGRESS NOTES
Pulmonary Critical Care Progress Note  Odalys Steven MD     Patient seen for the follow up of acute hypoxic respiratory failure, moderate pulmonary hypertension, former smoker/COPD Pleural effusion on right     Subjective:  No significant overnight events noted. She is sitting up in the bed, no distress. She is on oxygen at 2 L nasal cannula and doing fairly well. She denies any chest pain or significant cough. She had chest tube removed 2 days ago with no occurrence of right effusion or pneumothorax. She is tolerating orals. Examination:  Vitals: /83   Pulse 94   Temp 97.9 °F (36.6 °C) (Oral)   Resp 20   Ht 5' 4\" (1.626 m)   Wt 120 lb 4 oz (54.5 kg)   SpO2 98%   BMI 20.64 kg/m²   General appearance: alert and cooperative with exam, sitting up in bed  Neck: No JVD  Lungs: Moderate air exchange, diminished bilateral bases, no crackles or wheezing  Heart: regular rate and rhythm, S1, S2 normal, no gallop  Abdomen: Soft, non tender, + BS  Extremities: no cyanosis or clubbing. No significant edema    LABs:  CBC:   Recent Labs     11/03/21  0553 11/04/21  0545   WBC 17.6* 19.8*   HGB 9.2* 7.9*   HCT 31.0* 26.9*   * 61*     BMP:   Recent Labs     11/03/21  0553 11/04/21  0545    135   K 3.7 3.3*   CO2 25 25   BUN 25* 14   CREATININE 1.44* 1.13*   LABGLOM 35* 47*   GLUCOSE 67* 80     Radiology:  X-ray chest 11/4/2021      Impression:  · Acute hypoxic respiratory failure requiring high flow oxygen  · Moderate to large right pleural effusion, s/p thoracentesis with 850 mL removed  · S/p pigtail catheter placement 10/18/21  · Upsize of chest tube on 10/26/2021  · Small left pleural effusion, loculated  · Atelectasis/?  Trapped lung on the right  · Mild pulmonary edema  · Moderate pulmonary hypertension, RVSP 60 mmHg  · Suspected COPD/former smoker, quit 2019  · Acute kidney injury  · D-dimer, low probability for PE on VQ scan on 10/13/21. Negative CTA of the chest on 10/25/2021.   · Right pneumothorax    Recommendations:  · Oxygen via nasal cannula to keep SPO2 greater than 90%  · Incentive spirometry every hour while awake  · Symbicort  · DuoNeb every 4 hours  · Budesonide and Brovana via nebulizer every 12 hours  · Prednisone taper  · Monitor off of antibiotics. 14-day course of Levaquin completed. · Dialysis per nephrology  · Pleural fluid negative for malignancy  · DVT prophylaxis, subcu heparin  · PFTs as an outpatient  · No objection to discharge to SNF from pulmonary standpoint with outpatient follow-up in 1 to 2 weeks.   · Discussed with patient  · Discussed with RN      Electronically signed by     Radha Serrano MD on 11/4/2021 at 4:49 PM  Pulmonary Critical Care and Sleep Medicine,  [facility]  Cell: [phone]  Office: [phone]

## 2021-11-04 NOTE — PROGRESS NOTES
Patient discharged via [facility]. Report called to facility by writer to [facility].    EDWARD completed and signed per writer and physician for [facility] AND [facility]   Discharge packet given to EMS to deliver to RN receiving patient       Telemetry monitor returned

## 2021-11-04 NOTE — PLAN OF CARE
Problem: Falls - Risk of:  Goal: Will remain free from falls  Description: Will remain free from falls  11/3/2021 2342 by Nolan Alarcon RN  Outcome: Ongoing     Problem: Falls - Risk of:  Goal: Absence of physical injury  Description: Absence of physical injury  11/3/2021 2342 by Nolan Alarcon RN  Outcome: Ongoing     Problem: Skin Integrity:  Goal: Absence of new skin breakdown  Description: Absence of new skin breakdown  11/3/2021 2342 by Nolan Alarcon RN  Outcome: Ongoing     Problem: OXYGENATION/RESPIRATORY FUNCTION  Goal: Patient will achieve/maintain normal respiratory rate/effort  Description: Respiratory rate and effort will be within normal limits for the patient  11/3/2021 2342 by Nolan Alarcon RN  Outcome: Ongoing

## 2021-11-04 NOTE — PLAN OF CARE
Problem: Falls - Risk of:  Goal: Will remain free from falls  Description: Will remain free from falls  11/4/2021 1138 by Uzair Alarcon RN  Outcome: Ongoing  Siderails up x 2  Hourly rounding. Call light in reach. Instructed to call for assist before attempting out of bed. Remains free from falls and accidental injury at this time. Floor free from obstacles, and bed is locked and in lowest position. Adequate lighting provided. Bed alarm on. Fall sticker on wristband.  Fall Sign posted in doorway       Problem: Falls - Risk of:  Goal: Absence of physical injury  Description: Absence of physical injury  11/4/2021 1138 by Uzair Alarcon RN  Outcome: Ongoing     Problem: Nutrition  Goal: Optimal nutrition therapy  Outcome: Ongoing  Patient eating 50%-75% of meals      Problem: IP BALANCE  Goal: LTG - Patient will maintain balance to allow for safe/functional mobility  Outcome: Ongoing     Problem: Skin Integrity:  Goal: Will show no infection signs and symptoms  Description: Will show no infection signs and symptoms  Outcome: Ongoing     Problem: Skin Integrity:  Goal: Absence of new skin breakdown  Description: Absence of new skin breakdown  11/4/2021 1138 by Uzair Alarcon RN  Outcome: Ongoing  Waffle mattress on bed and properly inflated   Q2 turn  Meiplex to coccyx        Problem: OXYGENATION/RESPIRATORY FUNCTION  Goal: Patient will achieve/maintain normal respiratory rate/effort  Description: Respiratory rate and effort will be within normal limits for the patient  11/4/2021 1138 by Uzair Alarcon RN  Outcome: Ongoing  Patient does well at rest on 1-2L NC   Requiring 6L during HD      Problem: Breathing Pattern - Ineffective:  Goal: Ability to achieve and maintain a regular respiratory rate will improve  Description: Ability to achieve and maintain a regular respiratory rate will improve  Outcome: Ongoing     Problem: Pain:  Goal: Pain level will decrease  Description: Pain level will decrease  Outcome: Ongoing     Problem: Pain:  Goal: Control of acute pain  Description: Control of acute pain  Outcome: Ongoing     Problem: Pain:  Goal: Control of chronic pain  Description: Control of chronic pain  Outcome: Ongoing     Problem: Cardiac Output - Decreased:  Goal: Hemodynamic stability will improve  Description: Hemodynamic stability will improve  Outcome: Ongoing

## 2021-11-04 NOTE — CARE COORDINATION
Social work: [employer] chairtime confirmed. Patient to dc to "[employer]" via [address] [facility] [address]  at [address].  # for RN report: [phone]. Completed EDWARD faxed to [phone]. Informed RN and facility of dc time, agreeable to plan. HENS completed.

## 2021-11-05 NOTE — DISCHARGE SUMMARY
[facility]  Office: [address], DO, Tea Napoles, DO, Barbara Gleason, DO, [facility], DO, Alfie Ayon MD, Cailin Arce MD, Renee Chun MD, Danielle Dutton MD, Dionicio Walton MD, Beckie Sanchez MD, Kamaljit Telles MD, Jesica Bradford MD, Miguel Wolf, DO, Yessica Napoles DO, Ángel Stephenson MD,  Xander Anthony DO, Naheed Baig MD, Andrea Swartz MD, Dahiana Dempsey MD, Lavell Lozoya MD, Iqra Bergeron MD, Erica Chaudhary MD, Ludwin [facility], [facility], CNP, Sonya Parkinson, CNP, Panfilo Vincent, CNS, Anna Mazariegos, CNP, Adenike Dover, CNP, Leda Judge, CNP, Nat Estrada, CNP, Celso Beal, CNP, Nga Tabor, CNP, Carl Wahl PA-C, Rob Hester, [facility], Lainey Cameron, CNP, Massiel Kulkarni, CNP, Yen Santoro, CNP, Fredy Gusman, CNP, Dameon Azar, CNP, Abdi Gunn, CNP, Jocelyn Carr, [facility]    Discharge Summary    Patient ID: Yolie Florentino  :  1945   MRN: [id number]     ACCOUNT:  [de-identified]   Patient's PCP: Valeria Abarca MD  Admit Date: 10/12/2021   Discharge Date: 2021    Discharge Physician: Maykel Duncan DO     The patient was seen and examined on day of discharge and this discharge summary is in conjunction with any daily progress note from day of discharge.     Active Discharge Diagnoses:     Primary Problem  Acute diastolic heart failure [facility])      [facility]    Diagnosis Date Noted    Pulmonary hypertension ([facility] [I27.20] 2021    COPD [facility]ch[facility] obstructive pulmonary disease) [facility] [J44.9] 2021    Thrombocytopenia (HCC) [D69.6] 2021    End-stage renal disease (ESRD) ([facility] [N18.6] 2021    Hypokalemia [E87.6] 10/17/2021    Other emphysema ([facility] [J43.8] 10/17/2021    Acute kidney injury ([facility] [N17.9]     Vitamin D deficiency [E55.9] 10/13/2021    Acute diastolic heart failure (HCC) [I50.31] 10/13/2021    Moderate protein-calorie malnutrition (HCC) [E44.0] 10/13/2021    Severe malnutrition ([facility] [E43] 10/13/2021    Hypoxia [R09.02]     Anemia [D64.9]     Peripheral edema [R60.9]     Pleural effusion on right [J90] 10/12/2021    Iron deficiency anemia [D50.9] 10/12/2021    Essential hypertension [I10] 10/12/2021    General weakness [R53.1] 10/12/2021    Acute respiratory failure with hypoxia [facility]) [J96.01]          Hospital Course:     Brief History  As documented in the medical record:  \"Danielle Wadsworth is a 64 y. o. female with a hx of CKD 4 and iron deficiency anemia who presented to [place] ER with Respiratory Distress and hypoxia (O2 sats 45%) and failure to thrive and is admitted to the hospital for the management of right Pleural effusion and acute renal failure. Patient lives at home alone and has recently been having difficulty caring for herself and generalized weakness for the last few weeks . She does have chronic BLE edema but denies hx of CHF. Initial CXR showed large right pleural effusion, stat elevated BNP and D-dimer as well as BUN/creatinine.  Last took known creatinine was 2.34 in august, she reports she seen a nephrologist once. She does not wear home oxygen   10/13: US guided right thoracentesis 850ml of nonturbid straw colored fluid removed -transudative  Pleural fluid cultures negative for malignancy\"  Chest tube placed 10/18 on right for loculated effusion   Started on heparin drip for possible PE; VQ being repeated 10/24     Chest tube clamped and removed 11/2     Database has included:  Anemia work-up:  Results for Guanako Chowdary (MRN [id number]) as of 11/3/2021 12:54    Ref.  Range 10/13/2021 09:53   Ferritin Latest Ref Range: 13 - 150 ug/L 20   Iron Latest Ref Range: 37 - 145 ug/dL 22 (L)   Iron Saturation Latest Ref Range: 20 - 55 % 9 (L)   UIBC Latest Ref Range: 112 - 347 ug/dL 215   TIBC Latest Ref Range: 250 - 450 ug/dL 237 (L)   Folate Latest Ref Range: >4.8 ng/mL 12.3   Vitamin B-12 Latest Ref Range: 232 - 1245 pg/mL 1647 (H)      Echo:  Summary  Left ventricle is normal in size  Global left ventricular systolic function is normal  Estimated ejection fraction is 65 % . Moderate tricuspid regurgitation. Moderate pulmonary hypertension. Anterior echo free space suggestive of adipose tissue is seen. Pleural effusion noted  Normal aortic root dimension.     The patient's condition was stabilized  Her chest tube was removed  Discharge planning initiated      Discharge plan:     Optimize cardio-pulmonary function  Pulmonary eval & f/u as scheduled Dr Atilio Myrick  Chest tube removed  Thoracentesis as needed  Oxygen supplementation  Respiratory Therapy and Bronchodilators prn   Nephrology eval & f/u as scheduled Dr Janna Rdz  Dialysis as scheduled  Check bun and creatinine  Avoid nephrotoxins  Correct electrolyte abnormalities  Anemia w/u on outpatient basis is suggested    Blood Pressure - Monitor and control  Midodrine as needed  Antibiotics per Infectious Disease service   Observing off antibiotics at this time  DVT prophylaxis  Discharge planning  Will discharge when arrangements complete and ok with other services. Follow-up with PCP in one week, Blanca Thomason MD  Notify PCP of discharge  Med rec done  EDWARD done  DCP 35 min+    No discharge procedures on file.     Significant Diagnostic Studies:     Labs / Micro:  Labs:  Hematology:  Recent Labs     11/02/21  0702 11/03/21  0553 11/04/21  0545   WBC 22.8* 17.6* 19.8*   RBC 2.96* 3.46* 2.93*   HGB 7.7* 9.2* 7.9*   HCT 27.0* 31.0* 26.9*   MCV 91.2 89.6 91.8   MCH 26.0 26.6 27.0   MCHC 28.5 29.7 29.4   RDW 26.4* 26.8* 27.1*   PLT 94* 113* 61*   MPV Abnormal 10.6 Abnormal     Chemistry:  Recent Labs     11/02/21  0702 11/03/21  0553 11/04/21  0545   * 136 135   K 3.5* 3.7 3.3*   CL 98 98 97*   CO2 26 25 25   GLUCOSE 71 67* 80   BUN 16 25* 14   CREATININE 1.22* 1.44* 1.13*   MG 1.9  --  1.9   ANIONGAP 10 13 13   LABGLOM 43* 35* 47*   GFRAA 52* 43* 56*   CALCIUM 8.0* 8.3* 8.3*     Recent Labs     11/03/21  0848 11/03/21  1116 11/03/21  1638 11/03/21  2046 11/04/21  0837 11/04/21  1131   POCGLU 107* 89 114* 123* 73 125*         Radiology:    Echo Complete    Result Date: 10/13/2021  [facility] Transthoracic Echocardiography Report (TTE)  Patient Name David Kelley     Date of Study           10/13/2021               PITA ARCHER   Date of      1945  Gender                  Female  Birth   Age          64 year(s)  Race                       Room Number  1012        Height:                 64 inch, 162.56 cm   Corporate ID [id number]    Weight:                 139 pounds, 63.1 kg  #   Patient Acct [de-identified]   BSA:        1.68 m^2    BMI:        23.86 kg/m^2  #   MR #         [id number]     Ngoc Ruiz   Accession #  [id number]  Interpreting Physician  [address] [place] [address]   Fellow                   Referring Nurse                           Practitioner   Interpreting             Referring Physician     Lori [address] [place] [address] APRN-NP  Fellow  Type of Study   TTE procedure:2D Echocardiogram, M-Mode, Doppler, Color Doppler. Procedure Date Date: 10/13/2021 Start: 10:02 AM Study Location: [address] [place] [address] Technical Quality: Adequate visualization Indications:Fluid overload. History / Tech. Comments: Procedure explained to patient. Patient Status: Inpatient Height: 64 inches Weight: 139 pounds BSA: 1.68 m^2 BMI: 23.86 kg/m^2 CONCLUSIONS Summary Left ventricle is normal in size Global left ventricular systolic function is normal Estimated ejection fraction is 65 % . Moderate tricuspid regurgitation. Moderate pulmonary hypertension. Anterior echo free space suggestive of adipose tissue is seen.  Pleural effusion noted Normal aortic root dimension. Signature ----------------------------------------------------------------------------  Electronically signed by Sebastian Small on 10/13/2021  11:08 AM ---------------------------------------------------------------------------- ----------------------------------------------------------------------------  Electronically signed by Marleni HernandezInterpreting physician) on  10/13/2021 02:31 PM ---------------------------------------------------------------------------- FINDINGS Left Atrium Left atrium is normal in size. Left Ventricle Left ventricle is normal in size Global left ventricular systolic function is normal Estimated ejection fraction is 65 % . Right Atrium Right atrium is normal in size. Right Ventricle Normal right ventricular size and function. Mitral Valve Mitral valve sclerosis without stenosis. Trivial mitral regurgitation. Aortic Valve Aortic valve is sclerotic but opens well. No aortic insufficiency. Tricuspid Valve Moderate tricuspid regurgitation. Moderate pulmonary hypertension. Pulmonic Valve The pulmonic valve is normal in structure. Trivial pulmonic insufficiency. Pericardial Effusion Anterior echo free space suggestive of adipose tissue is seen. Pleural Effusion Pleural effusion noted Miscellaneous Normal aortic root dimension.  M-mode / 2D Measurements & Calculations:   LVIDd:4 cm(3.7 - 5.6 cm)        Diastolic DYGPTL:97 ml  LVIDs:2 cm(2.2 - 4.0 cm)        Systolic Volume:8 ml  YGLX:8.3 cm(0.6 - 1.1 cm)       Aortic Root:2.9 cm(2.0 - 3.7 cm)  LVPWd:0.9 cm(0.6 - 1.1 cm)      LA Dimension: 3.4 cm(1.9 - 4.0 cm)  Fractional Shortenin %      LA volume/Index: 42.9 ml /26m^2  Calculated LVEF (%): 87.5 %     LVOT:2 cm   Mitral:                                  Aortic   Valve Area (P1/2-Time): 2.97 cm^2        Peak Velocity: 1.51 m/s  Peak E-Wave: 0.72 m/s                    Peak Gradient: 9.12 mmHg  Peak A-Wave: 1.04 m/s  E/A Ratio: 0.69  Peak Gradient: 2.06 mmHg Deceleration Time: 225 msec  P1/2t: 74 msec   Tricuspid:                               Pulmonic:   Estimated RVSP: 60 mmHg  Peak TR Velocity: 3.80 m/s  Peak TR Gradient: 57.76 mmHg  Estimated RA Pressure: 10 mmHg                                           Estimated PASP: 67.76 mmHg  Septal Wall E' velocity:0.07 m/s Lateral Wall E' velocity:0.08 m/s    XR CHEST (SINGLE VIEW FRONTAL)    Result Date: 10/24/2021  EXAMINATION: ONE XRAY VIEW OF THE CHEST 10/24/2021 5:54 am COMPARISON: 10/23/2021 HISTORY: ORDERING SYSTEM PROVIDED HISTORY: Effusion TECHNOLOGIST PROVIDED HISTORY: Effusion Reason for Exam: Pt eval for f/u FINDINGS: A small left pleural effusion is noted which appears stable. Left lower lobe atelectatic changes. Right chest tube stable. Heart remains enlarged. Redemonstration of COPD. Small stable left effusion and left lower lobe atelectatic changes. Stable right chest tube. Cardiomegaly and COPD redemonstrated. CT CHEST WO CONTRAST    Result Date: 10/15/2021  EXAMINATION: CT OF THE CHEST WITHOUT CONTRAST 10/14/2021 10:28 am TECHNIQUE: CT of the chest was performed without the administration of intravenous contrast. Multiplanar reformatted images are provided for review. Dose modulation, iterative reconstruction, and/or weight based adjustment of the mA/kV was utilized to reduce the radiation dose to as low as reasonably achievable. COMPARISON: Chest x-ray dated 10/14/2021 no prior CT imaging of the chest. HISTORY: ORDERING SYSTEM PROVIDED HISTORY:  Pleural effusion, pulmonary nodule TECHNOLOGIST PROVIDED HISTORY: Pleural effusion, pulmonary nodule. Reason for Exam:  Pleural effusion, pulmonary nodule. Acuity:  Unknown Type of Exam:  Unknown FINDINGS: Mediastinum:  The heart is normal in size. There is no pericardial effusion. There is extensive coronary artery calcification. Thoracic aorta is normal in caliber. Main pulmonary artery is mildly prominent, measuring 3.7 cm in diameter.   No mediastinal or hilar lymphadenopathy is appreciated. No axillary lymphadenopathy. There is a small hiatal hernia. Focal radiodensity is seen in the proximal stomach. There are small nodules in the thyroid gland, measuring up to 1.3 cm. Lungs/Pleura: There is a moderate sized, mildly loculated right pleural effusion and small mildly loculated left pleural effusion. There is moderate to severe centrilobular emphysema. There is consolidation with air bronchograms at the inferior right middle lobe, right lower lobe, and left lower lobes, likely atelectasis. No pneumothorax. Upper Abdomen: Limited visualized upper abdominal structures are unremarkable. Soft Tissues/Bones: There is no acute or suspicious osseous abnormality. Visualized superficial soft tissues are within normal limits. 1. Moderate sized, mildly loculated right pleural effusion and small mildly loculated left pleural effusion. 2. Consolidation at the inferior lungs, likely atelectasis. 3. Moderate to severe centrilobular emphysema. 4. Mildly prominent main pulmonary artery, which can be seen with pulmonary hypertension. 5. Small hiatal hernia. 6. Small nodules noted in the thyroid gland. If indicated, this could be further evaluated with ultrasound. NM LUNG SCAN PERFUSION ONLY    Result Date: 10/21/2021  EXAMINATION: NUCLEAR MEDICINE PERFUSION SCAN. 10/21/2021 TECHNIQUE: Ventilation not performed as part of COVID-19 safety precautions. 9.8 millicuries of Tc 96E MAA was administered intravenously prior to planar imaging of the lungs in multiple projections. COMPARISON: Chest radiograph dated 10/21/2021, perfusion exam dated 10/13/2021 HISTORY: ORDERING SYSTEM PROVIDED HISTORY: shortness of breath/PE TECHNOLOGIST PROVIDED HISTORY: shortness of breath/PE Reason for Exam: SOB rule out P/E Acuity: Unknown Type of Exam: Unknown FINDINGS: Heterogeneity of perfusion is demonstrated.   There is again noted to be activity within left greater than right lung, as had been seen on prior. Diminished activity is seen within much of the upper to mid lung zones bilaterally where no corresponding opacity is seen on radiograph. Decreased activity is again seen at the lateral basal segment of the right lower lobe, slightly improved. Exam is technically high probability for pulmonary embolism. While emboli are possible, the diminished activity within the upper lung zones could also be secondary to emphysema; a  ventilation study would be needed for confirmation. This could be obtained at a later time as clinically warranted. Improving perfusion at the lateral right lung base. Findings were discussed with Brittney Donovan at 12:13 on 10/21/2021. NM LUNG SCAN PERFUSION ONLY    Result Date: 10/13/2021  EXAMINATION: NUCLEAR MEDICINE PERFUSION SCAN. 10/13/2021 TECHNIQUE: Ventilation not performed as part of COVID-19 safety precautions. 8.5 millicuries of Tc 61F MAA was administered intravenously prior to planar imaging of the lungs in multiple projections. COMPARISON: Portable chest from 10/12/2021 HISTORY: ORDERING SYSTEM PROVIDED HISTORY: elevated D-dimer, hypoxia TECHNOLOGIST PROVIDED HISTORY: elevated D-dimer, hypoxia Decision Support Exception - unselect if not a suspected or confirmed emergency medical condition->Emergency Medical Condition (MA) Reason for Exam: sob Acuity: Unknown Type of Exam: Unknown 68-year-old female with shortness of breath, elevated D-dimer, hypoxia FINDINGS: PERFUSION: Distribution of radiotracer is somewhat heterogeneous. Large area of diminished perfusion involving the right lower lobe which corresponds to a large right-sided pleural effusion and right basilar airspace disease. No discrete perfusion defect is evident. CHEST RADIOGRAPH: Large right-sided pleural effusion and right basilar airspace disease, atelectasis and/or infiltrate. Findings consistent with low probability V/Q scan for pulmonary embolus.      XR CHEST DECUBITUS RIGHT    Result Date: 10/16/2021  EXAMINATION: DECUBITUS XRAY VIEW(S) OF THE CHEST (x2) 10/16/2021 2:44 pm COMPARISON: 10/15/2021, CT 10/14/2021 HISTORY: ORDERING SYSTEM PROVIDED HISTORY: Loculated effusion TECHNOLOGIST PROVIDED HISTORY: Loculated effusion Acuity: Acute Type of Exam: Ongoing FINDINGS: Bilateral decubitus views of the chest were performed. Emphysematous changes and bibasilar opacities noted. Small to moderate layering right pleural effusion and small layering left pleural effusion. CT showed bilateral partially loculated pleural fluid. Small to moderate right and small left layering pleural effusions. XR CHEST DECUBITUS LEFT    Result Date: 10/16/2021  EXAMINATION: DECUBITUS XRAY VIEW(S) OF THE CHEST (x2) 10/16/2021 2:44 pm COMPARISON: 10/15/2021, CT 10/14/2021 HISTORY: ORDERING SYSTEM PROVIDED HISTORY: Loculated effusion TECHNOLOGIST PROVIDED HISTORY: Loculated effusion Acuity: Acute Type of Exam: Ongoing FINDINGS: Bilateral decubitus views of the chest were performed. Emphysematous changes and bibasilar opacities noted. Small to moderate layering right pleural effusion and small layering left pleural effusion. CT showed bilateral partially loculated pleural fluid. Small to moderate right and small left layering pleural effusions. CT ABDOMEN PELVIS W IV CONTRAST Additional Contrast? Radiologist Recommendation    Result Date: 10/25/2021  EXAMINATION: CT OF THE ABDOMEN AND PELVIS WITH CONTRAST 10/25/2021 12:32 pm TECHNIQUE: CT of the abdomen and pelvis was performed with the administration of intravenous contrast. Multiplanar reformatted images are provided for review. Dose modulation, iterative reconstruction, and/or weight based adjustment of the mA/kV was utilized to reduce the radiation dose to as low as reasonably achievable. COMPARISON: CT chest today.  HISTORY: ORDERING SYSTEM PROVIDED HISTORY: assess for infection, rising wbc TECHNOLOGIST PROVIDED HISTORY: Spoke to renal-they are fine with her getting iv dye as she will be starting dialysis tomorrow assess for infection, rising wbc Reason for Exam: PT HAD THORACENTESIS,  CHEST TUBE PLACED 10/18/21. PT VERY SOB Acuity: Acute Type of Exam: Initial FINDINGS: Lower Chest: Right chest tube in place. Large pneumothorax partially visualized. Trace left pleural effusion and dependent atelectasis. Moderate size hiatal hernia. Organs: The liver, gallbladder, pancreas, spleen, adrenals and kidneys reveal no acute findings. Bilateral renal cysts. GI/Bowel: Large amount of stool is present in the distal sigmoid and rectum. There is a moderate amount of stool burden in the remainder of the colon and moderate gaseous distention of the colon. No transition point. No small bowel distension. Few scattered distal colonic diverticula. Pelvis: Rectal findings, as above. Lomeli catheter decompresses the bladder. Peritoneum/Retroperitoneum: No free air or free fluid. The aorta is normal in caliber. The visceral branches are patent. Calcified atheromatous plaque is present. No lymphadenopathy. Bones/Soft Tissues: No acute osseous abnormality identified. 1.  Right pneumothorax with chest tube in place. Please see separate chest CT report for details of this region. 2.  Large rectal colorectal stool burden and mild gaseous distention of the colon. XR CHEST PORTABLE    Result Date: 11/4/2021  EXAMINATION: ONE XRAY VIEW OF THE CHEST 11/4/2021 6:50 am COMPARISON: 3 November 2021 HISTORY: ORDERING SYSTEM PROVIDED HISTORY: Effusion/infiltrate TECHNOLOGIST PROVIDED HISTORY: Effusion/infiltrate Reason for Exam: effusion/ infiltrate Acuity: Unknown Type of Exam: Unknown FINDINGS: AP portable view of the chest time stamped at 602 hours demonstrates overlying cardiac monitoring electrodes, stable cardiac size, and a double lumen catheter entering from the right terminating in the right atrium. Heart size is stable.   No pneumothorax is noted.  Continued left effusion with left basilar opacity. Osseous structures are stable. No pneumothorax. No change in left effusion and left basilar opacity. Double lumen catheter in site 2. XR CHEST PORTABLE    Result Date: 11/3/2021  EXAMINATION: ONE XRAY VIEW OF THE CHEST 11/3/2021 11:58 am COMPARISON: 11/02/2021 HISTORY: ORDERING SYSTEM PROVIDED HISTORY: Chest tube/pneumothorax/effusion TECHNOLOGIST PROVIDED HISTORY: Chest tube/pneumothorax/effusion Reason for Exam: pleural effusion, chest tube, pneumothorax Acuity: Chronic Type of Exam: Ongoing FINDINGS: Right-sided central venous catheter remains in place. Right chest tube removed. Heart size stable. No pneumothorax. Persistent opacity left lung base. Right chest tube removed, no pneumothorax Unchanged opacity left lung base could represent atelectasis or infection     XR CHEST PORTABLE    Result Date: 11/2/2021  EXAMINATION: ONE XRAY VIEW OF THE CHEST 11/2/2021 8:09 am COMPARISON: 1 November 2021 HISTORY: ORDERING SYSTEM PROVIDED HISTORY: Chest tube/pneumothorax/effusion TECHNOLOGIST PROVIDED HISTORY: Chest tube/pneumothorax/effusion Reason for Exam: Chest tube eval, AP UPRIGHT PORTABLE Acuity: Acute Type of Exam: Subsequent/Follow-up FINDINGS: AP portable view of the chest time stamped at 744 hours demonstrates overlying cardiac monitoring electrodes. A double lumen catheter entering from the right terminates in the right atrium. Pigtail terminus catheter overlies the right lung base laterally. Pleural thickening and old right-sided rib fractures are noted. No change in right pleural effusion. The patient has a left effusion which is stable. There is no change in left basilar opacity likely a combination of effusion and atelectasis. No extrapleural air is noted. Mediastinal contours are stable. No significant change from prior study.   Indwelling pigtail terminus chest tube bilateral effusions in appearance of probable atelectasis left side. XR CHEST PORTABLE    Result Date: 11/1/2021  EXAMINATION: ONE XRAY VIEW OF THE CHEST 11/1/2021 6:55 pm COMPARISON: 11/01/2021 at 0526 hours HISTORY: ORDERING SYSTEM PROVIDED HISTORY: Pneumothorax TECHNOLOGIST PROVIDED HISTORY: Pneumothorax Reason for Exam: Pneumothorax Acuity: Unknown Type of Exam: Unknown FINDINGS: Heart is enlarged. Right sided hemodialysis catheter location. Right-sided PleurX catheter base. Mild hyperinflation. No recurrent pneumothorax identified. Left basilar opacity likely represent atelectasis scarring and left-sided effusion. Right-sided PleurX catheter. No recurrent pneumothorax identified Cardiomegaly. Small left-sided effusion and atelectasis. XR CHEST PORTABLE    Result Date: 11/1/2021  EXAMINATION: ONE XRAY VIEW OF THE CHEST 11/1/2021 5:42 am COMPARISON: 10/31/2021, 10/30/2021 HISTORY: ORDERING SYSTEM PROVIDED HISTORY: Chest tube/pneumothorax/effusion TECHNOLOGIST PROVIDED HISTORY: Chest tube/pneumothorax/effusion Reason for Exam: chest tube Acuity: Acute Type of Exam: Initial FINDINGS: Pigtail right chest tube remains in place. No evidence for pneumothorax. Tunneled right internal jugular dialysis catheter remains in place. The cardiac and mediastinal contours appear unchanged. Basilar opacities and pleural effusions are again demonstrated without appreciable change. No new airspace disease identified in the interval.  Slight blunting of the left costophrenic angle remains unchanged. No significant effusion on the right. Right chest tube remains in place. No pneumothorax or significant right pleural effusion.      XR CHEST PORTABLE    Result Date: 10/31/2021  EXAMINATION: ONE XRAY VIEW OF THE CHEST 10/31/2021 6:15 am COMPARISON: 30 October 2021 HISTORY: ORDERING SYSTEM PROVIDED HISTORY: Chest tube/pneumothorax/effusion TECHNOLOGIST PROVIDED HISTORY: Chest tube/pneumothorax/effusion Reason for Exam: chest tube,pneumothorax,trouble are stable. The upper abdomen unremarkable. The extrathoracic soft tissues are unremarkable. There is a right internal jugular catheter with the tip at the cavoatrial junction. Left basilar effusion and stable right basilar chest tube. Stable right internal jugular central line. XR CHEST PORTABLE    Result Date: 10/28/2021  EXAMINATION: ONE XRAY VIEW OF THE CHEST 10/28/2021 12:05 pm COMPARISON: October 27, 2021 HISTORY: ORDERING SYSTEM PROVIDED HISTORY: pneumothorax, chest tube TECHNOLOGIST PROVIDED HISTORY: pneumothorax, chest tube Reason for Exam: respiratory distess. pneumothorax. chest tube. Acuity: Unknown Type of Exam: Ongoing FINDINGS: Small caliber pigtail chest tube at the right base stable. No pneumothorax. Dialysis catheter unchanged. Blunted costophrenic angles greater on the left suggests small effusions. This is slightly greater on the left. No new airspace opacities demonstrated. Cardiac and mediastinal silhouettes unremarkable. Osseous structures grossly intact. Telemetry leads overlie the chest.     Probable mild increase in small left-sided effusion with otherwise stable exam.     XR CHEST PORTABLE    Result Date: 10/27/2021  EXAMINATION: ONE XRAY VIEW OF THE CHEST 10/27/2021 6:06 am COMPARISON: 10/26/2021, 10/25/2021, 10/24/2021 HISTORY: ORDERING SYSTEM PROVIDED HISTORY: pneumothorax TECHNOLOGIST PROVIDED HISTORY: pneumothorax Reason for Exam: resp distress Acuity: Acute Type of Exam: Initial FINDINGS: Right internal jugular dialysis catheter remains in place. Pigtail right chest tube remains in place. The cardiac and mediastinal contours appear unchanged. No pneumothorax identified. Blunting of the left costophrenic angle in streaky linear opacities in the lung bases are again demonstrated. No new airspace disease identified in the interval.  No evidence for pneumothorax. Right chest tube remains in place. No pneumothorax identified.      XR CHEST PORTABLE    Result Date: 10/25/2021  EXAMINATION: ONE XRAY VIEW OF THE CHEST 10/25/2021 1:47 pm COMPARISON: AP chest from 10/24/2021, and CT scan of the chest from 10/25/2021 HISTORY: ORDERING SYSTEM PROVIDED HISTORY: Pneumothorax TECHNOLOGIST PROVIDED HISTORY: Pneumothorax Acuity: Acute Type of Exam: Ongoing FINDINGS: Rotated somewhat to the right. Again noted right-sided inferolateral 10 [place] chest tube in unchanged position. Overlying ECG monitor leads, respiratory tubing and gown snaps. Cardiomediastinal shadow stable. Small residual right pneumothorax, best seen lateral costophrenic space. Unchanged left pleural effusion, with compressive atelectasis. Hyperinflated lungs. Bones unchanged. Small bore right chest tube in unchanged position. Smaller but persistent right pneumothorax; otherwise, unchanged findings. Stable left effusion with left lower lobe volume loss. COPD. XR CHEST PORTABLE    Result Date: 10/23/2021  EXAMINATION: ONE XRAY VIEW OF THE CHEST 10/23/2021 8:13 am COMPARISON: Chest radiograph October 22, 2021 and priors. HISTORY: ORDERING SYSTEM PROVIDED HISTORY: Effusion/infiltrate TECHNOLOGIST PROVIDED HISTORY: Effusion/infiltrate Reason for Exam: Respiratory distress Acuity: Unknown Type of Exam: Unknown FINDINGS: Right-sided chest tube is again seen overlying the lateral base. Questionable trace focal pneumothorax laterally in the right base. There is a small left-sided pleural effusion. There is bibasilar airspace consolidation. Cardiac and mediastinal contours are without acute process. Questionable trace focal pneumothorax laterally in the right base near the chest tube. Persistent by a basilar airspace disease with small left pleural effusion.      XR CHEST PORTABLE    Result Date: 10/22/2021  EXAMINATION: ONE XRAY VIEW OF THE CHEST 10/22/2021 6:10 am COMPARISON: October 21, 2021 chest exam HISTORY: ORDERING SYSTEM PROVIDED HISTORY: Chest tube TECHNOLOGIST PROVIDED HISTORY: Chest tube Reason for left pleural effusion. XR CHEST PORTABLE    Result Date: 10/19/2021  EXAMINATION: ONE XRAY VIEW OF THE CHEST 10/19/2021 10:10 am COMPARISON: October 18, 2021 chest exam HISTORY: ORDERING SYSTEM PROVIDED HISTORY: Effusion/chest tube TECHNOLOGIST PROVIDED HISTORY: Effusion/chest tube Reason for Exam: Cough and SOB. Acuity: Acute Type of Exam: Unknown FINDINGS: Stable cardiopericardial silhouette/mild tortuosity of the thoracic aorta Emphysematous changes of the lungs Stable right pleural catheter No definite change in bibasilar opacities. No pneumothorax     Stable position/appearance of right pleural catheter stable bibasilar opacities related to combined pleural-parenchymal processes     XR CHEST PORTABLE    Result Date: 10/18/2021  EXAMINATION: ONE XRAY VIEW OF THE CHEST 10/18/2021 8:21 pm COMPARISON: 10/16/2021, 10/15/2021 HISTORY: ORDERING SYSTEM PROVIDED HISTORY: incr FiO2 needs TECHNOLOGIST PROVIDED HISTORY: incr FiO2 needs Reason for Exam: Increased FiO2 needs Acuity: Unknown Type of Exam: Ongoing FINDINGS: Stable cardiomegaly. Right-sided pigtail pleural catheter in place. Decreased right pleural effusion. Unchanged left pleural effusion. No pneumothorax. Right-sided pleural catheter in place Decreased right pleural effusion No pneumothorax. Unchanged left pleural effusion     XR CHEST PORTABLE    Result Date: 10/15/2021  EXAMINATION: ONE XRAY VIEW OF THE CHEST 10/15/2021 7:03 am COMPARISON: Chest radiograph performed 10/14/2021. HISTORY: ORDERING SYSTEM PROVIDED HISTORY: Effusion/infiltrate TECHNOLOGIST PROVIDED HISTORY: Effusion/infiltrate Reason for Exam: effusion/infiltrate Acuity: Unknown Type of Exam: Unknown FINDINGS: There are bilateral effusions with adjacent infiltrates. There is no pneumothorax. The mediastinal structures are unremarkable. The upper abdomen unremarkable. The extrathoracic soft tissues are unremarkable.      Bilateral effusions with adjacent infiltrates concerning for pneumonia. XR CHEST PORTABLE    Result Date: 10/14/2021  EXAMINATION: ONE XRAY VIEW OF THE CHEST 10/14/2021 8:40 am COMPARISON: October 13, 2021 HISTORY: ORDERING SYSTEM PROVIDED HISTORY: on high flow, Acute respiratory failure TECHNOLOGIST PROVIDED HISTORY: on high flow, Acute respiratory failure Reason for Exam: Acute resp failure, AP UPRIGHT PORTABLE Acuity: Acute Type of Exam: Subsequent/Follow-up FINDINGS: There has been interval increase in the right pleural effusion, moderate in degree. There is persistent rightward mediastinal shift indicating some degree of right lung atelectasis/collapse despite the presence of pleural effusion. Small left pleural effusion and left basilar parenchymal opacification unchanged probably atelectasis. No pneumothorax. Stable cardiomediastinal silhouette. Interstitial edema persists. Interval increase in the recurrent at least moderate right pleural effusion. However, there is rightward mediastinal shift despite the presence of right pleural effusion, indicating right lung atelectasis/collapse suggestive of trapped lung syndrome. Small left pleural effusion with probable left basilar atelectasis. XR CHEST PORTABLE    Result Date: 10/13/2021  EXAMINATION: ONE XRAY VIEW OF THE CHEST 10/13/2021 2:20 pm COMPARISON: Chest radiograph dated 10/12/2021. HISTORY: ORDERING SYSTEM PROVIDED HISTORY: worsening hypoxia TECHNOLOGIST PROVIDED HISTORY: worsening hypoxia Reason for Exam: worsening hypoxia. respiratory distress. Acuity: Chronic Type of Exam: Ongoing FINDINGS: There has been interval decrease in size of pleural effusion now moderate in size. There is redemonstration of small left pleural effusion. Opacity of the bilateral lower lung fields are favored to reflect underlying atelectasis. Correlate clinically with signs of pneumonia. No definite pneumothorax is identified. The apparent thin line overlying right lung apex may reflect overlying skin fold.   The lung markings are seen lateral to the line. Interval decrease in size of right-sided pleural effusion now moderate in size. Small left pleural effusion. Opacity of bilateral lower lung fields may reflect underlying atelectasis. Apparent thin line overlying the right lung apex may be artifactual related to overlying skin fold. The lung markings are seen lateral to the aforementioned line. XR CHEST PORTABLE    Result Date: 10/12/2021  EXAMINATION: ONE XRAY VIEW OF THE CHEST 10/12/2021 3:55 pm COMPARISON: None. HISTORY: ORDERING SYSTEM PROVIDED HISTORY: dyspnea TECHNOLOGIST PROVIDED HISTORY: dyspnea Reason for Exam: respiratory distress Acuity: Acute Type of Exam: Initial FINDINGS: Right cardiomediastinal border is obscured. No definite cardiomegaly. Calcifications noted of the aortic arch. Moderate to large right pleural effusion and associated airspace disease. Mild left basilar airspace disease. Trace left pleural effusion cannot be excluded. No pneumothorax or subdiaphragmatic free air. No acute osseous abnormality identified. Significant right pleural effusion and associated airspace disease. Mild left basilar airspace disease. Pneumonia cannot be excluded. XR CHEST 1 VIEW    Result Date: 10/26/2021  EXAMINATION: ONE XRAY VIEW OF THE CHEST 10/26/2021 9:34 am COMPARISON: Chest x-ray dated 25 October 2021 HISTORY: ORDERING SYSTEM PROVIDED HISTORY: s/p chest tube ex change TECHNOLOGIST PROVIDED HISTORY: s/p chest tube ex change Reason for Exam: S/P chest tube and line placement Acuity: Acute Type of Exam: Initial FINDINGS: There has been interval placement of a tunneled dialysis catheter with tip in the right atrium, appropriate position. There has been interval up sizing of right basilar chest tube. Left lung is stable with basilar atelectasis and/or scarring with possibly small left effusion, stable. The right lung is hyperinflated.   Previously identified small pneumothorax at the right base is no longer seen. No large pneumothorax present. Lung markings are seen to the chest wall in the apex. Cardiomediastinal contour is stable. Severe atherosclerosis noted. Interval placement of tunneled dialysis catheter with tip in appropriate position. Small right basilar pneumothorax no longer present. Chest tube upsize with tip in right base. Attention for pneumothorax on serial portable. .     CT CHEST PULMONARY EMBOLISM W CONTRAST    Addendum Date: 10/25/2021    ADDENDUM: Findings were discussed with IVA SURESH at 1:30 pm on 10/25/2021. Result Date: 10/25/2021  EXAMINATION: CTA OF THE CHEST 10/25/2021 12:26 pm TECHNIQUE: CTA of the chest was performed after the administration of intravenous contrast.  Multiplanar reformatted images are provided for review. MIP images are provided for review. Dose modulation, iterative reconstruction, and/or weight based adjustment of the mA/kV was utilized to reduce the radiation dose to as low as reasonably achievable. COMPARISON: CT chest performed 10/14/2021. HISTORY: ORDERING SYSTEM PROVIDED HISTORY: assess for PE and also possible infection TECHNOLOGIST PROVIDED HISTORY: Spoke to renal-they are fine with her getting iv dye as she will be starting dialysis tomorrow assess for PE and also possible infection FINDINGS: Pulmonary Arteries: Pulmonary arteries are adequately opacified for evaluation. No evidence of intraluminal filling defect to suggest pulmonary embolism. Main pulmonary artery is normal in caliber. Mediastinum: No evidence of mediastinal lymphadenopathy. The heart and pericardium demonstrate no acute abnormality. There is no acute abnormality of the thoracic aorta. Lungs/pleura: There is a small loculated left sided effusion with minimal adjacent basilar consolidation. There is minimal consolidation the right lower lobe. There is a moderate to large right-sided pneumothorax.   There is an indwelling pigtail drain in the right lung base.  The tracheobronchial tree is patent. Upper Abdomen: Limited images of the upper abdomen are unremarkable. There is a hiatal hernia. Soft Tissues/Bones: No acute bone or soft tissue abnormality. Loculated left basilar effusion with adjacent consolidation representing atelectasis versus pneumonia. Focus of consolidation in the right lower lobe representing atelectasis versus pneumonia. Moderate to large right-sided pneumothorax with an indwelling pigtail catheter in the right basilar pleural space. IR TUNNELED CVC PLACE WO SQ PORT/PUMP > 5 YEARS    Result Date: 10/26/2021  PROCEDURE: ULTRASOUND GUIDED VASCULAR ACCESS. FLUOROSCOPY GUIDED PLACEMENT OF A TUNNELED CATHETER. MODERATE CONSCIOUS SEDATION 10/26/2021. HISTORY: ORDERING SYSTEM PROVIDED HISTORY: dialysis access TECHNOLOGIST PROVIDED HISTORY: dialysis access IR to place a tunneled dialysis catheter How many lumens are being requested?->2 What side should this line be placed?->Right What site is the preferred site?->Other (Comment) SEDATION: 0.5 mg IVversed and 50 mcg IV fentanyl were titrated intravenously for moderate sedation monitored under my direction. Total intraservice time of sedation was 15 minutes. The patient's vital signs were monitored throughout the procedure and recorded in the patient's medical record by the nurse. Sedation monitoring started at 8:50 a.m. and ended at 9:05 a.m. Noelle Copping FLUOROSCOPY DOSE AND TYPE OR TIME AND EXPOSURES: Images: 5 images sent to PACS Fluoroscopy time: 0.7 minutes Fluoroscopic dose: 980 DAP TECHNIQUE: Informed consent was obtained after a detailed explanation of the procedure including risks, benefits, and alternatives. Universal protocol was observed. A time-out was performed prior to commencing the procedure. The right neck and chest were prepped and draped in sterile fashion using maximum sterile barrier technique. Local anesthesia was achieved with lidocaine.  A micropuncture needle was used to access the right internal jugular vein using ultrasound guidance. An ultrasound image demonstrating patency of the vein with needle tip located within it. An image was obtained and stored in PACs. A 018 wire was advanced to the right atrium and used to measure intravascular distance. A transitional dilator was placed and a 0.035 guidewire was used to place a peel-away sheath. A subcutaneous tunnel was created to the infraclavicular region and a 14.5 Camb[place]ian 23 cm tip to cuff palindrome tunneled dialysis catheter was pulled through the subcutaneous tunnel to the venotomy site and advanced through the peel-away sheath under fluoroscopic guidance to the right atrium. The catheter flushed easily and there was a good blood return. The catheter was sutured to the skin. The catheter was locked with heparinized saline. The patient tolerated the procedure well and there were no immediate complications. FINDINGS: Fluoroscopic image demonstrates the tip of the catheter in the right atrium. Successful ultrasound and fluoroscopy guided tunneled catheter placement . The tunneled dialysis catheter may be used immediately.      VL Lower Extremity Bilateral Venous Duplex    Result Date: 10/22/2021    [facility]  Vascular Lower Extremities DVT Study Procedure   Patient Name  Orrosa maria 39     Date of Study           10/21/2021                Tai Tripathi   Date of Birth 1945  Gender                  Female   Age           64 year(s)  Race                       Room Number   1012        Height:                 64.17 inch, 163 cm   Corporate ID  [id number]    Weight:                 139 pounds, 63.1 kg  #   Patient Acct  [de-identified]   BSA:        1.68 m^2    BMI:       23.73 kg/m^2  #   MR #          [id number]     Sonographer             Reyna Schroeder   Accession #   [id number]  Interpreting Physician  Chadd Ward   Referring                 Referring Physician     Dennie Cookey,  Nurse  Practitioner  Procedure Type of Study:   Veins: Lower Extremities DVT Study, Venous Scan Lower Bilateral.  Indications for Study:Pain, leg. Patient Status: In Patient. Technical Quality:Good visualization. Conclusions   Summary   No evidence of superficial or deep venous thrombosis in both lower  extremities. Signature   ----------------------------------------------------------------  Electronically signed by Reyna Schroeder(Sonographer) on  10/21/2021 04:27 PM  ----------------------------------------------------------------   ----------------------------------------------------------------  Electronically signed by Chadd Ward(Interpreting physician)  on 10/22/2021 09:58 AM  ----------------------------------------------------------------  Findings:   Right Impression:                    Left Impression:  The common femoral, femoral,         The common femoral, femoral,  popliteal, tibials and saphenous     popliteal, tibials and saphenous  veins are compressible with normal   veins are compressible with normal  doppler responses. doppler responses. Velocities are measured in cm/s ; Diameters are measured in cm Right Lower Extremities DVT Study Measurements Right 2D Measurements +------------------------------------+----------+---------------+----------+ ! Location                            ! Visualized! Compressibility! Thrombosis! +------------------------------------+----------+---------------+----------+ ! Common Femoral                      !Yes       ! Yes            ! None      ! +------------------------------------+----------+---------------+----------+ ! Prox Femoral                        !Yes       ! Yes            ! None      ! +------------------------------------+----------+---------------+----------+ ! Mid Femoral                         !Yes       ! Yes            ! None      ! +------------------------------------+----------+---------------+----------+ ! Dist Femoral                        !Yes       ! Yes !None      ! +------------------------------------+----------+---------------+----------+ ! Deep Femoral                        !Yes       ! Yes            ! None      ! +------------------------------------+----------+---------------+----------+ ! Popliteal                           !Yes       ! Yes            ! None      ! +------------------------------------+----------+---------------+----------+ ! Sapheno Femoral Junction            ! Yes       ! Yes            ! None      ! +------------------------------------+----------+---------------+----------+ ! PTV                                 ! Yes       ! Yes            ! None      ! +------------------------------------+----------+---------------+----------+ ! Peroneal                            !Yes       ! Yes            ! None      ! +------------------------------------+----------+---------------+----------+ ! Gastroc                             ! Yes       ! Yes            ! None      ! +------------------------------------+----------+---------------+----------+ ! GSV Thigh                           ! Yes       ! Yes            ! None      ! +------------------------------------+----------+---------------+----------+ ! GSV Knee                            ! Yes       ! Yes            ! None      ! +------------------------------------+----------+---------------+----------+ ! GSV Ankle                           ! Yes       ! Yes            ! None      ! +------------------------------------+----------+---------------+----------+ ! SSV                                 ! Yes       ! Yes            ! None      ! +------------------------------------+----------+---------------+----------+ Right Doppler Measurements +---------------------------+------+------+--------------------------------+ ! Location                   ! Signal!Reflux! Reflux (msec)                   ! +---------------------------+------+------+--------------------------------+ ! Common Femoral             !Phasic!      ! ! +---------------------------+------+------+--------------------------------+ ! Prox Femoral               !Phasic!      !                                ! +---------------------------+------+------+--------------------------------+ ! Popliteal                  !Phasic!      !                                ! +---------------------------+------+------+--------------------------------+ Left Lower Extremities DVT Study Measurements Left 2D Measurements +------------------------------------+----------+---------------+----------+ ! Location                            ! Visualized! Compressibility! Thrombosis! +------------------------------------+----------+---------------+----------+ ! Common Femoral                      !Yes       ! Yes            ! None      ! +------------------------------------+----------+---------------+----------+ ! Prox Femoral                        !Yes       ! Yes            ! None      ! +------------------------------------+----------+---------------+----------+ ! Mid Femoral                         !Yes       ! Yes            ! None      ! +------------------------------------+----------+---------------+----------+ ! Dist Femoral                        !Yes       ! Yes            ! None      ! +------------------------------------+----------+---------------+----------+ ! Deep Femoral                        !Yes       ! Yes            ! None      ! +------------------------------------+----------+---------------+----------+ ! Popliteal                           !Yes       ! Yes            ! None      ! +------------------------------------+----------+---------------+----------+ ! Sapheno Femoral Junction            ! Yes       ! Yes            ! None      ! +------------------------------------+----------+---------------+----------+ ! PTV                                 ! Yes       ! Yes            ! None      ! +------------------------------------+----------+---------------+----------+ ! Iain !Yes       !Yes            ! None      ! +------------------------------------+----------+---------------+----------+ ! Gastroc                             ! Yes       ! Yes            ! None      ! +------------------------------------+----------+---------------+----------+ ! GSV Thigh                           ! Yes       ! Yes            ! None      ! +------------------------------------+----------+---------------+----------+ ! GSV Knee                            ! Yes       ! Yes            ! None      ! +------------------------------------+----------+---------------+----------+ ! GSV Ankle                           ! Yes       ! Yes            ! None      ! +------------------------------------+----------+---------------+----------+ ! SSV                                 ! Yes       ! Yes            ! None      ! +------------------------------------+----------+---------------+----------+ Left Doppler Measurements +---------------------------+------+------+--------------------------------+ ! Location                   ! Signal!Reflux! Reflux (msec)                   ! +---------------------------+------+------+--------------------------------+ ! Common Femoral             !Phasic!      !                                ! +---------------------------+------+------+--------------------------------+ ! Prox Femoral               !Phasic!      !                                ! +---------------------------+------+------+--------------------------------+ ! Popliteal                  !Phasic!      !                                ! +---------------------------+------+------+--------------------------------+    IR CHEST TUBE INSERTION    Result Date: 10/18/2021  PROCEDURE: FLUOROSCOPY AND ULTRASOUND GUIDED CHEST TUBE PLACEMENT 10/18/2021 HISTORY: ORDERING SYSTEM PROVIDED HISTORY: pigtail catheter TECHNOLOGIST PROVIDED HISTORY: pigtail catheter Which side should the procedure be performed?->Right Bilateral loculated pleural Images: Single fluoroscopic image sent to PACS DESCRIPTION OF PROCEDURE: Informed consent was obtained after a detailed explanation of the procedure including risks, benefits, and alternatives. Universal protocol was observed. A time-out was performed prior to the procedure. The drain and suture were cut. An Amplatz wire was inserted and coiled in the right lung base. After serial dilation a new 16 [place]ohra multipurpose drain was advanced, looped, and locked in the right base. It was sutured in place with 2 0 Ethilon. A sterile dressing was applied. Is attached to wall suction at which time air bubbles were seen suctioned. Patient's hypoxia improved from 84% to 94% after attachment to wall suction. FINDINGS: Interval drain exchange with new 16 [place]ian drain in the right base. Chest tube upsized to 16 [place] Zohra. Attached to wall suction at which time air was aspirated consistent with pneumothorax. Patient hypoxia improved from 84 to 94%. These findings were communicated to the ICU doctor in person at 9:45 a.m.. IR GUIDED THORACENTESIS PLEURAL    Result Date: 10/13/2021  PROCEDURE: ULTRASOUNDGUIDED RIGHT THORACENTESIS 10/13/2021 HISTORY: ORDERING SYSTEM PROVIDED HISTORY: pleural effusion TECHNOLOGIST PROVIDED HISTORY: pleural effusion Which side should the procedure be performed?->Right TECHNIQUE: Informed consent was obtained after a detailed explanation of the procedure including risks, benefits, and alternatives. Universal protocol was performed. While the patient was seated upright, after localizing, marking and anesthetizing the posterior right lower chest with one percent lidocaine, a 5 Western Zohra Yueh needle was advanced under live sonography. Sonogram image confirmed safe tract access and satisfactory needle tip position. The catheter was advanced and the needle was removed. The catheter was connected to a Vacutainer bottle and 850 mL non turbid light yellow fluid was drained.  At this point, the patient started to cough persistently and declined any further drainage. The catheter was removed and the site was dressed appropriately. A sample was sent for laboratory analysis. The patient tolerated the procedure well and left the Department in stable condition. Dr. Karli Quijano was present. FINDINGS: A total of 850 mL was removed. Successful ultrasound guided diagnostic and therapeutic right thoracentesis. Consultations:    Consults:     Final Specialist Recommendations/Findings:   IP CONSULT TO NEPHROLOGY  IP CONSULT TO SOCIAL WORK  IP CONSULT TO PULMONOLOGY  IP CONSULT TO INFECTIOUS DISEASES        Discharged Condition:    Stable     Disposition: skilled nursing facility    Physician Follow Up:   No follow-up provider specified.      Activity:  activity as tolerated    Diet:  cardiac diet     Discharge Medications:      Medication List      START taking these medications    Arformoterol Tartrate 15 MCG/2ML Nebu  Commonly known as: BROVANA  Take 2 mLs by nebulization 2 times daily     budesonide 0.5 MG/2ML nebulizer suspension  Commonly known as: PULMICORT  Take 2 mLs by nebulization 2 times daily     calcitRIOL 0.25 MCG capsule  Commonly known as: ROCALTROL  Take 1 capsule by mouth three times a week     epoetin mike-epbx 4000 UNIT/ML Soln injection  Commonly known as: RETACRIT  Inject 2 mLs into the skin three times a week     ipratropium-albuterol 0.5-2.5 (3) MG/3ML Soln nebulizer solution  Commonly known as: DUONEB  Inhale 3 mLs into the lungs 3 times daily     melatonin 3 MG Tabs tablet  Take 1 tablet by mouth nightly as needed (Insomnia)     midodrine 10 MG tablet  Commonly known as: PROAMATINE  Take 1 tablet by mouth 3 times daily as needed (for SBP < 100)     predniSONE 20 MG tablet  Commonly known as: DELTASONE  Take 1 tablet by mouth daily for 10 days  Start taking on: November 5, 2021     vitamin D 1.25 MG (15717 UT) Caps capsule  Commonly known as: ERGOCALCIFEROL  Take 1 capsule by mouth once a week        CHANGE how you take these medications    metoprolol tartrate 25 MG tablet  Commonly known as: LOPRESSOR  Take 0.5 tablets by mouth 2 times daily  What changed:   · medication strength  · how much to take        CONTINUE taking these medications    folic acid 507 MCG tablet  Commonly known as: FOLVITE     magnesium oxide 400 MG tablet  Commonly known as: MAG-OX        STOP taking these medications    amLODIPine 5 MG tablet  Commonly known as: NORVASC     Tylenol PM Extra Strength  MG tablet  Generic drug: diphenhydrAMINE-APAP (sleep)           Where to Get Your Medications      These medications were sent to [address] [place] [address]. [place] [id number] [phone]  [address] [place] [address], [address] [place] [address]    Phone: [phone]   · Arformoterol Tartrate 15 MCG/2ML Nebu  · budesonide 0.5 MG/2ML nebulizer suspension  · ipratropium-albuterol 0.5-2.5 (3) MG/3ML Soln nebulizer solution  · metoprolol tartrate 25 MG tablet     Information about where to get these medications is not yet available    Ask your nurse or doctor about these medications  · calcitRIOL 0.25 MCG capsule  · epoetin mike-epbx 4000 UNIT/ML Soln injection  · melatonin 3 MG Tabs tablet  · midodrine 10 MG tablet  · predniSONE 20 MG tablet  · vitamin D 1.25 MG (12885 UT) Caps capsule         Time Spent on discharge is  35 mins in patient examination, evaluation, counseling, medication reconciliation, discharge plan and follow up. Electronically signed by   Yancy Bauer DO  11/4/2021  9:19 PM      Thank you Dr. Suzan Esparza MD for the opportunity to be involved in this patient's care.

## 2021-11-05 NOTE — PROGRESS NOTES
Physical Therapy  Facility/Department: [facility] PROGRESSIVE CARE  Daily Treatment Note  NAME: Giovanna Bai  : 1945  MRN: [id number]    Date of Service: 2021    Discharge Recommendations:  Patient would benefit from continued therapy after discharge   Pt currently functioning below baseline. Would suggest additional therapy at time of discharge to maximize long term outcomes and prevent re-admission. Please refer to AM-PAC score for current level of function. PT Equipment Recommendations  Equipment Needed: No    Assessment   Body structures, Functions, Activity limitations: Decreased balance;Decreased functional mobility ; Decreased safe awareness;Decreased ADL status; Decreased strength;Decreased endurance;Decreased posture  Assessment: Patient required decrease assistance during standing and STS with no buckling or Lt LE weakness noted during standing. Patient did still requried Mod x 2 A and the [facility] marisa for transfer. Patient would benefit from continued skilled PT services to optimize return to PLOF and decrease fall risk. Prognosis: Good  Decision Making: High Complexity  PT Education: Goals;PT Role; Injury Prevention; Energy Conservation  Patient Education: Patient edcuated on deep breathing  REQUIRES PT FOLLOW UP: Yes  Activity Tolerance  Activity Tolerance: Patient limited by endurance; Patient limited by fatigue  Activity Tolerance: Patient with decreased tolerance to activities and requires increased time for motion with SpO2 maintained above 90% this visit. Patient Diagnosis(es): The primary encounter diagnosis was Hypoxia. Diagnoses of Pleural effusion on right, Peripheral edema, Anemia, unspecified type, and Acute kidney injury [facility]) were also pertinent to this visit. has a past medical history of Pulmonary hypertension ([facility] [address]    has a past surgical history that includes IR CHEST TUBE INSERTION (10/18/2021) and IR TUNNELED CVC PLACE WO SQ PORT/PUMP > 5 YEARS (10/26/2021). Restrictions  Restrictions/Precautions  Restrictions/Precautions: General Precautions, Fall Risk, Up as Tolerated  Required Braces or Orthoses?: No  Position Activity Restriction  Other position/activity restrictions: Up with assist, telemetry, gallegos cath, O2 NC 5L, R chest tube, 1500 mL fluid restriction, heels off bed at all times  Subjective   General  Chart Reviewed: Yes  Response To Previous Treatment: Patient with no complaints from previous session. Family / Caregiver Present: No  Subjective  Subjective: Patient is agreeable for PT with min encouragement. General Comment  Comments: Marilyn Hillman RN reports patient medically appropriate for PT. RN would like patient up to chair if possible. Orientation  Orientation  Overall Orientation Status: Within Functional Limits  Cognition   Cognition  Overall Cognitive Status: Exceptions  Arousal/Alertness: Appropriate responses to stimuli  Following Commands: Follows one step commands with increased time; Follows one step commands with repetition  Attention Span: Appears intact  Memory: Appears intact  Safety Judgement: Decreased awareness of need for safety;Decreased awareness of need for assistance  Problem Solving: Decreased awareness of errors;Assistance required to correct errors made;Assistance required to identify errors made;Assistance required to implement solutions;Assistance required to generate solutions  Insights: Decreased awareness of deficits  Initiation: Requires cues for some  Sequencing: Requires cues for some  Cognition Comment: Pt. pleasant and cooperative with treatment  Objective   Bed mobility  Bridging: Moderate assistance;2 Person assistance  Rolling to Left: Moderate assistance;2 Person assistance  Rolling to Right: Moderate assistance;2 Person assistance  Supine to Sit: Moderate assistance;2 Person assistance  Sit to Supine: Moderate assistance;2 Person assistance  Scooting:  Moderate assistance;2 Person assistance  Comment: Patient required VC and tactile cue  Transfers  Sit to Stand: Moderate Assistance;2 Person Assistance  Stand to sit: Moderate Assistance;2 Person Assistance  Bed to Chair: Dependent/Total  Stand Pivot Transfers: Dependent/Total  Lateral Transfers: Dependent/Total  Comment: Initially attempted sit to stand with RW, but patient unable to clear bed. Deangelo Easleys Stedy for safety and support with good results, patient required Mod x 2A for STS transfer from EOB for support, stability and motion initiation. Patient transferred to bedside chair within [facility], no Lt LE weakness or buckling noted. Ambulation  Ambulation?: No  Neuromuscular Education  NDT Treatment: Sitting;Standing  Neuromuscular Comments: .neuro  Balance  Posture: Poor  Sitting - Static: Good  Sitting - Dynamic: Fair;+  Standing - Static: Fair;-  Standing - Dynamic: Poor  Comments: Standing balance assessed within [facility] stedy  Exercises  Comments: Educated and  patient performs LUKE LE x 10 circulation ex's, pursed lip breathing to increase control of breathing. Initiated by breathing through the nose and blowing out with pursed lip to increase O2 into lungs & LE ex's to move what moves to maintain strength & joint congruency     AM-PAC Score  AM-PAC Inpatient Mobility Raw Score : 9 (11/04/21 1521)  AM-PAC Inpatient T-Scale Score : 30.55 (11/04/21 1521)  Mobility Inpatient CMS 0-100% Score: 81.38 (11/04/21 1521)  Mobility Inpatient CMS G-Code Modifier : CM (11/04/21 1521)          Goals  Short term goals  Time Frame for Short term goals: 12 visits  Short term goal 1: Patient will be min assist for bed mobility. Short term goal 2: Patient will be min assist for transfers. Short term goal 3: Patient will amb 50 feet with RW and mod assist.  Short term goal 4: Patient will tolerate 30 minutes of ther-ex and ther-act. Short term goal 5: Patient will be indep with HEP.   Patient Goals   Patient goals : Improve breathing    Plan Plan  Times per week: 1-2x/d, 5-6 d/wk  Current Treatment Recommendations: Strengthening, Balance Training, Functional Mobility Training, Transfer Training, Gait Training, Endurance Training, Patient/Caregiver Education & Training, Home Exercise Program, Safety Education & Training  Safety Devices  Type of devices: Nurse notified, Gait belt, Call light within reach, All fall risk precautions in place, Left in chair, Chair alarm in place     Therapy Time   Individual Concurrent Group Co-treatment   Time In       1123   Time Out       1150   Minutes       27        Co-treatment with OT warranted secondary to decreased safety and independence requiring 2 skilled therapy professionals to address individual discipline's goals. PT addressing pre gait trunk strengthening, weight shifting prior to transfers, transfer training and postural control in sitting.     Lorrie Nunez, PTA

## 2021-11-15 LAB
CULTURE: NORMAL
Lab: NORMAL
SPECIMEN DESCRIPTION: NORMAL

## 2021-11-29 LAB
CULTURE: NORMAL
DIRECT EXAM: NORMAL
Lab: NORMAL
SPECIMEN DESCRIPTION: NORMAL

## 2023-06-20 NOTE — PROGRESS NOTES
Dialysis Safety Checks:    Patient ID Verified (Y/N) y     -Hepatitis Test                   Date      Result  Hepatitis B Surface Antigen   10/25/21 Neg     Hepatitis B Surface Antibody 10/25/21 neg     Hepatitis B Core Antibody        -Treatment Initiation  Blood Vol Processed Goal (Liters)  Pump Speed x Treatment Hours x 60 Minutes    Target Fluid Removal 1kilo as karey     * Intra-treatment documented Safety Checks include the followin) Access and face visible at all times. 2) All connections and blood lines are secure with no kinks. 3) NVL alarm engaged. 4) Hemosafe device applied (if applicable). 5) No collapse of Arterial or Venous blood chambers. 6) All blood lines / pump segments in the air detectors.   -------------------------------------------------------------------------------- pended